# Patient Record
Sex: FEMALE | Race: BLACK OR AFRICAN AMERICAN | NOT HISPANIC OR LATINO | Employment: OTHER | URBAN - METROPOLITAN AREA
[De-identification: names, ages, dates, MRNs, and addresses within clinical notes are randomized per-mention and may not be internally consistent; named-entity substitution may affect disease eponyms.]

---

## 2017-01-19 ENCOUNTER — OFFICE VISIT (OUTPATIENT)
Dept: FAMILY MEDICINE | Facility: CLINIC | Age: 62
End: 2017-01-19
Payer: COMMERCIAL

## 2017-01-19 VITALS
DIASTOLIC BLOOD PRESSURE: 90 MMHG | WEIGHT: 196.13 LBS | HEART RATE: 76 BPM | OXYGEN SATURATION: 99 % | SYSTOLIC BLOOD PRESSURE: 140 MMHG | BODY MASS INDEX: 31.52 KG/M2 | HEIGHT: 66 IN | TEMPERATURE: 99 F

## 2017-01-19 DIAGNOSIS — I10 ESSENTIAL HYPERTENSION: Primary | ICD-10-CM

## 2017-01-19 DIAGNOSIS — L30.9 ECZEMA, UNSPECIFIED TYPE: ICD-10-CM

## 2017-01-19 DIAGNOSIS — K21.9 GASTROESOPHAGEAL REFLUX DISEASE, ESOPHAGITIS PRESENCE NOT SPECIFIED: ICD-10-CM

## 2017-01-19 DIAGNOSIS — R73.03 PREDIABETES: ICD-10-CM

## 2017-01-19 PROCEDURE — 99999 PR PBB SHADOW E&M-EST. PATIENT-LVL III: CPT | Mod: PBBFAC,,, | Performed by: FAMILY MEDICINE

## 2017-01-19 PROCEDURE — 1159F MED LIST DOCD IN RCRD: CPT | Mod: S$GLB,,, | Performed by: FAMILY MEDICINE

## 2017-01-19 PROCEDURE — 99214 OFFICE O/P EST MOD 30 MIN: CPT | Mod: S$GLB,,, | Performed by: FAMILY MEDICINE

## 2017-01-19 PROCEDURE — 3080F DIAST BP >= 90 MM HG: CPT | Mod: S$GLB,,, | Performed by: FAMILY MEDICINE

## 2017-01-19 PROCEDURE — 3077F SYST BP >= 140 MM HG: CPT | Mod: S$GLB,,, | Performed by: FAMILY MEDICINE

## 2017-01-19 RX ORDER — LOSARTAN POTASSIUM AND HYDROCHLOROTHIAZIDE 25; 100 MG/1; MG/1
1 TABLET ORAL DAILY
Qty: 90 TABLET | Refills: 4 | Status: SHIPPED | OUTPATIENT
Start: 2017-01-19 | End: 2018-04-20 | Stop reason: ALTCHOICE

## 2017-01-19 RX ORDER — HYDROCORTISONE 25 MG/G
OINTMENT TOPICAL 2 TIMES DAILY
Qty: 20 G | Refills: 0 | Status: SHIPPED | OUTPATIENT
Start: 2017-01-19 | End: 2017-05-01

## 2017-01-19 NOTE — PATIENT INSTRUCTIONS
Exercise for a Healthier Heart  You may wonder how you can improve the health of your heart. If youre thinking about exercise, youre on the right track. You dont need to become an athlete, but you do need a certain amount of brisk exercise to help strengthen your heart. If you have been diagnosed with a heart condition, your doctor may recommend exercise to help stabilize your condition. To help make exercise a habit, choose safe, fun activities.     Exercise with a friend. When activity is fun, you're more likely to stick with it.     Be sure to check with your healthcare provider before starting an exercise program.   Why exercise?  Exercising regularly offers many healthy rewards. It can help you do all of the following:  · Improve your blood cholesterol level to help prevent further heart trouble  · Lower your blood pressure to help prevent a stroke or heart attack  · Control diabetes, or reduce your risk of getting this disease  · Improve your heart and lung function  · Reach and maintain a healthy weight  · Make your muscles stronger and more limber so you can stay active  · Prevent falls and fractures by slowing the loss of bone mass (osteoporosis)  · Manage stress better  · Reduce your blood pressure  · Improve your sense of self and your body image  Exercise tips  Ease into your routine. Set small goals. Then build on them.  Exercise on most days. Aim for a total of 150 or more minutes of moderate to  vigorous intensity activity each week. Consider 40 minutes, 3 to 4 times a week. For best results, activity should last for 40 minutes on average. It is OK to work up to the 40 minute period over time. Examples of moderate-intensity activity is walking 1 mile in 15 minutes or 30 to 45 minutes of yard work.  Step up your daily activity level. Along with your exercise program, try being more active throughout the day. Walk instead of drive. Do more household tasks or yard work.  Choose one or more  activities you enjoy. Walking is one of the easiest things you can do. You can also try swimming, riding a bike, dancing, or taking an exercise class.  Stop exercising and call your doctor if you:  · Have chest pain or feel dizzy or lightheaded  · Feel burning, tightness, pressure, or heaviness in your chest, neck, shoulders, back, or arms  · Have unusual shortness of breath  · Have increased joint or muscle pain  · Have palpitations or an irregular heartbeat   © 9025-7399 [employer]. [address] [address]. All rights reserved. This information is not intended as a substitute for professional medical care. Always follow your healthcare professional's instructions.

## 2017-01-19 NOTE — PROGRESS NOTES
Routine Office Visit    Patient Name: Fauzia Kirk    : 1955  MRN: [id number]    Subjective:  Fauzia is a 61 y.o. female who presents today for     1. Hypertension - pt is here for prescription refill. She states blood pressure is better at home but becomes more elevated when she is at the doctor's office. Pt states during her surgery - there was concern for tachycardia. She would like to have her heart checked out.   2. gerd - pt c/o severe symptoms. She states that she has severe pain and reflux. The prilosec was working for her but when she saw the commercials on TV she stopped taking the prilosec. She has been taking otc zantac with minimal relief of symptoms. She would like to know what the next options were. She is scared of the side effects of prilosec.   3. Nagging hip pain - on left side - pain is intermittent. No aggravating / alleviating factors.   4. Rash on nose - chronic condition for patient. She does not know how it started but states she has had it for quite some time. It causes patient irritation         Review of Systems   Constitutional: Negative for chills and fever.   HENT: Negative for congestion.    Eyes: Negative for blurred vision.   Respiratory: Negative for cough.    Cardiovascular: Negative for chest pain.   Gastrointestinal: Positive for heartburn. Negative for abdominal pain, constipation, diarrhea, nausea and vomiting.   Genitourinary: Negative for dysuria.   Musculoskeletal: Negative for myalgias.   Skin: Positive for itching and rash.   Neurological: Negative for dizziness and headaches.   Psychiatric/Behavioral: Negative for depression.       Active Problem List  Patient Active Problem List   Diagnosis    Hypertension    History of left breast cancer    s/p L mastectomy, SLNBx     Ductal carcinoma in situ (DCIS) of left breast       Past Surgical History  Past Surgical History   Procedure Laterality Date    Mastectomy      Breast lumpectomy        "section, classic      Lipoma resection         Family History  Family History   Problem Relation Age of Onset    Lymphoma Mother     Prostate cancer Father     Stomach cancer Sister     Breast cancer Sister     Diabetes Brother     Breast cancer Paternal Aunt     Breast cancer Paternal Grandmother        Social History  Social History     Social History    Marital status:      Spouse name: N/A    Number of children: N/A    Years of education: N/A     Occupational History    Not on file.     Social History Main Topics    Smoking status: Never Smoker    Smokeless tobacco: Not on file    Alcohol use No    Drug use: No    Sexual activity: Not on file     Other Topics Concern    Not on file     Social History Narrative       Medications and Allergies  Reviewed and updated.       Physical Exam  Visit Vitals    BP (!) 140/90 (BP Location: Right arm, Patient Position: Sitting, BP Method: Manual)    Pulse 76    Temp 98.5 °F (36.9 °C) (Oral)    Ht 5' 6" (1.676 m)    Wt 89 kg (196 lb 1.6 oz)    SpO2 99%    BMI 31.65 kg/m2     Physical Exam   Constitutional: She is oriented to person, place, and time. She appears well-developed and well-nourished.   HENT:   Head: Normocephalic and atraumatic.   Eczematous rash on nares   Eyes: Conjunctivae and EOM are normal. Pupils are equal, round, and reactive to light.   Neck: Normal range of motion. Neck supple.   Cardiovascular: Normal rate, regular rhythm and normal heart sounds.  Exam reveals no gallop and no friction rub.    No murmur heard.  Pulmonary/Chest: Breath sounds normal. No respiratory distress.   Abdominal: Soft. Bowel sounds are normal. She exhibits no distension. There is no tenderness.   Musculoskeletal: Normal range of motion.   Lymphadenopathy:     She has no cervical adenopathy.   Neurological: She is alert and oriented to person, place, and time.   Skin: Skin is warm.   Psychiatric: She has a normal mood and affect. "         Assessment/Plan:  Fauzia Kirk is a 61 y.o. female who presents today for :    Essential hypertension  -     losartan-hydrochlorothiazide 100-25 mg (HYZAAR) 100-25 mg per tablet; Take 1 tablet by mouth once daily.  Dispense: 90 tablet; Refill: 4  -     Echo doppler color flow; Future  -     Lipid panel; Future; Expected date: 1/19/17  The current medical regimen is effective;  continue present plan and medications.    Prediabetes  -     HEMOGLOBIN A1C; Future; Expected date: 1/19/17  hga1c 6.1 last year  The patient is asked to make an attempt to improve diet and exercise patterns to aid in medical management of this problem.    Eczema, unspecified type  -     hydrocortisone 2.5 % ointment; Apply topically 2 (two) times daily.  Dispense: 20 g; Refill: 0  Will refer to dermatology if not improved     Gastroesophageal reflux disease, esophagitis presence not specified  -     ranitidine (ZANTAC) 150 MG tablet; Take 1 tablet (150 mg total) by mouth 2 (two) times daily.  Dispense: 180 tablet; Refill: 1  Avoid fried fatty foods  Avoid spicy foods  May alternate with prilosec if needed  Will give increase dose of zantac         No Follow-up on file.

## 2017-01-19 NOTE — MR AVS SNAPSHOT
[facility]  [address]  [place] [address]  Phone: [phone]  Fax: [phone]                  Fauzia Kirk   2017 10:15 AM   Office Visit    Description:  Female : 1955   Provider:  Era Urena MD   Department:  [facility] Medicine           Reason for Visit     Hypertension           Diagnoses this Visit        Comments    Prediabetes    -  Primary     Essential hypertension                To Do List           Goals (5 Years of Data)     None       These Medications        Disp Refills Start End    ranitidine (ZANTAC) 150 MG tablet 180 tablet 1 2017    Take 1 tablet (150 mg total) by mouth 2 (two) times daily. - Oral    Pharmacy: [place] [address] [place] [address] Ph #: [phone]       losartan-hydrochlorothiazide 100-25 mg (HYZAAR) 100-25 mg per tablet 90 tablet 4 2017     Take 1 tablet by mouth once daily. - Oral    Pharmacy: [place] [address] [place] [address] Ph #: [phone]       hydrocortisone 2.5 % ointment 20 g 0 2017    Apply topically 2 (two) times daily. - Topical (Top)    Pharmacy: [place] [address] [place] [address] Ph #: [phone]         [facility] On [facility]     [facility] On Call Nurse Care Line -  Assistance  Registered nurses in the [facility] provide clinical advisement, health education, appointment booking, and other advisory services.  Call for this free service at [phone].             Medications           Message regarding Medications     Verify the changes and/or additions to your medication regime listed below are the same as discussed with your clinician today.  If any of these changes or additions are incorrect, please notify your healthcare provider.        START taking these NEW medications        Refills    ranitidine (ZANTAC) 150 MG tablet 1    Sig: Take 1 tablet (150  "mg total) by mouth 2 (two) times daily.    Class: Normal    Route: Oral    hydrocortisone 2.5 % ointment 0    Sig: Apply topically 2 (two) times daily.    Class: Normal    Route: Topical (Top)      CHANGE how you are taking these medications     Start Taking Instead of    losartan-hydrochlorothiazide 100-25 mg (HYZAAR) 100-25 mg per tablet losartan-hydrochlorothiazide 100-25 mg (HYZAAR) 100-25 mg per tablet    Dosage:  Take 1 tablet by mouth once daily. Dosage:  Take 1 tablet by mouth once daily. Needs appointment for future refills    Reason for Change:  Reorder            Verify that the below list of medications is an accurate representation of the medications you are currently taking.  If none reported, the list may be blank. If incorrect, please contact your healthcare provider. Carry this list with you in case of emergency.           Current Medications     losartan-hydrochlorothiazide 100-25 mg (HYZAAR) 100-25 mg per tablet Take 1 tablet by mouth once daily.    hydrocortisone 2.5 % ointment Apply topically 2 (two) times daily.    ranitidine (ZANTAC) 150 MG tablet Take 1 tablet (150 mg total) by mouth 2 (two) times daily.           Clinical Reference Information           Vital Signs - Last Recorded  Most recent update: 1/19/2017 10:15 AM by Arun Wilkins MA    BP Pulse Temp Ht Wt SpO2    (!) 140/90 (BP Location: Right arm, Patient Position: Sitting, BP Method: Manual) 76 98.5 °F (36.9 °C) (Oral) 5' 6" (1.676 m) 89 kg (196 lb 1.6 oz) 99%    BMI                31.65 kg/m2          Blood Pressure          Most Recent Value    BP  (!)  140/90      Allergies as of 1/19/2017     No Known Allergies      Immunizations Administered on Date of Encounter - 1/19/2017     None      Orders Placed During Today's Visit     Future Labs/Procedures Expected by Expires    HEMOGLOBIN A1C  1/19/2017 3/20/2018    Lipid panel  1/19/2017 1/19/2018    Echo doppler color flow  As directed 1/19/2018      [facility] Sign-Up     " Activating your [facility] account is as easy as 1-2-3!     1) Visit [email or web address], select Sign Up Now, enter this activation code and your date of birth, then select Next.  [email or web address]  Expires: 3/5/2017 10:53 AM      2) Create a username and password to use when you visit [facility] in the future and select a security question in case you lose your password and select Next.    3) Enter your e-mail address and click Sign Up!    Additional Information  If you have questions, please e-mail [email or web address] or call [phone] to talk to our [facility] staff. Remember, [facility] is NOT to be used for urgent needs. For medical emergencies, dial 911.         Instructions      Exercise for a Healthier Heart  You may wonder how you can improve the health of your heart. If youre thinking about exercise, youre on the right track. You dont need to become an athlete, but you do need a certain amount of brisk exercise to help strengthen your heart. If you have been diagnosed with a heart condition, your doctor may recommend exercise to help stabilize your condition. To help make exercise a habit, choose safe, fun activities.     Exercise with a friend. When activity is fun, you're more likely to stick with it.     Be sure to check with your healthcare provider before starting an exercise program.   Why exercise?  Exercising regularly offers many healthy rewards. It can help you do all of the following:  · Improve your blood cholesterol level to help prevent further heart trouble  · Lower your blood pressure to help prevent a stroke or heart attack  · Control diabetes, or reduce your risk of getting this disease  · Improve your heart and lung function  · Reach and maintain a healthy weight  · Make your muscles stronger and more limber so you can stay active  · Prevent falls and fractures by slowing the loss of bone mass (osteoporosis)  · Manage stress better  · Reduce your blood pressure  · Improve your sense of  self and your body image  Exercise tips  Ease into your routine. Set small goals. Then build on them.  Exercise on most days. Aim for a total of 150 or more minutes of moderate to  vigorous intensity activity each week. Consider 40 minutes, 3 to 4 times a week. For best results, activity should last for 40 minutes on average. It is OK to work up to the 40 minute period over time. Examples of moderate-intensity activity is walking 1 mile in 15 minutes or 30 to 45 minutes of yard work.  Step up your daily activity level. Along with your exercise program, try being more active throughout the day. Walk instead of drive. Do more household tasks or yard work.  Choose one or more activities you enjoy. Walking is one of the easiest things you can do. You can also try swimming, riding a bike, dancing, or taking an exercise class.  Stop exercising and call your doctor if you:  · Have chest pain or feel dizzy or lightheaded  · Feel burning, tightness, pressure, or heaviness in your chest, neck, shoulders, back, or arms  · Have unusual shortness of breath  · Have increased joint or muscle pain  · Have palpitations or an irregular heartbeat   © 6721-1553 [employer]. [address] [place] [address], [place] [address]. All rights reserved. This information is not intended as a substitute for professional medical care. Always follow your healthcare professional's instructions.

## 2017-01-24 DIAGNOSIS — I10 ESSENTIAL HYPERTENSION: ICD-10-CM

## 2017-01-24 RX ORDER — LOSARTAN POTASSIUM AND HYDROCHLOROTHIAZIDE 25; 100 MG/1; MG/1
TABLET ORAL
Qty: 30 TABLET | Refills: 0 | Status: SHIPPED | OUTPATIENT
Start: 2017-01-24 | End: 2017-04-18 | Stop reason: SDUPTHER

## 2017-02-03 ENCOUNTER — HOSPITAL ENCOUNTER (OUTPATIENT)
Dept: CARDIOLOGY | Facility: HOSPITAL | Age: 62
Discharge: HOME | End: 2017-02-03
Attending: FAMILY MEDICINE
Payer: COMMERCIAL

## 2017-02-03 DIAGNOSIS — I10 ESSENTIAL HYPERTENSION: ICD-10-CM

## 2017-02-03 LAB
DIASTOLIC DYSFUNCTION: NO
ESTIMATED PA SYSTOLIC PRESSURE: 28.07
GLOBAL PERICARDIAL EFFUSION: NORMAL
MITRAL VALVE MOBILITY: NORMAL
RETIRED EF AND QEF - SEE NOTES: 60 (ref 55–65)

## 2017-02-03 PROCEDURE — 93306 TTE W/DOPPLER COMPLETE: CPT

## 2017-02-03 PROCEDURE — 93306 TTE W/DOPPLER COMPLETE: CPT | Mod: 26,,, | Performed by: INTERNAL MEDICINE

## 2017-02-06 ENCOUNTER — TELEPHONE (OUTPATIENT)
Dept: FAMILY MEDICINE | Facility: CLINIC | Age: 62
End: 2017-02-06

## 2017-02-06 NOTE — TELEPHONE ENCOUNTER
----- Message from Era Urena MD sent at 2/4/2017  8:19 PM CST -----  Normal ultrasound of the heart.

## 2017-04-18 ENCOUNTER — TELEPHONE (OUTPATIENT)
Dept: FAMILY MEDICINE | Facility: CLINIC | Age: 62
End: 2017-04-18

## 2017-04-18 ENCOUNTER — OFFICE VISIT (OUTPATIENT)
Dept: FAMILY MEDICINE | Facility: CLINIC | Age: 62
End: 2017-04-18
Payer: COMMERCIAL

## 2017-04-18 ENCOUNTER — HOSPITAL ENCOUNTER (OUTPATIENT)
Dept: RADIOLOGY | Facility: HOSPITAL | Age: 62
Discharge: HOME | End: 2017-04-18
Attending: INTERNAL MEDICINE
Payer: COMMERCIAL

## 2017-04-18 VITALS
OXYGEN SATURATION: 98 % | WEIGHT: 192.13 LBS | HEIGHT: 66 IN | DIASTOLIC BLOOD PRESSURE: 82 MMHG | HEART RATE: 78 BPM | BODY MASS INDEX: 30.88 KG/M2 | SYSTOLIC BLOOD PRESSURE: 130 MMHG | TEMPERATURE: 98 F

## 2017-04-18 DIAGNOSIS — B96.89 ACUTE BACTERIAL BRONCHITIS: ICD-10-CM

## 2017-04-18 DIAGNOSIS — R05.9 COUGH: Primary | ICD-10-CM

## 2017-04-18 DIAGNOSIS — J20.8 ACUTE BACTERIAL BRONCHITIS: ICD-10-CM

## 2017-04-18 DIAGNOSIS — R05.9 COUGH: ICD-10-CM

## 2017-04-18 PROCEDURE — 3075F SYST BP GE 130 - 139MM HG: CPT | Mod: S$GLB,,, | Performed by: INTERNAL MEDICINE

## 2017-04-18 PROCEDURE — 3079F DIAST BP 80-89 MM HG: CPT | Mod: S$GLB,,, | Performed by: INTERNAL MEDICINE

## 2017-04-18 PROCEDURE — 1160F RVW MEDS BY RX/DR IN RCRD: CPT | Mod: S$GLB,,, | Performed by: INTERNAL MEDICINE

## 2017-04-18 PROCEDURE — 99999 PR PBB SHADOW E&M-EST. PATIENT-LVL III: CPT | Mod: PBBFAC,,, | Performed by: INTERNAL MEDICINE

## 2017-04-18 PROCEDURE — 71020 XR CHEST PA AND LATERAL: CPT | Mod: TC,PO

## 2017-04-18 PROCEDURE — 71020 XR CHEST PA AND LATERAL: CPT | Mod: 26,,, | Performed by: RADIOLOGY

## 2017-04-18 PROCEDURE — 99214 OFFICE O/P EST MOD 30 MIN: CPT | Mod: S$GLB,,, | Performed by: INTERNAL MEDICINE

## 2017-04-18 RX ORDER — AMOXICILLIN 875 MG/1
875 TABLET, FILM COATED ORAL EVERY 12 HOURS
Qty: 14 TABLET | Refills: 0 | Status: SHIPPED | OUTPATIENT
Start: 2017-04-18 | End: 2017-04-18 | Stop reason: SDUPTHER

## 2017-04-18 RX ORDER — AMOXICILLIN 875 MG/1
875 TABLET, FILM COATED ORAL EVERY 12 HOURS
Qty: 14 TABLET | Refills: 0 | Status: SHIPPED | OUTPATIENT
Start: 2017-04-18 | End: 2017-04-25

## 2017-04-18 NOTE — TELEPHONE ENCOUNTER
"Left VM asking her to call back. Chest X ray shows "opacity" in the right side - could be pneumonia but not sure.  Take the antibiotic but needs to follow up and repeat the chest X ray in 3 weeks.  "

## 2017-04-18 NOTE — PROGRESS NOTES
Assessment & Plan  Cough  Acute bacterial bronchitis  - couugh ongoing x weeks, hx of perennial rhinitis, hx of nasal sprays ineffective, cough persisting despite benadryl use.  Check CXR, will send in amoxicillin 7 day course.  -     X-Ray Chest PA And Lateral; Future; Expected date: 4/18/17  -     Discontinue: amoxicillin (AMOXIL) 875 MG tablet; Take 1 tablet (875 mg total) by mouth every 12 (twelve) hours.  Dispense: 14 tablet; Refill: 0  -     amoxicillin (AMOXIL) 875 MG tablet; Take 1 tablet (875 mg total) by mouth every 12 (twelve) hours.  Dispense: 14 tablet; Refill: 0    Medications Discontinued During This Encounter   Medication Reason    losartan-hydrochlorothiazide 100-25 mg (HYZAAR) 100-25 mg per tablet Duplicate Order    amoxicillin (AMOXIL) 875 MG tablet Reorder       Follow-up: No Follow-up on file.      =================================================================      Chief Complaint   Patient presents with    Cough    Sinus Problem       HPI  Fauzia is a 61 y.o. female, last appointment with this clinic was 1/19/2017.    No LMP recorded. Patient has had a hysterectomy.    HTN  GERD  Hx of breast CA 1990 with recurrence, s/p mastectomy 2016  Pre-DM    Notes cough that won't quit.  Notes a hx of frequent URIs.  Sometimes blood with sputum in the AM cough.  Duration - weeks, off and on.  No accompanying fevers/chills subjective.  Cough all day, off and on.  Dry other than in the morning.  No sinus congestion.  OTC benadryl helps with the nasal allergy component.  Takes H2 for GERD - notes it's controlled.  Notes year long allergic rhinitis. Minor sniffling.     Patient Care Team:  Era Uerna MD as PCP - General (Family Medicine)    Patient Active Problem List    Diagnosis Date Noted    Ductal carcinoma in situ (DCIS) of left breast 11/25/2016    s/p L mastectomy, SLNBx 11/7 11/07/2016    History of left breast cancer 10/06/2015    Hypertension 03/18/2015       PAST MEDICAL HISTORY:  Past  "Medical History:   Diagnosis Date    Arthritis     Breast cancer     Hypertension        PAST SURGICAL HISTORY:  Past Surgical History:   Procedure Laterality Date    BREAST LUMPECTOMY       SECTION, CLASSIC      LIPOMA RESECTION      MASTECTOMY         SOCIAL HISTORY:  Social History     Social History    Marital status:      Spouse name: N/A    Number of children: N/A    Years of education: N/A     Occupational History    Not on file.     Social History Main Topics    Smoking status: Never Smoker    Smokeless tobacco: Not on file    Alcohol use No    Drug use: No    Sexual activity: Not on file     Other Topics Concern    Not on file     Social History Narrative       ALLERGIES AND MEDICATIONS: updated and reviewed.  Review of patient's allergies indicates:  No Known Allergies  Current Outpatient Prescriptions   Medication Sig Dispense Refill    losartan-hydrochlorothiazide 100-25 mg (HYZAAR) 100-25 mg per tablet Take 1 tablet by mouth once daily. 90 tablet 4    ranitidine (ZANTAC) 150 MG tablet Take 1 tablet (150 mg total) by mouth 2 (two) times daily. 180 tablet 1    hydrocortisone 2.5 % ointment Apply topically 2 (two) times daily. 20 g 0     No current facility-administered medications for this visit.        Review of Systems   Constitutional: Negative for chills, fever and malaise/fatigue.   HENT: Negative for congestion, ear pain, hearing loss and sore throat.    Respiratory: Positive for cough and sputum production. Negative for hemoptysis and shortness of breath.    Cardiovascular: Negative for chest pain.   Musculoskeletal: Negative for myalgias and neck pain.   Skin: Negative for rash.   Neurological: Negative for headaches.       Physical Exam   Vitals:    17 1053   BP: 130/82   Pulse: 78   Temp: 98.3 °F (36.8 °C)   SpO2: 98%   Weight: 87.1 kg (192 lb 2.1 oz)   Height: 5' 6" (1.676 m)    Body mass index is 31.01 kg/(m^2).  Weight: 87.1 kg (192 lb 2.1 oz) " "  Height: 5' 6" (167.6 cm)     Physical Exam   Constitutional: She is oriented to person, place, and time. She appears well-developed and well-nourished.   HENT:   Right Ear: Ear canal normal.   Left Ear: Tympanic membrane and ear canal normal.   Mouth/Throat: No oral lesions. No oropharyngeal exudate or posterior oropharyngeal erythema.   R TM obscured by impacted cerumen   Eyes: EOM are normal.   Neck: Neck supple.   Cardiovascular: Normal rate, regular rhythm and normal heart sounds.    Pulmonary/Chest: Effort normal and breath sounds normal. She has no wheezes.   Cough during exam - with some rhonchi minimal   Lymphadenopathy:     She has no cervical adenopathy.   Neurological: She is alert and oriented to person, place, and time.   Skin: Skin is warm and dry.   Psychiatric: She has a normal mood and affect. Her behavior is normal.     "

## 2017-04-20 DIAGNOSIS — R93.89 ABNORMAL CXR: Primary | ICD-10-CM

## 2017-04-20 NOTE — TELEPHONE ENCOUNTER
Spoke w/patient, informed of xray result and recommendation. Verbalized understanding. Xray scheduled 5/9/17.

## 2017-05-01 ENCOUNTER — OFFICE VISIT (OUTPATIENT)
Dept: FAMILY MEDICINE | Facility: CLINIC | Age: 62
End: 2017-05-01
Payer: COMMERCIAL

## 2017-05-01 VITALS
OXYGEN SATURATION: 99 % | HEART RATE: 84 BPM | TEMPERATURE: 99 F | SYSTOLIC BLOOD PRESSURE: 123 MMHG | HEIGHT: 66 IN | DIASTOLIC BLOOD PRESSURE: 75 MMHG

## 2017-05-01 DIAGNOSIS — J18.9 PNEUMONIA OF RIGHT MIDDLE LOBE DUE TO INFECTIOUS ORGANISM: Primary | ICD-10-CM

## 2017-05-01 PROCEDURE — 99213 OFFICE O/P EST LOW 20 MIN: CPT | Mod: S$GLB,,, | Performed by: INTERNAL MEDICINE

## 2017-05-01 PROCEDURE — 3074F SYST BP LT 130 MM HG: CPT | Mod: S$GLB,,, | Performed by: INTERNAL MEDICINE

## 2017-05-01 PROCEDURE — 99999 PR PBB SHADOW E&M-EST. PATIENT-LVL III: CPT | Mod: PBBFAC,,, | Performed by: INTERNAL MEDICINE

## 2017-05-01 PROCEDURE — 3078F DIAST BP <80 MM HG: CPT | Mod: S$GLB,,, | Performed by: INTERNAL MEDICINE

## 2017-05-01 PROCEDURE — 1160F RVW MEDS BY RX/DR IN RCRD: CPT | Mod: S$GLB,,, | Performed by: INTERNAL MEDICINE

## 2017-05-01 RX ORDER — AZITHROMYCIN 250 MG/1
TABLET, FILM COATED ORAL
Qty: 6 TABLET | Refills: 0 | Status: SHIPPED | OUTPATIENT
Start: 2017-05-01 | End: 2017-05-18

## 2017-05-01 RX ORDER — PROMETHAZINE HYDROCHLORIDE AND DEXTROMETHORPHAN HYDROBROMIDE 6.25; 15 MG/5ML; MG/5ML
5 SYRUP ORAL EVERY 12 HOURS PRN
Qty: 60 ML | Refills: 0 | Status: SHIPPED | OUTPATIENT
Start: 2017-05-01 | End: 2017-05-11

## 2017-05-01 NOTE — PROGRESS NOTES
Assessment & Plan  Pneumonia of right middle lobe due to infectious organism - opacity of the great fissure, will empirically treat for pneumonia, with zithromax.  Promethazine/DM for cough.  If no improvement at all - CT chest.  Repeat CXR - push out 3 weeks.  -     azithromycin (ZITHROMAX Z-LAMAR) 250 MG tablet; Take 2 pills day 1, then 1 pill day 2-5.  Dispense: 6 tablet; Refill: 0  -     promethazine-dextromethorphan (PROMETHAZINE-DM) 6.25-15 mg/5 mL Syrp; Take 5 mLs by mouth every 12 (twelve) hours as needed.  Dispense: 60 mL; Refill: 0        Medications Discontinued During This Encounter   Medication Reason    hydrocortisone 2.5 % ointment Patient no longer taking       Follow-up: No Follow-up on file.      =================================================================      Chief Complaint   Patient presents with    URI     cold symtoms not going away       HPI  Fauzia is a 61 y.o. female, last appointment with this clinic was 2017.    No LMP recorded. Patient has had a hysterectomy.    She finished her course of amoxicillin from last visit and feels no improvement whatsoever.  Cough, closely, nonproductive.  No fevers or chills.    She is scheduled to repeat her chest x-ray in a week and a half I believe.  We reviewed her chest x-ray together today.  Opacity in the right lung, in the sulcus.    Patient Care Team:  Era Urena MD as PCP - General (Family Medicine)    Patient Active Problem List    Diagnosis Date Noted    Ductal carcinoma in situ (DCIS) of left breast 2016    s/p L mastectomy, SLNBx 2016    History of left breast cancer 10/06/2015    Hypertension 2015       PAST MEDICAL HISTORY:  Past Medical History:   Diagnosis Date    Arthritis     Breast cancer     Hypertension        PAST SURGICAL HISTORY:  Past Surgical History:   Procedure Laterality Date    BREAST LUMPECTOMY       SECTION, CLASSIC      LIPOMA RESECTION      MASTECTOMY         SOCIAL  "HISTORY:  Social History     Social History    Marital status:      Spouse name: N/A    Number of children: N/A    Years of education: N/A     Occupational History    Not on file.     Social History Main Topics    Smoking status: Never Smoker    Smokeless tobacco: Not on file    Alcohol use No    Drug use: No    Sexual activity: Yes     Partners: Male     Other Topics Concern    Not on file     Social History Narrative    No narrative on file       ALLERGIES AND MEDICATIONS: updated and reviewed.  Review of patient's allergies indicates:  No Known Allergies  Current Outpatient Prescriptions   Medication Sig Dispense Refill    losartan-hydrochlorothiazide 100-25 mg (HYZAAR) 100-25 mg per tablet Take 1 tablet by mouth once daily. 90 tablet 4    ranitidine (ZANTAC) 150 MG tablet Take 1 tablet (150 mg total) by mouth 2 (two) times daily. 180 tablet 1     No current facility-administered medications for this visit.        ROS    Physical Exam   Vitals:    05/01/17 1556   BP: 123/75   BP Location: Right arm   Patient Position: Sitting   BP Method: Automatic   Pulse: 84   Temp: 98.6 °F (37 °C)   TempSrc: Oral   SpO2: 99%   Height: 5' 6" (1.676 m)    There is no height or weight on file to calculate BMI.      Height: 5' 6" (167.6 cm)     Physical Exam   Constitutional: She is oriented to person, place, and time. She appears well-developed and well-nourished. No distress.   Eyes: EOM are normal.   Cardiovascular: Normal rate, regular rhythm and normal heart sounds.    No murmur heard.  Pulmonary/Chest: Effort normal and breath sounds normal.   Musculoskeletal: Normal range of motion.   Neurological: She is alert and oriented to person, place, and time. Coordination normal.   Skin: Skin is warm and dry.   Psychiatric: She has a normal mood and affect. Her behavior is normal. Thought content normal.        CXR 4/18 PA and lateral    There is a rounded opacity overlying the right major fissure. The cardiac " silhouette is within normal limits.    Impression comment a rounded opacification overlying the right major fissure. Decreased as is the appearance of the suggested may be fluid within the fissure. Repeat chest ray graft following treatment to ensure resolution is recommended.

## 2017-05-01 NOTE — LETTER
May 1, 2017      Robert Fischer, BEVERLY  [address]  [place] [address]           [facility]  [address]  [place] [address]  Phone: [phone]  Fax: [phone]          Patient: Fauzia Kirk   MR Number: [id number]   YOB: 1955   Date of Visit: 5/1/2017       Dear Robert Fischer:    Thank you for referring Fauzia Kirk to me for evaluation. Attached you will find relevant portions of my assessment and plan of care.    If you have questions, please do not hesitate to call me. I look forward to following Fauzia Kirk along with you.    Sincerely,    Elvis De La Cruz MD    Enclosure  CC:  No Recipients    If you would like to receive this communication electronically, please contact [email or web address] or [phone] to request more information on [employer] Link access.    For providers and/or their staff who would like to refer a patient to [facility], please contact us through our one-stop-shop provider referral line, [facility] [place], at [phone].    If you feel you have received this communication in error or would no longer like to receive these types of communications, please e-mail [email or web address]

## 2017-05-01 NOTE — MR AVS SNAPSHOT
[facility]  [address]  [place] [address]  Phone: [phone]  Fax: [phone]                  Fauzia Kirk   2017 4:00 PM   Office Visit    Description:  Female : 1955   Provider:  Elvis De La Cruz MD   Department[facility]  [facility]           Reason for Visit     URI           Diagnoses this Visit        Comments    Pneumonia of right middle lobe due to infectious organism    -  Primary            To Do List           Future Appointments        Provider Department Dept Phone    2017 10:00 AM Wyatt Mai MD [place] - Hematology Oncology [phone]      Goals (5 Years of Data)     None       These Medications        Disp Refills Start End    azithromycin (ZITHROMAX Z-LAMAR) 250 MG tablet 6 tablet 0 2017     Take 2 pills day 1, then 1 pill day 2-5.    Pharmacy: [place] [address] [place] [address] Ph #: [phone]       promethazine-dextromethorphan (PROMETHAZINE-DM) 6.25-15 mg/5 mL Syrp 60 mL 0 2017    Take 5 mLs by mouth every 12 (twelve) hours as needed. - Oral    Pharmacy: [place] [address] [place] [address] Ph #: [phone]         [facility] On Call     [facility] On Call Nurse Care Line - 24 Assistance  Unless otherwise directed by your provider, please contact [facility], our nurse care line that is available for  assistance.     Registered nurses in the [facility] provide: appointment scheduling, clinical advisement, health education, and other advisory services.  Call: [phone] (toll free)               Medications           Message regarding Medications     Verify the changes and/or additions to your medication regime listed below are the same as discussed with your clinician today.  If any of these changes or additions are incorrect, please notify your healthcare provider.        START taking these NEW medications        Refills  "   azithromycin (ZITHROMAX Z-LAMAR) 250 MG tablet 0    Sig: Take 2 pills day 1, then 1 pill day 2-5.    Class: Normal    promethazine-dextromethorphan (PROMETHAZINE-DM) 6.25-15 mg/5 mL Syrp 0    Sig: Take 5 mLs by mouth every 12 (twelve) hours as needed.    Class: Normal    Route: Oral      STOP taking these medications     hydrocortisone 2.5 % ointment Apply topically 2 (two) times daily.           Verify that the below list of medications is an accurate representation of the medications you are currently taking.  If none reported, the list may be blank. If incorrect, please contact your healthcare provider. Carry this list with you in case of emergency.           Current Medications     losartan-hydrochlorothiazide 100-25 mg (HYZAAR) 100-25 mg per tablet Take 1 tablet by mouth once daily.    ranitidine (ZANTAC) 150 MG tablet Take 1 tablet (150 mg total) by mouth 2 (two) times daily.    azithromycin (ZITHROMAX Z-LAMAR) 250 MG tablet Take 2 pills day 1, then 1 pill day 2-5.    promethazine-dextromethorphan (PROMETHAZINE-DM) 6.25-15 mg/5 mL Syrp Take 5 mLs by mouth every 12 (twelve) hours as needed.           Clinical Reference Information           Your Vitals Were     BP Pulse Temp Height SpO2       123/75 (BP Location: Right arm, Patient Position: Sitting, BP Method: Automatic) 84 98.6 °F (37 °C) (Oral) 5' 6" (1.676 m) 99%       Blood Pressure          Most Recent Value    BP  123/75      Allergies as of 5/1/2017     No Known Allergies      Immunizations Administered on Date of Encounter - 5/1/2017     None      Language Assistance Services     ATTENTION: Language assistance services are available, free of charge. Please call [phone].      ATENCIÓN: Si habla español, tiene a alcala disposición servicios gratuitos de asistencia lingüística. Llame al [phone].     CHÚ Ý: N?u b?n nói Ti?ng Vi?t, có các d?ch v? h? tr? ngôn ng? mi?n phí dành cho b?n. G?i s? [phone].         [facility] " complies with applicable Federal civil rights laws and does not discriminate on the basis of race, color, national origin, age, disability, or sex.

## 2017-05-18 ENCOUNTER — OFFICE VISIT (OUTPATIENT)
Dept: HEMATOLOGY/ONCOLOGY | Facility: CLINIC | Age: 62
End: 2017-05-18
Payer: COMMERCIAL

## 2017-05-18 VITALS
BODY MASS INDEX: 32.02 KG/M2 | DIASTOLIC BLOOD PRESSURE: 77 MMHG | HEART RATE: 100 BPM | RESPIRATION RATE: 17 BRPM | WEIGHT: 198.44 LBS | SYSTOLIC BLOOD PRESSURE: 174 MMHG | TEMPERATURE: 99 F

## 2017-05-18 DIAGNOSIS — D05.12 DUCTAL CARCINOMA IN SITU (DCIS) OF LEFT BREAST: ICD-10-CM

## 2017-05-18 DIAGNOSIS — J18.9 PNEUMONIA OF RIGHT LUNG DUE TO INFECTIOUS ORGANISM, UNSPECIFIED PART OF LUNG: ICD-10-CM

## 2017-05-18 DIAGNOSIS — Z85.3 HISTORY OF LEFT BREAST CANCER: Primary | ICD-10-CM

## 2017-05-18 PROCEDURE — 99213 OFFICE O/P EST LOW 20 MIN: CPT | Mod: S$GLB,,, | Performed by: INTERNAL MEDICINE

## 2017-05-18 PROCEDURE — 99999 PR PBB SHADOW E&M-EST. PATIENT-LVL III: CPT | Mod: PBBFAC,,, | Performed by: INTERNAL MEDICINE

## 2017-05-18 PROCEDURE — 3077F SYST BP >= 140 MM HG: CPT | Mod: S$GLB,,, | Performed by: INTERNAL MEDICINE

## 2017-05-18 PROCEDURE — 3078F DIAST BP <80 MM HG: CPT | Mod: S$GLB,,, | Performed by: INTERNAL MEDICINE

## 2017-05-18 NOTE — PROGRESS NOTES
Subjective:       Patient ID: Fauzia Kirk is a 61 y.o. female.    Chief Complaint: Breast Cancer    HPI Ms. Kirk is a very pleasant 61-year-old        female who returned to clinic for followup of history of bilateral carcinoma of the breast.  In December 2016 she began to have some blood in her mucus when she would cough in the morning only.  That was intermittent.  Neuro April she developed a persistent cough and saw her physician on April 18.  Chest x-ray at that time showed a rounded opacity in the right lung overlying the major fissure.  She initially received a course of amoxicillin without any benefit and then took azithromycin which she thinks has helped.  She T's to have occasional tinge of blood in her mucus but has cervical mucus production.  She's had no shortness of breath or chest pain although she reports that her right chest feels a little different when she is lying on her right side.  She's had no fevers or sweats.          On October 17 left mammogram showed increased calcifications in the left breast at 3:00.  On October 27 a biopsy showed DCIS with solid, cribriform, and micropapillary patterns.  Tumor was 95% ER positive at 95% [place] positive    On November 7, 2016 she underwent left mastectomy which showed 8 mm of DCIS and negative margins.    She's been recovering from that fairly well but has some postoperative pain.      History:She had a stage I, ER negative carcinoma of the    left breast in 1990, treated with lumpectomy and radiation therapy.  In      1993, she developed a stage I, ER positive carcinoma of the right breast     treated with lumpectomy and radiation.  In 1995, she developed left breast   cancer recurrence, treated with lumpectomy, brachytherapy and four cycles    of Adriamycin and Cytoxan.  In 1996, she developed a new right breast        cancer T2 N0 poorly differentiated, treated with four cycles of              preoperative Taxol followed by mastectomy.           Review of Systems   Constitutional: Negative for activity change, appetite change, fever and unexpected weight change.   Respiratory: Positive for cough. Negative for shortness of breath.    Cardiovascular: Negative for chest pain.   Gastrointestinal: Negative for abdominal pain and nausea.   Neurological: Negative for headaches.   Psychiatric/Behavioral: Negative for dysphoric mood.       Objective:    /70 - right arm while sitting  Physical Exam   Constitutional: She appears well-developed and well-nourished.   HENT:   Mouth/Throat: Oropharynx is clear and moist. No oropharyngeal exudate.   Cardiovascular: Normal rate, regular rhythm and normal heart sounds.    Pulmonary/Chest: Effort normal and breath sounds normal. She has no wheezes. She has no rales. Left breast exhibits no mass, no nipple discharge and no skin change.       Abdominal: Soft. She exhibits no mass. There is no tenderness.   Lymphadenopathy:     She has no cervical adenopathy.     She has no axillary adenopathy.        Right: No supraclavicular adenopathy present.        Left: No supraclavicular adenopathy present.   Psychiatric: She has a normal mood and affect. Her behavior is normal. Thought content normal.       Assessment:      chest x-ray images reviewed.    1. History of left breast cancer    2. Ductal carcinoma in situ (DCIS) of left breast    3. Pneumonia of right lung due to infectious organism, unspecified part of lung        Plan:     Will get CT scan of the chest to further characterize her lung findings.  Further decisions based on that result.

## 2017-05-22 ENCOUNTER — HOSPITAL ENCOUNTER (OUTPATIENT)
Dept: RADIOLOGY | Facility: HOSPITAL | Age: 62
Discharge: HOME | End: 2017-05-22
Attending: INTERNAL MEDICINE
Payer: COMMERCIAL

## 2017-05-22 DIAGNOSIS — J18.9 PNEUMONIA OF RIGHT LUNG DUE TO INFECTIOUS ORGANISM, UNSPECIFIED PART OF LUNG: ICD-10-CM

## 2017-05-22 DIAGNOSIS — Z85.3 HISTORY OF LEFT BREAST CANCER: ICD-10-CM

## 2017-05-22 PROCEDURE — 71250 CT THORAX DX C-: CPT | Mod: TC

## 2017-05-22 PROCEDURE — 71250 CT THORAX DX C-: CPT | Mod: 26,,, | Performed by: RADIOLOGY

## 2017-05-25 DIAGNOSIS — R91.8 LUNG MASS: Primary | ICD-10-CM

## 2017-05-29 ENCOUNTER — OFFICE VISIT (OUTPATIENT)
Dept: PULMONOLOGY | Facility: CLINIC | Age: 62
End: 2017-05-29
Payer: COMMERCIAL

## 2017-05-29 VITALS
WEIGHT: 195.75 LBS | HEIGHT: 66 IN | OXYGEN SATURATION: 99 % | HEART RATE: 69 BPM | SYSTOLIC BLOOD PRESSURE: 132 MMHG | BODY MASS INDEX: 31.46 KG/M2 | DIASTOLIC BLOOD PRESSURE: 70 MMHG

## 2017-05-29 DIAGNOSIS — R91.8 MASS OF MIDDLE LOBE OF RIGHT LUNG: Primary | ICD-10-CM

## 2017-05-29 DIAGNOSIS — R04.2 HEMOPTYSIS: ICD-10-CM

## 2017-05-29 DIAGNOSIS — D05.12 DUCTAL CARCINOMA IN SITU (DCIS) OF LEFT BREAST: ICD-10-CM

## 2017-05-29 DIAGNOSIS — R05.9 COUGH: ICD-10-CM

## 2017-05-29 DIAGNOSIS — Z85.3 HISTORY OF LEFT BREAST CANCER: ICD-10-CM

## 2017-05-29 PROCEDURE — 99999 PR PBB SHADOW E&M-EST. PATIENT-LVL III: CPT | Mod: PBBFAC,,, | Performed by: INTERNAL MEDICINE

## 2017-05-29 PROCEDURE — 99244 OFF/OP CNSLTJ NEW/EST MOD 40: CPT | Mod: S$GLB,,, | Performed by: INTERNAL MEDICINE

## 2017-05-29 RX ORDER — ALBUTEROL SULFATE 90 UG/1
2 AEROSOL, METERED RESPIRATORY (INHALATION) EVERY 6 HOURS PRN
Qty: 1 INHALER | Refills: 6 | Status: SHIPPED | OUTPATIENT
Start: 2017-05-29 | End: 2017-10-16

## 2017-05-29 RX ORDER — HYDROCODONE BITARTRATE AND HOMATROPINE METHYLBROMIDE ORAL SOLUTION 5; 1.5 MG/5ML; MG/5ML
5 LIQUID ORAL EVERY 6 HOURS PRN
Qty: 240 ML | Refills: 0 | Status: SHIPPED | OUTPATIENT
Start: 2017-05-29 | End: 2017-10-16

## 2017-05-29 NOTE — PROGRESS NOTES
Reviewed instructions for EBUS with patient and a copy of written directions were given. Patient is to arrive to [facility] 1-1/2 hours prior to procedure and be NPO after midnight. Patient provided with name and phone number for nurse should they have any questions or concerns about the procedure. Instructions were given on anti coag therapy as needed.

## 2017-05-29 NOTE — PROGRESS NOTES
Subjective:       Patient ID: Fauzia Kirk is a 61 y.o. female.    Chief Complaint: Lung Mass    Consult from Dr. Mai for right lung mass with a history of breast cancer.    Lifelong nonsmoker    Patient had hemoptysis which began first and she related to sinus infection as she has chronic sinus infection.  Cough that has changed since April and is not related to sinus congestion.  Worse when she lays on the right lateral decubitus condition.      Cancer history:  She had a stage I, ER negative carcinoma of the    left breast in 1990, treated with lumpectomy and radiation therapy.  In      1993, she developed a stage I, ER positive carcinoma of the right breast     treated with lumpectomy and radiation.  In 1995, she developed left breast   cancer recurrence, treated with lumpectomy, brachytherapy and four cycles    of Adriamycin and Cytoxan.  In 1996, she developed a new right breast        cancer T2 N0 poorly differentiated, treated with four cycles of              preoperative Taxol followed by mastectomy.  On October 17 left mammogram showed increased calcifications in the left breast at 3:00.  On October 27 a biopsy showed DCIS with solid, cribriform, and micropapillary patterns.  Tumor was 95% ER positive at 95% [place] positive     On November 7, 2016 she underwent left mastectomy which showed 8 mm of DCIS and negative margins.      Review of Systems   Constitutional: Negative for fever, weight loss and appetite change.   HENT: Negative for trouble swallowing.    Respiratory: Positive for hemoptysis (fresh blood in morning and during day just streaks). Negative for choking and shortness of breath.    Cardiovascular: Negative for chest pain and leg swelling.   Genitourinary: Negative for difficulty urinating.   Endocrine: Negative for cold intolerance and heat intolerance.    Musculoskeletal: Negative for arthralgias.   Skin: Negative for rash.   Gastrointestinal: Positive for acid reflux (with certain  "foods with symptoms for two years).   Neurological: Negative for headaches.   Hematological: Negative for adenopathy.   Psychiatric/Behavioral: Negative for confusion.       Past medical and surgical history reviewed.  Social and family history reviewed.  Allergies and medications reviewed.  No personal or family history of anesthesia complications  Objective:       Vitals:    05/29/17 1315   BP: 132/70   Pulse: 69   SpO2: 99%   Weight: 88.8 kg (195 lb 12.3 oz)   Height: 5' 6" (1.676 m)     Physical Exam   Constitutional: She is oriented to person, place, and time. She appears well-developed and well-nourished.   HENT:   Head: Normocephalic.   Nose: Nose normal.   Neck: Normal range of motion. Neck supple. No tracheal deviation present.   Cardiovascular: Normal rate, regular rhythm, normal heart sounds and intact distal pulses.    Pulmonary/Chest: Normal expansion, symmetric chest wall expansion, effort normal and breath sounds normal.   Abdominal: Soft. Bowel sounds are normal. There is no hepatosplenomegaly.   Musculoskeletal: Normal range of motion. She exhibits no edema.   Lymphadenopathy: No supraclavicular adenopathy is present.     She has no cervical adenopathy.   Neurological: She is alert and oriented to person, place, and time. No cranial nerve deficit.   Skin: Skin is warm and dry.   Psychiatric: She has a normal mood and affect.     Personal Diagnostic Review  CT of chest performed on 5/22/2017 without contrast revealed RML lung mass at 4.8 cm.  1.6 cm right paratracheal lymph node..  No flowsheet data found.      Assessment:       1. Mass of middle lobe of right lung    2. Hemoptysis    3. History of left breast cancer    4. Ductal carcinoma in situ (DCIS) of left breast    5. Cough        Outpatient Encounter Prescriptions as of 5/29/2017   Medication Sig Dispense Refill    losartan-hydrochlorothiazide 100-25 mg (HYZAAR) 100-25 mg per tablet Take 1 tablet by mouth once daily. 90 tablet 4    " ranitidine (ZANTAC) 150 MG tablet Take 1 tablet (150 mg total) by mouth 2 (two) times daily. 180 tablet 1    albuterol 90 mcg/actuation inhaler Inhale 2 puffs into the lungs every 6 (six) hours as needed. 1 Inhaler 6    hydrocodone-homatropine 5-1.5 mg/5 ml (HYCODAN) 5-1.5 mg/5 mL Syrp Take 5 mLs by mouth every 6 (six) hours as needed (as needed for cough.  Do not drive while taking this med). 240 mL 0     No facility-administered encounter medications on file as of 5/29/2017.      No orders of the defined types were placed in this encounter.    Plan:       Trial of albuterol for cough  Hycodan for cough suppressant.  EBUS with navigation for right paratracheal lymph node and navigation to mass.  I have explained the risks, benefits and alternatives of the procedure in detail.  The patient voices understanding and all questions have been answered.  The patient agrees to proceed as planned.

## 2017-05-29 NOTE — LETTER
May 29, 2017      Wyatt Mai MD  [address] [address]           [facility]  [address]  [facility] [address]  Phone: [phone]          Patient: Fauzia Kirk   MR Number: [id number]   YOB: 1955   Date of Visit: 5/29/2017       Dear Dr. Wyatt Mai:    Thank you for referring Fauzia Kirk to me for evaluation. Attached you will find relevant portions of my assessment and plan of care.    If you have questions, please do not hesitate to call me. I look forward to following Fauzia Kirk along with you.    Sincerely,    Tatyana Maza MD    Enclosure  CC:  No Recipients    If you would like to receive this communication electronically, please contact [email or web address] or [phone] to request more information on [employer] Link access.    For providers and/or their staff who would like to refer a patient to [facility], please contact us through our one-stop-shop provider referral line, [facility], at [phone].    If you feel you have received this communication in error or would no longer like to receive these types of communications, please e-mail [email or web address]

## 2017-06-06 ENCOUNTER — TELEPHONE (OUTPATIENT)
Dept: PULMONOLOGY | Facility: CLINIC | Age: 62
End: 2017-06-06

## 2017-06-06 NOTE — TELEPHONE ENCOUNTER
Called patient to notify of new time for [facility]ch. Patient told to arrive to [facility] for 7 am. Full written instructions were given at time of office visit. Patient had no further questions.

## 2017-06-09 ENCOUNTER — SURGERY (OUTPATIENT)
Age: 62
End: 2017-06-09

## 2017-06-09 ENCOUNTER — ANESTHESIA EVENT (OUTPATIENT)
Dept: SURGERY | Facility: HOSPITAL | Age: 62
End: 2017-06-09
Payer: COMMERCIAL

## 2017-06-09 ENCOUNTER — HOSPITAL ENCOUNTER (OUTPATIENT)
Facility: HOSPITAL | Age: 62
Discharge: HOME | End: 2017-06-09
Attending: INTERNAL MEDICINE | Admitting: INTERNAL MEDICINE
Payer: COMMERCIAL

## 2017-06-09 ENCOUNTER — HOSPITAL ENCOUNTER (OUTPATIENT)
Dept: RADIOLOGY | Facility: HOSPITAL | Age: 62
Discharge: HOME | End: 2017-06-09
Attending: INTERNAL MEDICINE | Admitting: INTERNAL MEDICINE
Payer: COMMERCIAL

## 2017-06-09 ENCOUNTER — ANESTHESIA (OUTPATIENT)
Dept: SURGERY | Facility: HOSPITAL | Age: 62
End: 2017-06-09
Payer: COMMERCIAL

## 2017-06-09 VITALS
OXYGEN SATURATION: 96 % | WEIGHT: 194 LBS | SYSTOLIC BLOOD PRESSURE: 131 MMHG | BODY MASS INDEX: 31.18 KG/M2 | TEMPERATURE: 98 F | DIASTOLIC BLOOD PRESSURE: 78 MMHG | HEART RATE: 85 BPM | HEIGHT: 66 IN | RESPIRATION RATE: 18 BRPM

## 2017-06-09 DIAGNOSIS — Z85.3 HISTORY OF LEFT BREAST CANCER: ICD-10-CM

## 2017-06-09 DIAGNOSIS — D05.12 DUCTAL CARCINOMA IN SITU (DCIS) OF LEFT BREAST: ICD-10-CM

## 2017-06-09 DIAGNOSIS — R91.8 LUNG MASS: ICD-10-CM

## 2017-06-09 DIAGNOSIS — R91.8 MASS OF MIDDLE LOBE OF RIGHT LUNG: ICD-10-CM

## 2017-06-09 PROCEDURE — D9220A PRA ANESTHESIA: Mod: CRNA,,, | Performed by: NURSE ANESTHETIST, CERTIFIED REGISTERED

## 2017-06-09 PROCEDURE — 88173 CYTOPATH EVAL FNA REPORT: CPT | Mod: 26,,, | Performed by: PATHOLOGY

## 2017-06-09 PROCEDURE — 31627 NAVIGATIONAL BRONCHOSCOPY: CPT | Mod: ,,, | Performed by: INTERNAL MEDICINE

## 2017-06-09 PROCEDURE — 31628 BRONCHOSCOPY/LUNG BX EACH: CPT | Mod: 51,RT,, | Performed by: INTERNAL MEDICINE

## 2017-06-09 PROCEDURE — 71250 CT THORAX DX C-: CPT | Mod: TC

## 2017-06-09 PROCEDURE — 71000033 HC RECOVERY, INTIAL HOUR: Performed by: INTERNAL MEDICINE

## 2017-06-09 PROCEDURE — 63600175 PHARM REV CODE 636 W HCPCS: Performed by: NURSE ANESTHETIST, CERTIFIED REGISTERED

## 2017-06-09 PROCEDURE — 88172 CYTP DX EVAL FNA 1ST EA SITE: CPT | Mod: 26,,, | Performed by: PATHOLOGY

## 2017-06-09 PROCEDURE — 71250 CT THORAX DX C-: CPT | Mod: 26,,, | Performed by: RADIOLOGY

## 2017-06-09 PROCEDURE — 31629 BRONCHOSCOPY/NEEDLE BX EACH: CPT | Mod: 59,RT,, | Performed by: INTERNAL MEDICINE

## 2017-06-09 PROCEDURE — 25000003 PHARM REV CODE 250: Performed by: INTERNAL MEDICINE

## 2017-06-09 PROCEDURE — 88305 TISSUE EXAM BY PATHOLOGIST: CPT | Mod: 26,,, | Performed by: PATHOLOGY

## 2017-06-09 PROCEDURE — 36000707: Performed by: INTERNAL MEDICINE

## 2017-06-09 PROCEDURE — 88305 TISSUE EXAM BY PATHOLOGIST: CPT | Performed by: PATHOLOGY

## 2017-06-09 PROCEDURE — 71000015 HC POSTOP RECOV 1ST HR: Performed by: INTERNAL MEDICINE

## 2017-06-09 PROCEDURE — 88342 IMHCHEM/IMCYTCHM 1ST ANTB: CPT | Mod: 26,,, | Performed by: PATHOLOGY

## 2017-06-09 PROCEDURE — 71000045 HC DOSC ROUTINE RECOVERY EA ADD'L HR: Performed by: INTERNAL MEDICINE

## 2017-06-09 PROCEDURE — D9220A PRA ANESTHESIA: Mod: ANES,,, | Performed by: ANESTHESIOLOGY

## 2017-06-09 PROCEDURE — 37000009 HC ANESTHESIA EA ADD 15 MINS: Performed by: INTERNAL MEDICINE

## 2017-06-09 PROCEDURE — 31652 BRONCH EBUS SAMPLNG 1/2 NODE: CPT | Mod: ,,, | Performed by: INTERNAL MEDICINE

## 2017-06-09 PROCEDURE — 25000003 PHARM REV CODE 250: Performed by: ANESTHESIOLOGY

## 2017-06-09 PROCEDURE — 88177 CYTP FNA EVAL EA ADDL: CPT | Mod: 26,,, | Performed by: PATHOLOGY

## 2017-06-09 PROCEDURE — 36000706: Performed by: INTERNAL MEDICINE

## 2017-06-09 PROCEDURE — 88341 IMHCHEM/IMCYTCHM EA ADD ANTB: CPT | Mod: 26,,, | Performed by: PATHOLOGY

## 2017-06-09 PROCEDURE — 37000008 HC ANESTHESIA 1ST 15 MINUTES: Performed by: INTERNAL MEDICINE

## 2017-06-09 PROCEDURE — 27201423 OPTIME MED/SURG SUP & DEVICES STERILE SUPPLY: Performed by: INTERNAL MEDICINE

## 2017-06-09 PROCEDURE — 25000003 PHARM REV CODE 250: Performed by: NURSE ANESTHETIST, CERTIFIED REGISTERED

## 2017-06-09 PROCEDURE — 88341 IMHCHEM/IMCYTCHM EA ADD ANTB: CPT | Performed by: PATHOLOGY

## 2017-06-09 PROCEDURE — 71000044 HC DOSC ROUTINE RECOVERY FIRST HOUR: Performed by: INTERNAL MEDICINE

## 2017-06-09 RX ORDER — LIDOCAINE HCL/PF 100 MG/5ML
SYRINGE (ML) INTRAVENOUS
Status: DISCONTINUED | OUTPATIENT
Start: 2017-06-09 | End: 2017-06-09

## 2017-06-09 RX ORDER — PROPOFOL 10 MG/ML
VIAL (ML) INTRAVENOUS CONTINUOUS PRN
Status: DISCONTINUED | OUTPATIENT
Start: 2017-06-09 | End: 2017-06-09

## 2017-06-09 RX ORDER — LIDOCAINE HYDROCHLORIDE 10 MG/ML
INJECTION, SOLUTION EPIDURAL; INFILTRATION; INTRACAUDAL; PERINEURAL
Status: DISCONTINUED | OUTPATIENT
Start: 2017-06-09 | End: 2017-06-09 | Stop reason: HOSPADM

## 2017-06-09 RX ORDER — FENTANYL CITRATE 50 UG/ML
INJECTION, SOLUTION INTRAMUSCULAR; INTRAVENOUS
Status: DISCONTINUED | OUTPATIENT
Start: 2017-06-09 | End: 2017-06-09

## 2017-06-09 RX ORDER — SODIUM CHLORIDE 9 MG/ML
INJECTION, SOLUTION INTRAVENOUS CONTINUOUS
Status: DISCONTINUED | OUTPATIENT
Start: 2017-06-09 | End: 2017-06-09 | Stop reason: HOSPADM

## 2017-06-09 RX ORDER — PHENYLEPHRINE HYDROCHLORIDE 10 MG/ML
INJECTION INTRAVENOUS
Status: DISCONTINUED | OUTPATIENT
Start: 2017-06-09 | End: 2017-06-09

## 2017-06-09 RX ORDER — MIDAZOLAM HYDROCHLORIDE 1 MG/ML
INJECTION, SOLUTION INTRAMUSCULAR; INTRAVENOUS
Status: DISCONTINUED | OUTPATIENT
Start: 2017-06-09 | End: 2017-06-09

## 2017-06-09 RX ORDER — PROPOFOL 10 MG/ML
VIAL (ML) INTRAVENOUS
Status: DISCONTINUED | OUTPATIENT
Start: 2017-06-09 | End: 2017-06-09

## 2017-06-09 RX ADMIN — PHENYLEPHRINE HYDROCHLORIDE 100 MCG: 10 INJECTION INTRAVENOUS at 09:06

## 2017-06-09 RX ADMIN — MIDAZOLAM HYDROCHLORIDE 2 MG: 1 INJECTION, SOLUTION INTRAMUSCULAR; INTRAVENOUS at 08:06

## 2017-06-09 RX ADMIN — FENTANYL CITRATE 25 MCG: 50 INJECTION, SOLUTION INTRAMUSCULAR; INTRAVENOUS at 09:06

## 2017-06-09 RX ADMIN — PROPOFOL 50 MG: 10 INJECTION, EMULSION INTRAVENOUS at 09:06

## 2017-06-09 RX ADMIN — SODIUM CHLORIDE: 0.9 INJECTION, SOLUTION INTRAVENOUS at 08:06

## 2017-06-09 RX ADMIN — PROPOFOL 100 MG: 10 INJECTION, EMULSION INTRAVENOUS at 09:06

## 2017-06-09 RX ADMIN — LIDOCAINE HYDROCHLORIDE 8 ML: 10 INJECTION, SOLUTION EPIDURAL; INFILTRATION; INTRACAUDAL; PERINEURAL at 10:06

## 2017-06-09 RX ADMIN — LIDOCAINE HYDROCHLORIDE 100 MG: 20 INJECTION, SOLUTION INTRAVENOUS at 09:06

## 2017-06-09 RX ADMIN — PROPOFOL 200 MCG/KG/MIN: 10 INJECTION, EMULSION INTRAVENOUS at 09:06

## 2017-06-09 RX ADMIN — FENTANYL CITRATE 50 MCG: 50 INJECTION, SOLUTION INTRAMUSCULAR; INTRAVENOUS at 09:06

## 2017-06-09 NOTE — ANESTHESIA POSTPROCEDURE EVALUATION
"Anesthesia Post Evaluation    Patient: Fauzia Kirk    Procedure(s) Performed: Procedure(s) (LRB):  ENDOBRONCHIAL NAVIGATION (N/A)    Final Anesthesia Type: general  Patient location during evaluation: PACU  Patient participation: Yes- Able to Participate  Level of consciousness: awake and alert and oriented  Post-procedure vital signs: reviewed and stable  Pain management: adequate  Airway patency: patent  PONV status at discharge: No PONV  Anesthetic complications: no      Cardiovascular status: hemodynamically stable  Respiratory status: unassisted, spontaneous ventilation and room air  Hydration status: euvolemic  Follow-up not needed.        Visit Vitals  /78   Pulse 85   Temp 36.4 °C (97.6 °F) (Temporal)   Resp 18   Ht 5' 6" (1.676 m)   Wt 88 kg (194 lb)   SpO2 96%   Breastfeeding? No   BMI 31.31 kg/m²       Pain/Jaelyn Score: Pain Assessment Performed: Yes (6/9/2017 11:40 AM)  Presence of Pain: denies (6/9/2017 11:40 AM)  Jaelyn Score: 10 (6/9/2017 11:40 AM)      "

## 2017-06-09 NOTE — INTERVAL H&P NOTE
The patient has been examined and the H&P has been reviewed:    I concur with the findings and no changes have occurred since H&P was written.    Anesthesia/Surgery risks, benefits and alternative options discussed and understood by patient/family.          Active Hospital Problems    Diagnosis  POA    Mass of middle lobe of right lung [R91.8]  Yes      Resolved Hospital Problems    Diagnosis Date Resolved POA   No resolved problems to display.

## 2017-06-09 NOTE — ANESTHESIA RELEASE NOTE
"Anesthesia Release from PACU Note    Patient: Fauzia Kirk    Procedure(s) Performed: Procedure(s) (LRB):  ENDOBRONCHIAL NAVIGATION (N/A)    Anesthesia type: GEN    Post pain: Adequate analgesia reported    Post assessment: no apparent anesthetic complications, tolerated procedure well and no evidence of recall    Post vital signs: /78   Pulse 85   Temp 36.4 °C (97.6 °F) (Temporal)   Resp 18   Ht 5' 6" (1.676 m)   Wt 88 kg (194 lb)   SpO2 96%   Breastfeeding? No   BMI 31.31 kg/m²     Level of consciousness: awake, alert and oriented    Nausea/Vomiting: no nausea/no vomiting    Complications: none    Airway Patency: patent    Respiratory: unassisted, spontaneous ventilation, room air    Cardiovascular: stable and blood pressure at baseline    Hydration: euvolemic    "

## 2017-06-09 NOTE — ANESTHESIA PREPROCEDURE EVALUATION
2017  Fauzia Kirk is a 61 y.o., female.        PMH:  HTN on losartan-hctz          Drips: none    Patient Active Problem List   Diagnosis    Hypertension    History of left breast cancer    s/p L mastectomy, SLNBx     Ductal carcinoma in situ (DCIS) of left breast    Pneumonia of right lung due to infectious organism    Mass of middle lobe of right lung       No Known Allergies     Current Outpatient Prescriptions on File Prior to Visit   Medication Sig Dispense Refill    albuterol 90 mcg/actuation inhaler Inhale 2 puffs into the lungs every 6 (six) hours as needed. 1 Inhaler 6    hydrocodone-homatropine 5-1.5 mg/5 ml (HYCODAN) 5-1.5 mg/5 mL Syrp Take 5 mLs by mouth every 6 (six) hours as needed (as needed for cough.  Do not drive while taking this med). 240 mL 0    losartan-hydrochlorothiazide 100-25 mg (HYZAAR) 100-25 mg per tablet Take 1 tablet by mouth once daily. 90 tablet 4    ranitidine (ZANTAC) 150 MG tablet Take 1 tablet (150 mg total) by mouth 2 (two) times daily. 180 tablet 1     No current facility-administered medications on file prior to visit.        Past Surgical History:   Procedure Laterality Date    BREAST LUMPECTOMY       SECTION, CLASSIC      LIPOMA RESECTION      MASTECTOMY         Social History     Social History    Marital status:      Spouse name: N/A    Number of children: N/A    Years of education: N/A     Occupational History    Not on file.     Social History Main Topics    Smoking status: Never Smoker    Smokeless tobacco: Not on file    Alcohol use No    Drug use: No    Sexual activity: Yes     Partners: Male     Other Topics Concern    Not on file     Social History Narrative    No narrative on file         Vital Signs Range (Last 24H):  Temp:  [36.9 °C (98.5 °F)]   Pulse:  [99]   Resp:  [20]   BP: (146)/(69)   SpO2:   [93 %]       CBC: No results for input(s): WBC, RBC, HGB, HCT, PLT, MCV, MCH, MCHC in the last 72 hours.    CMP: No results for input(s): NA, K, CL, CO2, BUN, CREATININE, GLU, MG, PHOS, CALCIUM, ALBUMIN, PROT, ALKPHOS, ALT, AST, BILITOT in the last 72 hours.    INR  No results for input(s): INR, PROTIME, APTT in the last 72 hours.    Invalid input(s): PT        Diagnostic Studies:  Mammo:   Increasing calcifications in the left breast at 3 o'clock are suspicious.    EKG:  Vent. Rate : 076 BPM     Atrial Rate : 076 BPM     P-R Int : 180 ms          QRS Dur : 064 ms      QT Int : 362 ms       P-R-T Axes : 039 000 -02 degrees     QTc Int : 407 ms    Normal sinus rhythm  Nonspecific T wave abnormality  Abnormal ECG  When compared with ECG of 08-AUG-2006 09:42,  Nonspecific T wave abnormality now evident in Inferior leads      2D Echo: none in system          Pre-op Assessment         Review of Systems  Anesthesia Hx:  History of prior surgery of interest to airway management or planning: Previous anesthesia: General Airway issues documented on chart review include mask, easy, GETA, easy direct laryngoscopy , view on direct laryngoscopy Grade I  Denies Family Hx of Anesthesia complications.   Denies Personal Hx of Anesthesia complications.   Social:  Non-Smoker    Hematology/Oncology:        Current/Recent Cancer. Breast bilateral axillary node dissection chemotherapy, surgery and radiation   Cardiovascular:   Hypertension Denies MI.          Pulmonary:   Denies COPD.  Denies Asthma.  Denies Sleep Apnea.    Renal/:  Renal/ Normal     Hepatic/GI:   GERD, well controlled Denies Liver Disease. Denies Hepatitis.    Neurological:   Denies CVA. Denies Seizures.    Endocrine:   Denies Diabetes. Denies Hypothyroidism.        Physical Exam  General:  Well nourished    Airway/Jaw/Neck:  Airway Findings: Mouth Opening: Normal Tongue: Normal  General Airway Assessment: Adult  Mallampati: III  TM Distance: Normal, at least 6 cm   Jaw/Neck Findings:  Neck ROM: Normal ROM      Dental:  Dental Findings: upper partial dentures   Chest/Lungs:  Chest/Lungs Findings: Clear to auscultation, Normal Respiratory Rate     Heart/Vascular:  Heart Findings: Rate: Normal  Rhythm: Regular Rhythm        Mental Status:  Mental Status Findings:  Cooperative, Alert and Oriented         Anesthesia Plan  Type of Anesthesia, risks & benefits discussed:  Anesthesia Type:  general  Patient's Preference:   Intra-op Monitoring Plan:   Intra-op Monitoring Plan Comments:   Post Op Pain Control Plan:   Post Op Pain Control Plan Comments:   Induction:   IV  Beta Blocker:  Patient is not currently on a Beta-Blocker (No further documentation required).       Informed Consent: Patient understands risks and agrees with Anesthesia plan.  Questions answered. Anesthesia consent signed with patient.  ASA Score: 2     Day of Surgery Review of History & Physical:            Ready For Surgery From Anesthesia Perspective.

## 2017-06-09 NOTE — DISCHARGE SUMMARY
[facility]  Discharge Summary      Admit Date: 6/9/2017    Discharge Date and Time:  06/09/2017 10:56 AM    Attending Physician: Tatyana Maza MD     Reason for Admission: Bronchoscopy with navigation and EBUS    Procedures Performed: Procedure(s) (LRB):  ENDOBRONCHIAL NAVIGATION (N/A)    Hospital Course (synopsis of major diagnoses, care, treatment, and services provided during the course of the hospital stay): Bronchoscopy complete with biopsy and needle aspirate     Consults: none    Significant Diagnostic Studies: Bronchoscopy: complete  Post bronch CXR with pneumothorax or complications  Final Diagnoses:    Principal Problem: Mass of middle lobe of right lung   Secondary Diagnoses:   Active Hospital Problems    Diagnosis  POA    *Mass of middle lobe of right lung [R91.8]  Yes    Ductal carcinoma in situ (DCIS) of left breast [D05.12]  Yes      Resolved Hospital Problems    Diagnosis Date Resolved POA   No resolved problems to display.       Discharged Condition: stable    Disposition: Home or Self Care    Follow Up/Patient Instructions:     Medications:  Reconciled Home Medications:   Current Discharge Medication List      CONTINUE these medications which have NOT CHANGED    Details   albuterol 90 mcg/actuation inhaler Inhale 2 puffs into the lungs every 6 (six) hours as needed.  Qty: 1 Inhaler, Refills: 6    Associated Diagnoses: Cough      hydrocodone-homatropine 5-1.5 mg/5 ml (HYCODAN) 5-1.5 mg/5 mL Syrp Take 5 mLs by mouth every 6 (six) hours as needed (as needed for cough.  Do not drive while taking this med).  Qty: 240 mL, Refills: 0      losartan-hydrochlorothiazide 100-25 mg (HYZAAR) 100-25 mg per tablet Take 1 tablet by mouth once daily.  Qty: 90 tablet, Refills: 4    Associated Diagnoses: Essential hypertension      ranitidine (ZANTAC) 150 MG tablet Take 1 tablet (150 mg total) by mouth 2 (two) times daily.  Qty: 180 tablet, Refills: 1    Associated Diagnoses: Gastroesophageal  reflux disease, esophagitis presence not specified             Discharge Procedure Orders  Diet general       Follow-up Information     Call in 1 week to follow up.

## 2017-06-09 NOTE — TRANSFER OF CARE
"Anesthesia Transfer of Care Note    Patient: Fauzia Kirk    Procedure(s) Performed: Procedure(s) (LRB):  ENDOBRONCHIAL NAVIGATION (N/A)    Patient location: [facility]    Anesthesia Type: general    Transport from OR: Transported from OR on 6-10 L/min O2 by face mask with adequate spontaneous ventilation    Post pain: adequate analgesia    Post assessment: no apparent anesthetic complications and tolerated procedure well    Post vital signs: stable    Level of consciousness: awake, alert and oriented    Nausea/Vomiting: no nausea/vomiting    Complications: none    Transfer of care protocol was followed      Last vitals:   Visit Vitals  BP (!) 121/58 (BP Location: Right arm, Patient Position: Lying, BP Method: Automatic)   Pulse 105   Temp 36.3 °C (97.3 °F) (Skin)   Resp 18   Ht 5' 6" (1.676 m)   Wt 88 kg (194 lb)   SpO2 99%   Breastfeeding? No   BMI 31.31 kg/m²     "

## 2017-06-19 ENCOUNTER — TELEPHONE (OUTPATIENT)
Dept: PULMONOLOGY | Facility: CLINIC | Age: 62
End: 2017-06-19

## 2017-06-19 NOTE — TELEPHONE ENCOUNTER
----- Message from Abida Herrera sent at 6/19/2017 10:32 AM CDT -----  Contact: self   [phone]  Sahunna Morrow Pt needs to speak with the Dr in regards to an procedure she had done on June 9  Thanks,

## 2017-06-26 ENCOUNTER — TELEPHONE (OUTPATIENT)
Dept: PULMONOLOGY | Facility: HOSPITAL | Age: 62
End: 2017-06-26

## 2017-06-26 NOTE — TELEPHONE ENCOUNTER
Wyatt,  The results of her lung biopsy are positive for cancer.  I understand that she has had several types of breast cancer.  The patient is aware and will be waiting to hear from your office regarding the next best step.  Tatyana

## 2017-07-06 ENCOUNTER — TELEPHONE (OUTPATIENT)
Dept: HEMATOLOGY/ONCOLOGY | Facility: CLINIC | Age: 62
End: 2017-07-06

## 2017-07-06 DIAGNOSIS — Z85.3 HISTORY OF LEFT BREAST CANCER: Primary | ICD-10-CM

## 2017-07-06 DIAGNOSIS — R91.8 MASS OF MIDDLE LOBE OF RIGHT LUNG: ICD-10-CM

## 2017-07-06 NOTE — TELEPHONE ENCOUNTER
----- Message from Man Gibson MA sent at 7/6/2017 12:37 PM CDT -----  Contact: Self   Pt said you was suppose to call her after you speak with another doctor its been over a week and she haven't heard from you.   ----- Message -----  From: Maile Rashid  Sent: 7/6/2017  10:19 AM  To: Sergei POTTER Staff    Pt needs to talk to Sergei about results.   Call: [phone]

## 2017-07-06 NOTE — TELEPHONE ENCOUNTER
----- Message from Wyatt Mai MD sent at 7/6/2017  3:30 PM CDT -----  Needs PET scan and see me next week

## 2017-07-12 ENCOUNTER — HOSPITAL ENCOUNTER (OUTPATIENT)
Dept: RADIOLOGY | Facility: HOSPITAL | Age: 62
Discharge: HOME | End: 2017-07-12
Attending: INTERNAL MEDICINE
Payer: COMMERCIAL

## 2017-07-12 DIAGNOSIS — Z85.3 HISTORY OF LEFT BREAST CANCER: ICD-10-CM

## 2017-07-12 DIAGNOSIS — R91.8 MASS OF MIDDLE LOBE OF RIGHT LUNG: ICD-10-CM

## 2017-07-12 PROCEDURE — A9552 F18 FDG: HCPCS

## 2017-07-12 PROCEDURE — 78815 PET IMAGE W/CT SKULL-THIGH: CPT | Mod: 26,,, | Performed by: NUCLEAR MEDICINE

## 2017-07-13 ENCOUNTER — TELEPHONE (OUTPATIENT)
Dept: HEMATOLOGY/ONCOLOGY | Facility: CLINIC | Age: 62
End: 2017-07-13

## 2017-07-13 ENCOUNTER — INITIAL CONSULT (OUTPATIENT)
Dept: RADIATION ONCOLOGY | Facility: CLINIC | Age: 62
End: 2017-07-13
Payer: COMMERCIAL

## 2017-07-13 ENCOUNTER — OFFICE VISIT (OUTPATIENT)
Dept: HEMATOLOGY/ONCOLOGY | Facility: CLINIC | Age: 62
End: 2017-07-13
Payer: COMMERCIAL

## 2017-07-13 VITALS
HEART RATE: 110 BPM | BODY MASS INDEX: 30.18 KG/M2 | TEMPERATURE: 98 F | SYSTOLIC BLOOD PRESSURE: 147 MMHG | DIASTOLIC BLOOD PRESSURE: 68 MMHG | RESPIRATION RATE: 20 BRPM | WEIGHT: 187 LBS

## 2017-07-13 VITALS
RESPIRATION RATE: 16 BRPM | TEMPERATURE: 98 F | WEIGHT: 187.38 LBS | SYSTOLIC BLOOD PRESSURE: 147 MMHG | DIASTOLIC BLOOD PRESSURE: 68 MMHG | HEART RATE: 110 BPM | BODY MASS INDEX: 30.25 KG/M2

## 2017-07-13 DIAGNOSIS — R05.9 COUGH: Primary | ICD-10-CM

## 2017-07-13 DIAGNOSIS — C34.2 PRIMARY CANCER OF RIGHT MIDDLE LOBE OF LUNG: ICD-10-CM

## 2017-07-13 DIAGNOSIS — Z51.11 ENCOUNTER FOR ANTINEOPLASTIC CHEMOTHERAPY: ICD-10-CM

## 2017-07-13 DIAGNOSIS — C77.9 REGIONAL LYMPH NODE METASTASIS PRESENT: ICD-10-CM

## 2017-07-13 DIAGNOSIS — C34.2 PRIMARY CANCER OF RIGHT MIDDLE LOBE OF LUNG: Primary | ICD-10-CM

## 2017-07-13 DIAGNOSIS — R05.9 COUGH: ICD-10-CM

## 2017-07-13 PROCEDURE — 99999 PR PBB SHADOW E&M-EST. PATIENT-LVL III: CPT | Mod: PBBFAC,,, | Performed by: INTERNAL MEDICINE

## 2017-07-13 PROCEDURE — 99999 PR PBB SHADOW E&M-EST. PATIENT-LVL III: CPT | Mod: PBBFAC,,, | Performed by: RADIOLOGY

## 2017-07-13 PROCEDURE — 99213 OFFICE O/P EST LOW 20 MIN: CPT | Mod: S$GLB,,, | Performed by: INTERNAL MEDICINE

## 2017-07-13 PROCEDURE — 99205 OFFICE O/P NEW HI 60 MIN: CPT | Mod: S$GLB,,, | Performed by: RADIOLOGY

## 2017-07-13 RX ORDER — CODEINE SULFATE 30 MG/1
30 TABLET ORAL EVERY 4 HOURS PRN
Qty: 120 TABLET | Refills: 0 | Status: SHIPPED | OUTPATIENT
Start: 2017-07-13 | End: 2017-07-13 | Stop reason: SDUPTHER

## 2017-07-13 RX ORDER — CODEINE SULFATE 30 MG/1
30 TABLET ORAL EVERY 4 HOURS PRN
Qty: 120 TABLET | Refills: 0 | Status: SHIPPED | OUTPATIENT
Start: 2017-07-13 | End: 2017-07-27 | Stop reason: SDUPTHER

## 2017-07-13 NOTE — PROGRESS NOTES
REFERRING PHYSICIAN: Dr. Mai    DIAGNOSIS:   Stage IIIA poorly differentiated carcinoma of the right lung with mediastinal LAD. H/o B/L recurrences of breast cancers s/p metachronous mastectomies.    HISTORY OF PRESENT ILLNESS:   Ms. Kirk is a 63 yo female never smoker with a history of stage I ER + B/L metachronous breast cancers diagnosed in the 1990s (see below for Dr. Mai's excellent synopsis).  She eventually underwent right mastectomy in 1996, left mastectomy in 11/2016.  She had RT to each breast in the early 1990s, and no RT since that time.  She has had a persistent, productive cough since April of 2017 with occasional blood tinged sputum.  Chest x-ray at that time showed a rounded opacity in the right lung overlying the major fissure.   Chest CT in May showed a 4.8 x 4.0 cm, rounded, soft tissue mass in the middle lobe between the medial and lateral segments. There was pre-carinal adenopathy.   She underwent bronchoscopy with EBUS on 6/9/17. Needle biopsy of the medistinal nodes was positive for poorly differentiated carcinoma with squamoid features. The cells were positive for CK7, CK5/6, and P63 and are negative for CK20, GCDFP, TTF1, ER, MS, Napsin and YEMI 3.This was felt to be most c/w a new squamous lung cancer.     PET CT yesterday demonstrated the following:a hypermetabolic, large mass in the right middle lobe with an SUV max of 20.23. There is hypermetabolic activity extending from this mass tracking along the pleura. There is a hypermetabolic right hilar lymph node demonstrating an SUV max of 20.2. In addition there is are 2 hypermetabolic subcarinal lymph nodes with the larger demonstrating an SUV max of 14.2. An additional right paratracheal lymph node is seen with an SUV max of 26.1. Findings are consistent with metastatic lung cancer.    Today she c/o persistent productive cough.  She was prescribed a codeine pill today but has not yet started taking.  Cough is refractory to tessalone  perles and sri.  No significant SOB.  She denies weight loss but does say she is not eating as much these days.  She is a never smoker.  Denies headaches or neuro deficits.  No pain.  BMs, urination normal.  No LE swelling.  No N/V.  She has not had a brain MRI.    Strong FH of cancer, including S dx'ed with breast ca in her 30s (alive and well), sister w/ stomach CA dx'ed at 51, P aunt w/ breast ca at 49, P GM breast cancer, and father with prostate cancer.  Patient never had genetic testing.      Previous cancer history:  She had a stage I, ER negative carcinoma of the    left breast in , treated with lumpectomy and radiation therapy.  In      , she developed a stage I, ER positive carcinoma of the right breast     treated with lumpectomy and radiation.  In , she developed left breast   cancer recurrence, treated with lumpectomy, brachytherapy and four cycles    of Adriamycin and Cytoxan.  In , she developed a new right breast        cancer T2 N0 poorly differentiated, treated with four cycles of              preoperative Taxol followed by mastectomy.       On 2016 left mammogram showed increased calcifications in the left breast at 3:00.  On  a biopsy showed DCIS with solid, cribriform, and micropapillary patterns.  Tumor was 95% ER positive at 95% NJ positive  On 2016 she underwent left mastectomy which showed 8 mm of DCIS and negative margins.       ECO  ECOG SCORE             REVIEW OF SYSTEMS:   As above.  In addition, patient denies visual problems, dizziness, chest pain, diarrhea, or any new bony pains.  Patient also denies easy bruising, skin rashes, or numbness or tingling.    PAST MEDICAL HISTORY:  Past Medical History:   Diagnosis Date    Arthritis     Breast cancer     Hypertension      PAST SURGICAL HISTORY:  Past Surgical History:   Procedure Laterality Date    BREAST LUMPECTOMY       SECTION, CLASSIC      LIPOMA RESECTION       MASTECTOMY         ALLERGIES:   Review of patient's allergies indicates:  No Known Allergies    MEDICATIONS:  Current Outpatient Prescriptions   Medication Sig    albuterol 90 mcg/actuation inhaler Inhale 2 puffs into the lungs every 6 (six) hours as needed.    codeine 30 MG Tab Take 1 tablet (30 mg total) by mouth every 4 (four) hours as needed (cough).    hydrocodone-homatropine 5-1.5 mg/5 ml (HYCODAN) 5-1.5 mg/5 mL Syrp Take 5 mLs by mouth every 6 (six) hours as needed (as needed for cough.  Do not drive while taking this med).    losartan-hydrochlorothiazide 100-25 mg (HYZAAR) 100-25 mg per tablet Take 1 tablet by mouth once daily.    ranitidine (ZANTAC) 150 MG tablet Take 1 tablet (150 mg total) by mouth 2 (two) times daily.     No current facility-administered medications for this visit.        SOCIAL HISTORY:  Social History     Social History    Marital status:      Spouse name: N/A    Number of children: N/A    Years of education: N/A     Occupational History    Not on file.     Social History Main Topics    Smoking status: Never Smoker    Smokeless tobacco: Not on file    Alcohol use No    Drug use: No    Sexual activity: Yes     Partners: Male     Other Topics Concern    Not on file     Social History Narrative    No narrative on file       FAMILY HISTORY:  Family History   Problem Relation Age of Onset    Lymphoma Mother     Prostate cancer Father     Stomach cancer Sister     Breast cancer Sister     Diabetes Brother     Breast cancer Paternal Aunt     Breast cancer Paternal Grandmother          PHYSICAL EXAMINATION:  BP (!) 147/68 (BP Location: Right arm)   Pulse 110   Temp 97.5 °F (36.4 °C) (Oral)   Resp 20   Wt 84.8 kg (187 lb)   BMI 30.18 kg/m²   GENERAL: Patient is alert and oriented, in no acute distress.  HEENT:Extraocular muscles are intact.  Oropharynx is clear without lesions.    No thyromegaly noted.  LYMPH: There is no cervical or supraclavicular  lymphadenopathy palpated.  HEART: Regular rate and rhythm, no m/r/g.  LUNGS: Clear to auscultation bilaterally.  ABDOMEN:Soft, nontender, nondistended, without hepatosplenomegaly.  Normoactive bowel sounds.  EXTREMITIES: No clubbing, cyanosis, or edema.  MSK: spine is nontender.  NEUROLOGICAL: Cranial nerve II through XII grossly intact.  Sensation is intact.  Strength is 5 out of 5 in the upper and lower extremities bilaterally.  Gait is normal.    ASSESSMENT:  63 yo woman with stage IIIA poorly differentiated carcinoma of the right lung and mediastinum.      PLAN:  I will order a brain MRI to complete her staging.  Assuming this is negative, standard of care with known N2 disease up front (especially in the setting of multiple positive mediastinal nodes) is chemoradiation.  Path should be checked for ALK, ROS, EGFR mutations as well as PDL-1, especially given her non smoking status.    The risks, benefits, and side effects of radiation, both early and late were explained in detail to the patient  and her significant other.  Side effects discussed included but was not limited to esophagitis and radiation pneumonitis.  We also discussed the possibility of increased reaction of her chest wall skin and soft tissues given her prior radiotherapy, though much of this dose has likely been forgiven.  All of their questions were answered and informed consent was signed.  I plan to see the patient back for radiation planning CT as soon as possible.     I spent approximately 75 minutes reviewing the available records and evaluating the patient, out of which over 50% of the time was spent face to face with the patient in counseling and coordinating this patient's care.    Distress Screening Results: Psychosocial Distress screening score of Distress Score: 0 noted and reviewed. No intervention indicated.     Discussed case with Dr. Mai- testing for actionable mutations not necessary at this point as disease is not  metastatic.

## 2017-07-13 NOTE — TELEPHONE ENCOUNTER
Spoke to patient and she request the codeine be sent to [facility] [employer] in [place]. I added to med card.

## 2017-07-13 NOTE — LETTER
July 13, 2017      Wyatt Mai MD  [address] [facility] [address]           [facility] Radiation [facility]  [address]  [facility] [address]  Phone: [phone]          Patient: Fauzia Kirk   MR Number: [id number]   YOB: 1955   Date of Visit: 7/13/2017       Dear Dr. Wyatt Mai:    Thank you for referring Fauzia Kirk to me for evaluation. Attached you will find relevant portions of my assessment and plan of care.    If you have questions, please do not hesitate to call me. I look forward to following Fauzia Kirk along with you.    Sincerely,    Rasheed Heredia MD    Enclosure  CC:  No Recipients    If you would like to receive this communication electronically, please contact [email or web address] or [phone] to request more information on [facility] [employer] [employer] access.    For providers and/or their staff who would like to refer a patient to [facility], please contact us through our one-stop-shop provider referral line, [facility] , at [phone].    If you feel you have received this communication in error or would no longer like to receive these types of communications, please e-mail [email or web address]

## 2017-07-13 NOTE — PROGRESS NOTES
Subjective:       Patient ID: Fauzia Kirk is a 62 y.o. female.    Chief Complaint: No chief complaint on file.    HPI Ms. Kirk is a very pleasant 62-year-old  female who returned to clinic history  for F/U of PET scan results done to evaluate a new right lung cancer.     Her major issue continues to be her chronic cough.        In December 2016 she began to have some blood in her mucus after coughing.In  April she developed a persistent cough and saw her physician on April 18.  Chest x-ray at that time showed a rounded opacity in the right lung overlying the major fissure.   Chest CT in May showed a 4.8 x 4.0 cm, rounded, soft tissue mass in the middle lobe between the medial and lateral segments. There was pre-carinal adenopathy.    Subsequently she underwent bronchoscopy with EBUS on 6/9/17. Needle biopsy of the medistinal nodes was positive for poorly differentiated carcinoma with squamoid features. The cells were positive for CK7, CK5/6, and P63 and are negative for CK20, GCDFP, TTF1, ER, OK, Napsin and YEMI 3.This was felt to be most CW a new squamous lung cancer.    PET CT yesterday demonstrated the following:a hypermetabolic, large mass in the right middle lobe with an SUV max of 20.23. There is hypermetabolic activity extending from this mass tracking along the pleura. There is a hypermetabolic right hilar lymph node demonstrating an SUV max of 20.2. In addition there is are 2 hypermetabolic subcarinal lymph nodes with the larger demonstrating an SUV max of 14.2. An additional right paratracheal lymph node is seen with an SUV max of 26.1. Findings are consistent with metastatic lung cancer.      Previous cancer history:She had a stage I, ER negative carcinoma of the    left breast in 1990, treated with lumpectomy and radiation therapy.  In      1993, she developed a stage I, ER positive carcinoma of the right breast     treated with lumpectomy and radiation.  In 1995, she developed left breast    cancer recurrence, treated with lumpectomy, brachytherapy and four cycles    of Adriamycin and Cytoxan.  In 1996, she developed a new right breast        cancer T2 N0 poorly differentiated, treated with four cycles of              preoperative Taxol followed by mastectomy.        On October 17, 2016 left mammogram showed increased calcifications in the left breast at 3:00.  On October 27 a biopsy showed DCIS with solid, cribriform, and micropapillary patterns.  Tumor was 95% ER positive at 95% UT positive    On November 7, 2016 she underwent left mastectomy which showed 8 mm of DCIS and negative margins.    Review of Systems   Constitutional: Negative for activity change, appetite change, fever and unexpected weight change.   Respiratory: Positive for cough. Negative for shortness of breath.    Cardiovascular: Negative for chest pain.   Neurological: Negative for headaches.   Psychiatric/Behavioral: Negative for dysphoric mood. The patient is nervous/anxious.        Objective:      Physical Exam   Constitutional: She appears well-developed and well-nourished.   Psychiatric: She has a normal mood and affect. Her behavior is normal. Thought content normal.       Assessment:      chest x-ray images reviewed.    1. Primary cancer of right middle lobe of lung    2. Regional lymph node metastasis present        Plan:     I discussed the scan findings and reviewed images.     I recommended combined chemotherapy and radiation.  For chemotherapy I recommended weekly carboplatin AUC 2 with Paclitaxel 45 mg/M2.    She will see radiation oncology today.  She'll need a port placed.

## 2017-07-13 NOTE — TELEPHONE ENCOUNTER
----- Message from Ana Flower sent at 7/13/2017  1:04 PM CDT -----  Contact: [facility]  Verify codeine 30 MG Tab    [phone]

## 2017-07-13 NOTE — TELEPHONE ENCOUNTER
Pharmacy states they do not carry the codeine. You can send to another pharmacy or send something else in.

## 2017-07-13 NOTE — TELEPHONE ENCOUNTER
----- Message from Yudith Sander sent at 7/13/2017  2:55 PM CDT -----  Contact: RIVERA STEPHENSON [[id number]]  _  1st Request  _  2nd Request  _  3rd Request        Who: RIVERA STEPHENSON [[id number]]    Why: Patient states she would like a call back to know where to  Rx codeine 30 MG Tab because [facility] does not have it available.     What Number to Call Back: [phone]    When to Expect a call back: (Before the end of the day)   -- if call after 3:00 call back will be tomorrow.

## 2017-07-13 NOTE — TELEPHONE ENCOUNTER
----- Message from Maile Rashid sent at 7/13/2017  2:22 PM CDT -----  Contact: Self   Pt is returning your call from today

## 2017-07-17 ENCOUNTER — DOCUMENTATION ONLY (OUTPATIENT)
Dept: SURGERY | Facility: CLINIC | Age: 62
End: 2017-07-17

## 2017-07-18 ENCOUNTER — HOSPITAL ENCOUNTER (OUTPATIENT)
Dept: RADIATION THERAPY | Facility: HOSPITAL | Age: 62
Discharge: HOME | End: 2017-07-18
Attending: RADIOLOGY
Payer: COMMERCIAL

## 2017-07-18 DIAGNOSIS — C34.2 PRIMARY CANCER OF RIGHT MIDDLE LOBE OF LUNG: Primary | ICD-10-CM

## 2017-07-18 DIAGNOSIS — Z01.818 PREOP EXAMINATION: ICD-10-CM

## 2017-07-18 PROCEDURE — 77263 THER RADIOLOGY TX PLNG CPLX: CPT | Mod: ,,, | Performed by: RADIOLOGY

## 2017-07-18 PROCEDURE — 77290 THER RAD SIMULAJ FIELD CPLX: CPT | Mod: 26,,, | Performed by: RADIOLOGY

## 2017-07-18 PROCEDURE — 77334 RADIATION TREATMENT AID(S): CPT | Mod: 26,,, | Performed by: RADIOLOGY

## 2017-07-18 PROCEDURE — 77334 RADIATION TREATMENT AID(S): CPT | Mod: TC | Performed by: RADIOLOGY

## 2017-07-18 PROCEDURE — 77290 THER RAD SIMULAJ FIELD CPLX: CPT | Mod: TC | Performed by: RADIOLOGY

## 2017-07-18 PROCEDURE — 77014 HC CT GUIDANCE RADIATION THERAPY FLDS PLACEMENT: CPT | Mod: TC | Performed by: RADIOLOGY

## 2017-07-19 DIAGNOSIS — C34.2 PRIMARY CANCER OF RIGHT MIDDLE LOBE OF LUNG: Primary | ICD-10-CM

## 2017-07-20 ENCOUNTER — TELEPHONE (OUTPATIENT)
Dept: SURGERY | Facility: CLINIC | Age: 62
End: 2017-07-20

## 2017-07-20 NOTE — TELEPHONE ENCOUNTER
----- Message from Rene Devlin sent at 7/20/2017  4:09 PM CDT -----  Contact: pt   Pt would like to be called back regarding nurse calling about upcoming appt.       Pt can be reached at [phone].

## 2017-07-20 NOTE — TELEPHONE ENCOUNTER
Returned pt phone call-notified her that unfortunately we cannot place any sooner than 7/26.  Pt understands and will keep surgery date on 7/26 as scheduled.

## 2017-07-25 ENCOUNTER — ANESTHESIA EVENT (OUTPATIENT)
Dept: SURGERY | Facility: HOSPITAL | Age: 62
End: 2017-07-25
Payer: COMMERCIAL

## 2017-07-25 ENCOUNTER — TELEPHONE (OUTPATIENT)
Dept: SURGERY | Facility: CLINIC | Age: 62
End: 2017-07-25

## 2017-07-25 PROCEDURE — 77301 RADIOTHERAPY DOSE PLAN IMRT: CPT | Mod: TC | Performed by: RADIOLOGY

## 2017-07-25 PROCEDURE — 77301 RADIOTHERAPY DOSE PLAN IMRT: CPT | Mod: 26,,, | Performed by: RADIOLOGY

## 2017-07-25 RX ORDER — OMEPRAZOLE 20 MG/1
20 CAPSULE, DELAYED RELEASE ORAL 2 TIMES DAILY
COMMUNITY
End: 2017-07-27 | Stop reason: DRUGHIGH

## 2017-07-25 NOTE — ANESTHESIA PREPROCEDURE EVALUATION
Pre-operative evaluation for Procedure(s) (LRB):  [id number] (N/A)    Fauzia Kirk is a 62 y.o. female  with PMHx  Significant for  HTN (on arb/hctz), GERD,  breast cancers diagnosed in the 1990s with eventual right mastectomy in 1996, left mastectomy in 11/2016, with newly diagnosed RML lung cancer undergoing the above mentioned procedure     LDA: none    Prev airway:   Present Prior to Hospital Arrival?: No; Placement Date: 06/09/17; Placement Time: 0909; Inserted by: CRNA; Airway Device: LMA; Mask Ventilation: Easy; Intubated: Postinduction; Airway Device Size: 4.0; Cuff Inflation: Minimal occlusive pressure; Inflation Amount: 15; Placement Verified By: Auscultation, Capnometry, ETT Condensation; Complicating Factors: None; Intubation Findings: Positive EtCO2, Bilateral breath sounds, Atraumatic/Condition of teeth unchanged; Securment: Lips; Complications: None; Breath Sounds: Equal Bilateral; Insertion Attempts: 1; Removal Date: 06/09/17;  Removal Time: 1023    Method of Intubation: Direct laryngoscopy; Inserted by: Anesthesia Resident; Airway Device: Endotracheal Tube; Mask Ventilation: Easy; Intubated: Postinduction; Blade: Samson #2; Airway Device Size: 7.0; Style: Cuffed; Placement Verified By: Auscultation, Capnometry; Grade: Grade I; Complicating Factors: None; Intubation Findings: Positive EtCO2, Bilateral breath sounds, Atraumatic/Condition of teeth unchanged;  Depth of Insertion: 22; Securment: Lips; Complications: None; Breath Sounds: Equal Bilateral;    Drips:     Patient Active Problem List   Diagnosis    Hypertension    History of left breast cancer    s/p L mastectomy, SLNBx 11/7    Ductal carcinoma in situ (DCIS) of left breast    Pneumonia of right lung due to infectious organism    Mass of middle lobe of right lung    Primary cancer of right middle lobe of lung    Regional lymph node metastasis present    Encounter for antineoplastic chemotherapy       Review of  patient's allergies indicates:  No Known Allergies     No current facility-administered medications on file prior to encounter.      Current Outpatient Prescriptions on File Prior to Encounter   Medication Sig Dispense Refill    albuterol 90 mcg/actuation inhaler Inhale 2 puffs into the lungs every 6 (six) hours as needed. 1 Inhaler 6    losartan-hydrochlorothiazide 100-25 mg (HYZAAR) 100-25 mg per tablet Take 1 tablet by mouth once daily. 90 tablet 4    codeine 30 MG Tab Take 1 tablet (30 mg total) by mouth every 4 (four) hours as needed (cough). 120 tablet 0    hydrocodone-homatropine 5-1.5 mg/5 ml (HYCODAN) 5-1.5 mg/5 mL Syrp Take 5 mLs by mouth every 6 (six) hours as needed (as needed for cough.  Do not drive while taking this med). 240 mL 0       Past Surgical History:   Procedure Laterality Date    BREAST LUMPECTOMY       SECTION, CLASSIC      LIPOMA RESECTION      MASTECTOMY         Social History     Social History    Marital status:      Spouse name: N/A    Number of children: N/A    Years of education: N/A     Occupational History    Not on file.     Social History Main Topics    Smoking status: Never Smoker    Smokeless tobacco: Not on file    Alcohol use No    Drug use: No    Sexual activity: Yes     Partners: Male     Other Topics Concern    Not on file     Social History Narrative    No narrative on file           Diagnostic Studies:  CXR   Bedside chest radiograph was obtained with the patient in LEFT posterior oblique rotation.  Bilateral mastectomies and axillary hank dissection is noted.    The patient has known large mass in the RIGHT middle lobe accounting for the opacity observed in the RIGHT lower lung zone.    I detect no pneumothorax or other complication of recent bronchoscopy.    EK2016  Normal sinus rhythm  Nonspecific T wave abnormality  Abnormal ECG  When compared with ECG of 2016 10:32,  No significant change was found  Confirmed by  HERMINIO FLANAGAN MD ([id number]) on 11/7/2016 3:47:59 PM    2D Echo:  2/2017  CONCLUSIONS     1 - Normal left ventricular systolic function (EF 60-65%).  Normal wall motion.     2 - Aortic sclerosis without significant stenosis.     Anesthesia Evaluation    I have reviewed the Patient Summary Reports.    I have reviewed the Nursing Notes.   I have reviewed the Medications.     Review of Systems  Anesthesia Hx:  History of prior surgery of interest to airway management or planning: Previous anesthesia: General, MAC 04/2016 with general anesthesia.  Procedure performed at an [facility].  06/2017 with MAC.  Procedure performed at an [facility]. Airway issues documented on chart review include mask, easy, easy direct laryngoscopy , view on direct laryngoscopy Grade I  Denies Family Hx of Anesthesia complications.   Denies Personal Hx of Anesthesia complications.   Social:  Non-Smoker    Hematology/Oncology:        Current/Recent Cancer. Breast bilateral axillary node dissection chemotherapy, surgery and radiation Oncology Comments: + newly diagnosed RML lung ca    Cardiovascular:   Hypertension Denies MI.          Pulmonary:   Denies COPD.  Denies Asthma.  Denies Sleep Apnea.    Renal/:  Renal/ Normal     Hepatic/GI:   GERD, well controlled Denies Liver Disease. Denies Hepatitis.    Neurological:   Denies CVA. Denies Seizures.    Endocrine:   Denies Diabetes. Denies Hypothyroidism.        Physical Exam  General:  Well nourished    Airway/Jaw/Neck:  Airway Findings: Mouth Opening: Normal Tongue: Normal  General Airway Assessment: Adult  Mallampati: III  TM Distance: Normal, at least 6 cm  Jaw/Neck Findings:  Neck ROM: Normal ROM      Dental:  Dental Findings: upper partial dentures   Chest/Lungs:  Chest/Lungs Findings: Clear to auscultation, Normal Respiratory Rate     Heart/Vascular:  Heart Findings: Rate: Normal  Rhythm: Regular Rhythm        Mental Status:  Mental Status Findings:  Cooperative, Alert and  Oriented         Anesthesia Plan  Type of Anesthesia, risks & benefits discussed:  Anesthesia Type:  MAC, general  Patient's Preference:   Intra-op Monitoring Plan: standard ASA monitors  Intra-op Monitoring Plan Comments:   Post Op Pain Control Plan: IV/PO Opioids PRN  Post Op Pain Control Plan Comments:   Induction:   IV  Beta Blocker:  Patient is not currently on a Beta-Blocker (No further documentation required).       Informed Consent: Patient understands risks and agrees with Anesthesia plan.  Questions answered. Anesthesia consent signed with patient.  ASA Score: 3     Day of Surgery Review of History & Physical:  There are no significant changes.          Ready For Surgery From Anesthesia Perspective.

## 2017-07-25 NOTE — PRE-PROCEDURE INSTRUCTIONS
Preop instructions: NPO after midnight or 8 hours prior to procedure time, shower instructions, directions, leave all valuables at home, medication instructions for PM prior & am of procedure explained. Patient stated an understanding.    Patient denies any side effects or issues with anesthesia or sedation.  Did have tachycardia post op in the past

## 2017-07-26 ENCOUNTER — SURGERY (OUTPATIENT)
Age: 62
End: 2017-07-26

## 2017-07-26 ENCOUNTER — ANESTHESIA (OUTPATIENT)
Dept: SURGERY | Facility: HOSPITAL | Age: 62
End: 2017-07-26
Payer: COMMERCIAL

## 2017-07-26 ENCOUNTER — HOSPITAL ENCOUNTER (OUTPATIENT)
Facility: HOSPITAL | Age: 62
Discharge: HOME | End: 2017-07-26
Attending: SURGERY | Admitting: SURGERY
Payer: COMMERCIAL

## 2017-07-26 VITALS
HEIGHT: 66 IN | HEART RATE: 93 BPM | BODY MASS INDEX: 31.18 KG/M2 | RESPIRATION RATE: 18 BRPM | DIASTOLIC BLOOD PRESSURE: 60 MMHG | OXYGEN SATURATION: 100 % | TEMPERATURE: 98 F | WEIGHT: 194 LBS | SYSTOLIC BLOOD PRESSURE: 122 MMHG

## 2017-07-26 VITALS — DIASTOLIC BLOOD PRESSURE: 102 MMHG | OXYGEN SATURATION: 100 % | HEART RATE: 87 BPM | SYSTOLIC BLOOD PRESSURE: 173 MMHG

## 2017-07-26 DIAGNOSIS — D05.12 DUCTAL CARCINOMA IN SITU (DCIS) OF LEFT BREAST: Primary | ICD-10-CM

## 2017-07-26 DIAGNOSIS — C80.1 CANCER: ICD-10-CM

## 2017-07-26 DIAGNOSIS — R91.8 MASS OF MIDDLE LOBE OF RIGHT LUNG: ICD-10-CM

## 2017-07-26 PROCEDURE — 37000009 HC ANESTHESIA EA ADD 15 MINS: Performed by: SURGERY

## 2017-07-26 PROCEDURE — 77293 RESPIRATOR MOTION MGMT SIMUL: CPT | Mod: TC | Performed by: RADIOLOGY

## 2017-07-26 PROCEDURE — 77338 DESIGN MLC DEVICE FOR IMRT: CPT | Mod: TC | Performed by: RADIOLOGY

## 2017-07-26 PROCEDURE — 25000003 PHARM REV CODE 250: Performed by: ANESTHESIOLOGY

## 2017-07-26 PROCEDURE — 36000706: Performed by: SURGERY

## 2017-07-26 PROCEDURE — 77001 FLUOROGUIDE FOR VEIN DEVICE: CPT | Mod: 26,,, | Performed by: SURGERY

## 2017-07-26 PROCEDURE — 71000044 HC DOSC ROUTINE RECOVERY FIRST HOUR: Performed by: SURGERY

## 2017-07-26 PROCEDURE — C1788 PORT, INDWELLING, IMP: HCPCS | Performed by: SURGERY

## 2017-07-26 PROCEDURE — 37000008 HC ANESTHESIA 1ST 15 MINUTES: Performed by: SURGERY

## 2017-07-26 PROCEDURE — 36000707: Performed by: SURGERY

## 2017-07-26 PROCEDURE — 77338 DESIGN MLC DEVICE FOR IMRT: CPT | Mod: 26,,, | Performed by: RADIOLOGY

## 2017-07-26 PROCEDURE — 77293 RESPIRATOR MOTION MGMT SIMUL: CPT | Mod: 26,,, | Performed by: RADIOLOGY

## 2017-07-26 PROCEDURE — 77300 RADIATION THERAPY DOSE PLAN: CPT | Mod: 26,,, | Performed by: RADIOLOGY

## 2017-07-26 PROCEDURE — 63600175 PHARM REV CODE 636 W HCPCS: Performed by: ANESTHESIOLOGY

## 2017-07-26 PROCEDURE — 63600175 PHARM REV CODE 636 W HCPCS: Performed by: SURGERY

## 2017-07-26 PROCEDURE — 77280 THER RAD SIMULAJ FIELD SMPL: CPT | Mod: TC | Performed by: RADIOLOGY

## 2017-07-26 PROCEDURE — 77280 THER RAD SIMULAJ FIELD SMPL: CPT | Mod: 26,,, | Performed by: RADIOLOGY

## 2017-07-26 PROCEDURE — 25000003 PHARM REV CODE 250: Performed by: SURGERY

## 2017-07-26 PROCEDURE — 71000015 HC POSTOP RECOV 1ST HR: Performed by: SURGERY

## 2017-07-26 PROCEDURE — D9220A PRA ANESTHESIA: Mod: ANES,,, | Performed by: ANESTHESIOLOGY

## 2017-07-26 PROCEDURE — D9220A PRA ANESTHESIA: Mod: CRNA,,, | Performed by: NURSE ANESTHETIST, CERTIFIED REGISTERED

## 2017-07-26 PROCEDURE — 77300 RADIATION THERAPY DOSE PLAN: CPT | Mod: TC | Performed by: RADIOLOGY

## 2017-07-26 PROCEDURE — 36561 INSERT TUNNELED CV CATH: CPT | Mod: ,,, | Performed by: SURGERY

## 2017-07-26 DEVICE — KIT POWERPORT SINGLE 8FR: Type: IMPLANTABLE DEVICE | Site: CHEST | Status: FUNCTIONAL

## 2017-07-26 RX ORDER — FENTANYL CITRATE 50 UG/ML
INJECTION, SOLUTION INTRAMUSCULAR; INTRAVENOUS
Status: DISCONTINUED | OUTPATIENT
Start: 2017-07-26 | End: 2017-07-26

## 2017-07-26 RX ORDER — LIDOCAINE HCL/PF 100 MG/5ML
SYRINGE (ML) INTRAVENOUS
Status: DISCONTINUED | OUTPATIENT
Start: 2017-07-26 | End: 2017-07-26

## 2017-07-26 RX ORDER — LIDOCAINE HYDROCHLORIDE 10 MG/ML
1 INJECTION, SOLUTION EPIDURAL; INFILTRATION; INTRACAUDAL; PERINEURAL ONCE
Status: DISCONTINUED | OUTPATIENT
Start: 2017-07-26 | End: 2017-07-26 | Stop reason: HOSPADM

## 2017-07-26 RX ORDER — ONDANSETRON 2 MG/ML
4 INJECTION INTRAMUSCULAR; INTRAVENOUS DAILY PRN
Status: DISCONTINUED | OUTPATIENT
Start: 2017-07-26 | End: 2017-07-26 | Stop reason: HOSPADM

## 2017-07-26 RX ORDER — BUPIVACAINE HYDROCHLORIDE 2.5 MG/ML
INJECTION, SOLUTION EPIDURAL; INFILTRATION; INTRACAUDAL
Status: DISCONTINUED | OUTPATIENT
Start: 2017-07-26 | End: 2017-07-26 | Stop reason: HOSPADM

## 2017-07-26 RX ORDER — HEPARIN SODIUM (PORCINE) LOCK FLUSH IV SOLN 100 UNIT/ML 100 UNIT/ML
SOLUTION INTRAVENOUS
Status: DISCONTINUED | OUTPATIENT
Start: 2017-07-26 | End: 2017-07-26 | Stop reason: HOSPADM

## 2017-07-26 RX ORDER — FENTANYL CITRATE 50 UG/ML
25 INJECTION, SOLUTION INTRAMUSCULAR; INTRAVENOUS EVERY 5 MIN PRN
Status: DISCONTINUED | OUTPATIENT
Start: 2017-07-26 | End: 2017-07-26 | Stop reason: HOSPADM

## 2017-07-26 RX ORDER — SODIUM CHLORIDE 0.9 % (FLUSH) 0.9 %
3 SYRINGE (ML) INJECTION
Status: DISCONTINUED | OUTPATIENT
Start: 2017-07-26 | End: 2017-07-26 | Stop reason: HOSPADM

## 2017-07-26 RX ORDER — MIDAZOLAM HYDROCHLORIDE 1 MG/ML
INJECTION, SOLUTION INTRAMUSCULAR; INTRAVENOUS
Status: DISCONTINUED | OUTPATIENT
Start: 2017-07-26 | End: 2017-07-26

## 2017-07-26 RX ORDER — PROPOFOL 10 MG/ML
VIAL (ML) INTRAVENOUS
Status: DISCONTINUED | OUTPATIENT
Start: 2017-07-26 | End: 2017-07-26

## 2017-07-26 RX ORDER — PROPOFOL 10 MG/ML
VIAL (ML) INTRAVENOUS CONTINUOUS PRN
Status: DISCONTINUED | OUTPATIENT
Start: 2017-07-26 | End: 2017-07-26

## 2017-07-26 RX ORDER — SODIUM CHLORIDE 9 MG/ML
INJECTION, SOLUTION INTRAVENOUS CONTINUOUS
Status: DISCONTINUED | OUTPATIENT
Start: 2017-07-26 | End: 2017-07-26 | Stop reason: HOSPADM

## 2017-07-26 RX ORDER — OXYCODONE AND ACETAMINOPHEN 5; 325 MG/1; MG/1
1 TABLET ORAL EVERY 4 HOURS PRN
Qty: 10 TABLET | Refills: 0 | Status: SHIPPED | OUTPATIENT
Start: 2017-07-26 | End: 2017-10-16

## 2017-07-26 RX ADMIN — PROPOFOL 50 MG: 10 INJECTION, EMULSION INTRAVENOUS at 09:07

## 2017-07-26 RX ADMIN — PROPOFOL 20 MG: 10 INJECTION, EMULSION INTRAVENOUS at 10:07

## 2017-07-26 RX ADMIN — BUPIVACAINE HYDROCHLORIDE 8 ML: 2.5 INJECTION, SOLUTION EPIDURAL; INFILTRATION; INTRACAUDAL; PERINEURAL at 11:07

## 2017-07-26 RX ADMIN — HEPARIN SODIUM (PORCINE) LOCK FLUSH IV SOLN 100 UNIT/ML 10 ML: 100 SOLUTION at 11:07

## 2017-07-26 RX ADMIN — PROPOFOL 20 MG: 10 INJECTION, EMULSION INTRAVENOUS at 11:07

## 2017-07-26 RX ADMIN — PROPOFOL 75 MCG/KG/MIN: 10 INJECTION, EMULSION INTRAVENOUS at 09:07

## 2017-07-26 RX ADMIN — SODIUM CHLORIDE, SODIUM GLUCONATE, SODIUM ACETATE, POTASSIUM CHLORIDE, MAGNESIUM CHLORIDE, SODIUM PHOSPHATE, DIBASIC, AND POTASSIUM PHOSPHATE: .53; .5; .37; .037; .03; .012; .00082 INJECTION, SOLUTION INTRAVENOUS at 11:07

## 2017-07-26 RX ADMIN — FENTANYL CITRATE 50 MCG: 50 INJECTION, SOLUTION INTRAMUSCULAR; INTRAVENOUS at 10:07

## 2017-07-26 RX ADMIN — MIDAZOLAM HYDROCHLORIDE 2 MG: 1 INJECTION, SOLUTION INTRAMUSCULAR; INTRAVENOUS at 09:07

## 2017-07-26 RX ADMIN — SODIUM CHLORIDE: 0.9 INJECTION, SOLUTION INTRAVENOUS at 08:07

## 2017-07-26 RX ADMIN — LIDOCAINE HYDROCHLORIDE 75 MG: 20 INJECTION, SOLUTION INTRAVENOUS at 09:07

## 2017-07-26 RX ADMIN — Medication 2 G: at 09:07

## 2017-07-26 RX ADMIN — FENTANYL CITRATE 50 MCG: 50 INJECTION, SOLUTION INTRAMUSCULAR; INTRAVENOUS at 09:07

## 2017-07-26 NOTE — DISCHARGE INSTRUCTIONS
Vascular Access Port Implantation   Port implantation is surgery to place (implant) a port under the skin. For vascular access, it is placed into a vein. The port allows medicines or nutrition to be sent right into your bloodstream. Blood can also be taken or given through the port. During the procedure, a long, thin tube called a catheter is threaded into one of your large veins. The tube is then attached to the port. This usually sits under the skin of your chest and causes a small bump. To use the port, a special needle is passed through your skin and into the port. The needle can stay in your skin for up to 7 days, if needed. A port can stay in place for weeks or months or longer.    Why is a vascular access port needed?  A vascular access port may allow healthcare providers to give you:  · Chemotherapy or other cancer-fighting drugs  · IV treatments, such as antibiotics or nutrition  · Hemodialysis (for kidney failure)  The port may also be used to draw blood.  Before the procedure  Follow any instructions you are given on how to prepare.  Tell your provider about any medicines you are taking. This includes:  · All prescription medicines  · Over-the-counter medicines such as aspirin or ibuprofen  · Herbs, vitamins, and other supplements  Also be sure your provider knows:  · If you are pregnant or think you may be pregnant  · If you are allergic to any medicines or substances, especially local anesthetics or iodine  · Your full medical history, including why you will need the port  · If you plan on doing any contact sports  During the procedure  · Before the procedure, an IV may be put into a vein in your arm or hand. This gives you fluids and medicines. You may be given medicine through the IV to help you relax during the procedure. This is called sedation. But some surgeons place ports using general anesthesia.  · The chest is used most often for the port. In some cases, your belly (abdomen) or arm will be  used instead.  · The skin over the insertion area is numbed with local anesthetic.  · Ultrasound or X-rays are used to help the healthcare provider guide the catheter into the proper location during the procedure.  · A cut (incision) is made in the skin where the port will be placed. A small pocket for the port is formed under the skin.  · A second small incision is made in the skin near the first incision. A tunnel under the skin is created. The catheter is put through the tunnel and into the blood vessel.  · The skin is closed over the port. It is held shut with stitches (sutures) or surgical glue or tape. The second small incision is also closed.  · A chest X-ray may be done to make sure the port is placed properly.  After the procedure  You may be taken to a recovery room where youll recover from the sedation. Nurses will check on you as you rest. If you have pain, nurses can give you medicine. If you are not staying in the hospital overnight, you will be sent home a few hours after the procedure is done. A healthcare provider will tell you when you can go home. An adult family member or friend will need to drive you home.  Recovering at home  · Take pain medicine as directed by your healthcare provider.  · Take it easy for 24 hours after the procedure. Avoid physical activity and heavy lifting until your healthcare provider says its OK.  · Keep the port clean and dry. Ask when you can shower again. You will need to keep the port dry by covering it when you shower.  · Care for the insertion site as you are directed.  · Dont swim, bathe, or do other activities that cause water to cover the insertion site.  · To keep the port from getting blocked with blood clots, flush it as often as directed. You should be shown the proper way to flush the port before you go home. It is important to follow these directions.     Risks and possible complications of implantation  · Bleeding  · Infection of the insertion  site  · Damage to a blood vessel  · Nerve injury or irritation  · Collapsed lung (for chest port placements)  · Skin breakdown over the port  Risks and possible complications of having a port  · Blocked  port or catheter  · Leakage or breakage of the port or catheter  · The port moves out of position  · Blood clot  · Skin or bloodstream infection  · Skin breakdown over the port      When to seek medical care  Call your healthcare provider right away if you have any of the following:  · A fever of 100.4°F (38.0°C) or higher  · You can't access or use the port properly  · You can't flush the port or get a blood return  · The skin near the port is red, warm, swollen, or broken  · You have shoulder pain on the side where the port is located  · You feel a heart flutter or racing heart   · Swollen arm, if the port is placed in your arm   Date Last Reviewed: 7/1/2016  © 3774-3072 [employer] [address] [place] [address], [place] [address]. All rights reserved. This information is not intended as a substitute for professional medical care. Always follow your healthcare professional's instructions.

## 2017-07-26 NOTE — PLAN OF CARE
Dr Meng notified of cxr completion. Dr Meng says he will look at CXR once he is done in the OR. Informed him that patient is waiting to go for an MRI appt. Instructed to page Dr Tompkins to request him to look at the xray films.

## 2017-07-26 NOTE — INTERVAL H&P NOTE
The patient has been examined and the H&P has been reviewed:    I concur with the findings and no changes have occurred since H&P was written.    Anesthesia/Surgery risks, benefits and alternative options discussed and understood by patient/family.          Active Hospital Problems    Diagnosis  POA    Cancer [C80.1]  Yes      Resolved Hospital Problems    Diagnosis Date Resolved POA   No resolved problems to display.

## 2017-07-26 NOTE — PLAN OF CARE
DISCHARGE INSTRUCTIONS GIVEN,VERBALIZED UNDERSTANDING, VSS, DENIES PAIN, IV REMOVED WITH TIP INTACT.

## 2017-07-26 NOTE — H&P (VIEW-ONLY)
REFERRING PHYSICIAN: Dr. Mai    DIAGNOSIS:   Stage IIIA poorly differentiated carcinoma of the right lung with mediastinal LAD. H/o B/L recurrences of breast cancers s/p metachronous mastectomies.    HISTORY OF PRESENT ILLNESS:   Ms. Kirk is a 61 yo female never smoker with a history of stage I ER + B/L metachronous breast cancers diagnosed in the 1990s (see below for Dr. Mai's excellent synopsis).  She eventually underwent right mastectomy in 1996, left mastectomy in 11/2016.  She had RT to each breast in the early 1990s, and no RT since that time.  She has had a persistent, productive cough since April of 2017 with occasional blood tinged sputum.  Chest x-ray at that time showed a rounded opacity in the right lung overlying the major fissure.   Chest CT in May showed a 4.8 x 4.0 cm, rounded, soft tissue mass in the middle lobe between the medial and lateral segments. There was pre-carinal adenopathy.   She underwent bronchoscopy with EBUS on 6/9/17. Needle biopsy of the medistinal nodes was positive for poorly differentiated carcinoma with squamoid features. The cells were positive for CK7, CK5/6, and P63 and are negative for CK20, GCDFP, TTF1, ER, IN, Napsin and YEMI 3.This was felt to be most c/w a new squamous lung cancer.     PET CT yesterday demonstrated the following:a hypermetabolic, large mass in the right middle lobe with an SUV max of 20.23. There is hypermetabolic activity extending from this mass tracking along the pleura. There is a hypermetabolic right hilar lymph node demonstrating an SUV max of 20.2. In addition there is are 2 hypermetabolic subcarinal lymph nodes with the larger demonstrating an SUV max of 14.2. An additional right paratracheal lymph node is seen with an SUV max of 26.1. Findings are consistent with metastatic lung cancer.    Today she c/o persistent productive cough.  She was prescribed a codeine pill today but has not yet started taking.  Cough is refractory to tessalone  perles and sri.  No significant SOB.  She denies weight loss but does say she is not eating as much these days.  She is a never smoker.  Denies headaches or neuro deficits.  No pain.  BMs, urination normal.  No LE swelling.  No N/V.  She has not had a brain MRI.    Strong FH of cancer, including S dx'ed with breast ca in her 30s (alive and well), sister w/ stomach CA dx'ed at 51, P aunt w/ breast ca at 49, P GM breast cancer, and father with prostate cancer.  Patient never had genetic testing.      Previous cancer history:  She had a stage I, ER negative carcinoma of the    left breast in , treated with lumpectomy and radiation therapy.  In      , she developed a stage I, ER positive carcinoma of the right breast     treated with lumpectomy and radiation.  In , she developed left breast   cancer recurrence, treated with lumpectomy, brachytherapy and four cycles    of Adriamycin and Cytoxan.  In , she developed a new right breast        cancer T2 N0 poorly differentiated, treated with four cycles of              preoperative Taxol followed by mastectomy.       On 2016 left mammogram showed increased calcifications in the left breast at 3:00.  On  a biopsy showed DCIS with solid, cribriform, and micropapillary patterns.  Tumor was 95% ER positive at 95% OK positive  On 2016 she underwent left mastectomy which showed 8 mm of DCIS and negative margins.       ECO  ECOG SCORE             REVIEW OF SYSTEMS:   As above.  In addition, patient denies visual problems, dizziness, chest pain, diarrhea, or any new bony pains.  Patient also denies easy bruising, skin rashes, or numbness or tingling.    PAST MEDICAL HISTORY:  Past Medical History:   Diagnosis Date    Arthritis     Breast cancer     Hypertension      PAST SURGICAL HISTORY:  Past Surgical History:   Procedure Laterality Date    BREAST LUMPECTOMY       SECTION, CLASSIC      LIPOMA RESECTION       MASTECTOMY         ALLERGIES:   Review of patient's allergies indicates:  No Known Allergies    MEDICATIONS:  Current Outpatient Prescriptions   Medication Sig    albuterol 90 mcg/actuation inhaler Inhale 2 puffs into the lungs every 6 (six) hours as needed.    codeine 30 MG Tab Take 1 tablet (30 mg total) by mouth every 4 (four) hours as needed (cough).    hydrocodone-homatropine 5-1.5 mg/5 ml (HYCODAN) 5-1.5 mg/5 mL Syrp Take 5 mLs by mouth every 6 (six) hours as needed (as needed for cough.  Do not drive while taking this med).    losartan-hydrochlorothiazide 100-25 mg (HYZAAR) 100-25 mg per tablet Take 1 tablet by mouth once daily.    ranitidine (ZANTAC) 150 MG tablet Take 1 tablet (150 mg total) by mouth 2 (two) times daily.     No current facility-administered medications for this visit.        SOCIAL HISTORY:  Social History     Social History    Marital status:      Spouse name: N/A    Number of children: N/A    Years of education: N/A     Occupational History    Not on file.     Social History Main Topics    Smoking status: Never Smoker    Smokeless tobacco: Not on file    Alcohol use No    Drug use: No    Sexual activity: Yes     Partners: Male     Other Topics Concern    Not on file     Social History Narrative    No narrative on file       FAMILY HISTORY:  Family History   Problem Relation Age of Onset    Lymphoma Mother     Prostate cancer Father     Stomach cancer Sister     Breast cancer Sister     Diabetes Brother     Breast cancer Paternal Aunt     Breast cancer Paternal Grandmother          PHYSICAL EXAMINATION:  BP (!) 147/68 (BP Location: Right arm)   Pulse 110   Temp 97.5 °F (36.4 °C) (Oral)   Resp 20   Wt 84.8 kg (187 lb)   BMI 30.18 kg/m²   GENERAL: Patient is alert and oriented, in no acute distress.  HEENT:Extraocular muscles are intact.  Oropharynx is clear without lesions.    No thyromegaly noted.  LYMPH: There is no cervical or supraclavicular  lymphadenopathy palpated.  HEART: Regular rate and rhythm, no m/r/g.  LUNGS: Clear to auscultation bilaterally.  ABDOMEN:Soft, nontender, nondistended, without hepatosplenomegaly.  Normoactive bowel sounds.  EXTREMITIES: No clubbing, cyanosis, or edema.  MSK: spine is nontender.  NEUROLOGICAL: Cranial nerve II through XII grossly intact.  Sensation is intact.  Strength is 5 out of 5 in the upper and lower extremities bilaterally.  Gait is normal.    ASSESSMENT:  63 yo woman with stage IIIA poorly differentiated carcinoma of the right lung and mediastinum.      PLAN:  I will order a brain MRI to complete her staging.  Assuming this is negative, standard of care with known N2 disease up front (especially in the setting of multiple positive mediastinal nodes) is chemoradiation.  Path should be checked for ALK, ROS, EGFR mutations as well as PDL-1, especially given her non smoking status.    The risks, benefits, and side effects of radiation, both early and late were explained in detail to the patient  and her significant other.  Side effects discussed included but was not limited to esophagitis and radiation pneumonitis.  We also discussed the possibility of increased reaction of her chest wall skin and soft tissues given her prior radiotherapy, though much of this dose has likely been forgiven.  All of their questions were answered and informed consent was signed.  I plan to see the patient back for radiation planning CT as soon as possible.     I spent approximately 75 minutes reviewing the available records and evaluating the patient, out of which over 50% of the time was spent face to face with the patient in counseling and coordinating this patient's care.    Distress Screening Results: Psychosocial Distress screening score of Distress Score: 0 noted and reviewed. No intervention indicated.     Discussed case with Dr. Mai- testing for actionable mutations not necessary at this point as disease is not  metastatic.

## 2017-07-26 NOTE — TRANSFER OF CARE
"Anesthesia Transfer of Care Note    Patient: Fauzia Kirk    Procedure(s) Performed: Procedure(s) (LRB):  [id number] (N/A)    Patient location: [facility]    Anesthesia Type: general    Transport from OR: Transported from OR on room air with adequate spontaneous ventilation    Post pain: adequate analgesia    Post assessment: no apparent anesthetic complications and tolerated procedure well    Post vital signs: stable    Level of consciousness: awake, alert and oriented    Nausea/Vomiting: no nausea/vomiting    Complications: none    Transfer of care protocol was followed      Last vitals:   Visit Vitals  BP (!) 136/52 (BP Location: Right arm, Patient Position: Lying, BP Method: Automatic)   Pulse 95   Temp 36.9 °C (98.4 °F) (Oral)   Resp 18   Ht 5' 6" (1.676 m)   Wt 88 kg (194 lb)   SpO2 100%   Breastfeeding? No   BMI 31.31 kg/m²     "

## 2017-07-26 NOTE — OP NOTE
[facility]  Surgery Department  Operative Note    SUMMARY     Date of Procedure: 7/26/2017     Procedure: Procedure(s) (LRB):  [id number] (N/A)     Surgeon(s) and Role:     * Brendan Ruiz MD - Primary     * Pablo Meng MD - Resident - Assisting        Pre-Operative Diagnosis: Primary cancer of right middle lobe of lung [C34.2]    Post-Operative Diagnosis: Post-Op Diagnosis Codes:     * Primary cancer of right middle lobe of lung [C34.2]    Anesthesia: Local MAC    Technical Procedures Used: The patient was identified in Preoperative Holding,   brought back to the Operating Room, and placed supine on the operating table   and padded appropriately. Monitors were applied. Monitored anesthesia care   was initiated. The left chest was prepped and draped in the standard sterile   surgical fashion. A timeout was performed and all team members present agreed   this was the correct procedure on the correct patient. We also confirmed   administration of appropriate preoperative antibiotics.     After administration of appropriate local anesthesia, the left subclavian vein was cannulated on the 1st pass with a hollow-bore needle. A guidewire was placed and confirmed to be in correct position by fluoroscopy. An appropriate area for the pocket was chosen, and the incision was anesthetized with lidocaine. A 4 cm transverse skin incision was made. Subcutaneous tissue was divided with Bovie electrocautery.  The   catheter was measured for length appropriately and cut. Additional local was   administered for the pocket for the port and then the port pocket was created   with a mixture of Bovie electrocautery and blunt dissection.  The port was secured to the investing pectoral fascia with two 2-0 Prolene sutures. Under fluoroscopic guidance, the split sheath and dilator were placed over the guidewire, and the guidewire and dilator were then removed. The catheter was placed down the split  sheath and the sheath was split and peeled away. Fluoroscopy confirmed appropriate position of the catheter, which aspirated and flushed easily. Heparinized saline was instilled in the catheter. The skin incision was closed in layers with deep buried interrupted 3-0 Vicryl and  running subcuticular 4-0 Monocryl. A sterile dressing was applied. The patient was transported to the Recovery Room in stable condition. All sponge, instrument and needle counts were correct at the end of the procedure. I was present and scrubbed for the entire case.      Description of the Findings of the Procedure: Left subclavian port, in good position on fluoro    Significant Surgical Tasks Conducted by the Assistant(s), if Applicable: Assist    Complications: No    Estimated Blood Loss (EBL):minimal           Implants:   Implant Name Type Inv. Item Serial No.  Lot No. LRB No. Used   KIT POWERPORT SINGLE 8FR - [id number]   KIT POWERPORT SINGLE 8FR   [employer] [id number] Left 1       Specimens:   Specimen (12h ago through future)    None                  Condition: Good    Disposition: PACU - hemodynamically stable.    Attestation: I was present and scrubbed for the entire procedure.

## 2017-07-26 NOTE — ANESTHESIA POSTPROCEDURE EVALUATION
"Anesthesia Post Evaluation    Patient: Fauzia Kirk    Procedure(s) Performed: Procedure(s) (LRB):  [id number] (N/A)    Final Anesthesia Type: general  Patient location during evaluation: PACU  Patient participation: Yes- Able to Participate  Level of consciousness: awake and alert and oriented  Post-procedure vital signs: reviewed and stable  Pain management: adequate  Airway patency: patent  PONV status at discharge: No PONV  Anesthetic complications: no      Cardiovascular status: blood pressure returned to baseline and hemodynamically stable  Respiratory status: unassisted, spontaneous ventilation and room air  Hydration status: euvolemic  Follow-up not needed.        Visit Vitals  /60   Pulse 93   Temp 36.9 °C (98.4 °F) (Temporal)   Resp 18   Ht 5' 6" (1.676 m)   Wt 88 kg (194 lb)   SpO2 100%   Breastfeeding? No   BMI 31.31 kg/m²       Pain/Jaelyn Score: Pain Assessment Performed: Yes (7/26/2017 12:25 PM)  Presence of Pain: denies (7/26/2017 12:25 PM)  Jaelyn Score: 10 (7/26/2017 12:25 PM)      "

## 2017-07-26 NOTE — BRIEF OP NOTE
[facility]  Brief Operative Note     SUMMARY     Surgery Date: 7/26/2017     Surgeon(s) and Role:     * Brendan Ruiz MD - Primary     * Pablo Meng MD - Resident - Assisting        Pre-op Diagnosis:  Primary cancer of right middle lobe of lung [C34.2]    Post-op Diagnosis:  Post-Op Diagnosis Codes:     * Primary cancer of right middle lobe of lung [C34.2]    Procedure(s) (LRB):  [id number] (N/A)    Anesthesia: Local MAC    Description of the findings of the procedure: Placement of left subclavian port    Findings/Key Components: Placement confirmed under fluoro    Estimated Blood Loss: minimal         Specimens:   Specimen (12h ago through future)    None          Discharge Note    SUMMARY     Admit Date: 7/26/2017    Discharge Date and Time:  07/26/2017 11:30 AM    Hospital Course (synopsis of major diagnoses, care, treatment, and services provided during the course of the hospital stay): OP surgery     Final Diagnosis: Post-Op Diagnosis Codes:     * Primary cancer of right middle lobe of lung [C34.2]    Disposition: Home or Self Care    Follow Up/Patient Instructions:     Medications:  Reconciled Home Medications:   Current Discharge Medication List      START taking these medications    Details   oxycodone-acetaminophen (PERCOCET) 5-325 mg per tablet Take 1 tablet by mouth every 4 (four) hours as needed.  Qty: 10 tablet, Refills: 0         CONTINUE these medications which have NOT CHANGED    Details   albuterol 90 mcg/actuation inhaler Inhale 2 puffs into the lungs every 6 (six) hours as needed.  Qty: 1 Inhaler, Refills: 6    Associated Diagnoses: Cough      hydrocodone-homatropine 5-1.5 mg/5 ml (HYCODAN) 5-1.5 mg/5 mL Syrp Take 5 mLs by mouth every 6 (six) hours as needed (as needed for cough.  Do not drive while taking this med).  Qty: 240 mL, Refills: 0      losartan-hydrochlorothiazide 100-25 mg (HYZAAR) 100-25 mg per tablet Take 1 tablet by mouth once daily.  Qty:  90 tablet, Refills: 4    Associated Diagnoses: Essential hypertension      omeprazole (PRILOSEC) 20 MG capsule Take 20 mg by mouth 2 (two) times daily.      codeine 30 MG Tab Take 1 tablet (30 mg total) by mouth every 4 (four) hours as needed (cough).  Qty: 120 tablet, Refills: 0    Associated Diagnoses: Primary cancer of right middle lobe of lung; Cough             Discharge Procedure Orders  Diet general     Activity as tolerated   Order Comments: Ok to shower in 48 hours     Call MD for:  increased confusion or weakness     Call MD for:  persistent dizziness, light-headedness, or visual disturbances     Call MD for:  worsening rash     Call MD for:  severe persistent headache     Call MD for:  difficulty breathing or increased cough     Call MD for:  redness, tenderness, or signs of infection (pain, swelling, redness, odor or green/yellow discharge around incision site)     Call MD for:  severe uncontrolled pain     Call MD for:  persistent nausea and vomiting or diarrhea     Call MD for:  temperature >100.4     Remove dressing in 48 hours   Order Comments: Leave steri strips on for 2 weeks.  Outer dressing can come off in 2 days       Follow-up Information     Brendan Ruiz MD. Schedule an appointment as soon as possible for a visit in 2 weeks.    Specialties:  General Surgery, Bariatrics  Contact information:  [address]  [facility] [address] [phone]

## 2017-07-27 ENCOUNTER — INFUSION (OUTPATIENT)
Dept: INFUSION THERAPY | Facility: HOSPITAL | Age: 62
End: 2017-07-27
Attending: INTERNAL MEDICINE
Payer: COMMERCIAL

## 2017-07-27 ENCOUNTER — LAB VISIT (OUTPATIENT)
Dept: LAB | Facility: HOSPITAL | Age: 62
End: 2017-07-27
Payer: COMMERCIAL

## 2017-07-27 ENCOUNTER — OFFICE VISIT (OUTPATIENT)
Dept: HEMATOLOGY/ONCOLOGY | Facility: CLINIC | Age: 62
End: 2017-07-27
Payer: COMMERCIAL

## 2017-07-27 VITALS
DIASTOLIC BLOOD PRESSURE: 69 MMHG | BODY MASS INDEX: 30.14 KG/M2 | WEIGHT: 186.75 LBS | SYSTOLIC BLOOD PRESSURE: 131 MMHG | TEMPERATURE: 99 F | HEART RATE: 103 BPM

## 2017-07-27 VITALS
HEART RATE: 101 BPM | DIASTOLIC BLOOD PRESSURE: 71 MMHG | WEIGHT: 188.5 LBS | HEIGHT: 66 IN | RESPIRATION RATE: 20 BRPM | TEMPERATURE: 99 F | BODY MASS INDEX: 30.29 KG/M2 | SYSTOLIC BLOOD PRESSURE: 136 MMHG

## 2017-07-27 DIAGNOSIS — C34.2 PRIMARY CANCER OF RIGHT MIDDLE LOBE OF LUNG: Primary | ICD-10-CM

## 2017-07-27 DIAGNOSIS — R05.9 COUGH: ICD-10-CM

## 2017-07-27 DIAGNOSIS — C77.9 REGIONAL LYMPH NODE METASTASIS PRESENT: ICD-10-CM

## 2017-07-27 DIAGNOSIS — Z51.11 ENCOUNTER FOR ANTINEOPLASTIC CHEMOTHERAPY: ICD-10-CM

## 2017-07-27 DIAGNOSIS — C34.2 PRIMARY CANCER OF RIGHT MIDDLE LOBE OF LUNG: ICD-10-CM

## 2017-07-27 PROBLEM — J18.9 PNEUMONIA OF RIGHT LUNG DUE TO INFECTIOUS ORGANISM: Status: RESOLVED | Noted: 2017-05-18 | Resolved: 2017-07-27

## 2017-07-27 LAB
ALBUMIN SERPL BCP-MCNC: 2.9 G/DL
ALP SERPL-CCNC: 69 U/L
ALT SERPL W/O P-5'-P-CCNC: 7 U/L
ANION GAP SERPL CALC-SCNC: 8 MMOL/L
AST SERPL-CCNC: 10 U/L
BASOPHILS # BLD AUTO: 0.03 K/UL
BASOPHILS NFR BLD: 0.3 %
BILIRUB SERPL-MCNC: 0.6 MG/DL
BUN SERPL-MCNC: 13 MG/DL
CALCIUM SERPL-MCNC: 9.9 MG/DL
CHLORIDE SERPL-SCNC: 102 MMOL/L
CO2 SERPL-SCNC: 29 MMOL/L
CREAT SERPL-MCNC: 0.9 MG/DL
DIFFERENTIAL METHOD: ABNORMAL
EOSINOPHIL # BLD AUTO: 0.2 K/UL
EOSINOPHIL NFR BLD: 1.7 %
ERYTHROCYTE [DISTWIDTH] IN BLOOD BY AUTOMATED COUNT: 14.4 %
EST. GFR  (AFRICAN AMERICAN): >60 ML/MIN/1.73 M^2
EST. GFR  (NON AFRICAN AMERICAN): >60 ML/MIN/1.73 M^2
GLUCOSE SERPL-MCNC: 153 MG/DL
HCT VFR BLD AUTO: 30.9 %
HGB BLD-MCNC: 9.7 G/DL
LYMPHOCYTES # BLD AUTO: 1.5 K/UL
LYMPHOCYTES NFR BLD: 14.3 %
MCH RBC QN AUTO: 26.1 PG
MCHC RBC AUTO-ENTMCNC: 31.4 G/DL
MCV RBC AUTO: 83 FL
MONOCYTES # BLD AUTO: 0.7 K/UL
MONOCYTES NFR BLD: 6.2 %
NEUTROPHILS # BLD AUTO: 8.2 K/UL
NEUTROPHILS NFR BLD: 77.2 %
PLATELET # BLD AUTO: 416 K/UL
PMV BLD AUTO: 9.3 FL
POTASSIUM SERPL-SCNC: 3.5 MMOL/L
PROT SERPL-MCNC: 7.9 G/DL
RBC # BLD AUTO: 3.72 M/UL
SODIUM SERPL-SCNC: 139 MMOL/L
WBC # BLD AUTO: 10.59 K/UL

## 2017-07-27 PROCEDURE — 96375 TX/PRO/DX INJ NEW DRUG ADDON: CPT

## 2017-07-27 PROCEDURE — A4216 STERILE WATER/SALINE, 10 ML: HCPCS | Performed by: INTERNAL MEDICINE

## 2017-07-27 PROCEDURE — 25000003 PHARM REV CODE 250: Performed by: INTERNAL MEDICINE

## 2017-07-27 PROCEDURE — 96413 CHEMO IV INFUSION 1 HR: CPT

## 2017-07-27 PROCEDURE — 96417 CHEMO IV INFUS EACH ADDL SEQ: CPT

## 2017-07-27 PROCEDURE — 36415 COLL VENOUS BLD VENIPUNCTURE: CPT

## 2017-07-27 PROCEDURE — 80053 COMPREHEN METABOLIC PANEL: CPT

## 2017-07-27 PROCEDURE — 85025 COMPLETE CBC W/AUTO DIFF WBC: CPT

## 2017-07-27 PROCEDURE — 77386 HC IMRT, COMPLEX: CPT | Performed by: RADIOLOGY

## 2017-07-27 PROCEDURE — 63600175 PHARM REV CODE 636 W HCPCS: Performed by: INTERNAL MEDICINE

## 2017-07-27 PROCEDURE — 96367 TX/PROPH/DG ADDL SEQ IV INF: CPT

## 2017-07-27 PROCEDURE — 77387 GUIDANCE FOR RADJ TX DLVR: CPT | Mod: 26,,, | Performed by: RADIOLOGY

## 2017-07-27 PROCEDURE — 99999 PR PBB SHADOW E&M-EST. PATIENT-LVL III: CPT | Mod: PBBFAC,,, | Performed by: INTERNAL MEDICINE

## 2017-07-27 PROCEDURE — 99212 OFFICE O/P EST SF 10 MIN: CPT | Mod: S$GLB,,, | Performed by: INTERNAL MEDICINE

## 2017-07-27 PROCEDURE — S0028 INJECTION, FAMOTIDINE, 20 MG: HCPCS | Performed by: INTERNAL MEDICINE

## 2017-07-27 RX ORDER — DIPHENHYDRAMINE HYDROCHLORIDE 50 MG/ML
50 INJECTION INTRAMUSCULAR; INTRAVENOUS ONCE AS NEEDED
Status: CANCELLED | OUTPATIENT
Start: 2017-07-27 | End: 2017-07-27

## 2017-07-27 RX ORDER — ONDANSETRON 4 MG/1
4 TABLET, ORALLY DISINTEGRATING ORAL EVERY 6 HOURS PRN
Qty: 20 TABLET | Refills: 3 | Status: SHIPPED | OUTPATIENT
Start: 2017-07-27 | End: 2017-08-03

## 2017-07-27 RX ORDER — HEPARIN 100 UNIT/ML
500 SYRINGE INTRAVENOUS
Status: CANCELLED | OUTPATIENT
Start: 2017-07-27

## 2017-07-27 RX ORDER — EPINEPHRINE 0.3 MG/.3ML
0.3 INJECTION SUBCUTANEOUS ONCE AS NEEDED
Status: CANCELLED | OUTPATIENT
Start: 2017-07-27 | End: 2017-07-27

## 2017-07-27 RX ORDER — EPINEPHRINE 0.3 MG/.3ML
0.3 INJECTION SUBCUTANEOUS ONCE AS NEEDED
Status: DISCONTINUED | OUTPATIENT
Start: 2017-07-27 | End: 2017-07-27 | Stop reason: HOSPADM

## 2017-07-27 RX ORDER — DIPHENHYDRAMINE HYDROCHLORIDE 50 MG/ML
50 INJECTION INTRAMUSCULAR; INTRAVENOUS ONCE AS NEEDED
Status: DISCONTINUED | OUTPATIENT
Start: 2017-07-27 | End: 2017-07-27 | Stop reason: HOSPADM

## 2017-07-27 RX ORDER — SODIUM CHLORIDE 0.9 % (FLUSH) 0.9 %
10 SYRINGE (ML) INJECTION
Status: DISCONTINUED | OUTPATIENT
Start: 2017-07-27 | End: 2017-07-27 | Stop reason: HOSPADM

## 2017-07-27 RX ORDER — SODIUM CHLORIDE 0.9 % (FLUSH) 0.9 %
10 SYRINGE (ML) INJECTION
Status: CANCELLED | OUTPATIENT
Start: 2017-07-27

## 2017-07-27 RX ORDER — FAMOTIDINE 10 MG/ML
20 INJECTION INTRAVENOUS
Status: COMPLETED | OUTPATIENT
Start: 2017-07-27 | End: 2017-07-27

## 2017-07-27 RX ORDER — CODEINE SULFATE 30 MG/1
30 TABLET ORAL EVERY 4 HOURS PRN
Qty: 120 TABLET | Refills: 0 | Status: SHIPPED | OUTPATIENT
Start: 2017-07-27 | End: 2017-10-16

## 2017-07-27 RX ORDER — FAMOTIDINE 10 MG/ML
20 INJECTION INTRAVENOUS
Status: CANCELLED | OUTPATIENT
Start: 2017-07-27

## 2017-07-27 RX ORDER — OMEPRAZOLE 40 MG/1
40 CAPSULE, DELAYED RELEASE ORAL DAILY
Qty: 30 CAPSULE | Refills: 6 | Status: SHIPPED | OUTPATIENT
Start: 2017-07-27 | End: 2019-02-04

## 2017-07-27 RX ORDER — HEPARIN 100 UNIT/ML
500 SYRINGE INTRAVENOUS
Status: DISCONTINUED | OUTPATIENT
Start: 2017-07-27 | End: 2017-07-27 | Stop reason: HOSPADM

## 2017-07-27 RX ADMIN — SODIUM CHLORIDE: 9 INJECTION, SOLUTION INTRAVENOUS at 02:07

## 2017-07-27 RX ADMIN — FAMOTIDINE 20 MG: 10 INJECTION INTRAVENOUS at 02:07

## 2017-07-27 RX ADMIN — DIPHENHYDRAMINE HYDROCHLORIDE 50 MG: 50 INJECTION, SOLUTION INTRAMUSCULAR; INTRAVENOUS at 02:07

## 2017-07-27 RX ADMIN — SODIUM CHLORIDE 20 MG: 9 INJECTION, SOLUTION INTRAVENOUS at 02:07

## 2017-07-27 RX ADMIN — HEPARIN 500 UNITS: 100 SYRINGE at 04:07

## 2017-07-27 RX ADMIN — PACLITAXEL 90 MG: 6 INJECTION, SOLUTION, CONCENTRATE INTRAVENOUS at 02:07

## 2017-07-27 RX ADMIN — SODIUM CHLORIDE, PRESERVATIVE FREE 10 ML: 5 INJECTION INTRAVENOUS at 04:07

## 2017-07-27 RX ADMIN — CARBOPLATIN 220 MG: 10 INJECTION, SOLUTION INTRAVENOUS at 03:07

## 2017-07-27 NOTE — PROGRESS NOTES
Subjective:       Patient ID: Fauzia Kirk is a 62 y.o. female.    Chief Complaint: Chemotherapy    HPI Ms. Kirk is a very pleasant 62-year-old  female who returned  for F/U of right lung cancer.  She has seen Dr. paul in radiation oncology and has had a port placed and she is here to initiate chemotherapy as part of chemoradiation.    Her major issue continues to be her chronic cough.        In December 2016 she began to have some blood in her mucus after coughing.In  April she developed a persistent cough and saw her physician on April 18.  Chest x-ray at that time showed a rounded opacity in the right lung overlying the major fissure.   Chest CT in May showed a 4.8 x 4.0 cm, rounded, soft tissue mass in the middle lobe between the medial and lateral segments. There was pre-carinal adenopathy.    Subsequently she underwent bronchoscopy with EBUS on 6/9/17. Needle biopsy of the medistinal nodes was positive for poorly differentiated carcinoma with squamoid features. The cells were positive for CK7, CK5/6, and P63 and are negative for CK20, GCDFP, TTF1, ER, TX, Napsin and YEMI 3.This was felt to be most CW a new squamous lung cancer.    PET CT yesterday demonstrated the following:a hypermetabolic, large mass in the right middle lobe with an SUV max of 20.23. There is hypermetabolic activity extending from this mass tracking along the pleura. There is a hypermetabolic right hilar lymph node demonstrating an SUV max of 20.2. In addition there is are 2 hypermetabolic subcarinal lymph nodes with the larger demonstrating an SUV max of 14.2. An additional right paratracheal lymph node is seen with an SUV max of 26.1. Findings are consistent with metastatic lung cancer.      Previous cancer history:She had a stage I, ER negative carcinoma of the    left breast in 1990, treated with lumpectomy and radiation therapy.  In      1993, she developed a stage I, ER positive carcinoma of the right breast     treated with  lumpectomy and radiation.  In 1995, she developed left breast   cancer recurrence, treated with lumpectomy, brachytherapy and four cycles    of Adriamycin and Cytoxan.  In 1996, she developed a new right breast        cancer T2 N0 poorly differentiated, treated with four cycles of              preoperative Taxol followed by mastectomy.        On October 17, 2016 left mammogram showed increased calcifications in the left breast at 3:00.  On October 27 a biopsy showed DCIS with solid, cribriform, and micropapillary patterns.  Tumor was 95% ER positive at 95% TN positive    On November 7, 2016 she underwent left mastectomy which showed 8 mm of DCIS and negative margins.    Review of Systems   Respiratory: Positive for cough.    Psychiatric/Behavioral: Positive for dysphoric mood. The patient is nervous/anxious.        Objective:      Physical Exam   Constitutional: She appears well-developed and well-nourished.   Psychiatric: She has a normal mood and affect. Her behavior is normal. Thought content normal.       Assessment:      chest x-ray images reviewed.    1. Primary cancer of right middle lobe of lung    2. Regional lymph node metastasis present    3. Encounter for antineoplastic chemotherapy        Plan:     I  reviewed side effects of chemotherapy including myelosuppression, hair loss, gastrointestinal effects, and peripheral neuropathy.  Written informed consent was executed.    Plan will be for her to have weekly carboplatin AUC 2 together  with Paclitaxel 45 mg/M2.

## 2017-07-27 NOTE — PLAN OF CARE
Problem: Patient Care Overview  Goal: Plan of Care Review  Outcome: Ongoing (interventions implemented as appropriate)  Pt tolerated Taxol and Carbo with no complications. VSS. Pt instructed to call MD with any problems. AVS given to patient and patient discharged home.

## 2017-07-28 PROCEDURE — 77387 GUIDANCE FOR RADJ TX DLVR: CPT | Mod: 26,,, | Performed by: RADIOLOGY

## 2017-07-28 PROCEDURE — 77386 HC IMRT, COMPLEX: CPT | Performed by: RADIOLOGY

## 2017-07-31 PROCEDURE — 77387 GUIDANCE FOR RADJ TX DLVR: CPT | Mod: 26,,, | Performed by: RADIOLOGY

## 2017-07-31 PROCEDURE — 77386 HC IMRT, COMPLEX: CPT | Performed by: RADIOLOGY

## 2017-08-01 ENCOUNTER — HOSPITAL ENCOUNTER (OUTPATIENT)
Dept: RADIATION THERAPY | Facility: HOSPITAL | Age: 62
Discharge: HOME | End: 2017-08-01
Attending: RADIOLOGY
Payer: COMMERCIAL

## 2017-08-01 ENCOUNTER — DOCUMENTATION ONLY (OUTPATIENT)
Dept: RADIATION ONCOLOGY | Facility: CLINIC | Age: 62
End: 2017-08-01

## 2017-08-01 PROCEDURE — 77387 GUIDANCE FOR RADJ TX DLVR: CPT | Mod: 26,,, | Performed by: RADIOLOGY

## 2017-08-01 PROCEDURE — 77386 HC IMRT, COMPLEX: CPT | Performed by: RADIOLOGY

## 2017-08-02 PROCEDURE — 77336 RADIATION PHYSICS CONSULT: CPT | Performed by: RADIOLOGY

## 2017-08-02 PROCEDURE — 77387 GUIDANCE FOR RADJ TX DLVR: CPT | Mod: 26,,, | Performed by: RADIOLOGY

## 2017-08-02 PROCEDURE — 77386 HC IMRT, COMPLEX: CPT | Performed by: RADIOLOGY

## 2017-08-03 ENCOUNTER — INFUSION (OUTPATIENT)
Dept: INFUSION THERAPY | Facility: HOSPITAL | Age: 62
End: 2017-08-03
Attending: INTERNAL MEDICINE
Payer: COMMERCIAL

## 2017-08-03 VITALS
SYSTOLIC BLOOD PRESSURE: 103 MMHG | BODY MASS INDEX: 29.65 KG/M2 | HEART RATE: 110 BPM | TEMPERATURE: 98 F | HEIGHT: 66 IN | WEIGHT: 184.5 LBS | DIASTOLIC BLOOD PRESSURE: 55 MMHG

## 2017-08-03 DIAGNOSIS — Z51.11 ENCOUNTER FOR ANTINEOPLASTIC CHEMOTHERAPY: ICD-10-CM

## 2017-08-03 DIAGNOSIS — C34.2 PRIMARY CANCER OF RIGHT MIDDLE LOBE OF LUNG: Primary | ICD-10-CM

## 2017-08-03 PROCEDURE — 77386 HC IMRT, COMPLEX: CPT | Performed by: RADIOLOGY

## 2017-08-03 PROCEDURE — 96413 CHEMO IV INFUSION 1 HR: CPT

## 2017-08-03 PROCEDURE — 25000003 PHARM REV CODE 250: Performed by: INTERNAL MEDICINE

## 2017-08-03 PROCEDURE — 96375 TX/PRO/DX INJ NEW DRUG ADDON: CPT

## 2017-08-03 PROCEDURE — A4216 STERILE WATER/SALINE, 10 ML: HCPCS | Performed by: INTERNAL MEDICINE

## 2017-08-03 PROCEDURE — 96417 CHEMO IV INFUS EACH ADDL SEQ: CPT

## 2017-08-03 PROCEDURE — 77417 THER RADIOLOGY PORT IMAGE(S): CPT | Performed by: RADIOLOGY

## 2017-08-03 PROCEDURE — S0028 INJECTION, FAMOTIDINE, 20 MG: HCPCS | Performed by: INTERNAL MEDICINE

## 2017-08-03 PROCEDURE — 96367 TX/PROPH/DG ADDL SEQ IV INF: CPT

## 2017-08-03 PROCEDURE — 77387 GUIDANCE FOR RADJ TX DLVR: CPT | Mod: 26,,, | Performed by: RADIOLOGY

## 2017-08-03 PROCEDURE — 63600175 PHARM REV CODE 636 W HCPCS: Performed by: INTERNAL MEDICINE

## 2017-08-03 RX ORDER — SODIUM CHLORIDE 0.9 % (FLUSH) 0.9 %
10 SYRINGE (ML) INJECTION
Status: DISCONTINUED | OUTPATIENT
Start: 2017-08-03 | End: 2017-08-03 | Stop reason: HOSPADM

## 2017-08-03 RX ORDER — EPINEPHRINE 0.3 MG/.3ML
0.3 INJECTION SUBCUTANEOUS ONCE AS NEEDED
Status: DISCONTINUED | OUTPATIENT
Start: 2017-08-03 | End: 2017-08-03 | Stop reason: HOSPADM

## 2017-08-03 RX ORDER — HEPARIN 100 UNIT/ML
500 SYRINGE INTRAVENOUS
Status: DISCONTINUED | OUTPATIENT
Start: 2017-08-03 | End: 2017-08-03 | Stop reason: HOSPADM

## 2017-08-03 RX ORDER — DIPHENHYDRAMINE HYDROCHLORIDE 50 MG/ML
50 INJECTION INTRAMUSCULAR; INTRAVENOUS ONCE AS NEEDED
Status: DISCONTINUED | OUTPATIENT
Start: 2017-08-03 | End: 2017-08-03 | Stop reason: HOSPADM

## 2017-08-03 RX ORDER — FAMOTIDINE 10 MG/ML
20 INJECTION INTRAVENOUS
Status: COMPLETED | OUTPATIENT
Start: 2017-08-03 | End: 2017-08-03

## 2017-08-03 RX ADMIN — DEXAMETHASONE SODIUM PHOSPHATE 20 MG: 4 INJECTION, SOLUTION INTRA-ARTICULAR; INTRALESIONAL; INTRAMUSCULAR; INTRAVENOUS; SOFT TISSUE at 02:08

## 2017-08-03 RX ADMIN — HEPARIN 500 UNITS: 100 SYRINGE at 05:08

## 2017-08-03 RX ADMIN — FAMOTIDINE 20 MG: 10 INJECTION INTRAVENOUS at 02:08

## 2017-08-03 RX ADMIN — CARBOPLATIN 200 MG: 10 INJECTION, SOLUTION INTRAVENOUS at 04:08

## 2017-08-03 RX ADMIN — PACLITAXEL 90 MG: 6 INJECTION, SOLUTION, CONCENTRATE INTRAVENOUS at 03:08

## 2017-08-03 RX ADMIN — HYDROCORTISONE SODIUM SUCCINATE 100 MG: 100 INJECTION, POWDER, FOR SOLUTION INTRAMUSCULAR; INTRAVENOUS at 03:08

## 2017-08-03 RX ADMIN — SODIUM CHLORIDE, PRESERVATIVE FREE 10 ML: 5 INJECTION INTRAVENOUS at 05:08

## 2017-08-03 RX ADMIN — SODIUM CHLORIDE: 0.9 INJECTION, SOLUTION INTRAVENOUS at 02:08

## 2017-08-03 RX ADMIN — DIPHENHYDRAMINE HYDROCHLORIDE 50 MG: 50 INJECTION, SOLUTION INTRAMUSCULAR; INTRAVENOUS at 02:08

## 2017-08-03 NOTE — PLAN OF CARE
Problem: Patient Care Overview  Goal: Plan of Care Review  Outcome: Ongoing (interventions implemented as appropriate)  Patient completed taxol and carbo today. NAD noted upon discharge. Plan to rtc 8/10 for next infusion. Verbalized understanding to call MD for any questions/concerns. AVS given. Discharged home; ambulated independently with  by side.

## 2017-08-03 NOTE — NURSING
Patient began to experience hot flashes. Taxol paused. Susan osullivan RN also at chair side. B/P elevated from baseline 163/79 hr 122. Patient requesting not to receive anymore benadryl. Solucortef 100 mg given iv. B/P trending down, 148/72 hr 111.     Resumed infusion. Repeat B/P 133/68, hr 111

## 2017-08-04 PROCEDURE — 77386 HC IMRT, COMPLEX: CPT | Performed by: RADIOLOGY

## 2017-08-04 PROCEDURE — 77387 GUIDANCE FOR RADJ TX DLVR: CPT | Mod: 26,,, | Performed by: RADIOLOGY

## 2017-08-07 PROCEDURE — 77386 HC IMRT, COMPLEX: CPT | Performed by: RADIOLOGY

## 2017-08-07 PROCEDURE — 77387 GUIDANCE FOR RADJ TX DLVR: CPT | Mod: 26,,, | Performed by: RADIOLOGY

## 2017-08-08 PROCEDURE — 77386 HC IMRT, COMPLEX: CPT | Performed by: RADIOLOGY

## 2017-08-08 PROCEDURE — 77387 GUIDANCE FOR RADJ TX DLVR: CPT | Mod: 26,,, | Performed by: RADIOLOGY

## 2017-08-08 PROCEDURE — 77336 RADIATION PHYSICS CONSULT: CPT | Performed by: RADIOLOGY

## 2017-08-09 ENCOUNTER — DOCUMENTATION ONLY (OUTPATIENT)
Dept: RADIATION ONCOLOGY | Facility: CLINIC | Age: 62
End: 2017-08-09

## 2017-08-09 PROCEDURE — 77387 GUIDANCE FOR RADJ TX DLVR: CPT | Mod: 26,,, | Performed by: RADIOLOGY

## 2017-08-09 PROCEDURE — 77386 HC IMRT, COMPLEX: CPT | Performed by: RADIOLOGY

## 2017-08-09 NOTE — PLAN OF CARE
Problem: Patient Care Overview  Goal: Plan of Care Review  Outcome: Ongoing (interventions implemented as appropriate)  Day 10 of radiation to the lung cough has improved appetite poor

## 2017-08-10 ENCOUNTER — INFUSION (OUTPATIENT)
Dept: INFUSION THERAPY | Facility: HOSPITAL | Age: 62
End: 2017-08-10
Attending: INTERNAL MEDICINE
Payer: COMMERCIAL

## 2017-08-10 ENCOUNTER — OFFICE VISIT (OUTPATIENT)
Dept: HEMATOLOGY/ONCOLOGY | Facility: CLINIC | Age: 62
End: 2017-08-10
Payer: COMMERCIAL

## 2017-08-10 VITALS
DIASTOLIC BLOOD PRESSURE: 66 MMHG | TEMPERATURE: 99 F | SYSTOLIC BLOOD PRESSURE: 113 MMHG | HEART RATE: 114 BPM | HEART RATE: 110 BPM | RESPIRATION RATE: 16 BRPM | RESPIRATION RATE: 18 BRPM | SYSTOLIC BLOOD PRESSURE: 126 MMHG | DIASTOLIC BLOOD PRESSURE: 70 MMHG | BODY MASS INDEX: 29 KG/M2 | TEMPERATURE: 98 F | WEIGHT: 179.69 LBS

## 2017-08-10 DIAGNOSIS — C34.2 PRIMARY CANCER OF RIGHT MIDDLE LOBE OF LUNG: Primary | ICD-10-CM

## 2017-08-10 DIAGNOSIS — Z51.11 ENCOUNTER FOR ANTINEOPLASTIC CHEMOTHERAPY: ICD-10-CM

## 2017-08-10 PROCEDURE — 63600175 PHARM REV CODE 636 W HCPCS: Performed by: INTERNAL MEDICINE

## 2017-08-10 PROCEDURE — 96375 TX/PRO/DX INJ NEW DRUG ADDON: CPT

## 2017-08-10 PROCEDURE — 77417 THER RADIOLOGY PORT IMAGE(S): CPT | Performed by: RADIOLOGY

## 2017-08-10 PROCEDURE — 3008F BODY MASS INDEX DOCD: CPT | Mod: S$GLB,,, | Performed by: INTERNAL MEDICINE

## 2017-08-10 PROCEDURE — 99214 OFFICE O/P EST MOD 30 MIN: CPT | Mod: S$GLB,,, | Performed by: INTERNAL MEDICINE

## 2017-08-10 PROCEDURE — S0028 INJECTION, FAMOTIDINE, 20 MG: HCPCS | Performed by: INTERNAL MEDICINE

## 2017-08-10 PROCEDURE — 77386 HC IMRT, COMPLEX: CPT | Performed by: RADIOLOGY

## 2017-08-10 PROCEDURE — 77387 GUIDANCE FOR RADJ TX DLVR: CPT | Mod: 26,,, | Performed by: RADIOLOGY

## 2017-08-10 PROCEDURE — 25000003 PHARM REV CODE 250: Performed by: INTERNAL MEDICINE

## 2017-08-10 PROCEDURE — 99999 PR PBB SHADOW E&M-EST. PATIENT-LVL III: CPT | Mod: PBBFAC,,, | Performed by: INTERNAL MEDICINE

## 2017-08-10 PROCEDURE — 3074F SYST BP LT 130 MM HG: CPT | Mod: S$GLB,,, | Performed by: INTERNAL MEDICINE

## 2017-08-10 PROCEDURE — 96367 TX/PROPH/DG ADDL SEQ IV INF: CPT

## 2017-08-10 PROCEDURE — 96413 CHEMO IV INFUSION 1 HR: CPT

## 2017-08-10 PROCEDURE — 96417 CHEMO IV INFUS EACH ADDL SEQ: CPT

## 2017-08-10 PROCEDURE — 3078F DIAST BP <80 MM HG: CPT | Mod: S$GLB,,, | Performed by: INTERNAL MEDICINE

## 2017-08-10 RX ORDER — DIPHENHYDRAMINE HYDROCHLORIDE 50 MG/ML
50 INJECTION INTRAMUSCULAR; INTRAVENOUS ONCE AS NEEDED
Status: CANCELLED | OUTPATIENT
Start: 2017-08-10 | End: 2017-08-10

## 2017-08-10 RX ORDER — EPINEPHRINE 0.3 MG/.3ML
0.3 INJECTION SUBCUTANEOUS ONCE AS NEEDED
Status: DISCONTINUED | OUTPATIENT
Start: 2017-08-10 | End: 2017-08-10 | Stop reason: HOSPADM

## 2017-08-10 RX ORDER — HEPARIN 100 UNIT/ML
500 SYRINGE INTRAVENOUS
Status: CANCELLED | OUTPATIENT
Start: 2017-08-10

## 2017-08-10 RX ORDER — FAMOTIDINE 10 MG/ML
20 INJECTION INTRAVENOUS
Status: COMPLETED | OUTPATIENT
Start: 2017-08-10 | End: 2017-08-10

## 2017-08-10 RX ORDER — FAMOTIDINE 10 MG/ML
20 INJECTION INTRAVENOUS
Status: CANCELLED | OUTPATIENT
Start: 2017-08-10

## 2017-08-10 RX ORDER — SODIUM CHLORIDE 0.9 % (FLUSH) 0.9 %
10 SYRINGE (ML) INJECTION
Status: DISCONTINUED | OUTPATIENT
Start: 2017-08-10 | End: 2017-08-10 | Stop reason: HOSPADM

## 2017-08-10 RX ORDER — HEPARIN 100 UNIT/ML
500 SYRINGE INTRAVENOUS
Status: DISCONTINUED | OUTPATIENT
Start: 2017-08-10 | End: 2017-08-10 | Stop reason: HOSPADM

## 2017-08-10 RX ORDER — DIPHENHYDRAMINE HYDROCHLORIDE 50 MG/ML
50 INJECTION INTRAMUSCULAR; INTRAVENOUS ONCE AS NEEDED
Status: DISCONTINUED | OUTPATIENT
Start: 2017-08-10 | End: 2017-08-10 | Stop reason: HOSPADM

## 2017-08-10 RX ORDER — SODIUM CHLORIDE 0.9 % (FLUSH) 0.9 %
10 SYRINGE (ML) INJECTION
Status: CANCELLED | OUTPATIENT
Start: 2017-08-10

## 2017-08-10 RX ORDER — EPINEPHRINE 0.3 MG/.3ML
0.3 INJECTION SUBCUTANEOUS ONCE AS NEEDED
Status: CANCELLED | OUTPATIENT
Start: 2017-08-10 | End: 2017-08-10

## 2017-08-10 RX ADMIN — CARBOPLATIN 210 MG: 10 INJECTION, SOLUTION INTRAVENOUS at 02:08

## 2017-08-10 RX ADMIN — HEPARIN 500 UNITS: 100 SYRINGE at 03:08

## 2017-08-10 RX ADMIN — SODIUM CHLORIDE: 0.9 INJECTION, SOLUTION INTRAVENOUS at 11:08

## 2017-08-10 RX ADMIN — FAMOTIDINE 20 MG: 10 INJECTION INTRAVENOUS at 12:08

## 2017-08-10 RX ADMIN — DIPHENHYDRAMINE HYDROCHLORIDE 50 MG: 50 INJECTION, SOLUTION INTRAMUSCULAR; INTRAVENOUS at 12:08

## 2017-08-10 RX ADMIN — PACLITAXEL 90 MG: 6 INJECTION, SOLUTION, CONCENTRATE INTRAVENOUS at 12:08

## 2017-08-10 RX ADMIN — DEXAMETHASONE SODIUM PHOSPHATE 20 MG: 4 INJECTION, SOLUTION INTRA-ARTICULAR; INTRALESIONAL; INTRAMUSCULAR; INTRAVENOUS; SOFT TISSUE at 12:08

## 2017-08-10 NOTE — PROGRESS NOTES
Subjective:       Patient ID: Fauzia Kirk is a 62 y.o. female.    Chief Complaint: No chief complaint on file.    HPI Ms. Kirk is a very pleasant 62-year-old  female who returned  for F/U of right lung cancer.      She has been on weekly chemotherapy with carboplatin and Taxol and has been receiving radiation therapy for stage IIIA disease.    Her major problem is been some fatigue.  She's sleeping very poorly at night.  She's having no significant pain other than some aching in her legs.  Appetites been good, bowels have been somewhat irregular.        In December 2016 she began to have some blood in her mucus after coughing.In  April she developed a persistent cough and saw her physician on April 18.  Chest x-ray at that time showed a rounded opacity in the right lung overlying the major fissure.   Chest CT in May showed a 4.8 x 4.0 cm, rounded, soft tissue mass in the middle lobe between the medial and lateral segments. There was pre-carinal adenopathy.    Subsequently she underwent bronchoscopy with EBUS on 6/9/17. Needle biopsy of the medistinal nodes was positive for poorly differentiated carcinoma with squamoid features. The cells were positive for CK7, CK5/6, and P63 and are negative for CK20, GCDFP, TTF1, ER, FL, Napsin and YEMI 3.This was felt to be most CW a new squamous lung cancer.    PET CT yesterday demonstrated the following:a hypermetabolic, large mass in the right middle lobe with an SUV max of 20.23. There is hypermetabolic activity extending from this mass tracking along the pleura. There is a hypermetabolic right hilar lymph node demonstrating an SUV max of 20.2. In addition there is are 2 hypermetabolic subcarinal lymph nodes with the larger demonstrating an SUV max of 14.2. An additional right paratracheal lymph node is seen with an SUV max of 26.1. Findings are consistent with metastatic lung cancer.      Previous cancer history:She had a stage I, ER negative carcinoma of the     left breast in 1990, treated with lumpectomy and radiation therapy.  In      1993, she developed a stage I, ER positive carcinoma of the right breast     treated with lumpectomy and radiation.  In 1995, she developed left breast   cancer recurrence, treated with lumpectomy, brachytherapy and four cycles    of Adriamycin and Cytoxan.  In 1996, she developed a new right breast        cancer T2 N0 poorly differentiated, treated with four cycles of              preoperative Taxol followed by mastectomy.        On October 17, 2016 left mammogram showed increased calcifications in the left breast at 3:00.  On October 27 a biopsy showed DCIS with solid, cribriform, and micropapillary patterns.  Tumor was 95% ER positive at 95% OH positive    On November 7, 2016 she underwent left mastectomy which showed 8 mm of DCIS and negative margins.    Review of Systems   Constitutional: Positive for activity change and fatigue. Negative for appetite change and fever.   Respiratory: Positive for cough (improved).    Cardiovascular: Negative for chest pain.   Gastrointestinal: Negative for abdominal pain and constipation.   Musculoskeletal: Negative for back pain.   Psychiatric/Behavioral: Positive for sleep disturbance.       Objective:      Physical Exam   Constitutional: She is oriented to person, place, and time. She appears well-developed and well-nourished.   HENT:   Mouth/Throat: No oropharyngeal exudate.   Eyes: No scleral icterus.   Cardiovascular: Normal rate and regular rhythm.    Lymphadenopathy:     She has no cervical adenopathy.   Neurological: She is alert and oriented to person, place, and time.   Psychiatric: She has a normal mood and affect. Her behavior is normal. Thought content normal.       Assessment:       1. Primary cancer of right middle lobe of lung    2. Encounter for antineoplastic chemotherapy        Plan:       RTC 4 weeks.  Plan:-Continue weekly carboplatin AUC 2 together  with Paclitaxel 45  mg/M2.

## 2017-08-10 NOTE — PLAN OF CARE
Problem: Patient Care Overview  Goal: Plan of Care Review  Outcome: Ongoing (interventions implemented as appropriate)  Pt tolerated taxol/carbo infusion without difficulty, avs given, rtc 8/17/17, no distress noted upon d/c to home with grandson

## 2017-08-10 NOTE — PLAN OF CARE
Problem: Chemotherapy Effects (Adult)  Goal: Signs and Symptoms of Listed Potential Problems Will be Absent, Minimized or Managed (Chemotherapy Effects)  Signs and symptoms of listed potential problems will be absent, minimized or managed by discharge/transition of care (reference Chemotherapy Effects (Adult) CPG).   Outcome: Ongoing (interventions implemented as appropriate)  Pt here for taxol/carbo infusion cycle 3, hx, meds, allergies reviewed. Reviewed treatment plan with pt, pt verbalizes understanding, pt with chronic cough, reclined in chair, drink provided, continue to monitor

## 2017-08-11 ENCOUNTER — TELEPHONE (OUTPATIENT)
Dept: HEMATOLOGY/ONCOLOGY | Facility: CLINIC | Age: 62
End: 2017-08-11

## 2017-08-11 NOTE — TELEPHONE ENCOUNTER
----- Message from Ana Flower sent at 8/10/2017  4:50 PM CDT -----  Contact: PT  States Rx was suppose to be called into the pharmacy today for her.    She would like to know what medication it will be, and when it will be called in?    Call [phone]

## 2017-08-11 NOTE — TELEPHONE ENCOUNTER
Patient states Dr mai was suppose to send something to her pharmacy to help her sleep. Per Dr Mai call in ambien 5 mg #30 no refills to [facility] in med card. Patient informed spoke to Sandra at [facility].

## 2017-08-14 PROCEDURE — 77387 GUIDANCE FOR RADJ TX DLVR: CPT | Mod: 26,,, | Performed by: RADIOLOGY

## 2017-08-14 PROCEDURE — 77386 HC IMRT, COMPLEX: CPT | Performed by: RADIOLOGY

## 2017-08-15 ENCOUNTER — DOCUMENTATION ONLY (OUTPATIENT)
Dept: RADIATION ONCOLOGY | Facility: CLINIC | Age: 62
End: 2017-08-15

## 2017-08-15 PROCEDURE — 77387 GUIDANCE FOR RADJ TX DLVR: CPT | Mod: 26,,, | Performed by: RADIOLOGY

## 2017-08-15 PROCEDURE — 77386 HC IMRT, COMPLEX: CPT | Performed by: RADIOLOGY

## 2017-08-16 PROCEDURE — 77336 RADIATION PHYSICS CONSULT: CPT | Performed by: RADIOLOGY

## 2017-08-16 PROCEDURE — 77387 GUIDANCE FOR RADJ TX DLVR: CPT | Mod: 26,,, | Performed by: RADIOLOGY

## 2017-08-16 PROCEDURE — 77386 HC IMRT, COMPLEX: CPT | Performed by: RADIOLOGY

## 2017-08-16 NOTE — PLAN OF CARE
Problem: Patient Care Overview  Goal: Plan of Care Review  Outcome: Ongoing (interventions implemented as appropriate)  Day 13 of radiation to the lung wgt down 4 lbs no sob noted

## 2017-08-17 ENCOUNTER — INFUSION (OUTPATIENT)
Dept: INFUSION THERAPY | Facility: HOSPITAL | Age: 62
End: 2017-08-17
Attending: INTERNAL MEDICINE
Payer: COMMERCIAL

## 2017-08-17 VITALS
WEIGHT: 180.56 LBS | RESPIRATION RATE: 16 BRPM | SYSTOLIC BLOOD PRESSURE: 128 MMHG | HEART RATE: 105 BPM | DIASTOLIC BLOOD PRESSURE: 67 MMHG | TEMPERATURE: 98 F | HEIGHT: 66 IN | BODY MASS INDEX: 29.02 KG/M2

## 2017-08-17 DIAGNOSIS — C34.2 PRIMARY CANCER OF RIGHT MIDDLE LOBE OF LUNG: Primary | ICD-10-CM

## 2017-08-17 DIAGNOSIS — Z51.11 ENCOUNTER FOR ANTINEOPLASTIC CHEMOTHERAPY: ICD-10-CM

## 2017-08-17 PROCEDURE — 96413 CHEMO IV INFUSION 1 HR: CPT

## 2017-08-17 PROCEDURE — 96375 TX/PRO/DX INJ NEW DRUG ADDON: CPT

## 2017-08-17 PROCEDURE — 25000003 PHARM REV CODE 250: Performed by: INTERNAL MEDICINE

## 2017-08-17 PROCEDURE — 77386 HC IMRT, COMPLEX: CPT | Performed by: RADIOLOGY

## 2017-08-17 PROCEDURE — 96367 TX/PROPH/DG ADDL SEQ IV INF: CPT

## 2017-08-17 PROCEDURE — 96417 CHEMO IV INFUS EACH ADDL SEQ: CPT

## 2017-08-17 PROCEDURE — 77387 GUIDANCE FOR RADJ TX DLVR: CPT | Mod: 26,,, | Performed by: RADIOLOGY

## 2017-08-17 PROCEDURE — A4216 STERILE WATER/SALINE, 10 ML: HCPCS | Performed by: INTERNAL MEDICINE

## 2017-08-17 PROCEDURE — 63600175 PHARM REV CODE 636 W HCPCS: Performed by: INTERNAL MEDICINE

## 2017-08-17 PROCEDURE — S0028 INJECTION, FAMOTIDINE, 20 MG: HCPCS | Performed by: INTERNAL MEDICINE

## 2017-08-17 RX ORDER — FAMOTIDINE 10 MG/ML
20 INJECTION INTRAVENOUS
Status: COMPLETED | OUTPATIENT
Start: 2017-08-17 | End: 2017-08-17

## 2017-08-17 RX ORDER — SODIUM CHLORIDE 0.9 % (FLUSH) 0.9 %
10 SYRINGE (ML) INJECTION
Status: DISCONTINUED | OUTPATIENT
Start: 2017-08-17 | End: 2017-08-17 | Stop reason: HOSPADM

## 2017-08-17 RX ORDER — DIPHENHYDRAMINE HYDROCHLORIDE 50 MG/ML
50 INJECTION INTRAMUSCULAR; INTRAVENOUS ONCE AS NEEDED
Status: DISCONTINUED | OUTPATIENT
Start: 2017-08-17 | End: 2017-08-17 | Stop reason: HOSPADM

## 2017-08-17 RX ORDER — FAMOTIDINE 10 MG/ML
20 INJECTION INTRAVENOUS
Status: CANCELLED | OUTPATIENT
Start: 2017-08-17

## 2017-08-17 RX ORDER — SODIUM CHLORIDE 0.9 % (FLUSH) 0.9 %
10 SYRINGE (ML) INJECTION
Status: CANCELLED | OUTPATIENT
Start: 2017-08-17

## 2017-08-17 RX ORDER — HEPARIN 100 UNIT/ML
500 SYRINGE INTRAVENOUS
Status: DISCONTINUED | OUTPATIENT
Start: 2017-08-17 | End: 2017-08-17 | Stop reason: HOSPADM

## 2017-08-17 RX ORDER — EPINEPHRINE 0.3 MG/.3ML
0.3 INJECTION SUBCUTANEOUS ONCE AS NEEDED
Status: CANCELLED | OUTPATIENT
Start: 2017-08-17 | End: 2017-08-17

## 2017-08-17 RX ORDER — EPINEPHRINE 0.3 MG/.3ML
0.3 INJECTION SUBCUTANEOUS ONCE AS NEEDED
Status: DISCONTINUED | OUTPATIENT
Start: 2017-08-17 | End: 2017-08-17 | Stop reason: HOSPADM

## 2017-08-17 RX ORDER — DIPHENHYDRAMINE HYDROCHLORIDE 50 MG/ML
50 INJECTION INTRAMUSCULAR; INTRAVENOUS ONCE AS NEEDED
Status: CANCELLED | OUTPATIENT
Start: 2017-08-17 | End: 2017-08-17

## 2017-08-17 RX ORDER — HEPARIN 100 UNIT/ML
500 SYRINGE INTRAVENOUS
Status: CANCELLED | OUTPATIENT
Start: 2017-08-17

## 2017-08-17 RX ADMIN — CARBOPLATIN 235 MG: 10 INJECTION, SOLUTION INTRAVENOUS at 05:08

## 2017-08-17 RX ADMIN — DEXAMETHASONE SODIUM PHOSPHATE 20 MG: 4 INJECTION, SOLUTION INTRA-ARTICULAR; INTRALESIONAL; INTRAMUSCULAR; INTRAVENOUS; SOFT TISSUE at 03:08

## 2017-08-17 RX ADMIN — FAMOTIDINE 20 MG: 10 INJECTION INTRAVENOUS at 03:08

## 2017-08-17 RX ADMIN — DIPHENHYDRAMINE HYDROCHLORIDE 50 MG: 50 INJECTION, SOLUTION INTRAMUSCULAR; INTRAVENOUS at 04:08

## 2017-08-17 RX ADMIN — HEPARIN 500 UNITS: 100 SYRINGE at 06:08

## 2017-08-17 RX ADMIN — PACLITAXEL 90 MG: 6 INJECTION, SOLUTION, CONCENTRATE INTRAVENOUS at 04:08

## 2017-08-17 RX ADMIN — SODIUM CHLORIDE, PRESERVATIVE FREE 10 ML: 5 INJECTION INTRAVENOUS at 06:08

## 2017-08-17 NOTE — PLAN OF CARE
Problem: Patient Care Overview  Goal: Plan of Care Review  Outcome: Ongoing (interventions implemented as appropriate)  Pt tolerated taxol/carbo without adverse effects. VSS. Provided AVS & verbalized understanding of RTC date. DC with family ambulating independently.

## 2017-08-18 PROCEDURE — 77387 GUIDANCE FOR RADJ TX DLVR: CPT | Mod: 26,,, | Performed by: RADIOLOGY

## 2017-08-18 PROCEDURE — 77386 HC IMRT, COMPLEX: CPT | Performed by: RADIOLOGY

## 2017-08-21 PROCEDURE — 77386 HC IMRT, COMPLEX: CPT | Performed by: RADIOLOGY

## 2017-08-21 PROCEDURE — 77417 THER RADIOLOGY PORT IMAGE(S): CPT | Performed by: RADIOLOGY

## 2017-08-21 PROCEDURE — 77387 GUIDANCE FOR RADJ TX DLVR: CPT | Mod: 26,,, | Performed by: RADIOLOGY

## 2017-08-22 ENCOUNTER — DOCUMENTATION ONLY (OUTPATIENT)
Dept: RADIATION ONCOLOGY | Facility: CLINIC | Age: 62
End: 2017-08-22

## 2017-08-22 PROCEDURE — 77387 GUIDANCE FOR RADJ TX DLVR: CPT | Mod: 26,,, | Performed by: RADIOLOGY

## 2017-08-22 PROCEDURE — 77386 HC IMRT, COMPLEX: CPT | Performed by: RADIOLOGY

## 2017-08-22 NOTE — PLAN OF CARE
Problem: Patient Care Overview  Goal: Plan of Care Review  Outcome: Ongoing (interventions implemented as appropriate)  Day 18 of radiation to the lung w/wkly chemo  C/o nausea taking zofran MRI brain scheduled instructed to push fluids

## 2017-08-23 PROCEDURE — 77386 HC IMRT, COMPLEX: CPT | Performed by: RADIOLOGY

## 2017-08-23 PROCEDURE — 77387 GUIDANCE FOR RADJ TX DLVR: CPT | Mod: 26,,, | Performed by: RADIOLOGY

## 2017-08-23 PROCEDURE — 77336 RADIATION PHYSICS CONSULT: CPT | Performed by: RADIOLOGY

## 2017-08-24 ENCOUNTER — HOSPITAL ENCOUNTER (OUTPATIENT)
Dept: RADIOLOGY | Facility: HOSPITAL | Age: 62
Discharge: HOME | End: 2017-08-24
Attending: RADIOLOGY
Payer: COMMERCIAL

## 2017-08-24 ENCOUNTER — INFUSION (OUTPATIENT)
Dept: INFUSION THERAPY | Facility: HOSPITAL | Age: 62
End: 2017-08-24
Attending: INTERNAL MEDICINE
Payer: COMMERCIAL

## 2017-08-24 VITALS
TEMPERATURE: 99 F | HEART RATE: 99 BPM | RESPIRATION RATE: 16 BRPM | DIASTOLIC BLOOD PRESSURE: 80 MMHG | SYSTOLIC BLOOD PRESSURE: 144 MMHG

## 2017-08-24 DIAGNOSIS — C34.2 PRIMARY CANCER OF RIGHT MIDDLE LOBE OF LUNG: Primary | ICD-10-CM

## 2017-08-24 DIAGNOSIS — C34.2 PRIMARY CANCER OF RIGHT MIDDLE LOBE OF LUNG: ICD-10-CM

## 2017-08-24 DIAGNOSIS — Z51.11 ENCOUNTER FOR ANTINEOPLASTIC CHEMOTHERAPY: ICD-10-CM

## 2017-08-24 PROCEDURE — 77387 GUIDANCE FOR RADJ TX DLVR: CPT | Mod: 26,,, | Performed by: RADIOLOGY

## 2017-08-24 PROCEDURE — 77386 HC IMRT, COMPLEX: CPT | Performed by: RADIOLOGY

## 2017-08-24 PROCEDURE — 96417 CHEMO IV INFUS EACH ADDL SEQ: CPT

## 2017-08-24 PROCEDURE — 25500020 PHARM REV CODE 255: Performed by: RADIOLOGY

## 2017-08-24 PROCEDURE — 25000003 PHARM REV CODE 250: Performed by: INTERNAL MEDICINE

## 2017-08-24 PROCEDURE — 96375 TX/PRO/DX INJ NEW DRUG ADDON: CPT

## 2017-08-24 PROCEDURE — 70553 MRI BRAIN STEM W/O & W/DYE: CPT | Mod: 26,,, | Performed by: RADIOLOGY

## 2017-08-24 PROCEDURE — 96367 TX/PROPH/DG ADDL SEQ IV INF: CPT

## 2017-08-24 PROCEDURE — 70553 MRI BRAIN STEM W/O & W/DYE: CPT | Mod: TC

## 2017-08-24 PROCEDURE — S0028 INJECTION, FAMOTIDINE, 20 MG: HCPCS | Performed by: INTERNAL MEDICINE

## 2017-08-24 PROCEDURE — 96413 CHEMO IV INFUSION 1 HR: CPT

## 2017-08-24 PROCEDURE — 63600175 PHARM REV CODE 636 W HCPCS: Performed by: INTERNAL MEDICINE

## 2017-08-24 PROCEDURE — A4216 STERILE WATER/SALINE, 10 ML: HCPCS | Performed by: INTERNAL MEDICINE

## 2017-08-24 PROCEDURE — A9585 GADOBUTROL INJECTION: HCPCS | Performed by: RADIOLOGY

## 2017-08-24 RX ORDER — HEPARIN 100 UNIT/ML
500 SYRINGE INTRAVENOUS
Status: DISCONTINUED | OUTPATIENT
Start: 2017-08-24 | End: 2017-08-24 | Stop reason: HOSPADM

## 2017-08-24 RX ORDER — EPINEPHRINE 0.3 MG/.3ML
0.3 INJECTION SUBCUTANEOUS ONCE AS NEEDED
Status: CANCELLED | OUTPATIENT
Start: 2017-08-24 | End: 2017-08-24

## 2017-08-24 RX ORDER — EPINEPHRINE 0.3 MG/.3ML
0.3 INJECTION SUBCUTANEOUS ONCE AS NEEDED
Status: DISCONTINUED | OUTPATIENT
Start: 2017-08-24 | End: 2017-08-24 | Stop reason: HOSPADM

## 2017-08-24 RX ORDER — FAMOTIDINE 10 MG/ML
20 INJECTION INTRAVENOUS
Status: COMPLETED | OUTPATIENT
Start: 2017-08-24 | End: 2017-08-24

## 2017-08-24 RX ORDER — DIPHENHYDRAMINE HYDROCHLORIDE 50 MG/ML
50 INJECTION INTRAMUSCULAR; INTRAVENOUS ONCE AS NEEDED
Status: CANCELLED | OUTPATIENT
Start: 2017-08-24 | End: 2017-08-24

## 2017-08-24 RX ORDER — DIPHENHYDRAMINE HYDROCHLORIDE 50 MG/ML
50 INJECTION INTRAMUSCULAR; INTRAVENOUS ONCE AS NEEDED
Status: DISCONTINUED | OUTPATIENT
Start: 2017-08-24 | End: 2017-08-24 | Stop reason: HOSPADM

## 2017-08-24 RX ORDER — GADOBUTROL 604.72 MG/ML
9 INJECTION INTRAVENOUS
Status: COMPLETED | OUTPATIENT
Start: 2017-08-24 | End: 2017-08-24

## 2017-08-24 RX ORDER — SODIUM CHLORIDE 0.9 % (FLUSH) 0.9 %
10 SYRINGE (ML) INJECTION
Status: CANCELLED | OUTPATIENT
Start: 2017-08-24

## 2017-08-24 RX ORDER — FAMOTIDINE 10 MG/ML
20 INJECTION INTRAVENOUS
Status: CANCELLED | OUTPATIENT
Start: 2017-08-24

## 2017-08-24 RX ORDER — HEPARIN 100 UNIT/ML
500 SYRINGE INTRAVENOUS
Status: CANCELLED | OUTPATIENT
Start: 2017-08-24

## 2017-08-24 RX ORDER — SODIUM CHLORIDE 0.9 % (FLUSH) 0.9 %
10 SYRINGE (ML) INJECTION
Status: DISCONTINUED | OUTPATIENT
Start: 2017-08-24 | End: 2017-08-24 | Stop reason: HOSPADM

## 2017-08-24 RX ADMIN — FAMOTIDINE 20 MG: 10 INJECTION INTRAVENOUS at 03:08

## 2017-08-24 RX ADMIN — PACLITAXEL 90 MG: 6 INJECTION, SOLUTION, CONCENTRATE INTRAVENOUS at 04:08

## 2017-08-24 RX ADMIN — DEXAMETHASONE SODIUM PHOSPHATE 20 MG: 4 INJECTION, SOLUTION INTRA-ARTICULAR; INTRALESIONAL; INTRAMUSCULAR; INTRAVENOUS; SOFT TISSUE at 03:08

## 2017-08-24 RX ADMIN — HEPARIN 500 UNITS: 100 SYRINGE at 06:08

## 2017-08-24 RX ADMIN — DIPHENHYDRAMINE HYDROCHLORIDE 50 MG: 50 INJECTION, SOLUTION INTRAMUSCULAR; INTRAVENOUS at 03:08

## 2017-08-24 RX ADMIN — CARBOPLATIN 235 MG: 10 INJECTION, SOLUTION INTRAVENOUS at 05:08

## 2017-08-24 RX ADMIN — GADOBUTROL 9 ML: 604.72 INJECTION INTRAVENOUS at 01:08

## 2017-08-24 RX ADMIN — SODIUM CHLORIDE, PRESERVATIVE FREE 10 ML: 5 INJECTION INTRAVENOUS at 06:08

## 2017-08-24 NOTE — PLAN OF CARE
Problem: Chemotherapy Effects (Adult)  Goal: Signs and Symptoms of Listed Potential Problems Will be Absent, Minimized or Managed (Chemotherapy Effects)  Signs and symptoms of listed potential problems will be absent, minimized or managed by discharge/transition of care (reference Chemotherapy Effects (Adult) CPG).   Outcome: Ongoing (interventions implemented as appropriate)  Pt arrived via ambulatory with  at side.  Reviewed labs, medication, hx, allergies and treatment plan. Pt reclined back in chair.   Will monitor while in unit.

## 2017-08-24 NOTE — PLAN OF CARE
Problem: Patient Care Overview  Goal: Plan of Care Review  Outcome: Ongoing (interventions implemented as appropriate)  Patient received Taxol/Carboplatin without any issues. Notified to call MD with any further concerns . Vss. No apparent distress noted.

## 2017-08-24 NOTE — PLAN OF CARE
Problem: Patient Care Overview  Goal: Plan of Care Review  Outcome: Ongoing (interventions implemented as appropriate)  Patient received Taxol/Carbo without any issues. Notified to call MD with any further concerns No apparent distress noted.

## 2017-08-25 PROCEDURE — 77387 GUIDANCE FOR RADJ TX DLVR: CPT | Mod: 26,,, | Performed by: RADIOLOGY

## 2017-08-25 PROCEDURE — 77386 HC IMRT, COMPLEX: CPT | Performed by: RADIOLOGY

## 2017-08-28 PROCEDURE — 77387 GUIDANCE FOR RADJ TX DLVR: CPT | Mod: 26,,, | Performed by: RADIOLOGY

## 2017-08-28 PROCEDURE — 77417 THER RADIOLOGY PORT IMAGE(S): CPT | Performed by: RADIOLOGY

## 2017-08-28 PROCEDURE — 77386 HC IMRT, COMPLEX: CPT | Performed by: RADIOLOGY

## 2017-08-29 ENCOUNTER — DOCUMENTATION ONLY (OUTPATIENT)
Dept: RADIATION ONCOLOGY | Facility: CLINIC | Age: 62
End: 2017-08-29

## 2017-08-29 PROCEDURE — 77387 GUIDANCE FOR RADJ TX DLVR: CPT | Mod: 26,,, | Performed by: RADIOLOGY

## 2017-08-29 PROCEDURE — 77386 HC IMRT, COMPLEX: CPT | Performed by: RADIOLOGY

## 2017-08-29 NOTE — PLAN OF CARE
Problem: Patient Care Overview  Goal: Plan of Care Review  Outcome: Ongoing (interventions implemented as appropriate)  Day 23 of radiation to the lung cough has improved

## 2017-08-30 PROCEDURE — 77387 GUIDANCE FOR RADJ TX DLVR: CPT | Mod: 26,,, | Performed by: RADIOLOGY

## 2017-08-30 PROCEDURE — 77386 HC IMRT, COMPLEX: CPT | Performed by: RADIOLOGY

## 2017-08-31 ENCOUNTER — LAB VISIT (OUTPATIENT)
Dept: LAB | Facility: HOSPITAL | Age: 62
End: 2017-08-31
Attending: INTERNAL MEDICINE
Payer: COMMERCIAL

## 2017-08-31 ENCOUNTER — INFUSION (OUTPATIENT)
Dept: INFUSION THERAPY | Facility: HOSPITAL | Age: 62
End: 2017-08-31
Attending: INTERNAL MEDICINE
Payer: COMMERCIAL

## 2017-08-31 VITALS
DIASTOLIC BLOOD PRESSURE: 71 MMHG | TEMPERATURE: 99 F | RESPIRATION RATE: 16 BRPM | SYSTOLIC BLOOD PRESSURE: 136 MMHG | WEIGHT: 178.38 LBS | BODY MASS INDEX: 28.79 KG/M2 | HEART RATE: 99 BPM

## 2017-08-31 DIAGNOSIS — Z51.11 ENCOUNTER FOR ANTINEOPLASTIC CHEMOTHERAPY: ICD-10-CM

## 2017-08-31 DIAGNOSIS — C34.2 PRIMARY CANCER OF RIGHT MIDDLE LOBE OF LUNG: Primary | ICD-10-CM

## 2017-08-31 DIAGNOSIS — C34.2 PRIMARY CANCER OF RIGHT MIDDLE LOBE OF LUNG: ICD-10-CM

## 2017-08-31 LAB
ALBUMIN SERPL BCP-MCNC: 2.8 G/DL
ALP SERPL-CCNC: 56 U/L
ALT SERPL W/O P-5'-P-CCNC: 5 U/L
ANION GAP SERPL CALC-SCNC: 5 MMOL/L
AST SERPL-CCNC: 12 U/L
BASOPHILS # BLD AUTO: 0.01 K/UL
BASOPHILS NFR BLD: 0.4 %
BILIRUB SERPL-MCNC: 0.5 MG/DL
BUN SERPL-MCNC: 12 MG/DL
CALCIUM SERPL-MCNC: 9.5 MG/DL
CHLORIDE SERPL-SCNC: 107 MMOL/L
CO2 SERPL-SCNC: 27 MMOL/L
CREAT SERPL-MCNC: 0.8 MG/DL
DIFFERENTIAL METHOD: ABNORMAL
EOSINOPHIL # BLD AUTO: 0.1 K/UL
EOSINOPHIL NFR BLD: 2.3 %
ERYTHROCYTE [DISTWIDTH] IN BLOOD BY AUTOMATED COUNT: 19 %
EST. GFR  (AFRICAN AMERICAN): >60 ML/MIN/1.73 M^2
EST. GFR  (NON AFRICAN AMERICAN): >60 ML/MIN/1.73 M^2
GLUCOSE SERPL-MCNC: 134 MG/DL
HCT VFR BLD AUTO: 26.2 %
HGB BLD-MCNC: 8.7 G/DL
LYMPHOCYTES # BLD AUTO: 0.4 K/UL
LYMPHOCYTES NFR BLD: 16.7 %
MCH RBC QN AUTO: 26.6 PG
MCHC RBC AUTO-ENTMCNC: 33.2 G/DL
MCV RBC AUTO: 80 FL
MONOCYTES # BLD AUTO: 0.1 K/UL
MONOCYTES NFR BLD: 1.9 %
NEUTROPHILS # BLD AUTO: 2.1 K/UL
NEUTROPHILS NFR BLD: 78.7 %
PLATELET # BLD AUTO: 146 K/UL
PMV BLD AUTO: 9 FL
POTASSIUM SERPL-SCNC: 4.4 MMOL/L
PROT SERPL-MCNC: 7.1 G/DL
RBC # BLD AUTO: 3.27 M/UL
SODIUM SERPL-SCNC: 139 MMOL/L
WBC # BLD AUTO: 2.63 K/UL

## 2017-08-31 PROCEDURE — 36415 COLL VENOUS BLD VENIPUNCTURE: CPT

## 2017-08-31 PROCEDURE — 96367 TX/PROPH/DG ADDL SEQ IV INF: CPT

## 2017-08-31 PROCEDURE — 96413 CHEMO IV INFUSION 1 HR: CPT

## 2017-08-31 PROCEDURE — 77386 HC IMRT, COMPLEX: CPT | Performed by: RADIOLOGY

## 2017-08-31 PROCEDURE — 85025 COMPLETE CBC W/AUTO DIFF WBC: CPT

## 2017-08-31 PROCEDURE — 77387 GUIDANCE FOR RADJ TX DLVR: CPT | Mod: 26,,, | Performed by: RADIOLOGY

## 2017-08-31 PROCEDURE — 25000003 PHARM REV CODE 250: Performed by: INTERNAL MEDICINE

## 2017-08-31 PROCEDURE — 63600175 PHARM REV CODE 636 W HCPCS: Performed by: INTERNAL MEDICINE

## 2017-08-31 PROCEDURE — 96375 TX/PRO/DX INJ NEW DRUG ADDON: CPT

## 2017-08-31 PROCEDURE — A4216 STERILE WATER/SALINE, 10 ML: HCPCS | Performed by: INTERNAL MEDICINE

## 2017-08-31 PROCEDURE — 96417 CHEMO IV INFUS EACH ADDL SEQ: CPT

## 2017-08-31 PROCEDURE — 80053 COMPREHEN METABOLIC PANEL: CPT

## 2017-08-31 PROCEDURE — S0028 INJECTION, FAMOTIDINE, 20 MG: HCPCS | Performed by: INTERNAL MEDICINE

## 2017-08-31 RX ORDER — EPINEPHRINE 0.3 MG/.3ML
0.3 INJECTION SUBCUTANEOUS ONCE AS NEEDED
Status: CANCELLED | OUTPATIENT
Start: 2017-08-31 | End: 2017-08-31

## 2017-08-31 RX ORDER — SODIUM CHLORIDE 0.9 % (FLUSH) 0.9 %
10 SYRINGE (ML) INJECTION
Status: DISCONTINUED | OUTPATIENT
Start: 2017-08-31 | End: 2017-08-31 | Stop reason: HOSPADM

## 2017-08-31 RX ORDER — DIPHENHYDRAMINE HYDROCHLORIDE 50 MG/ML
50 INJECTION INTRAMUSCULAR; INTRAVENOUS ONCE AS NEEDED
Status: CANCELLED | OUTPATIENT
Start: 2017-08-31 | End: 2017-08-31

## 2017-08-31 RX ORDER — HEPARIN 100 UNIT/ML
500 SYRINGE INTRAVENOUS
Status: DISCONTINUED | OUTPATIENT
Start: 2017-08-31 | End: 2017-08-31 | Stop reason: HOSPADM

## 2017-08-31 RX ORDER — HEPARIN 100 UNIT/ML
500 SYRINGE INTRAVENOUS
Status: CANCELLED | OUTPATIENT
Start: 2017-08-31

## 2017-08-31 RX ORDER — EPINEPHRINE 0.3 MG/.3ML
0.3 INJECTION SUBCUTANEOUS ONCE AS NEEDED
Status: DISCONTINUED | OUTPATIENT
Start: 2017-08-31 | End: 2017-08-31 | Stop reason: HOSPADM

## 2017-08-31 RX ORDER — SODIUM CHLORIDE 0.9 % (FLUSH) 0.9 %
10 SYRINGE (ML) INJECTION
Status: CANCELLED | OUTPATIENT
Start: 2017-08-31

## 2017-08-31 RX ORDER — FAMOTIDINE 10 MG/ML
20 INJECTION INTRAVENOUS
Status: COMPLETED | OUTPATIENT
Start: 2017-08-31 | End: 2017-08-31

## 2017-08-31 RX ORDER — DIPHENHYDRAMINE HYDROCHLORIDE 50 MG/ML
50 INJECTION INTRAMUSCULAR; INTRAVENOUS ONCE AS NEEDED
Status: DISCONTINUED | OUTPATIENT
Start: 2017-08-31 | End: 2017-08-31 | Stop reason: HOSPADM

## 2017-08-31 RX ORDER — FAMOTIDINE 10 MG/ML
20 INJECTION INTRAVENOUS
Status: CANCELLED | OUTPATIENT
Start: 2017-08-31

## 2017-08-31 RX ADMIN — CARBOPLATIN 230 MG: 10 INJECTION, SOLUTION INTRAVENOUS at 03:08

## 2017-08-31 RX ADMIN — DEXAMETHASONE SODIUM PHOSPHATE 20 MG: 4 INJECTION, SOLUTION INTRA-ARTICULAR; INTRALESIONAL; INTRAMUSCULAR; INTRAVENOUS; SOFT TISSUE at 02:08

## 2017-08-31 RX ADMIN — SODIUM CHLORIDE: 0.9 INJECTION, SOLUTION INTRAVENOUS at 01:08

## 2017-08-31 RX ADMIN — DIPHENHYDRAMINE HYDROCHLORIDE 50 MG: 50 INJECTION, SOLUTION INTRAMUSCULAR; INTRAVENOUS at 02:08

## 2017-08-31 RX ADMIN — FAMOTIDINE 20 MG: 10 INJECTION INTRAVENOUS at 01:08

## 2017-08-31 RX ADMIN — PACLITAXEL 90 MG: 6 INJECTION, SOLUTION, CONCENTRATE INTRAVENOUS at 02:08

## 2017-08-31 RX ADMIN — HEPARIN 500 UNITS: 100 SYRINGE at 05:08

## 2017-08-31 RX ADMIN — SODIUM CHLORIDE, PRESERVATIVE FREE 10 ML: 5 INJECTION INTRAVENOUS at 05:08

## 2017-09-01 ENCOUNTER — HOSPITAL ENCOUNTER (OUTPATIENT)
Dept: RADIATION THERAPY | Facility: HOSPITAL | Age: 62
Discharge: HOME | End: 2017-09-01
Attending: RADIOLOGY
Payer: COMMERCIAL

## 2017-09-01 PROCEDURE — 77387 GUIDANCE FOR RADJ TX DLVR: CPT | Mod: 26,,, | Performed by: RADIOLOGY

## 2017-09-01 PROCEDURE — 77386 HC IMRT, COMPLEX: CPT | Performed by: RADIOLOGY

## 2017-09-05 ENCOUNTER — DOCUMENTATION ONLY (OUTPATIENT)
Dept: RADIATION ONCOLOGY | Facility: CLINIC | Age: 62
End: 2017-09-05

## 2017-09-05 PROCEDURE — 77386 HC IMRT, COMPLEX: CPT | Performed by: RADIOLOGY

## 2017-09-05 PROCEDURE — 77336 RADIATION PHYSICS CONSULT: CPT | Performed by: RADIOLOGY

## 2017-09-05 PROCEDURE — 77387 GUIDANCE FOR RADJ TX DLVR: CPT | Mod: 26,,, | Performed by: RADIOLOGY

## 2017-09-05 PROCEDURE — 77417 THER RADIOLOGY PORT IMAGE(S): CPT | Performed by: RADIOLOGY

## 2017-09-06 PROCEDURE — 77386 HC IMRT, COMPLEX: CPT | Performed by: RADIOLOGY

## 2017-09-06 PROCEDURE — 77387 GUIDANCE FOR RADJ TX DLVR: CPT | Mod: 26,,, | Performed by: RADIOLOGY

## 2017-09-06 NOTE — PLAN OF CARE
Problem: Patient Care Overview  Goal: Plan of Care Review  Outcome: Ongoing (interventions implemented as appropriate)  Day 27 of radiation to the lung feeling better this week

## 2017-09-07 ENCOUNTER — DOCUMENTATION ONLY (OUTPATIENT)
Dept: HEMATOLOGY/ONCOLOGY | Facility: CLINIC | Age: 62
End: 2017-09-07

## 2017-09-07 ENCOUNTER — LAB VISIT (OUTPATIENT)
Dept: LAB | Facility: HOSPITAL | Age: 62
End: 2017-09-07
Attending: INTERNAL MEDICINE
Payer: COMMERCIAL

## 2017-09-07 ENCOUNTER — OFFICE VISIT (OUTPATIENT)
Dept: HEMATOLOGY/ONCOLOGY | Facility: CLINIC | Age: 62
End: 2017-09-07
Payer: COMMERCIAL

## 2017-09-07 ENCOUNTER — TELEPHONE (OUTPATIENT)
Dept: HEMATOLOGY/ONCOLOGY | Facility: CLINIC | Age: 62
End: 2017-09-07

## 2017-09-07 VITALS
DIASTOLIC BLOOD PRESSURE: 79 MMHG | HEIGHT: 66 IN | SYSTOLIC BLOOD PRESSURE: 125 MMHG | WEIGHT: 179.25 LBS | BODY MASS INDEX: 28.81 KG/M2 | TEMPERATURE: 98 F | HEART RATE: 114 BPM

## 2017-09-07 DIAGNOSIS — C77.9 REGIONAL LYMPH NODE METASTASIS PRESENT: ICD-10-CM

## 2017-09-07 DIAGNOSIS — C34.2 PRIMARY CANCER OF RIGHT MIDDLE LOBE OF LUNG: Primary | ICD-10-CM

## 2017-09-07 DIAGNOSIS — C34.2 PRIMARY CANCER OF RIGHT MIDDLE LOBE OF LUNG: ICD-10-CM

## 2017-09-07 LAB
ALBUMIN SERPL BCP-MCNC: 3 G/DL
ALP SERPL-CCNC: 54 U/L
ALT SERPL W/O P-5'-P-CCNC: 9 U/L
ANION GAP SERPL CALC-SCNC: 7 MMOL/L
ANISOCYTOSIS BLD QL SMEAR: SLIGHT
AST SERPL-CCNC: 15 U/L
BASOPHILS NFR BLD: 0 %
BILIRUB SERPL-MCNC: 0.4 MG/DL
BUN SERPL-MCNC: 13 MG/DL
CALCIUM SERPL-MCNC: 9.4 MG/DL
CHLORIDE SERPL-SCNC: 107 MMOL/L
CO2 SERPL-SCNC: 27 MMOL/L
CREAT SERPL-MCNC: 0.8 MG/DL
DIFFERENTIAL METHOD: ABNORMAL
EOSINOPHIL NFR BLD: 2 %
ERYTHROCYTE [DISTWIDTH] IN BLOOD BY AUTOMATED COUNT: 20.6 %
EST. GFR  (AFRICAN AMERICAN): >60 ML/MIN/1.73 M^2
EST. GFR  (NON AFRICAN AMERICAN): >60 ML/MIN/1.73 M^2
GLUCOSE SERPL-MCNC: 146 MG/DL
HCT VFR BLD AUTO: 26.2 %
HGB BLD-MCNC: 8.4 G/DL
LYMPHOCYTES NFR BLD: 28 %
MCH RBC QN AUTO: 26.8 PG
MCHC RBC AUTO-ENTMCNC: 32.1 G/DL
MCV RBC AUTO: 83 FL
MONOCYTES NFR BLD: 2 %
NEUTROPHILS NFR BLD: 68 %
PLATELET # BLD AUTO: 164 K/UL
PLATELET BLD QL SMEAR: ABNORMAL
PMV BLD AUTO: 9.6 FL
POIKILOCYTOSIS BLD QL SMEAR: ABNORMAL
POLYCHROMASIA BLD QL SMEAR: ABNORMAL
POTASSIUM SERPL-SCNC: 4.3 MMOL/L
PROT SERPL-MCNC: 7.3 G/DL
RBC # BLD AUTO: 3.14 M/UL
SODIUM SERPL-SCNC: 141 MMOL/L
WBC # BLD AUTO: 1.33 K/UL

## 2017-09-07 PROCEDURE — 3078F DIAST BP <80 MM HG: CPT | Mod: S$GLB,,, | Performed by: INTERNAL MEDICINE

## 2017-09-07 PROCEDURE — 77386 HC IMRT, COMPLEX: CPT | Performed by: RADIOLOGY

## 2017-09-07 PROCEDURE — 99214 OFFICE O/P EST MOD 30 MIN: CPT | Mod: S$GLB,,, | Performed by: INTERNAL MEDICINE

## 2017-09-07 PROCEDURE — 36415 COLL VENOUS BLD VENIPUNCTURE: CPT

## 2017-09-07 PROCEDURE — 99999 PR PBB SHADOW E&M-EST. PATIENT-LVL III: CPT | Mod: PBBFAC,,, | Performed by: INTERNAL MEDICINE

## 2017-09-07 PROCEDURE — 3008F BODY MASS INDEX DOCD: CPT | Mod: S$GLB,,, | Performed by: INTERNAL MEDICINE

## 2017-09-07 PROCEDURE — 3074F SYST BP LT 130 MM HG: CPT | Mod: S$GLB,,, | Performed by: INTERNAL MEDICINE

## 2017-09-07 PROCEDURE — 77387 GUIDANCE FOR RADJ TX DLVR: CPT | Mod: 26,,, | Performed by: RADIOLOGY

## 2017-09-07 PROCEDURE — 80053 COMPREHEN METABOLIC PANEL: CPT

## 2017-09-07 PROCEDURE — 85007 BL SMEAR W/DIFF WBC COUNT: CPT

## 2017-09-07 PROCEDURE — 85027 COMPLETE CBC AUTOMATED: CPT

## 2017-09-07 NOTE — PROGRESS NOTES
Subjective:       Patient ID: Fauzia Kirk is a 62 y.o. female.    Chief Complaint: No chief complaint on file.    HPI Ms. Kirk is a very pleasant 62-year-old  female who returned  for F/U of right lung cancer.      She has been on weekly chemotherapy with carboplatin and Taxol and has been receiving radiation therapy for stage IIIA disease.  She has had 6 weekly treatments of chemotherapy thus far.  She is on day 29 of radiation and will finish tomorrow.  Her major side effects been some fatigue.  She has some chronic right leg pain in the distal lateral area.  Bowel functions been variable.  She denies any shortness of breath or dysphagia.            In December 2016 she began to have some blood in her mucus after coughing.In  April she developed a persistent cough and saw her physician on April 18.  Chest x-ray at that time showed a rounded opacity in the right lung overlying the major fissure.   Chest CT in May showed a 4.8 x 4.0 cm, rounded, soft tissue mass in the middle lobe between the medial and lateral segments. There was pre-carinal adenopathy.    Subsequently she underwent bronchoscopy with EBUS on 6/9/17. Needle biopsy of the medistinal nodes was positive for poorly differentiated carcinoma with squamoid features. The cells were positive for CK7, CK5/6, and P63 and are negative for CK20, GCDFP, TTF1, ER, [place], Napsin and YEMI 3.This was felt to be most CW a new squamous lung cancer.    PET CT yesterday demonstrated the following:a hypermetabolic, large mass in the right middle lobe with an SUV max of 20.23. There is hypermetabolic activity extending from this mass tracking along the pleura. There is a hypermetabolic right hilar lymph node demonstrating an SUV max of 20.2. In addition there is are 2 hypermetabolic subcarinal lymph nodes with the larger demonstrating an SUV max of 14.2. An additional right paratracheal lymph node is seen with an SUV max of 26.1. Findings are consistent with  metastatic lung cancer.      Previous cancer history:She had a stage I, ER negative carcinoma of the    left breast in 1990, treated with lumpectomy and radiation therapy.  In      1993, she developed a stage I, ER positive carcinoma of the right breast     treated with lumpectomy and radiation.  In 1995, she developed left breast   cancer recurrence, treated with lumpectomy, brachytherapy and four cycles    of Adriamycin and Cytoxan.  In 1996, she developed a new right breast        cancer T2 N0 poorly differentiated, treated with four cycles of              preoperative Taxol followed by mastectomy.        On October 17, 2016 left mammogram showed increased calcifications in the left breast at 3:00.  On October 27 a biopsy showed DCIS with solid, cribriform, and micropapillary patterns.  Tumor was 95% ER positive at 95% MA positive    On November 7, 2016 she underwent left mastectomy which showed 8 mm of DCIS and negative margins.    Review of Systems   Constitutional: Positive for fatigue. Negative for activity change, appetite change and fever.   Respiratory: Negative for cough and shortness of breath.    Cardiovascular: Negative for chest pain.   Gastrointestinal: Negative for abdominal pain and constipation.   Musculoskeletal: Negative for back pain.        Right leg pain   Psychiatric/Behavioral: Positive for sleep disturbance.       Objective:      Physical Exam   Constitutional: She is oriented to person, place, and time. She appears well-developed and well-nourished.   HENT:   Mouth/Throat: Oropharynx is clear and moist. No oropharyngeal exudate.   Eyes: No scleral icterus.   Cardiovascular: Normal rate and regular rhythm.    Pulmonary/Chest: Effort normal and breath sounds normal. She has no wheezes. She has no rales.   Abdominal: Soft. She exhibits no mass. There is no tenderness.   Musculoskeletal: She exhibits no edema.   Lymphadenopathy:     She has no cervical adenopathy.     She has no axillary  adenopathy.        Right: No supraclavicular adenopathy present.        Left: No supraclavicular adenopathy present.   Neurological: She is alert and oriented to person, place, and time.   Psychiatric: She has a normal mood and affect. Her behavior is normal. Thought content normal.       Assessment:       1. Primary cancer of right middle lobe of lung    2. Regional lymph node metastasis present        Plan:         Plan:-Continue weekly carboplatin AUC 2 together  with Paclitaxel 45 mg/M2.     RTC 4 weeks.  Repeat scans at that time.

## 2017-09-07 NOTE — TELEPHONE ENCOUNTER
----- Message from Wyatt Mai MD sent at 9/7/2017  2:34 PM CDT -----  Needs repeat CBC tomorrow and reschedule todays chemo to tomorrow

## 2017-09-07 NOTE — TELEPHONE ENCOUNTER
Called pt, no answer, left message letting pt know about apts for tomorrow. And asked her to call back with any questions.

## 2017-09-08 ENCOUNTER — TELEPHONE (OUTPATIENT)
Dept: HEMATOLOGY/ONCOLOGY | Facility: CLINIC | Age: 62
End: 2017-09-08

## 2017-09-08 ENCOUNTER — DOCUMENTATION ONLY (OUTPATIENT)
Dept: RADIATION ONCOLOGY | Facility: CLINIC | Age: 62
End: 2017-09-08

## 2017-09-08 PROCEDURE — 77387 GUIDANCE FOR RADJ TX DLVR: CPT | Mod: 26,,, | Performed by: RADIOLOGY

## 2017-09-08 PROCEDURE — 77386 HC IMRT, COMPLEX: CPT | Performed by: RADIOLOGY

## 2017-09-08 NOTE — TELEPHONE ENCOUNTER
----- Message from Wyatt Mai MD sent at 9/7/2017  5:01 PM CDT -----  Contact: PT   She wants to have her chemotherapy next week rather than tomorrow so we will set up an appointment with repeat CBC then  ----- Message -----  From: Desirae Hartman RN  Sent: 9/7/2017   3:11 PM  To: Wyatt Mai MD        ----- Message -----  From: Sepideh Goss  Sent: 9/7/2017   2:40 PM  To: Sergei POTTER Staff    PT is asking to speak to Dr. Mai himself.    Callback: [phone]

## 2017-09-08 NOTE — TELEPHONE ENCOUNTER
Patient called back and I assisted with rescheduling her labs and chemo that was for today to Tuesday(as she requested)

## 2017-09-11 ENCOUNTER — TELEPHONE (OUTPATIENT)
Dept: HEMATOLOGY/ONCOLOGY | Facility: CLINIC | Age: 62
End: 2017-09-11

## 2017-09-11 PROCEDURE — 77336 RADIATION PHYSICS CONSULT: CPT | Performed by: RADIOLOGY

## 2017-09-11 NOTE — TELEPHONE ENCOUNTER
----- Message from Dorys Mcdonald MA sent at 9/11/2017  8:57 AM CDT -----  Contact: pt   You can just change it  ----- Message -----  From: Laly Tang  Sent: 9/11/2017   8:19 AM  To: Dorys Mcdonald MA    This patient is calling to r/s her chemo again. Do you want to call and speak with her to find out why?    ----- Message -----  From: Maverick Alvarez  Sent: 9/11/2017   8:09 AM  To: Laly Tang    Pt has appt on tomorrow and will like to r/s sometime this week     Pt contact::[phone]

## 2017-09-13 ENCOUNTER — INFUSION (OUTPATIENT)
Dept: INFUSION THERAPY | Facility: HOSPITAL | Age: 62
End: 2017-09-13
Attending: INTERNAL MEDICINE
Payer: COMMERCIAL

## 2017-09-13 VITALS
SYSTOLIC BLOOD PRESSURE: 133 MMHG | DIASTOLIC BLOOD PRESSURE: 77 MMHG | RESPIRATION RATE: 18 BRPM | TEMPERATURE: 99 F | HEART RATE: 104 BPM

## 2017-09-13 DIAGNOSIS — Z51.11 ENCOUNTER FOR ANTINEOPLASTIC CHEMOTHERAPY: ICD-10-CM

## 2017-09-13 DIAGNOSIS — C34.2 PRIMARY CANCER OF RIGHT MIDDLE LOBE OF LUNG: Primary | ICD-10-CM

## 2017-09-13 PROCEDURE — 96413 CHEMO IV INFUSION 1 HR: CPT

## 2017-09-13 PROCEDURE — 96417 CHEMO IV INFUS EACH ADDL SEQ: CPT

## 2017-09-13 PROCEDURE — 25000003 PHARM REV CODE 250: Performed by: INTERNAL MEDICINE

## 2017-09-13 PROCEDURE — 96375 TX/PRO/DX INJ NEW DRUG ADDON: CPT

## 2017-09-13 PROCEDURE — 63600175 PHARM REV CODE 636 W HCPCS: Performed by: INTERNAL MEDICINE

## 2017-09-13 PROCEDURE — 96367 TX/PROPH/DG ADDL SEQ IV INF: CPT

## 2017-09-13 PROCEDURE — A4216 STERILE WATER/SALINE, 10 ML: HCPCS | Performed by: INTERNAL MEDICINE

## 2017-09-13 PROCEDURE — S0028 INJECTION, FAMOTIDINE, 20 MG: HCPCS | Performed by: INTERNAL MEDICINE

## 2017-09-13 RX ORDER — SODIUM CHLORIDE 0.9 % (FLUSH) 0.9 %
10 SYRINGE (ML) INJECTION
Status: DISCONTINUED | OUTPATIENT
Start: 2017-09-13 | End: 2017-09-13 | Stop reason: HOSPADM

## 2017-09-13 RX ORDER — EPINEPHRINE 0.3 MG/.3ML
0.3 INJECTION SUBCUTANEOUS ONCE AS NEEDED
Status: DISCONTINUED | OUTPATIENT
Start: 2017-09-13 | End: 2017-09-13 | Stop reason: HOSPADM

## 2017-09-13 RX ORDER — FAMOTIDINE 10 MG/ML
20 INJECTION INTRAVENOUS
Status: COMPLETED | OUTPATIENT
Start: 2017-09-13 | End: 2017-09-13

## 2017-09-13 RX ORDER — DIPHENHYDRAMINE HYDROCHLORIDE 50 MG/ML
50 INJECTION INTRAMUSCULAR; INTRAVENOUS ONCE AS NEEDED
Status: DISCONTINUED | OUTPATIENT
Start: 2017-09-13 | End: 2017-09-13 | Stop reason: HOSPADM

## 2017-09-13 RX ORDER — HEPARIN 100 UNIT/ML
500 SYRINGE INTRAVENOUS
Status: DISCONTINUED | OUTPATIENT
Start: 2017-09-13 | End: 2017-09-13 | Stop reason: HOSPADM

## 2017-09-13 RX ADMIN — DIPHENHYDRAMINE HYDROCHLORIDE 50 MG: 50 INJECTION, SOLUTION INTRAMUSCULAR; INTRAVENOUS at 01:09

## 2017-09-13 RX ADMIN — DEXAMETHASONE SODIUM PHOSPHATE 20 MG: 4 INJECTION, SOLUTION INTRA-ARTICULAR; INTRALESIONAL; INTRAMUSCULAR; INTRAVENOUS; SOFT TISSUE at 01:09

## 2017-09-13 RX ADMIN — HEPARIN 500 UNITS: 100 SYRINGE at 05:09

## 2017-09-13 RX ADMIN — PACLITAXEL 90 MG: 6 INJECTION, SOLUTION, CONCENTRATE INTRAVENOUS at 03:09

## 2017-09-13 RX ADMIN — SODIUM CHLORIDE: 0.9 INJECTION, SOLUTION INTRAVENOUS at 01:09

## 2017-09-13 RX ADMIN — FAMOTIDINE 20 MG: 10 INJECTION INTRAVENOUS at 01:09

## 2017-09-13 RX ADMIN — CARBOPLATIN 230 MG: 10 INJECTION, SOLUTION INTRAVENOUS at 04:09

## 2017-09-13 RX ADMIN — SODIUM CHLORIDE, PRESERVATIVE FREE 10 ML: 5 INJECTION INTRAVENOUS at 05:09

## 2017-09-13 NOTE — PLAN OF CARE
Problem: Patient Care Overview  Goal: Plan of Care Review  1719-Patient tolerated treatment well. Discharged without complaints or S/S of adverse event. AVS given.  Instructed to call provider for any questions or concerns.

## 2017-09-13 NOTE — PLAN OF CARE
Problem: Patient Care Overview  Goal: Individualization & Mutuality  Outcome: Ongoing (interventions implemented as appropriate)  1315-Labs , hx, and medications reviewed, Dr. aMi contacted to review labs and sign orders. Assessment completed. Discussed plan of care with patient. Patient in agreement. Chair reclined and warm blanket and snack offered.

## 2017-09-15 NOTE — PLAN OF CARE
Problem: Patient Care Overview  Goal: Plan of Care Review  Outcome: Outcome(s) achieved Date Met: 09/15/17  Radiation completed on 9/8/17  F/u appt made

## 2017-09-22 ENCOUNTER — TELEPHONE (OUTPATIENT)
Dept: RADIATION ONCOLOGY | Facility: CLINIC | Age: 62
End: 2017-09-22

## 2017-09-22 NOTE — TELEPHONE ENCOUNTER
Follow up call after completing radiation to the lung  Denies sob, cough has improved f/u appt confirmed

## 2017-09-29 ENCOUNTER — HOSPITAL ENCOUNTER (OUTPATIENT)
Dept: RADIOLOGY | Facility: HOSPITAL | Age: 62
Discharge: HOME | End: 2017-09-29
Attending: INTERNAL MEDICINE
Payer: COMMERCIAL

## 2017-09-29 DIAGNOSIS — C34.2 PRIMARY CANCER OF RIGHT MIDDLE LOBE OF LUNG: ICD-10-CM

## 2017-09-29 PROCEDURE — 71260 CT THORAX DX C+: CPT | Mod: 26,,, | Performed by: RADIOLOGY

## 2017-09-29 PROCEDURE — 25500020 PHARM REV CODE 255: Performed by: INTERNAL MEDICINE

## 2017-09-29 PROCEDURE — 71260 CT THORAX DX C+: CPT | Mod: TC

## 2017-09-29 RX ADMIN — IOHEXOL 75 ML: 350 INJECTION, SOLUTION INTRAVENOUS at 11:09

## 2017-10-02 ENCOUNTER — OFFICE VISIT (OUTPATIENT)
Dept: HEMATOLOGY/ONCOLOGY | Facility: CLINIC | Age: 62
End: 2017-10-02
Payer: COMMERCIAL

## 2017-10-02 ENCOUNTER — INFUSION (OUTPATIENT)
Dept: INFUSION THERAPY | Facility: HOSPITAL | Age: 62
End: 2017-10-02
Attending: INTERNAL MEDICINE
Payer: COMMERCIAL

## 2017-10-02 VITALS
DIASTOLIC BLOOD PRESSURE: 76 MMHG | RESPIRATION RATE: 17 BRPM | WEIGHT: 171.5 LBS | TEMPERATURE: 99 F | HEART RATE: 135 BPM | SYSTOLIC BLOOD PRESSURE: 117 MMHG | BODY MASS INDEX: 27.68 KG/M2

## 2017-10-02 DIAGNOSIS — C34.2 PRIMARY CANCER OF RIGHT MIDDLE LOBE OF LUNG: Primary | ICD-10-CM

## 2017-10-02 DIAGNOSIS — C77.9 REGIONAL LYMPH NODE METASTASIS PRESENT: ICD-10-CM

## 2017-10-02 DIAGNOSIS — Z85.3 HISTORY OF LEFT BREAST CANCER: ICD-10-CM

## 2017-10-02 PROCEDURE — A4216 STERILE WATER/SALINE, 10 ML: HCPCS | Performed by: INTERNAL MEDICINE

## 2017-10-02 PROCEDURE — 3078F DIAST BP <80 MM HG: CPT | Mod: S$GLB,,, | Performed by: INTERNAL MEDICINE

## 2017-10-02 PROCEDURE — 63600175 PHARM REV CODE 636 W HCPCS: Performed by: INTERNAL MEDICINE

## 2017-10-02 PROCEDURE — 3074F SYST BP LT 130 MM HG: CPT | Mod: S$GLB,,, | Performed by: INTERNAL MEDICINE

## 2017-10-02 PROCEDURE — 25000003 PHARM REV CODE 250: Performed by: INTERNAL MEDICINE

## 2017-10-02 PROCEDURE — 99999 PR PBB SHADOW E&M-EST. PATIENT-LVL III: CPT | Mod: PBBFAC,,, | Performed by: INTERNAL MEDICINE

## 2017-10-02 PROCEDURE — 3008F BODY MASS INDEX DOCD: CPT | Mod: S$GLB,,, | Performed by: INTERNAL MEDICINE

## 2017-10-02 PROCEDURE — 99213 OFFICE O/P EST LOW 20 MIN: CPT | Mod: S$GLB,,, | Performed by: INTERNAL MEDICINE

## 2017-10-02 PROCEDURE — 96523 IRRIG DRUG DELIVERY DEVICE: CPT

## 2017-10-02 RX ORDER — SODIUM CHLORIDE 0.9 % (FLUSH) 0.9 %
10 SYRINGE (ML) INJECTION
Status: COMPLETED | OUTPATIENT
Start: 2017-10-02 | End: 2017-10-02

## 2017-10-02 RX ORDER — HEPARIN 100 UNIT/ML
500 SYRINGE INTRAVENOUS
Status: COMPLETED | OUTPATIENT
Start: 2017-10-02 | End: 2017-10-02

## 2017-10-02 RX ORDER — HEPARIN 100 UNIT/ML
500 SYRINGE INTRAVENOUS
Status: CANCELLED | OUTPATIENT
Start: 2017-10-02

## 2017-10-02 RX ORDER — SODIUM CHLORIDE 0.9 % (FLUSH) 0.9 %
10 SYRINGE (ML) INJECTION
Status: CANCELLED | OUTPATIENT
Start: 2017-10-02

## 2017-10-02 RX ADMIN — HEPARIN 500 UNITS: 100 SYRINGE at 10:10

## 2017-10-02 RX ADMIN — SODIUM CHLORIDE, PRESERVATIVE FREE 10 ML: 5 INJECTION INTRAVENOUS at 10:10

## 2017-10-02 NOTE — NURSING
Pt arrived for port flush following MD appt.  Port flushes easily with no blood return.  Port deaccessed.  Pt discharged to home.

## 2017-10-02 NOTE — PROGRESS NOTES
Subjective:       Patient ID: Fauzia Kirk is a 62 y.o. female.    Chief Complaint: No chief complaint on file.    HPI Ms. Kirk is a very pleasant 62-year-old  female who returned  for F/U of right lung cancer.      She completed radiation together with weekly chemotherapy with carboplatin and Taxol  for stage IIIA disease.    She completed therapy on September 13 and received 7 chemotherapy treatments.      She is still recovering from that therapy.  She reports that her appetite is not back to normal.  She does have some ongoing upper GI symptoms.  Over the weekend she had acute onset of some nausea and was able to tolerate only liquids yesterday.  That is better today.    Her chronic cough is better and has changed in quality.  She's not having any significant pain.  She denies any breathing problems.            In December 2016 she began to have some blood in her mucus after coughing.In  April she developed a persistent cough and saw her physician on April 18.  Chest x-ray at that time showed a rounded opacity in the right lung overlying the major fissure.   Chest CT in May showed a 4.8 x 4.0 cm, rounded, soft tissue mass in the middle lobe between the medial and lateral segments. There was pre-carinal adenopathy.    Subsequently she underwent bronchoscopy with EBUS on 6/9/17. Needle biopsy of the medistinal nodes was positive for poorly differentiated carcinoma with squamoid features. The cells were positive for CK7, CK5/6, and P63 and are negative for CK20, GCDFP, TTF1, ER, [place], Napsin and YEMI 3.This was felt to be most CW a new squamous lung cancer.    PET CT yesterday demonstrated the following:a hypermetabolic, large mass in the right middle lobe with an SUV max of 20.23. There is hypermetabolic activity extending from this mass tracking along the pleura. There is a hypermetabolic right hilar lymph node demonstrating an SUV max of 20.2. In addition there is are 2 hypermetabolic subcarinal lymph  nodes with the larger demonstrating an SUV max of 14.2. An additional right paratracheal lymph node is seen with an SUV max of 26.1. Findings are consistent with metastatic lung cancer.      Previous cancer history:She had a stage I, ER negative carcinoma of the    left breast in 1990, treated with lumpectomy and radiation therapy.  In      1993, she developed a stage I, ER positive carcinoma of the right breast     treated with lumpectomy and radiation.  In 1995, she developed left breast   cancer recurrence, treated with lumpectomy, brachytherapy and four cycles    of Adriamycin and Cytoxan.  In 1996, she developed a new right breast        cancer T2 N0 poorly differentiated, treated with four cycles of              preoperative Taxol followed by mastectomy.        On October 17, 2016 left mammogram showed increased calcifications in the left breast at 3:00.  On October 27 a biopsy showed DCIS with solid, cribriform, and micropapillary patterns.  Tumor was 95% ER positive at 95% CA positive    On November 7, 2016 she underwent left mastectomy which showed 8 mm of DCIS and negative margins.    Review of Systems   Constitutional: Positive for fatigue. Negative for activity change, appetite change and fever.   Respiratory: Negative for cough and shortness of breath.    Cardiovascular: Negative for chest pain.   Gastrointestinal: Positive for abdominal pain, nausea and vomiting. Negative for constipation.   Musculoskeletal: Negative for back pain.        Right leg pain   Psychiatric/Behavioral: Positive for sleep disturbance.       Objective:      Physical Exam   Constitutional: She is oriented to person, place, and time. She appears well-developed and well-nourished.   HENT:   Mouth/Throat: Oropharynx is clear and moist. No oropharyngeal exudate.   Eyes: No scleral icterus.   Cardiovascular: Normal rate and regular rhythm.    Pulmonary/Chest: Effort normal and breath sounds normal. She has no wheezes. She has no  rales.   Abdominal: Soft. She exhibits no mass. There is no tenderness.   Musculoskeletal: She exhibits no edema.   Lymphadenopathy:     She has no cervical adenopathy.     She has no axillary adenopathy.        Right: No supraclavicular adenopathy present.        Left: No supraclavicular adenopathy present.   Neurological: She is alert and oriented to person, place, and time.   Psychiatric: She has a normal mood and affect. Her behavior is normal. Thought content normal.       Assessment:     CT scan from September 29 showed that the right middle lobe mass had decreased to 4.2 x 2.3 cm from 4.8 x 4.3 cm.  Stable 4 mm pulmonary nodule in the left lower lobe.  CBC was marked for hemoglobin 9.1.  Metabolic profiles normal.  1. Primary cancer of right middle lobe of lung    2. Regional lymph node metastasis present    3. History of left breast cancer        Plan:     will allow her some additional time to recuperate from her treatment.  I will see her in 2 weeks we'll discuss further chemotherapy that time.

## 2017-10-10 ENCOUNTER — OFFICE VISIT (OUTPATIENT)
Dept: RADIATION ONCOLOGY | Facility: CLINIC | Age: 62
End: 2017-10-10
Payer: COMMERCIAL

## 2017-10-10 VITALS
TEMPERATURE: 98 F | HEART RATE: 110 BPM | BODY MASS INDEX: 28.33 KG/M2 | SYSTOLIC BLOOD PRESSURE: 137 MMHG | DIASTOLIC BLOOD PRESSURE: 66 MMHG | WEIGHT: 175.5 LBS | RESPIRATION RATE: 20 BRPM

## 2017-10-10 DIAGNOSIS — C34.2 PRIMARY CANCER OF RIGHT MIDDLE LOBE OF LUNG: Primary | ICD-10-CM

## 2017-10-10 PROCEDURE — 99213 OFFICE O/P EST LOW 20 MIN: CPT | Mod: S$GLB,,, | Performed by: RADIOLOGY

## 2017-10-10 PROCEDURE — 99999 PR PBB SHADOW E&M-EST. PATIENT-LVL III: CPT | Mod: PBBFAC,,, | Performed by: RADIOLOGY

## 2017-10-10 NOTE — PROGRESS NOTES
REFERRING PHYSICIAN: Dr. Mai    DIAGNOSIS: SCC of the right lung, [id number], stage IIIA    INTERVAL SINCE COMPLETION: 1 month     INTERVAL HISTORY: Ms. Kirk returns a month after completing chemoradiotherapy for her stage IIIA squamous cell CA of the right lung.  She is a never smoker and previously had had adjuvant radiotherapy to both breasts in the 1990s.  For her lung cancer she received 60Gy in 30 fractions concurrently with weekly carbo/taxol chemotherapy. Chemoradiation was completed on 9/8/17.  Since completing treatment she had a chest CT on 9/29/17 that showed decreased size of the RML mass without any e/o lymphadenopathy.  Dr. Mai is seeing her again next week for consideration of two additional cycles of chemo.  Today she is feeling well. She is swallowing normally now, eating well and gaining weight.  Her cough has improved significantly and she is no longer needing antitussives.  No SOB or headaches.  Skin is healing well.    PHYSICAL EXAMINATION:  VITAL SIGNS: /66 (BP Location: Right arm)   Pulse 110   Temp 98 °F (36.7 °C) (Oral)   Resp 20   Wt 79.6 kg (175 lb 8 oz)   BMI 28.33 kg/m²   GENERAL: Patient is alert and oriented, in no acute distress.  HEENT: Extraocular muscles are intact.  Oropharynx is clear without lesions.    LYMPH: There is no cervical or supraclavicular adenopathy palpated.  No axillary LAD.  CHEST: Breath sounds clear bilaterally.  No rales.  No rhonchi.  Unlabored respirations.  CARDIOVASCULAR: Normal S1, S2.  Normal rate.  Regular rhythm.  ABDOMEN: Bowel sounds normal.  No tenderness.  No abdominal distention.  No hepatomegaly.  No splenomegaly.  EXTREMITIES: No clubbing, cyanosis, edema.  MSK: spine is nontender.  SKIN: no skin changes on the back.  Mild-moderate hyperpigmentation on anterior chest skin but this is on both sides and is not much different from her baseline.  There is some mild healing dry desquamation on the right anterior chest wall that is likely  healing change 2/2 radiotherapy.  NEUROLOGIC: Cranial nerves II through XII are grossly intact.  Sensation is intact.  Strength is 5 out of 5 in the upper and lower extremities bilaterally.    ASSESSMENT:   63 yo female never smoker with h/o B/L breast radiotherapy, now 1 month s/p chemoradiation for stage IIIA SCC of the RML.  She is recovering well from the acute side effects of treatment, and has had a partial response on early f/u CT chest.  ECOG 0.    PLAN:   She will follow up with Dr. Mai for consideration of further chemotherapy.  I will see her again in two months, sooner should the need arise.  20 minutes was spent in follow up, of which >50% was spent in face to face counseling.

## 2017-10-16 ENCOUNTER — OFFICE VISIT (OUTPATIENT)
Dept: HEMATOLOGY/ONCOLOGY | Facility: CLINIC | Age: 62
End: 2017-10-16
Payer: COMMERCIAL

## 2017-10-16 VITALS
RESPIRATION RATE: 17 BRPM | DIASTOLIC BLOOD PRESSURE: 70 MMHG | WEIGHT: 177.94 LBS | SYSTOLIC BLOOD PRESSURE: 145 MMHG | BODY MASS INDEX: 28.72 KG/M2 | HEART RATE: 100 BPM | TEMPERATURE: 98 F

## 2017-10-16 DIAGNOSIS — C77.9 REGIONAL LYMPH NODE METASTASIS PRESENT: ICD-10-CM

## 2017-10-16 DIAGNOSIS — C34.2 PRIMARY CANCER OF RIGHT MIDDLE LOBE OF LUNG: Primary | ICD-10-CM

## 2017-10-16 PROCEDURE — 99212 OFFICE O/P EST SF 10 MIN: CPT | Mod: S$GLB,,, | Performed by: INTERNAL MEDICINE

## 2017-10-16 PROCEDURE — 99999 PR PBB SHADOW E&M-EST. PATIENT-LVL III: CPT | Mod: PBBFAC,,, | Performed by: INTERNAL MEDICINE

## 2017-10-16 NOTE — PROGRESS NOTES
Subjective:       Patient ID: Fauzia Kirk is a 62 y.o. female.    Chief Complaint: No chief complaint on file.    HPI Ms. Kirk is a very pleasant 62-year-old  female who returned  for F/U of right lung cancer.      She completed radiation together with weekly chemotherapy with carboplatin and Taxol  for stage IIIA disease.    She completed therapy on September 13 and received 7 chemotherapy treatments.          CT scan from September 29 showed that the right middle lobe mass had decreased to 4.2 x 2.3 cm from 4.8 x 4.3 cm.  Stable 4 mm pulmonary nodule in the left lower lobe.      Today she reports she's been feeling much better since she's got off the chemotherapy and radiation.  Her appetites been good and she's gained some weight.          In December 2016 she began to have some blood in her mucus after coughing.In  April she developed a persistent cough and saw her physician on April 18.  Chest x-ray at that time showed a rounded opacity in the right lung overlying the major fissure.   Chest CT in May showed a 4.8 x 4.0 cm, rounded, soft tissue mass in the middle lobe between the medial and lateral segments. There was pre-carinal adenopathy.    Subsequently she underwent bronchoscopy with EBUS on 6/9/17. Needle biopsy of the medistinal nodes was positive for poorly differentiated carcinoma with squamoid features. The cells were positive for CK7, CK5/6, and P63 and are negative for CK20, GCDFP, TTF1, ER, [place], Napsin and YEMI 3.This was felt to be most CW a new squamous lung cancer.    PET CT yesterday demonstrated the following:a hypermetabolic, large mass in the right middle lobe with an SUV max of 20.23. There is hypermetabolic activity extending from this mass tracking along the pleura. There is a hypermetabolic right hilar lymph node demonstrating an SUV max of 20.2. In addition there is are 2 hypermetabolic subcarinal lymph nodes with the larger demonstrating an SUV max of 14.2. An additional right  paratracheal lymph node is seen with an SUV max of 26.1. Findings are consistent with metastatic lung cancer.      Previous cancer history:She had a stage I, ER negative carcinoma of the    left breast in 1990, treated with lumpectomy and radiation therapy.  In      1993, she developed a stage I, ER positive carcinoma of the right breast     treated with lumpectomy and radiation.  In 1995, she developed left breast   cancer recurrence, treated with lumpectomy, brachytherapy and four cycles    of Adriamycin and Cytoxan.  In 1996, she developed a new right breast        cancer T2 N0 poorly differentiated, treated with four cycles of              preoperative Taxol followed by mastectomy.        On October 17, 2016 left mammogram showed increased calcifications in the left breast at 3:00.  On October 27 a biopsy showed DCIS with solid, cribriform, and micropapillary patterns.  Tumor was 95% ER positive at 95% CT positive    On November 7, 2016 she underwent left mastectomy which showed 8 mm of DCIS and negative margins.    Review of Systems   Constitutional: Positive for appetite change ( improved) and fatigue (improved). Negative for activity change and fever.   Respiratory: Negative for cough and shortness of breath.    Cardiovascular: Negative for chest pain.   Gastrointestinal: Negative for abdominal pain, constipation, nausea and vomiting.   Musculoskeletal: Negative for back pain.        Right leg pain   Psychiatric/Behavioral: Positive for sleep disturbance (helped with Benadryl).       Objective:      Physical Exam   Constitutional: She is oriented to person, place, and time. She appears well-developed and well-nourished.   Neurological: She is alert and oriented to person, place, and time.   Psychiatric: She has a normal mood and affect. Her behavior is normal. Thought content normal.       Assessment:     CBC was marked for hemoglobin 9.1.  Metabolic profiles normal.  1. Primary cancer of right middle lobe of  lung    2. Regional lymph node metastasis present        Plan:     I discussed possible immunotherapy with durvalumab based on the [facility] article by Nina et al from September 2017.  If we can get approval for that therapy then that would be my recommendation.

## 2017-10-19 ENCOUNTER — TELEPHONE (OUTPATIENT)
Dept: HEMATOLOGY/ONCOLOGY | Facility: CLINIC | Age: 62
End: 2017-10-19

## 2017-10-19 NOTE — TELEPHONE ENCOUNTER
----- Message from Linda Hutchins sent at 10/19/2017  2:14 PM CDT -----  James Saul.. We are looking for the code for this drug.. Is this all the pt will receive or are they going to receive supportive meds as well?  If so, could that be loaded in a treatment plan?  ----- Message -----  From: Dorys Mcdonald MA  Sent: 10/18/2017   8:43 AM  To: Linda Hutchins    This is the patient I messaged you about yesterday to see if we can get authorization. The Dr Was not able to put in because this is a new drug. Durvalumab 10 mg/kg iv every 2 weeks for 12 months  ----- Message -----  From: Wyatt Mai MD  Sent: 10/17/2017  11:27 AM  To: Dorys Mcdonald MA    Can't - it does not exist in EPIC  ----- Message -----  From: Dorys Mcdonald MA  Sent: 10/17/2017  10:06 AM  To: Wyatt Mai MD    Please put plan in for durvalumab so we can get authorization

## 2017-10-25 ENCOUNTER — TELEPHONE (OUTPATIENT)
Dept: HEMATOLOGY/ONCOLOGY | Facility: CLINIC | Age: 62
End: 2017-10-25

## 2017-10-25 NOTE — TELEPHONE ENCOUNTER
I called patient to change her apt for her to see Dr. Mai. She stated she would like a nurse to call her first in regards to why she is needing to come in as she stated she just saw him not to long ago. I told her I would send a message to the nurse and have her call back. She voiced understanding.

## 2017-10-25 NOTE — TELEPHONE ENCOUNTER
----- Message from Dorys Mcdonald MA sent at 10/25/2017  9:39 AM CDT -----  Contact: Pt  Sorry please move someone to Karen schedule and put her there  ----- Message -----  From: Laly Tang  Sent: 10/25/2017   9:23 AM  To: Dorys Mcdonald MA    Where can we schedule her ?  ----- Message -----  From: Skye Jimenez  Sent: 10/25/2017   9:10 AM  To: Laly Tang    Pt calling regarding her appt on 10/30. She does not want to see Dr. Chicas she only wants to see . So she would like to be scheduled when  has something available.     Pt call back number  [phone]

## 2017-11-07 RX ORDER — HEPARIN 100 UNIT/ML
500 SYRINGE INTRAVENOUS
Status: CANCELLED | OUTPATIENT
Start: 2017-11-10

## 2017-11-07 RX ORDER — DIPHENHYDRAMINE HYDROCHLORIDE 50 MG/ML
50 INJECTION INTRAMUSCULAR; INTRAVENOUS ONCE AS NEEDED
Status: CANCELLED | OUTPATIENT
Start: 2017-11-10 | End: 2017-11-10

## 2017-11-07 RX ORDER — SODIUM CHLORIDE 0.9 % (FLUSH) 0.9 %
10 SYRINGE (ML) INJECTION
Status: CANCELLED | OUTPATIENT
Start: 2017-11-10

## 2017-11-07 RX ORDER — EPINEPHRINE 0.1 MG/ML
1 INJECTION INTRAVENOUS ONCE AS NEEDED
Status: CANCELLED | OUTPATIENT
Start: 2017-11-10 | End: 2017-11-10

## 2017-11-08 ENCOUNTER — TELEPHONE (OUTPATIENT)
Dept: HEMATOLOGY/ONCOLOGY | Facility: CLINIC | Age: 62
End: 2017-11-08

## 2017-11-10 ENCOUNTER — OFFICE VISIT (OUTPATIENT)
Dept: HEMATOLOGY/ONCOLOGY | Facility: CLINIC | Age: 62
End: 2017-11-10
Payer: COMMERCIAL

## 2017-11-10 ENCOUNTER — INFUSION (OUTPATIENT)
Dept: INFUSION THERAPY | Facility: HOSPITAL | Age: 62
End: 2017-11-10
Attending: INTERNAL MEDICINE
Payer: COMMERCIAL

## 2017-11-10 ENCOUNTER — LAB VISIT (OUTPATIENT)
Dept: LAB | Facility: HOSPITAL | Age: 62
End: 2017-11-10
Attending: INTERNAL MEDICINE
Payer: COMMERCIAL

## 2017-11-10 VITALS
TEMPERATURE: 99 F | DIASTOLIC BLOOD PRESSURE: 75 MMHG | SYSTOLIC BLOOD PRESSURE: 140 MMHG | WEIGHT: 176.38 LBS | HEART RATE: 104 BPM | HEIGHT: 67 IN | RESPIRATION RATE: 16 BRPM | BODY MASS INDEX: 27.68 KG/M2

## 2017-11-10 VITALS
DIASTOLIC BLOOD PRESSURE: 62 MMHG | HEART RATE: 91 BPM | SYSTOLIC BLOOD PRESSURE: 119 MMHG | RESPIRATION RATE: 18 BRPM | TEMPERATURE: 99 F

## 2017-11-10 DIAGNOSIS — Z51.11 ENCOUNTER FOR ANTINEOPLASTIC CHEMOTHERAPY: ICD-10-CM

## 2017-11-10 DIAGNOSIS — C77.9 REGIONAL LYMPH NODE METASTASIS PRESENT: ICD-10-CM

## 2017-11-10 DIAGNOSIS — C34.2 PRIMARY CANCER OF RIGHT MIDDLE LOBE OF LUNG: Primary | ICD-10-CM

## 2017-11-10 DIAGNOSIS — C34.2 PRIMARY CANCER OF RIGHT MIDDLE LOBE OF LUNG: ICD-10-CM

## 2017-11-10 LAB
ALBUMIN SERPL BCP-MCNC: 3.1 G/DL
ALP SERPL-CCNC: 75 U/L
ALT SERPL W/O P-5'-P-CCNC: 10 U/L
ANION GAP SERPL CALC-SCNC: 8 MMOL/L
AST SERPL-CCNC: 15 U/L
BASOPHILS # BLD AUTO: 0.02 K/UL
BASOPHILS NFR BLD: 0.4 %
BILIRUB SERPL-MCNC: 0.4 MG/DL
BUN SERPL-MCNC: 19 MG/DL
CALCIUM SERPL-MCNC: 9.7 MG/DL
CHLORIDE SERPL-SCNC: 106 MMOL/L
CO2 SERPL-SCNC: 25 MMOL/L
CREAT SERPL-MCNC: 0.9 MG/DL
DIFFERENTIAL METHOD: ABNORMAL
EOSINOPHIL # BLD AUTO: 0 K/UL
EOSINOPHIL NFR BLD: 0.8 %
ERYTHROCYTE [DISTWIDTH] IN BLOOD BY AUTOMATED COUNT: 17.2 %
EST. GFR  (AFRICAN AMERICAN): >60 ML/MIN/1.73 M^2
EST. GFR  (NON AFRICAN AMERICAN): >60 ML/MIN/1.73 M^2
GLUCOSE SERPL-MCNC: 118 MG/DL
HCT VFR BLD AUTO: 31.1 %
HGB BLD-MCNC: 9.9 G/DL
IMM GRANULOCYTES # BLD AUTO: 0.01 K/UL
IMM GRANULOCYTES NFR BLD AUTO: 0.2 %
LYMPHOCYTES # BLD AUTO: 0.6 K/UL
LYMPHOCYTES NFR BLD: 12.1 %
MCH RBC QN AUTO: 29.6 PG
MCHC RBC AUTO-ENTMCNC: 31.8 G/DL
MCV RBC AUTO: 93 FL
MONOCYTES # BLD AUTO: 0.4 K/UL
MONOCYTES NFR BLD: 7.1 %
NEUTROPHILS # BLD AUTO: 4 K/UL
NEUTROPHILS NFR BLD: 79.4 %
NRBC BLD-RTO: 0 /100 WBC
PLATELET # BLD AUTO: 238 K/UL
PMV BLD AUTO: 9.8 FL
POTASSIUM SERPL-SCNC: 4.1 MMOL/L
PROT SERPL-MCNC: 7.3 G/DL
RBC # BLD AUTO: 3.34 M/UL
SODIUM SERPL-SCNC: 139 MMOL/L
WBC # BLD AUTO: 5.05 K/UL

## 2017-11-10 PROCEDURE — 63600175 PHARM REV CODE 636 W HCPCS: Performed by: INTERNAL MEDICINE

## 2017-11-10 PROCEDURE — A4216 STERILE WATER/SALINE, 10 ML: HCPCS | Performed by: INTERNAL MEDICINE

## 2017-11-10 PROCEDURE — 96413 CHEMO IV INFUSION 1 HR: CPT

## 2017-11-10 PROCEDURE — 80053 COMPREHEN METABOLIC PANEL: CPT

## 2017-11-10 PROCEDURE — 99999 PR PBB SHADOW E&M-EST. PATIENT-LVL III: CPT | Mod: PBBFAC,,, | Performed by: INTERNAL MEDICINE

## 2017-11-10 PROCEDURE — C9492 INJECTION, DURVALUMAB: HCPCS | Performed by: INTERNAL MEDICINE

## 2017-11-10 PROCEDURE — 36415 COLL VENOUS BLD VENIPUNCTURE: CPT

## 2017-11-10 PROCEDURE — 25000003 PHARM REV CODE 250: Performed by: INTERNAL MEDICINE

## 2017-11-10 PROCEDURE — 85025 COMPLETE CBC W/AUTO DIFF WBC: CPT

## 2017-11-10 PROCEDURE — 99214 OFFICE O/P EST MOD 30 MIN: CPT | Mod: S$GLB,,, | Performed by: INTERNAL MEDICINE

## 2017-11-10 PROCEDURE — 36593 DECLOT VASCULAR DEVICE: CPT

## 2017-11-10 RX ORDER — HEPARIN 100 UNIT/ML
500 SYRINGE INTRAVENOUS
Status: DISCONTINUED | OUTPATIENT
Start: 2017-11-10 | End: 2017-11-10 | Stop reason: HOSPADM

## 2017-11-10 RX ORDER — LIDOCAINE AND PRILOCAINE 25; 25 MG/G; MG/G
CREAM TOPICAL
Qty: 5 G | Refills: 3 | Status: SHIPPED | OUTPATIENT
Start: 2017-11-10 | End: 2017-11-13 | Stop reason: SDUPTHER

## 2017-11-10 RX ORDER — SODIUM CHLORIDE 0.9 % (FLUSH) 0.9 %
10 SYRINGE (ML) INJECTION
Status: DISCONTINUED | OUTPATIENT
Start: 2017-11-10 | End: 2017-11-10 | Stop reason: HOSPADM

## 2017-11-10 RX ORDER — DIPHENHYDRAMINE HYDROCHLORIDE 50 MG/ML
50 INJECTION INTRAMUSCULAR; INTRAVENOUS ONCE AS NEEDED
Status: DISCONTINUED | OUTPATIENT
Start: 2017-11-10 | End: 2017-11-10 | Stop reason: HOSPADM

## 2017-11-10 RX ORDER — EPINEPHRINE 0.3 MG/.3ML
1 INJECTION SUBCUTANEOUS ONCE AS NEEDED
Status: DISCONTINUED | OUTPATIENT
Start: 2017-11-10 | End: 2017-11-10 | Stop reason: HOSPADM

## 2017-11-10 RX ADMIN — ALTEPLASE 2 MG: 2.2 INJECTION, POWDER, LYOPHILIZED, FOR SOLUTION INTRAVENOUS at 11:11

## 2017-11-10 RX ADMIN — SODIUM CHLORIDE: 900 INJECTION, SOLUTION INTRAVENOUS at 01:11

## 2017-11-10 RX ADMIN — HEPARIN 500 UNITS: 100 SYRINGE at 02:11

## 2017-11-10 RX ADMIN — SODIUM CHLORIDE, PRESERVATIVE FREE 10 ML: 5 INJECTION INTRAVENOUS at 02:11

## 2017-11-10 RX ADMIN — SODIUM CHLORIDE: 9 INJECTION, SOLUTION INTRAVENOUS at 11:11

## 2017-11-10 NOTE — PROGRESS NOTES
Subjective:       Patient ID: Fauzia Kirk is a 62 y.o. female.    Chief Complaint: No chief complaint on file.    HPI Ms. Kirk is a very pleasant 62-year-old  female who returned  for F/U of right lung cancer.      She completed radiation together with weekly chemotherapy with carboplatin and Taxol  for stage IIIA disease.    She completed therapy on September 13 and received 7 chemotherapy treatments.    CT scan from September 29 showed that the right middle lobe mass had decreased to 4.2 x 2.3 cm from 4.8 x 4.3 cm.  Stable 4 mm pulmonary nodule in the left lower lobe.    She is here to initiate adjuvant therapy with Durvalumab.          In December 2016 she began to have some blood in her mucus after coughing.In  April she developed a persistent cough and saw her physician on April 18.  Chest x-ray at that time showed a rounded opacity in the right lung overlying the major fissure.   Chest CT in May showed a 4.8 x 4.0 cm, rounded, soft tissue mass in the middle lobe between the medial and lateral segments. There was pre-carinal adenopathy.    Subsequently she underwent bronchoscopy with EBUS on 6/9/17. Needle biopsy of the medistinal nodes was positive for poorly differentiated carcinoma with squamoid features. The cells were positive for CK7, CK5/6, and P63 and are negative for CK20, GCDFP, TTF1, ER, MS, Napsin and YEMI 3.This was felt to be most CW a new squamous lung cancer.    PET CT yesterday demonstrated the following:a hypermetabolic, large mass in the right middle lobe with an SUV max of 20.23. There is hypermetabolic activity extending from this mass tracking along the pleura. There is a hypermetabolic right hilar lymph node demonstrating an SUV max of 20.2. In addition there is are 2 hypermetabolic subcarinal lymph nodes with the larger demonstrating an SUV max of 14.2. An additional right paratracheal lymph node is seen with an SUV max of 26.1. Findings are consistent with metastatic lung  cancer.      Previous cancer history:She had a stage I, ER negative carcinoma of the    left breast in 1990, treated with lumpectomy and radiation therapy.  In      1993, she developed a stage I, ER positive carcinoma of the right breast     treated with lumpectomy and radiation.  In 1995, she developed left breast   cancer recurrence, treated with lumpectomy, brachytherapy and four cycles    of Adriamycin and Cytoxan.  In 1996, she developed a new right breast        cancer T2 N0 poorly differentiated, treated with four cycles of              preoperative Taxol followed by mastectomy.        On October 17, 2016 left mammogram showed increased calcifications in the left breast at 3:00.  On October 27 a biopsy showed DCIS with solid, cribriform, and micropapillary patterns.  Tumor was 95% ER positive at 95% TX positive    On November 7, 2016 she underwent left mastectomy which showed 8 mm of DCIS and negative margins.    Review of Systems   Constitutional: Negative for activity change and fever.   Respiratory: Negative for cough and shortness of breath.    Cardiovascular: Negative for chest pain.   Gastrointestinal: Negative for abdominal pain, constipation, nausea and vomiting.   Musculoskeletal: Negative for back pain.       Objective:      Physical Exam   Constitutional: She is oriented to person, place, and time. She appears well-developed and well-nourished.   Neurological: She is alert and oriented to person, place, and time.   Psychiatric: She has a normal mood and affect. Her behavior is normal. Thought content normal.       Assessment:       1. Primary cancer of right middle lobe of lung    2. Regional lymph node metastasis present    3. Encounter for antineoplastic chemotherapy        Plan:     I reviewed side effects of therapy including effects on thyroid function, colitis, and pneumonitis.  Written informed consent was signed.    I'll plan to see her bi - weekly for the first several treatments and then  likely monthly.    Distress Screening Results: Psychosocial Distress screening score of Distress Score: 6 noted and reviewed. No intervention indicated.Anxious due to new therapy.

## 2017-11-10 NOTE — PLAN OF CARE
Problem: Patient Care Overview  Goal: Plan of Care Review  1448-Patients port flushed easily but no blood could be aspirated so patient had cath jonathan instilled. Port demonstrated patency by blood return after 30 minutes of cath jonathan so cath jonathan removed and medication for today released to pharmacy. Patient tolerated treatment well, she reports minor itching to her foot following infusion but denies any other symptoms or any need for intervention. Discharged without complaints or S/S of adverse event. AVS given.  Instructed to call provider for any questions or concerns.

## 2017-11-10 NOTE — PLAN OF CARE
Problem: Patient Care Overview  Goal: Individualization & Mutuality  Outcome: Ongoing (interventions implemented as appropriate)  1045-Labs , hx, and medications reviewed, patient seen by MD prior to arrival.  MD contacted regarding baseline thyroid function, -per MD baseline will be obtained with next treatment. Assessment completed. Discussed plan of care with patient. Patient in agreement. Chair reclined and warm blanket and snack offered.

## 2017-11-13 ENCOUNTER — TELEPHONE (OUTPATIENT)
Dept: HEMATOLOGY/ONCOLOGY | Facility: CLINIC | Age: 62
End: 2017-11-13

## 2017-11-13 DIAGNOSIS — Z51.11 ENCOUNTER FOR ANTINEOPLASTIC CHEMOTHERAPY: ICD-10-CM

## 2017-11-13 RX ORDER — LIDOCAINE AND PRILOCAINE 25; 25 MG/G; MG/G
CREAM TOPICAL
Qty: 5 G | Refills: 3 | Status: SHIPPED | OUTPATIENT
Start: 2017-11-13 | End: 2019-06-14 | Stop reason: SDUPTHER

## 2017-11-13 NOTE — TELEPHONE ENCOUNTER
Patient has a head cold. Blowing he nose a lot and dry cough. Patient not sure what she can take because of the new chemotherapy she had. She also said the emla [facility]

## 2017-11-13 NOTE — TELEPHONE ENCOUNTER
----- Message from Skye Jimenez sent at 11/13/2017  8:39 AM CST -----  Contact: Pt  Pt calling regarding medication      Pt call back number [phone]

## 2017-11-13 NOTE — TELEPHONE ENCOUNTER
----- Message from Skye Jimenez sent at 11/13/2017  8:39 AM CST -----  Contact: Pt  Pt calling regarding medication      Pt call back number [phone]

## 2017-11-21 ENCOUNTER — OFFICE VISIT (OUTPATIENT)
Dept: SURGERY | Facility: CLINIC | Age: 62
End: 2017-11-21
Payer: COMMERCIAL

## 2017-11-21 VITALS
DIASTOLIC BLOOD PRESSURE: 77 MMHG | HEIGHT: 67 IN | HEART RATE: 102 BPM | SYSTOLIC BLOOD PRESSURE: 120 MMHG | BODY MASS INDEX: 27.63 KG/M2 | WEIGHT: 176.06 LBS

## 2017-11-21 DIAGNOSIS — C34.2 PRIMARY CANCER OF RIGHT MIDDLE LOBE OF LUNG: Primary | ICD-10-CM

## 2017-11-21 PROCEDURE — 99213 OFFICE O/P EST LOW 20 MIN: CPT | Mod: S$GLB,,, | Performed by: SURGERY

## 2017-11-21 PROCEDURE — 99999 PR PBB SHADOW E&M-EST. PATIENT-LVL III: CPT | Mod: PBBFAC,,, | Performed by: SURGERY

## 2017-11-21 NOTE — PROGRESS NOTES
I have seen the patient, reviewed the Resident's history and physical, assessment and plan. I have personally interviewed and examined the patient at bedside and: agree with the findings.     Doing well with her port but has some difficulty with blood draws.  Follow up prn.

## 2017-11-21 NOTE — PROGRESS NOTES
History & Physical    SUBJECTIVE:     History of Present Illness:  Patient is a 62 y.o. female presents 4 months after left port-a-cath placement doing well since surgery. No issues with using the port. No infections at the port site. No complaints or concerns. She feels well today.       No chief complaint on file.      Review of patient's allergies indicates:  No Known Allergies    Current Outpatient Prescriptions   Medication Sig Dispense Refill    lidocaine-prilocaine (EMLA) cream Apply topically as needed. 5 g 3    losartan-hydrochlorothiazide 100-25 mg (HYZAAR) 100-25 mg per tablet Take 1 tablet by mouth once daily. 90 tablet 4    omeprazole (PRILOSEC) 40 MG capsule Take 1 capsule (40 mg total) by mouth once daily. 30 capsule 6     No current facility-administered medications for this visit.        Past Medical History:   Diagnosis Date    Arthritis     Breast cancer     Hypertension      Past Surgical History:   Procedure Laterality Date    BREAST LUMPECTOMY       SECTION, CLASSIC      LIPOMA RESECTION      MASTECTOMY       Family History   Problem Relation Age of Onset    Lymphoma Mother     Prostate cancer Father     Stomach cancer Sister     Breast cancer Sister     Diabetes Brother     Breast cancer Paternal Aunt     Breast cancer Paternal Grandmother      Social History   Substance Use Topics    Smoking status: Never Smoker    Smokeless tobacco: Never Used    Alcohol use No        Review of Systems:  Review of Systems   Constitutional: Negative for chills, fatigue and fever.   HENT: Negative.    Respiratory: Negative for chest tightness, shortness of breath and stridor.    Cardiovascular: Negative for chest pain.   Gastrointestinal: Negative for abdominal pain, nausea and vomiting.   Genitourinary: Negative.    Musculoskeletal: Negative.    Psychiatric/Behavioral: Negative.        OBJECTIVE:     Vital Signs (Most Recent)  Pulse: 102 (17 1129)  BP: 120/77 (17  "9149  5' 7" (1.702 m)  79.9 kg (176 lb 0.6 oz)     Physical Exam:  Physical Exam   Constitutional: She is oriented to person, place, and time. She appears well-developed and well-nourished. No distress.   HENT:   Head: Normocephalic and atraumatic.   Eyes: Pupils are equal, round, and reactive to light. No scleral icterus.   Neck: No tracheal deviation present.   Cardiovascular: Normal rate.    Pulmonary/Chest: Effort normal. No respiratory distress.       Abdominal: Soft. She exhibits no distension.   Neurological: She is alert and oriented to person, place, and time. No cranial nerve deficit.   Psychiatric: She has a normal mood and affect. Her behavior is normal.       ASSESSMENT/PLAN:     Fauzia Kirk is a 62 y.o. female s/p left port-a-cath placement doing well    Local wound care  Continue using as needed  Follow up when pt needs it removed    Bright Baker MD PGY III  [phone]        "

## 2017-11-21 NOTE — LETTER
[facility] - General Surgery  [address]  [place] [address]  Phone: [phone] November 21, 2017      Era Urena MD  [address] [facility] [address]    Patient: Fauzia Kirk   MR Number: [id number]   YOB: 1955   Date of Visit: 11/21/2017     Dear Dr. Urena:    Thank you for referring Fauzia Kirk to me for evaluation. Below are the relevant portions of my assessment and plan of care.    Fauzia Kirk is a 62-year-old female status post left port-a-cath placement doing well    PLAN:  - Local wound care  - Continue using as needed  - Follow up when pt needs it removed    If you have questions, please do not hesitate to call me. I look forward to following Fauzia along with you.    Sincerely,      Brendan Ruiz MD   Section Head - General, Laparoscopic, Bariatric  Acute Care and Oncologic Surgery   - Surgical Weight Loss Program  [facility]    WSR/alcides    CC  Wyatt Mai MD

## 2017-11-22 RX ORDER — DIPHENHYDRAMINE HYDROCHLORIDE 50 MG/ML
50 INJECTION INTRAMUSCULAR; INTRAVENOUS ONCE AS NEEDED
Status: CANCELLED | OUTPATIENT
Start: 2017-11-24 | End: 2017-11-24

## 2017-11-22 RX ORDER — HEPARIN 100 UNIT/ML
500 SYRINGE INTRAVENOUS
Status: CANCELLED | OUTPATIENT
Start: 2017-11-24

## 2017-11-22 RX ORDER — EPINEPHRINE 0.1 MG/ML
1 INJECTION INTRAVENOUS ONCE AS NEEDED
Status: CANCELLED | OUTPATIENT
Start: 2017-11-24 | End: 2017-11-24

## 2017-11-22 RX ORDER — SODIUM CHLORIDE 0.9 % (FLUSH) 0.9 %
10 SYRINGE (ML) INJECTION
Status: CANCELLED | OUTPATIENT
Start: 2017-11-24

## 2017-11-24 ENCOUNTER — LAB VISIT (OUTPATIENT)
Dept: LAB | Facility: HOSPITAL | Age: 62
End: 2017-11-24
Attending: INTERNAL MEDICINE
Payer: COMMERCIAL

## 2017-11-24 ENCOUNTER — INFUSION (OUTPATIENT)
Dept: INFUSION THERAPY | Facility: HOSPITAL | Age: 62
End: 2017-11-24
Attending: INTERNAL MEDICINE
Payer: COMMERCIAL

## 2017-11-24 VITALS
SYSTOLIC BLOOD PRESSURE: 142 MMHG | RESPIRATION RATE: 18 BRPM | TEMPERATURE: 99 F | DIASTOLIC BLOOD PRESSURE: 78 MMHG | HEART RATE: 100 BPM | HEIGHT: 67 IN | BODY MASS INDEX: 27.62 KG/M2 | WEIGHT: 176 LBS

## 2017-11-24 DIAGNOSIS — Z51.11 ENCOUNTER FOR ANTINEOPLASTIC CHEMOTHERAPY: ICD-10-CM

## 2017-11-24 DIAGNOSIS — C34.2 PRIMARY CANCER OF RIGHT MIDDLE LOBE OF LUNG: ICD-10-CM

## 2017-11-24 DIAGNOSIS — C34.2 PRIMARY CANCER OF RIGHT MIDDLE LOBE OF LUNG: Primary | ICD-10-CM

## 2017-11-24 LAB
ALBUMIN SERPL BCP-MCNC: 3.2 G/DL
ALP SERPL-CCNC: 78 U/L
ALT SERPL W/O P-5'-P-CCNC: 7 U/L
ANION GAP SERPL CALC-SCNC: 7 MMOL/L
AST SERPL-CCNC: 16 U/L
BASOPHILS # BLD AUTO: 0.03 K/UL
BASOPHILS NFR BLD: 0.5 %
BILIRUB SERPL-MCNC: 0.6 MG/DL
BUN SERPL-MCNC: 14 MG/DL
CALCIUM SERPL-MCNC: 9.7 MG/DL
CHLORIDE SERPL-SCNC: 106 MMOL/L
CO2 SERPL-SCNC: 28 MMOL/L
CREAT SERPL-MCNC: 0.9 MG/DL
DIFFERENTIAL METHOD: ABNORMAL
EOSINOPHIL # BLD AUTO: 0.1 K/UL
EOSINOPHIL NFR BLD: 1.4 %
ERYTHROCYTE [DISTWIDTH] IN BLOOD BY AUTOMATED COUNT: 15.2 %
EST. GFR  (AFRICAN AMERICAN): >60 ML/MIN/1.73 M^2
EST. GFR  (NON AFRICAN AMERICAN): >60 ML/MIN/1.73 M^2
GLUCOSE SERPL-MCNC: 105 MG/DL
HCT VFR BLD AUTO: 31.5 %
HGB BLD-MCNC: 10.2 G/DL
IMM GRANULOCYTES # BLD AUTO: 0.02 K/UL
IMM GRANULOCYTES NFR BLD AUTO: 0.4 %
LYMPHOCYTES # BLD AUTO: 0.7 K/UL
LYMPHOCYTES NFR BLD: 13 %
MCH RBC QN AUTO: 30.1 PG
MCHC RBC AUTO-ENTMCNC: 32.4 G/DL
MCV RBC AUTO: 93 FL
MONOCYTES # BLD AUTO: 0.6 K/UL
MONOCYTES NFR BLD: 11.1 %
NEUTROPHILS # BLD AUTO: 4.1 K/UL
NEUTROPHILS NFR BLD: 73.6 %
NRBC BLD-RTO: 0 /100 WBC
PLATELET # BLD AUTO: 230 K/UL
PMV BLD AUTO: 9.7 FL
POTASSIUM SERPL-SCNC: 4.3 MMOL/L
PROT SERPL-MCNC: 7.4 G/DL
RBC # BLD AUTO: 3.39 M/UL
SODIUM SERPL-SCNC: 141 MMOL/L
WBC # BLD AUTO: 5.6 K/UL

## 2017-11-24 PROCEDURE — 80053 COMPREHEN METABOLIC PANEL: CPT

## 2017-11-24 PROCEDURE — 63600175 PHARM REV CODE 636 W HCPCS: Performed by: INTERNAL MEDICINE

## 2017-11-24 PROCEDURE — 96413 CHEMO IV INFUSION 1 HR: CPT

## 2017-11-24 PROCEDURE — 85025 COMPLETE CBC W/AUTO DIFF WBC: CPT

## 2017-11-24 PROCEDURE — 25000003 PHARM REV CODE 250: Performed by: INTERNAL MEDICINE

## 2017-11-24 PROCEDURE — C9492 INJECTION, DURVALUMAB: HCPCS | Performed by: INTERNAL MEDICINE

## 2017-11-24 PROCEDURE — 36415 COLL VENOUS BLD VENIPUNCTURE: CPT

## 2017-11-24 PROCEDURE — A4216 STERILE WATER/SALINE, 10 ML: HCPCS | Performed by: INTERNAL MEDICINE

## 2017-11-24 RX ORDER — HEPARIN 100 UNIT/ML
500 SYRINGE INTRAVENOUS
Status: DISCONTINUED | OUTPATIENT
Start: 2017-11-24 | End: 2017-11-24 | Stop reason: HOSPADM

## 2017-11-24 RX ORDER — SODIUM CHLORIDE 0.9 % (FLUSH) 0.9 %
10 SYRINGE (ML) INJECTION
Status: DISCONTINUED | OUTPATIENT
Start: 2017-11-24 | End: 2017-11-24 | Stop reason: HOSPADM

## 2017-11-24 RX ORDER — DIPHENHYDRAMINE HYDROCHLORIDE 50 MG/ML
50 INJECTION INTRAMUSCULAR; INTRAVENOUS ONCE AS NEEDED
Status: DISCONTINUED | OUTPATIENT
Start: 2017-11-24 | End: 2017-11-24 | Stop reason: HOSPADM

## 2017-11-24 RX ORDER — EPINEPHRINE 0.3 MG/.3ML
0.3 INJECTION SUBCUTANEOUS ONCE AS NEEDED
Status: DISCONTINUED | OUTPATIENT
Start: 2017-11-24 | End: 2017-11-24 | Stop reason: HOSPADM

## 2017-11-24 RX ADMIN — SODIUM CHLORIDE: 9 INJECTION, SOLUTION INTRAVENOUS at 11:11

## 2017-11-24 RX ADMIN — HEPARIN 500 UNITS: 100 SYRINGE at 01:11

## 2017-11-24 RX ADMIN — DURVALUMAB: 120 INJECTION, SOLUTION INTRAVENOUS at 11:11

## 2017-11-24 RX ADMIN — SODIUM CHLORIDE, PRESERVATIVE FREE 10 ML: 5 INJECTION INTRAVENOUS at 01:11

## 2017-11-24 NOTE — PLAN OF CARE
Problem: Chemotherapy Effects (Adult)  Goal: Signs and Symptoms of Listed Potential Problems Will be Absent, Minimized or Managed (Chemotherapy Effects)  Signs and symptoms of listed potential problems will be absent, minimized or managed by discharge/transition of care (reference Chemotherapy Effects (Adult) CPG).   Outcome: Ongoing (interventions implemented as appropriate)  Pt here for Imfinzi infusion, labs, hx, meds, allergies reviewed, spouse with pt., reclined in chair, pt with no complaints or concerns at this time, continue to monitor

## 2017-11-24 NOTE — PLAN OF CARE
Problem: Patient Care Overview  Goal: Plan of Care Review  Outcome: Ongoing (interventions implemented as appropriate)  Pt tolerated imfinzi infusion without issue, avs given, rtc 12/8/17, no distress noted upon d/c to home

## 2017-12-06 RX ORDER — DIPHENHYDRAMINE HYDROCHLORIDE 50 MG/ML
50 INJECTION INTRAMUSCULAR; INTRAVENOUS ONCE AS NEEDED
Status: CANCELLED | OUTPATIENT
Start: 2017-12-08 | End: 2017-12-08

## 2017-12-06 RX ORDER — HEPARIN 100 UNIT/ML
500 SYRINGE INTRAVENOUS
Status: CANCELLED | OUTPATIENT
Start: 2017-12-08

## 2017-12-06 RX ORDER — SODIUM CHLORIDE 0.9 % (FLUSH) 0.9 %
10 SYRINGE (ML) INJECTION
Status: CANCELLED | OUTPATIENT
Start: 2017-12-08

## 2017-12-06 RX ORDER — EPINEPHRINE 0.1 MG/ML
1 INJECTION INTRAVENOUS ONCE AS NEEDED
Status: CANCELLED | OUTPATIENT
Start: 2017-12-08 | End: 2017-12-08

## 2017-12-08 ENCOUNTER — OFFICE VISIT (OUTPATIENT)
Dept: HEMATOLOGY/ONCOLOGY | Facility: CLINIC | Age: 62
End: 2017-12-08
Payer: COMMERCIAL

## 2017-12-08 ENCOUNTER — INFUSION (OUTPATIENT)
Dept: INFUSION THERAPY | Facility: HOSPITAL | Age: 62
End: 2017-12-08
Attending: INTERNAL MEDICINE
Payer: COMMERCIAL

## 2017-12-08 ENCOUNTER — HOSPITAL ENCOUNTER (OUTPATIENT)
Dept: CARDIOLOGY | Facility: CLINIC | Age: 62
Discharge: HOME | End: 2017-12-08
Payer: COMMERCIAL

## 2017-12-08 ENCOUNTER — HOSPITAL ENCOUNTER (OUTPATIENT)
Dept: RADIOLOGY | Facility: HOSPITAL | Age: 62
Discharge: HOME | End: 2017-12-08
Attending: INTERNAL MEDICINE
Payer: COMMERCIAL

## 2017-12-08 VITALS
RESPIRATION RATE: 18 BRPM | TEMPERATURE: 99 F | HEART RATE: 95 BPM | SYSTOLIC BLOOD PRESSURE: 121 MMHG | OXYGEN SATURATION: 99 % | DIASTOLIC BLOOD PRESSURE: 63 MMHG

## 2017-12-08 VITALS
RESPIRATION RATE: 16 BRPM | TEMPERATURE: 99 F | HEIGHT: 66 IN | SYSTOLIC BLOOD PRESSURE: 135 MMHG | HEART RATE: 107 BPM | DIASTOLIC BLOOD PRESSURE: 61 MMHG | WEIGHT: 175.94 LBS | OXYGEN SATURATION: 90 % | BODY MASS INDEX: 28.27 KG/M2

## 2017-12-08 DIAGNOSIS — I49.9 IRREGULAR HEART BEAT: ICD-10-CM

## 2017-12-08 DIAGNOSIS — C77.9 REGIONAL LYMPH NODE METASTASIS PRESENT: ICD-10-CM

## 2017-12-08 DIAGNOSIS — C34.2 PRIMARY CANCER OF RIGHT MIDDLE LOBE OF LUNG: ICD-10-CM

## 2017-12-08 DIAGNOSIS — C34.2 PRIMARY CANCER OF RIGHT MIDDLE LOBE OF LUNG: Primary | ICD-10-CM

## 2017-12-08 DIAGNOSIS — Z51.11 ENCOUNTER FOR ANTINEOPLASTIC CHEMOTHERAPY: ICD-10-CM

## 2017-12-08 DIAGNOSIS — E06.9 THYROIDITIS: ICD-10-CM

## 2017-12-08 PROCEDURE — 63600175 PHARM REV CODE 636 W HCPCS: Performed by: INTERNAL MEDICINE

## 2017-12-08 PROCEDURE — C9492 INJECTION, DURVALUMAB: HCPCS | Performed by: INTERNAL MEDICINE

## 2017-12-08 PROCEDURE — 99215 OFFICE O/P EST HI 40 MIN: CPT | Mod: S$GLB,,, | Performed by: INTERNAL MEDICINE

## 2017-12-08 PROCEDURE — 93000 ELECTROCARDIOGRAM COMPLETE: CPT | Mod: S$GLB,,, | Performed by: INTERNAL MEDICINE

## 2017-12-08 PROCEDURE — 96413 CHEMO IV INFUSION 1 HR: CPT

## 2017-12-08 PROCEDURE — 25000003 PHARM REV CODE 250: Performed by: INTERNAL MEDICINE

## 2017-12-08 PROCEDURE — A4216 STERILE WATER/SALINE, 10 ML: HCPCS | Performed by: INTERNAL MEDICINE

## 2017-12-08 PROCEDURE — 99999 PR PBB SHADOW E&M-EST. PATIENT-LVL III: CPT | Mod: PBBFAC,,, | Performed by: INTERNAL MEDICINE

## 2017-12-08 PROCEDURE — 71020 XR CHEST PA AND LATERAL: CPT | Mod: TC

## 2017-12-08 PROCEDURE — 71020 XR CHEST PA AND LATERAL: CPT | Mod: 26,,, | Performed by: RADIOLOGY

## 2017-12-08 RX ORDER — DIPHENHYDRAMINE HYDROCHLORIDE 50 MG/ML
50 INJECTION INTRAMUSCULAR; INTRAVENOUS ONCE AS NEEDED
Status: DISCONTINUED | OUTPATIENT
Start: 2017-12-08 | End: 2017-12-08 | Stop reason: HOSPADM

## 2017-12-08 RX ORDER — METHIMAZOLE 5 MG/1
5 TABLET ORAL 3 TIMES DAILY
Qty: 90 TABLET | Refills: 11 | Status: SHIPPED | OUTPATIENT
Start: 2017-12-08 | End: 2017-12-13

## 2017-12-08 RX ORDER — SODIUM CHLORIDE 0.9 % (FLUSH) 0.9 %
10 SYRINGE (ML) INJECTION
Status: DISCONTINUED | OUTPATIENT
Start: 2017-12-08 | End: 2017-12-08 | Stop reason: HOSPADM

## 2017-12-08 RX ORDER — EPINEPHRINE 0.3 MG/.3ML
1 INJECTION SUBCUTANEOUS ONCE AS NEEDED
Status: DISCONTINUED | OUTPATIENT
Start: 2017-12-08 | End: 2017-12-08 | Stop reason: HOSPADM

## 2017-12-08 RX ORDER — HEPARIN 100 UNIT/ML
500 SYRINGE INTRAVENOUS
Status: DISCONTINUED | OUTPATIENT
Start: 2017-12-08 | End: 2017-12-08 | Stop reason: HOSPADM

## 2017-12-08 RX ADMIN — DURVALUMAB 805 MG: 120 INJECTION, SOLUTION INTRAVENOUS at 02:12

## 2017-12-08 RX ADMIN — HEPARIN 500 UNITS: 100 SYRINGE at 03:12

## 2017-12-08 RX ADMIN — SODIUM CHLORIDE, PRESERVATIVE FREE 10 ML: 5 INJECTION INTRAVENOUS at 03:12

## 2017-12-08 RX ADMIN — SODIUM CHLORIDE: 9 INJECTION, SOLUTION INTRAVENOUS at 01:12

## 2017-12-08 NOTE — Clinical Note
Saw her on Friday. She was tachy and I sent her for EKG which showed PVC Did chest xray for cough and it seemed stable since before. You might want to do a CT chest if symptoms worsen. Also her TSH was undetectable so started methimazole. Will rechecked thyroid labs in  2 weeks

## 2017-12-08 NOTE — PROGRESS NOTES
Subjective:       Patient ID: Fauzia Kirk is a 62 y.o. female.    Chief Complaint: Primary cancer of right middle lobe of lung  ONCOLOGIC HISTORY:         In December 2016 she began to have some blood in her mucus after coughing.In  April she developed a persistent cough and saw her physician on April 18.  Chest x-ray at that time showed a rounded opacity in the right lung overlying the major fissure.   Chest CT in May showed a 4.8 x 4.0 cm, rounded, soft tissue mass in the middle lobe between the medial and lateral segments. There was pre-carinal adenopathy.     Subsequently she underwent bronchoscopy with EBUS on 6/9/17. Needle biopsy of the medistinal nodes was positive for poorly differentiated carcinoma with squamoid features. The cells were positive for CK7, CK5/6, and P63 and are negative for CK20, GCDFP, TTF1, ER, MN, Napsin and YEMI 3.This was felt to be most CW a new squamous lung cancer.     PET CT  demonstrated the following:a hypermetabolic, large mass in the right middle lobe with an SUV max of 20.23. There is hypermetabolic activity extending from this mass tracking along the pleura. There is a hypermetabolic right hilar lymph node demonstrating an SUV max of 20.2. In addition there is are 2 hypermetabolic subcarinal lymph nodes with the larger demonstrating an SUV max of 14.2. An additional right paratracheal lymph node is seen with an SUV max of 26.1. Findings are consistent with metastatic lung cancer.      She completed radiation together with weekly chemotherapy with carboplatin and Taxol  for stage IIIA disease.    She completed therapy on September 13 and received 7 chemotherapy treatments.    CT scan from September 29 showed that the right middle lobe mass had decreased to 4.2 x 2.3 cm from 4.8 x 4.3 cm.  Stable 4 mm pulmonary nodule in the left lower lobe.    Previous cancer history: She had a stage I, ER negative carcinoma of the    left breast in 1990, treated with lumpectomy and  radiation therapy.  In      1993, she developed a stage I, ER positive carcinoma of the right breast     treated with lumpectomy and radiation.  In 1995, she developed left breast   cancer recurrence, treated with lumpectomy, brachytherapy and four cycles    of Adriamycin and Cytoxan.  In 1996, she developed a new right breast        cancer T2 N0 poorly differentiated, treated with four cycles of              preoperative Taxol followed by mastectomy.         On October 17, 2016 left mammogram showed increased calcifications in the left breast at 3:00.  On October 27 a biopsy showed DCIS with solid, cribriform, and micropapillary patterns.  Tumor was 95% ER positive at 95% CA positive     On November 7, 2016 she underwent left mastectomy which showed 8 mm of DCIS and negative margins.     Kenyatta is now on Imfinzi. She notes some dry cough with clear sputum production. Denies fever, shortness of breath      Review of Systems   Constitutional: Negative for appetite change, fatigue and unexpected weight change.   HENT: Negative for mouth sores.    Eyes: Negative for visual disturbance.   Respiratory: Positive for cough. Negative for shortness of breath.    Cardiovascular: Negative for chest pain.   Gastrointestinal: Negative for abdominal pain and diarrhea.   Genitourinary: Negative for frequency.   Musculoskeletal: Negative for back pain.   Skin: Negative for rash.   Neurological: Negative for headaches.   Hematological: Negative for adenopathy.   Psychiatric/Behavioral: The patient is not nervous/anxious.    All other systems reviewed and are negative.      Objective:      Physical Exam   Constitutional: She is oriented to person, place, and time. She appears well-developed and well-nourished.   HENT:   Mouth/Throat: No oropharyngeal exudate.   Cardiovascular: Normal heart sounds.  An irregularly irregular rhythm present. Tachycardia present.    Pulmonary/Chest: Effort normal and breath sounds normal. She has no  wheezes.   Abdominal: Soft. Bowel sounds are normal. There is no tenderness.   Musculoskeletal: She exhibits no edema or tenderness.   Lymphadenopathy:     She has no cervical adenopathy.   Neurological: She is alert and oriented to person, place, and time. Coordination normal.   Skin: Skin is warm and dry. No rash noted.   Psychiatric: She has a normal mood and affect. Judgment and thought content normal.   Vitals reviewed.        LABS:  WBC   Date Value Ref Range Status   12/08/2017 5.38 3.90 - 12.70 K/uL Final     Hemoglobin   Date Value Ref Range Status   12/08/2017 9.8 (L) 12.0 - 16.0 g/dL Final     Hematocrit   Date Value Ref Range Status   12/08/2017 30.8 (L) 37.0 - 48.5 % Final     Platelets   Date Value Ref Range Status   12/08/2017 217 150 - 350 K/uL Final     Gran #   Date Value Ref Range Status   12/08/2017 4.2 1.8 - 7.7 K/uL Final     Gran%   Date Value Ref Range Status   12/08/2017 78.8 (H) 38.0 - 73.0 % Final       Chemistry        Component Value Date/Time     12/08/2017 0936    K 3.7 12/08/2017 0936     12/08/2017 0936    CO2 27 12/08/2017 0936    BUN 26 (H) 12/08/2017 0936    CREATININE 0.9 12/08/2017 0936     (H) 12/08/2017 0936        Component Value Date/Time    CALCIUM 9.7 12/08/2017 0936    ALKPHOS 81 12/08/2017 0936    AST 14 12/08/2017 0936    ALT 10 12/08/2017 0936    BILITOT 0.6 12/08/2017 0936    ESTGFRAFRICA >60.0 12/08/2017 0936    EGFRNONAA >60.0 12/08/2017 0936        TSH   Date Value Ref Range Status   12/08/2017 <0.010 (L) 0.400 - 4.000 uIU/mL Final     Free T4   Date Value Ref Range Status   12/08/2017 2.06 (H) 0.71 - 1.51 ng/dL Final       Assessment:       1. Primary cancer of right middle lobe of lung    2. Regional lymph node metastasis present    3. Irregular heart beat    4. Thyroiditis        Plan:        1,2. She will proceed with Imfinzi. Reviewed chest xray which appears stable. Sent message to Dr. Mai in case he wants to do CT chest  3,4. EKG with PVC  but labs reveal hyperthyroidism. E scribed Methimazole. Called patient and left voicemail to call back       Above care plan was discussed with patient and accompanying  and all questions were addressed to their satisfaction

## 2017-12-08 NOTE — PLAN OF CARE
Problem: Patient Care Overview  Goal: Individualization & Mutuality  Outcome: Ongoing (interventions implemented as appropriate)  1400-Labs , hx, and medications reviewed, patient seen by MD prior to arrival, had chest x ray and ekg, results reviewed by MD and okay to proceed with treatment today. Assessment completed. Discussed plan of care with patient. Patient in agreement. Chair reclined and warm blanket and snack offered.

## 2017-12-08 NOTE — PLAN OF CARE
Problem: Patient Care Overview  Goal: Plan of Care Review  0420-Patient tolerated treatment well, she spoke to MD while on the unit and was informed of new PO medication to take at home.  Printouts about new medication and diagnosis hyperthyroidism given to patient. Discharged without complaints or S/S of adverse event. AVS given.  Instructed to call provider for any questions or concerns.

## 2017-12-11 DIAGNOSIS — E05.90 HYPERTHYROIDISM: ICD-10-CM

## 2017-12-13 ENCOUNTER — TELEPHONE (OUTPATIENT)
Dept: HEMATOLOGY/ONCOLOGY | Facility: CLINIC | Age: 62
End: 2017-12-13

## 2017-12-13 DIAGNOSIS — E05.90 HYPERTHYROIDISM: Primary | ICD-10-CM

## 2017-12-13 NOTE — TELEPHONE ENCOUNTER
----- Message from Maverick Alvarez sent at 12/13/2017 12:05 PM CST -----  Contact: Pt   Will like a call from Dorys regarding thyroid medication     Contact::[phone]

## 2017-12-14 ENCOUNTER — OFFICE VISIT (OUTPATIENT)
Dept: RADIATION ONCOLOGY | Facility: CLINIC | Age: 62
End: 2017-12-14
Payer: COMMERCIAL

## 2017-12-14 VITALS
HEIGHT: 66 IN | HEART RATE: 92 BPM | RESPIRATION RATE: 16 BRPM | DIASTOLIC BLOOD PRESSURE: 77 MMHG | SYSTOLIC BLOOD PRESSURE: 152 MMHG | WEIGHT: 177.25 LBS | BODY MASS INDEX: 28.49 KG/M2

## 2017-12-14 DIAGNOSIS — C34.2 PRIMARY CANCER OF RIGHT MIDDLE LOBE OF LUNG: Primary | ICD-10-CM

## 2017-12-14 PROCEDURE — 99214 OFFICE O/P EST MOD 30 MIN: CPT | Mod: S$GLB,,, | Performed by: RADIOLOGY

## 2017-12-14 PROCEDURE — 99999 PR PBB SHADOW E&M-EST. PATIENT-LVL III: CPT | Mod: PBBFAC,,, | Performed by: RADIOLOGY

## 2017-12-15 RX ORDER — SODIUM CHLORIDE 0.9 % (FLUSH) 0.9 %
10 SYRINGE (ML) INJECTION
Status: CANCELLED | OUTPATIENT
Start: 2017-12-22

## 2017-12-15 RX ORDER — DIPHENHYDRAMINE HYDROCHLORIDE 50 MG/ML
50 INJECTION INTRAMUSCULAR; INTRAVENOUS ONCE AS NEEDED
Status: CANCELLED | OUTPATIENT
Start: 2017-12-22 | End: 2017-12-22

## 2017-12-15 RX ORDER — HEPARIN 100 UNIT/ML
500 SYRINGE INTRAVENOUS
Status: CANCELLED | OUTPATIENT
Start: 2017-12-22

## 2017-12-15 RX ORDER — EPINEPHRINE 0.1 MG/ML
1 INJECTION INTRAVENOUS ONCE AS NEEDED
Status: CANCELLED | OUTPATIENT
Start: 2017-12-22 | End: 2017-12-22

## 2017-12-20 ENCOUNTER — OFFICE VISIT (OUTPATIENT)
Dept: ENDOCRINOLOGY | Facility: CLINIC | Age: 62
End: 2017-12-20
Payer: COMMERCIAL

## 2017-12-20 VITALS
SYSTOLIC BLOOD PRESSURE: 110 MMHG | HEIGHT: 66 IN | WEIGHT: 176.81 LBS | HEART RATE: 112 BPM | DIASTOLIC BLOOD PRESSURE: 70 MMHG | BODY MASS INDEX: 28.42 KG/M2

## 2017-12-20 DIAGNOSIS — R00.0 TACHYCARDIA: ICD-10-CM

## 2017-12-20 DIAGNOSIS — E05.90 HYPERTHYROIDISM: Primary | ICD-10-CM

## 2017-12-20 DIAGNOSIS — C34.2 PRIMARY CANCER OF RIGHT MIDDLE LOBE OF LUNG: ICD-10-CM

## 2017-12-20 PROCEDURE — 99999 PR PBB SHADOW E&M-EST. PATIENT-LVL III: CPT | Mod: PBBFAC,,, | Performed by: INTERNAL MEDICINE

## 2017-12-20 PROCEDURE — 99204 OFFICE O/P NEW MOD 45 MIN: CPT | Mod: S$GLB,,, | Performed by: INTERNAL MEDICINE

## 2017-12-20 RX ORDER — PROPRANOLOL HYDROCHLORIDE 60 MG/1
60 CAPSULE, EXTENDED RELEASE ORAL DAILY
Qty: 30 CAPSULE | Refills: 3 | Status: SHIPPED | OUTPATIENT
Start: 2017-12-20 | End: 2018-04-20

## 2017-12-20 RX ORDER — METHIMAZOLE 5 MG/1
15 TABLET ORAL DAILY
Qty: 90 TABLET | Refills: 0
Start: 2017-12-20 | End: 2017-12-20

## 2017-12-20 NOTE — PROGRESS NOTES
Subjective:      Patient ID: Fauzia Kirk is a 62 y.o. female.    Chief Complaint:  Hyperthyroidism      History of Present Illness  Initial visit for hyperthyroidism     Pt is currently on durvalumab as part of her treatment for lung cancer, which she has been on since 11/2017 and has received three treatments (q2 weeks).   Screening TSH ordered by hemonc showed elevated FT4 and suppressed TSH.   No personal or FH thyroid disease.     She lost 25lbs but this was attributed to her other chemo agent in 7/2017 before starting durvalumab.   HR is elevated but she denies palpitations. She states that she has been evaluated for tachycardia with EKG, echo, holter even before durvalumab was started.   Denies diarrhea or increased frequency of BM, no tremors, or anxiety.   Has longstanding insomnia that is relieved with benadryl.  Denies hypotension, abd pain, dizziness, decreased appetite.     She was prescribed MMI 5mg tid by her oncologist, but stopped after 2 days due to pruritis (no rash).     Denies history of heart disease.   Denies fractures. No prior BMD.     She also has a history of breast cancer s/p mastectomy in chemo in '95.     Prediabetes based on A1c 6.1%. Denies polyuria, polydipsia.    Review of Systems   Constitutional: Positive for fatigue and unexpected weight change (weight loss). Negative for activity change.   Eyes: Negative for visual disturbance.   Respiratory: Positive for cough.    Cardiovascular: Negative for palpitations and leg swelling.   Gastrointestinal: Negative for abdominal pain, constipation, diarrhea, nausea and vomiting.   Endocrine: Positive for cold intolerance. Negative for polydipsia and polyuria.   Musculoskeletal: Negative for arthralgias.   Skin: Negative for rash.   Neurological: Negative for dizziness, tremors, weakness and light-headedness.   Psychiatric/Behavioral: Positive for sleep disturbance. The patient is not nervous/anxious.        Objective:   Physical  Exam   Constitutional: She appears well-developed.   HENT:   Right Ear: External ear normal.   Left Ear: External ear normal.   Nose: Nose normal.   Hearing normal  Dentition good    Neck: No tracheal deviation present. No thyromegaly (no tenderness or nodules palpated) present.   Cardiovascular: Intact distal pulses.    tachycardic   Pulmonary/Chest: Effort normal. No respiratory distress.   Abdominal: Soft. There is no tenderness. No hernia.   Musculoskeletal: She exhibits no edema.   Gait normal  No clubbing or cyanosis noted   Neurological: She displays normal reflexes. No cranial nerve deficit.   No tremors   Skin: No rash noted.   No nodules       Psychiatric: She has a normal mood and affect. Judgment normal.   Vitals reviewed.      Lab Review:       Ref. Range 12/8/2017 09:36   Sodium Latest Ref Range: 136 - 145 mmol/L 142   Potassium Latest Ref Range: 3.5 - 5.1 mmol/L 3.7   Chloride Latest Ref Range: 95 - 110 mmol/L 107   CO2 Latest Ref Range: 23 - 29 mmol/L 27   Anion Gap Latest Ref Range: 8 - 16 mmol/L 8   BUN, Bld Latest Ref Range: 8 - 23 mg/dL 26 (H)   Creatinine Latest Ref Range: 0.5 - 1.4 mg/dL 0.9   eGFR if non African American Latest Ref Range: >60 mL/min/1.73 m^2 >60.0   eGFR if African American Latest Ref Range: >60 mL/min/1.73 m^2 >60.0   Glucose Latest Ref Range: 70 - 110 mg/dL 136 (H)   Calcium Latest Ref Range: 8.7 - 10.5 mg/dL 9.7   Alkaline Phosphatase Latest Ref Range: 55 - 135 U/L 81   Total Protein Latest Ref Range: 6.0 - 8.4 g/dL 7.1   Albumin Latest Ref Range: 3.5 - 5.2 g/dL 2.8 (L)   Total Bilirubin Latest Ref Range: 0.1 - 1.0 mg/dL 0.6   AST Latest Ref Range: 10 - 40 U/L 14   ALT Latest Ref Range: 10 - 44 U/L 10   TSH Latest Ref Range: 0.400 - 4.000 uIU/mL <0.010 (L)   Free T4 Latest Ref Range: 0.71 - 1.51 ng/dL 2.06 (H)      Ref. Range 12/21/2015 09:20   Hemoglobin A1C Latest Ref Range: 4.5 - 6.2 % 6.1   Estimated Avg Glucose Latest Ref Range: 68 - 131 mg/dL 128     Assessment:      1. Hyperthyroidism  T3, FREE    Thyrotropin receptor antibody    THYROID PEROXIDASE ANTIBODY    NM Thyroid Uptake and Scan   2. Primary cancer of right middle lobe of lung     3. Tachycardia          Plan:     Discussed that durvalumab is a/w Immune-mediated hyperthyroidism, hypothyroidism, and thyroiditis.   Will get thyroid uptake and scan to differentiate if this is hyperthyroidism due to hormone production vs thyroiditis, which would alter management (MMI if uptake high vs steroids if uptake low) and check FT3, TPO, TRAb.   Since she is asymptomatic and clinically euthyroid, will hold off on MMI until thyroid scan done.    IF MMI is started, will monitor for agranulocytosis.      Start propranolol ER 60mg daily for tachycardia and monitor BP, low threshold to decrease current antihypertensives.  Discussed that hyperthyroidism may resolve and it is not uncommon to require treatment interruption and/or permanent discontinuation of ATD, and will monitor labs q2-4 weeks with chemo labs.   Currently without s/s adrenal insufficiency, monitor for hypotension, hyponatremia, hypoglycemia and check cortisol is becomes symptomatic as durvalumab also a/w hypophysitis.      Discussed w Dr Davis

## 2017-12-22 ENCOUNTER — LAB VISIT (OUTPATIENT)
Dept: LAB | Facility: HOSPITAL | Age: 62
End: 2017-12-22
Attending: INTERNAL MEDICINE
Payer: COMMERCIAL

## 2017-12-22 ENCOUNTER — TELEPHONE (OUTPATIENT)
Dept: RADIATION ONCOLOGY | Facility: CLINIC | Age: 62
End: 2017-12-22

## 2017-12-22 ENCOUNTER — OFFICE VISIT (OUTPATIENT)
Dept: HEMATOLOGY/ONCOLOGY | Facility: CLINIC | Age: 62
End: 2017-12-22
Payer: COMMERCIAL

## 2017-12-22 ENCOUNTER — INFUSION (OUTPATIENT)
Dept: INFUSION THERAPY | Facility: HOSPITAL | Age: 62
End: 2017-12-22
Attending: INTERNAL MEDICINE
Payer: COMMERCIAL

## 2017-12-22 VITALS
HEART RATE: 97 BPM | SYSTOLIC BLOOD PRESSURE: 134 MMHG | TEMPERATURE: 98 F | RESPIRATION RATE: 18 BRPM | DIASTOLIC BLOOD PRESSURE: 77 MMHG

## 2017-12-22 VITALS
OXYGEN SATURATION: 98 % | RESPIRATION RATE: 19 BRPM | SYSTOLIC BLOOD PRESSURE: 123 MMHG | DIASTOLIC BLOOD PRESSURE: 67 MMHG | TEMPERATURE: 98 F | WEIGHT: 176.38 LBS | HEART RATE: 104 BPM | BODY MASS INDEX: 28.34 KG/M2 | HEIGHT: 66 IN

## 2017-12-22 DIAGNOSIS — C34.2 PRIMARY CANCER OF RIGHT MIDDLE LOBE OF LUNG: ICD-10-CM

## 2017-12-22 DIAGNOSIS — C34.2 PRIMARY CANCER OF RIGHT MIDDLE LOBE OF LUNG: Primary | ICD-10-CM

## 2017-12-22 DIAGNOSIS — E05.90 HYPERTHYROIDISM: ICD-10-CM

## 2017-12-22 DIAGNOSIS — Z51.11 ENCOUNTER FOR ANTINEOPLASTIC CHEMOTHERAPY: ICD-10-CM

## 2017-12-22 LAB
ALBUMIN SERPL BCP-MCNC: 3 G/DL
ALP SERPL-CCNC: 74 U/L
ALT SERPL W/O P-5'-P-CCNC: 14 U/L
ANION GAP SERPL CALC-SCNC: 8 MMOL/L
AST SERPL-CCNC: 23 U/L
BASOPHILS # BLD AUTO: 0.02 K/UL
BASOPHILS NFR BLD: 0.4 %
BILIRUB SERPL-MCNC: 0.4 MG/DL
BUN SERPL-MCNC: 18 MG/DL
CALCIUM SERPL-MCNC: 10 MG/DL
CHLORIDE SERPL-SCNC: 104 MMOL/L
CO2 SERPL-SCNC: 27 MMOL/L
CREAT SERPL-MCNC: 0.9 MG/DL
DIFFERENTIAL METHOD: ABNORMAL
EOSINOPHIL # BLD AUTO: 0.2 K/UL
EOSINOPHIL NFR BLD: 3.4 %
ERYTHROCYTE [DISTWIDTH] IN BLOOD BY AUTOMATED COUNT: 13.8 %
EST. GFR  (AFRICAN AMERICAN): >60 ML/MIN/1.73 M^2
EST. GFR  (NON AFRICAN AMERICAN): >60 ML/MIN/1.73 M^2
GLUCOSE SERPL-MCNC: 122 MG/DL
HCT VFR BLD AUTO: 33.2 %
HGB BLD-MCNC: 10.5 G/DL
IMM GRANULOCYTES # BLD AUTO: 0.02 K/UL
IMM GRANULOCYTES NFR BLD AUTO: 0.4 %
LYMPHOCYTES # BLD AUTO: 0.8 K/UL
LYMPHOCYTES NFR BLD: 14.1 %
MCH RBC QN AUTO: 28.5 PG
MCHC RBC AUTO-ENTMCNC: 31.6 G/DL
MCV RBC AUTO: 90 FL
MONOCYTES # BLD AUTO: 0.5 K/UL
MONOCYTES NFR BLD: 9.5 %
NEUTROPHILS # BLD AUTO: 4 K/UL
NEUTROPHILS NFR BLD: 72.2 %
NRBC BLD-RTO: 0 /100 WBC
PLATELET # BLD AUTO: 296 K/UL
PMV BLD AUTO: 9.8 FL
POTASSIUM SERPL-SCNC: 4.4 MMOL/L
PROT SERPL-MCNC: 7.4 G/DL
RBC # BLD AUTO: 3.69 M/UL
SODIUM SERPL-SCNC: 139 MMOL/L
T3FREE SERPL-MCNC: 1.2 PG/ML
T4 FREE SERPL-MCNC: 0.57 NG/DL
THYROPEROXIDASE IGG SERPL-ACNC: 4726 IU/ML
TSH SERPL DL<=0.005 MIU/L-ACNC: 0.81 UIU/ML
WBC # BLD AUTO: 5.55 K/UL

## 2017-12-22 PROCEDURE — 63600175 PHARM REV CODE 636 W HCPCS: Performed by: INTERNAL MEDICINE

## 2017-12-22 PROCEDURE — 84439 ASSAY OF FREE THYROXINE: CPT

## 2017-12-22 PROCEDURE — C9492 INJECTION, DURVALUMAB: HCPCS | Performed by: INTERNAL MEDICINE

## 2017-12-22 PROCEDURE — 96413 CHEMO IV INFUSION 1 HR: CPT

## 2017-12-22 PROCEDURE — 83520 IMMUNOASSAY QUANT NOS NONAB: CPT

## 2017-12-22 PROCEDURE — 25000003 PHARM REV CODE 250: Performed by: INTERNAL MEDICINE

## 2017-12-22 PROCEDURE — 99999 PR PBB SHADOW E&M-EST. PATIENT-LVL III: CPT | Mod: PBBFAC,,, | Performed by: INTERNAL MEDICINE

## 2017-12-22 PROCEDURE — 84443 ASSAY THYROID STIM HORMONE: CPT

## 2017-12-22 PROCEDURE — 99215 OFFICE O/P EST HI 40 MIN: CPT | Mod: S$GLB,,, | Performed by: INTERNAL MEDICINE

## 2017-12-22 PROCEDURE — 36415 COLL VENOUS BLD VENIPUNCTURE: CPT

## 2017-12-22 PROCEDURE — A4216 STERILE WATER/SALINE, 10 ML: HCPCS | Performed by: INTERNAL MEDICINE

## 2017-12-22 PROCEDURE — 84481 FREE ASSAY (FT-3): CPT

## 2017-12-22 PROCEDURE — 80053 COMPREHEN METABOLIC PANEL: CPT

## 2017-12-22 PROCEDURE — 86376 MICROSOMAL ANTIBODY EACH: CPT

## 2017-12-22 PROCEDURE — 85025 COMPLETE CBC W/AUTO DIFF WBC: CPT

## 2017-12-22 RX ORDER — DIPHENHYDRAMINE HYDROCHLORIDE 50 MG/ML
50 INJECTION INTRAMUSCULAR; INTRAVENOUS ONCE AS NEEDED
Status: DISCONTINUED | OUTPATIENT
Start: 2017-12-22 | End: 2017-12-22 | Stop reason: HOSPADM

## 2017-12-22 RX ORDER — HEPARIN 100 UNIT/ML
500 SYRINGE INTRAVENOUS
Status: DISCONTINUED | OUTPATIENT
Start: 2017-12-22 | End: 2017-12-22 | Stop reason: HOSPADM

## 2017-12-22 RX ORDER — EPINEPHRINE 0.3 MG/.3ML
1 INJECTION SUBCUTANEOUS ONCE AS NEEDED
Status: DISCONTINUED | OUTPATIENT
Start: 2017-12-22 | End: 2017-12-22 | Stop reason: HOSPADM

## 2017-12-22 RX ORDER — SODIUM CHLORIDE 0.9 % (FLUSH) 0.9 %
10 SYRINGE (ML) INJECTION
Status: DISCONTINUED | OUTPATIENT
Start: 2017-12-22 | End: 2017-12-22 | Stop reason: HOSPADM

## 2017-12-22 RX ADMIN — SODIUM CHLORIDE: 900 INJECTION, SOLUTION INTRAVENOUS at 11:12

## 2017-12-22 RX ADMIN — DURVALUMAB 805 MG: 500 INJECTION, SOLUTION INTRAVENOUS at 11:12

## 2017-12-22 RX ADMIN — HEPARIN 500 UNITS: 100 SYRINGE at 12:12

## 2017-12-22 RX ADMIN — SODIUM CHLORIDE, PRESERVATIVE FREE 10 ML: 5 INJECTION INTRAVENOUS at 12:12

## 2017-12-22 NOTE — PLAN OF CARE
Problem: Patient Care Overview  Goal: Individualization & Mutuality  Outcome: Ongoing (interventions implemented as appropriate)  1100-Labs , hx, and medications reviewed, patient was seen by Md prior to arrival. Assessment completed. Discussed plan of care with patient. Patient in agreement. Chair reclined and warm blanket and snack offered.

## 2017-12-22 NOTE — PLAN OF CARE
Problem: Patient Care Overview  Goal: Plan of Care Review  1242-Patient tolerated treatment well. Discharged without complaints or S/S of adverse event. AVS given.  Instructed to call provider for any questions or concerns.

## 2017-12-22 NOTE — TELEPHONE ENCOUNTER
----- Message from Susana Trujillo sent at 12/22/2017 11:52 AM CST -----  Contact: self  Pt is calling in regards to rescheduling her appt on 01/18/18. Per pt she would like to reschedule to 01/23/18.     Pt would like a call back at [phone].    Thank you   appt changed and confirmed

## 2017-12-26 LAB — TSH RECEP AB SER-ACNC: <1 IU/L

## 2017-12-27 NOTE — PROGRESS NOTES
I, Taylor Davis, have personally taken the history and physical exam and I agree with Dr. Rodriges's assessment and plan.

## 2018-01-02 ENCOUNTER — TELEPHONE (OUTPATIENT)
Dept: ENDOCRINOLOGY | Facility: CLINIC | Age: 63
End: 2018-01-02

## 2018-01-02 DIAGNOSIS — R94.6 BORDERLINE ABNORMAL TFTS: Primary | ICD-10-CM

## 2018-01-02 NOTE — TELEPHONE ENCOUNTER
TFTs are now normal. But will need to continue monitoring while on durvalumab.   Will repeat tft q4 weeks (she get labs for chemo q2 week).   She has no s/s hyperthyroidism, has inderal but has not needed to start.   If tft abnormal again, to schedule thyroid scan. See consult note from 12/20.

## 2018-01-04 NOTE — PROGRESS NOTES
Subjective:       Patient ID: Fauzia Kirk is a 62 y.o. female.    Chief Complaint: No chief complaint on file.    HPI Ms. Kirk is a very pleasant 62-year-old  female who returned  for F/U of right lung cancer.    She has been receiving adjuvant therapy with Durvalumab.  That has been, complicated by the  development of transient hyperthyroidism.  She has seen endocrinology and her last thyroid functions had normalized.  Today her only complaint is some chronic distal right leg pain which began during her chemotherapy.  She has a mild chronic cough but no shortness of breath.  Appetite and bowel function have been good.        In December 2016 she began to have some blood in her mucus after coughing.In  April she developed a persistent cough and saw her physician on April 18.  Chest x-ray at that time showed a rounded opacity in the right lung overlying the major fissure.   Chest CT in May showed a 4.8 x 4.0 cm, rounded, soft tissue mass in the middle lobe between the medial and lateral segments. There was pre-carinal adenopathy.    Subsequently she underwent bronchoscopy with EBUS on 6/9/17. Needle biopsy of the medistinal nodes was positive for poorly differentiated carcinoma with squamoid features. The cells were positive for CK7, CK5/6, and P63 and are negative for CK20, GCDFP, TTF1, ER, [place], Napsin and YEMI 3.This was felt to be most CW a new squamous lung cancer.    PET CT yesterday demonstrated the following:a hypermetabolic, large mass in the right middle lobe with an SUV max of 20.23. There is hypermetabolic activity extending from this mass tracking along the pleura. There is a hypermetabolic right hilar lymph node demonstrating an SUV max of 20.2. In addition there is are 2 hypermetabolic subcarinal lymph nodes with the larger demonstrating an SUV max of 14.2. An additional right paratracheal lymph node is seen with an SUV max of 26.1. Findings are consistent with metastatic lung  cancer.      Previous cancer history:She had a stage I, ER negative carcinoma of the    left breast in 1990, treated with lumpectomy and radiation therapy.  In      1993, she developed a stage I, ER positive carcinoma of the right breast     treated with lumpectomy and radiation.  In 1995, she developed left breast   cancer recurrence, treated with lumpectomy, brachytherapy and four cycles    of Adriamycin and Cytoxan.  In 1996, she developed a new right breast        cancer T2 N0 poorly differentiated, treated with four cycles of              preoperative Taxol followed by mastectomy.        On October 17, 2016 left mammogram showed increased calcifications in the left breast at 3:00.  On October 27 a biopsy showed DCIS with solid, cribriform, and micropapillary patterns.  Tumor was 95% ER positive at 95% [place] positive    On November 7, 2016 she underwent left mastectomy which showed 8 mm of DCIS and negative margins.    She completed radiation together with weekly chemotherapy with carboplatin and Taxol  for stage IIIA disease.    She completed therapy on September 13 and received 7 chemotherapy treatments.    CT scan from September 29 showed that the right middle lobe mass had decreased to 4.2 x 2.3 cm from 4.8 x 4.3 cm.  Stable 4 mm pulmonary nodule in the left lower lobe.    Review of Systems   Constitutional: Negative for activity change, appetite change, fatigue, fever and unexpected weight change.   HENT: Negative for trouble swallowing.    Respiratory: Positive for cough. Negative for shortness of breath.    Cardiovascular: Negative for chest pain.   Gastrointestinal: Negative for abdominal pain, constipation, nausea and vomiting.   Musculoskeletal: Negative for back pain.        Right distal leg pain   Neurological: Negative for headaches.   Psychiatric/Behavioral: Negative for dysphoric mood. The patient is not nervous/anxious.        Objective:      Physical Exam   Constitutional: She is oriented to  person, place, and time. She appears well-developed and well-nourished.   HENT:   Mouth/Throat: No oropharyngeal exudate.   Eyes: No scleral icterus.   Cardiovascular: Normal rate and regular rhythm.    Pulmonary/Chest: Effort normal and breath sounds normal. She has no wheezes. She has no rales.   Abdominal: Soft. She exhibits no mass. There is no tenderness.   Lymphadenopathy:     She has no cervical adenopathy.     She has no axillary adenopathy.        Right: No supraclavicular adenopathy present.        Left: No supraclavicular adenopathy present.   Neurological: She is alert and oriented to person, place, and time.   Skin: No rash noted. No erythema.   Psychiatric: She has a normal mood and affect. Her behavior is normal. Thought content normal.       Assessment:       1. Primary cancer of right middle lobe of lung    2. Encounter for antineoplastic chemotherapy        Plan:     will continue her current therapy every 2 weeks.    Return in one month with labs and CT scan.

## 2018-01-05 ENCOUNTER — OFFICE VISIT (OUTPATIENT)
Dept: HEMATOLOGY/ONCOLOGY | Facility: CLINIC | Age: 63
End: 2018-01-05
Payer: COMMERCIAL

## 2018-01-05 ENCOUNTER — INFUSION (OUTPATIENT)
Dept: INFUSION THERAPY | Facility: HOSPITAL | Age: 63
End: 2018-01-05
Attending: INTERNAL MEDICINE
Payer: COMMERCIAL

## 2018-01-05 ENCOUNTER — LAB VISIT (OUTPATIENT)
Dept: LAB | Facility: HOSPITAL | Age: 63
End: 2018-01-05
Attending: INTERNAL MEDICINE
Payer: COMMERCIAL

## 2018-01-05 ENCOUNTER — TELEPHONE (OUTPATIENT)
Dept: HEMATOLOGY/ONCOLOGY | Facility: CLINIC | Age: 63
End: 2018-01-05

## 2018-01-05 VITALS
HEART RATE: 91 BPM | DIASTOLIC BLOOD PRESSURE: 67 MMHG | RESPIRATION RATE: 18 BRPM | SYSTOLIC BLOOD PRESSURE: 113 MMHG | TEMPERATURE: 98 F

## 2018-01-05 VITALS
SYSTOLIC BLOOD PRESSURE: 136 MMHG | BODY MASS INDEX: 29.55 KG/M2 | DIASTOLIC BLOOD PRESSURE: 72 MMHG | WEIGHT: 183.88 LBS | TEMPERATURE: 98 F | HEIGHT: 66 IN | HEART RATE: 96 BPM

## 2018-01-05 DIAGNOSIS — C34.2 PRIMARY CANCER OF RIGHT MIDDLE LOBE OF LUNG: ICD-10-CM

## 2018-01-05 DIAGNOSIS — C34.2 PRIMARY CANCER OF RIGHT MIDDLE LOBE OF LUNG: Primary | ICD-10-CM

## 2018-01-05 DIAGNOSIS — Z51.11 ENCOUNTER FOR ANTINEOPLASTIC CHEMOTHERAPY: ICD-10-CM

## 2018-01-05 DIAGNOSIS — M79.604 RIGHT LEG PAIN: ICD-10-CM

## 2018-01-05 LAB
ALBUMIN SERPL BCP-MCNC: 2.9 G/DL
ALP SERPL-CCNC: 74 U/L
ALT SERPL W/O P-5'-P-CCNC: 38 U/L
ANION GAP SERPL CALC-SCNC: 8 MMOL/L
AST SERPL-CCNC: 42 U/L
BILIRUB SERPL-MCNC: 0.3 MG/DL
BUN SERPL-MCNC: 17 MG/DL
CALCIUM SERPL-MCNC: 9.3 MG/DL
CHLORIDE SERPL-SCNC: 105 MMOL/L
CO2 SERPL-SCNC: 28 MMOL/L
CREAT SERPL-MCNC: 1 MG/DL
ERYTHROCYTE [DISTWIDTH] IN BLOOD BY AUTOMATED COUNT: 14.4 %
EST. GFR  (AFRICAN AMERICAN): >60 ML/MIN/1.73 M^2
EST. GFR  (NON AFRICAN AMERICAN): 59.7 ML/MIN/1.73 M^2
GLUCOSE SERPL-MCNC: 124 MG/DL
HCT VFR BLD AUTO: 34.5 %
HGB BLD-MCNC: 11 G/DL
IMM GRANULOCYTES # BLD AUTO: 0.02 K/UL
MCH RBC QN AUTO: 28 PG
MCHC RBC AUTO-ENTMCNC: 31.9 G/DL
MCV RBC AUTO: 88 FL
NEUTROPHILS # BLD AUTO: 3.6 K/UL
PLATELET # BLD AUTO: 224 K/UL
PMV BLD AUTO: 10.2 FL
POTASSIUM SERPL-SCNC: 4.3 MMOL/L
PROT SERPL-MCNC: 7.1 G/DL
RBC # BLD AUTO: 3.93 M/UL
SODIUM SERPL-SCNC: 141 MMOL/L
WBC # BLD AUTO: 5.35 K/UL

## 2018-01-05 PROCEDURE — A4216 STERILE WATER/SALINE, 10 ML: HCPCS | Performed by: INTERNAL MEDICINE

## 2018-01-05 PROCEDURE — 63600175 PHARM REV CODE 636 W HCPCS: Performed by: INTERNAL MEDICINE

## 2018-01-05 PROCEDURE — 99214 OFFICE O/P EST MOD 30 MIN: CPT | Mod: S$GLB,,, | Performed by: INTERNAL MEDICINE

## 2018-01-05 PROCEDURE — 96413 CHEMO IV INFUSION 1 HR: CPT

## 2018-01-05 PROCEDURE — 25000003 PHARM REV CODE 250: Performed by: INTERNAL MEDICINE

## 2018-01-05 PROCEDURE — 80053 COMPREHEN METABOLIC PANEL: CPT

## 2018-01-05 PROCEDURE — 99999 PR PBB SHADOW E&M-EST. PATIENT-LVL III: CPT | Mod: PBBFAC,,, | Performed by: INTERNAL MEDICINE

## 2018-01-05 PROCEDURE — 36415 COLL VENOUS BLD VENIPUNCTURE: CPT

## 2018-01-05 PROCEDURE — 85027 COMPLETE CBC AUTOMATED: CPT

## 2018-01-05 PROCEDURE — C9492 INJECTION, DURVALUMAB: HCPCS | Performed by: INTERNAL MEDICINE

## 2018-01-05 RX ORDER — GABAPENTIN 300 MG/1
300 CAPSULE ORAL NIGHTLY
Qty: 30 CAPSULE | Refills: 6 | Status: SHIPPED | OUTPATIENT
Start: 2018-01-05 | End: 2021-11-30

## 2018-01-05 RX ORDER — EPINEPHRINE 0.1 MG/ML
1 INJECTION INTRAVENOUS ONCE AS NEEDED
Status: DISCONTINUED | OUTPATIENT
Start: 2018-01-05 | End: 2018-01-05 | Stop reason: HOSPADM

## 2018-01-05 RX ORDER — DIPHENHYDRAMINE HYDROCHLORIDE 50 MG/ML
50 INJECTION INTRAMUSCULAR; INTRAVENOUS ONCE AS NEEDED
Status: CANCELLED | OUTPATIENT
Start: 2018-01-05 | End: 2018-01-05

## 2018-01-05 RX ORDER — EPINEPHRINE 0.1 MG/ML
1 INJECTION INTRAVENOUS ONCE AS NEEDED
Status: CANCELLED | OUTPATIENT
Start: 2018-01-05 | End: 2018-01-05

## 2018-01-05 RX ORDER — GABAPENTIN 300 MG/1
300 CAPSULE ORAL NIGHTLY
Qty: 30 CAPSULE | Refills: 6 | Status: SHIPPED | OUTPATIENT
Start: 2018-01-05 | End: 2018-01-05 | Stop reason: SDUPTHER

## 2018-01-05 RX ORDER — DIPHENHYDRAMINE HYDROCHLORIDE 50 MG/ML
50 INJECTION INTRAMUSCULAR; INTRAVENOUS ONCE AS NEEDED
Status: DISCONTINUED | OUTPATIENT
Start: 2018-01-05 | End: 2018-01-05 | Stop reason: HOSPADM

## 2018-01-05 RX ORDER — SODIUM CHLORIDE 0.9 % (FLUSH) 0.9 %
10 SYRINGE (ML) INJECTION
Status: CANCELLED | OUTPATIENT
Start: 2018-01-05

## 2018-01-05 RX ORDER — HEPARIN 100 UNIT/ML
500 SYRINGE INTRAVENOUS
Status: CANCELLED | OUTPATIENT
Start: 2018-01-05

## 2018-01-05 RX ORDER — HEPARIN 100 UNIT/ML
500 SYRINGE INTRAVENOUS
Status: DISCONTINUED | OUTPATIENT
Start: 2018-01-05 | End: 2018-01-05 | Stop reason: HOSPADM

## 2018-01-05 RX ORDER — SODIUM CHLORIDE 0.9 % (FLUSH) 0.9 %
10 SYRINGE (ML) INJECTION
Status: DISCONTINUED | OUTPATIENT
Start: 2018-01-05 | End: 2018-01-05 | Stop reason: HOSPADM

## 2018-01-05 RX ADMIN — SODIUM CHLORIDE: 0.9 INJECTION, SOLUTION INTRAVENOUS at 11:01

## 2018-01-05 RX ADMIN — HEPARIN 500 UNITS: 100 SYRINGE at 12:01

## 2018-01-05 RX ADMIN — DURVALUMAB 805 MG: 120 INJECTION, SOLUTION INTRAVENOUS at 11:01

## 2018-01-05 RX ADMIN — SODIUM CHLORIDE, PRESERVATIVE FREE 10 ML: 5 INJECTION INTRAVENOUS at 12:01

## 2018-01-05 NOTE — TELEPHONE ENCOUNTER
----- Message from Skye Jimenez sent at 1/5/2018  3:02 PM CST -----  Contact: Pt  Pt calling regarding all upcoming appts that are scheduled in [place]. She does not want anything to be scheduled in [place]. She would like to move all those appts to [facility]        Pt call back number [phone]

## 2018-01-05 NOTE — PLAN OF CARE
Problem: Patient Care Overview  Goal: Plan of Care Review  Outcome: Ongoing (interventions implemented as appropriate)  1300-Patient tolerated treatment well. Discharged without complaints or S/S of adverse event. AVS given.  Instructed to call provider for any questions or concerns.

## 2018-01-05 NOTE — PLAN OF CARE
Problem: Patient Care Overview  Goal: Individualization & Mutuality  Outcome: Ongoing (interventions implemented as appropriate)  1050-Labs , hx, and medications reviewed, patient had labs and was seen by MD prior to arrival. Assessment completed. Discussed plan of care with patient. Patient in agreement. Chair reclined and warm blanket and snack offered.

## 2018-01-12 RX ORDER — HEPARIN 100 UNIT/ML
500 SYRINGE INTRAVENOUS
Status: CANCELLED | OUTPATIENT
Start: 2018-01-19

## 2018-01-12 RX ORDER — SODIUM CHLORIDE 0.9 % (FLUSH) 0.9 %
10 SYRINGE (ML) INJECTION
Status: CANCELLED | OUTPATIENT
Start: 2018-01-19

## 2018-01-12 RX ORDER — DIPHENHYDRAMINE HYDROCHLORIDE 50 MG/ML
50 INJECTION INTRAMUSCULAR; INTRAVENOUS ONCE AS NEEDED
Status: CANCELLED | OUTPATIENT
Start: 2018-01-19 | End: 2018-01-19

## 2018-01-12 RX ORDER — EPINEPHRINE 0.1 MG/ML
1 INJECTION INTRAVENOUS ONCE AS NEEDED
Status: CANCELLED | OUTPATIENT
Start: 2018-01-19 | End: 2018-01-19

## 2018-01-19 ENCOUNTER — INFUSION (OUTPATIENT)
Dept: INFUSION THERAPY | Facility: HOSPITAL | Age: 63
End: 2018-01-19
Attending: INTERNAL MEDICINE
Payer: COMMERCIAL

## 2018-01-19 ENCOUNTER — LAB VISIT (OUTPATIENT)
Dept: LAB | Facility: HOSPITAL | Age: 63
End: 2018-01-19
Attending: INTERNAL MEDICINE
Payer: COMMERCIAL

## 2018-01-19 VITALS — RESPIRATION RATE: 18 BRPM | HEART RATE: 86 BPM | DIASTOLIC BLOOD PRESSURE: 84 MMHG | SYSTOLIC BLOOD PRESSURE: 134 MMHG

## 2018-01-19 DIAGNOSIS — C34.2 PRIMARY CANCER OF RIGHT MIDDLE LOBE OF LUNG: Primary | ICD-10-CM

## 2018-01-19 DIAGNOSIS — C34.2 PRIMARY CANCER OF RIGHT MIDDLE LOBE OF LUNG: ICD-10-CM

## 2018-01-19 DIAGNOSIS — Z51.11 ENCOUNTER FOR ANTINEOPLASTIC CHEMOTHERAPY: ICD-10-CM

## 2018-01-19 LAB
ALBUMIN SERPL BCP-MCNC: 3 G/DL
ALP SERPL-CCNC: 77 U/L
ALT SERPL W/O P-5'-P-CCNC: 41 U/L
ANION GAP SERPL CALC-SCNC: 6 MMOL/L
AST SERPL-CCNC: 46 U/L
BASOPHILS # BLD AUTO: 0.04 K/UL
BASOPHILS NFR BLD: 0.9 %
BILIRUB SERPL-MCNC: 0.5 MG/DL
BUN SERPL-MCNC: 15 MG/DL
CALCIUM SERPL-MCNC: 9.5 MG/DL
CHLORIDE SERPL-SCNC: 104 MMOL/L
CO2 SERPL-SCNC: 29 MMOL/L
CREAT SERPL-MCNC: 1 MG/DL
DIFFERENTIAL METHOD: ABNORMAL
EOSINOPHIL # BLD AUTO: 0.2 K/UL
EOSINOPHIL NFR BLD: 3.8 %
ERYTHROCYTE [DISTWIDTH] IN BLOOD BY AUTOMATED COUNT: 15.4 %
EST. GFR  (AFRICAN AMERICAN): >60 ML/MIN/1.73 M^2
EST. GFR  (NON AFRICAN AMERICAN): 59.7 ML/MIN/1.73 M^2
GLUCOSE SERPL-MCNC: 95 MG/DL
HCT VFR BLD AUTO: 34.6 %
HGB BLD-MCNC: 11.4 G/DL
IMM GRANULOCYTES # BLD AUTO: 0.02 K/UL
IMM GRANULOCYTES NFR BLD AUTO: 0.4 %
LYMPHOCYTES # BLD AUTO: 0.9 K/UL
LYMPHOCYTES NFR BLD: 20 %
MCH RBC QN AUTO: 28.1 PG
MCHC RBC AUTO-ENTMCNC: 32.9 G/DL
MCV RBC AUTO: 85 FL
MONOCYTES # BLD AUTO: 0.4 K/UL
MONOCYTES NFR BLD: 8.5 %
NEUTROPHILS # BLD AUTO: 3 K/UL
NEUTROPHILS NFR BLD: 66.4 %
NRBC BLD-RTO: 0 /100 WBC
PLATELET # BLD AUTO: 263 K/UL
PMV BLD AUTO: 9.7 FL
POTASSIUM SERPL-SCNC: 4.6 MMOL/L
PROT SERPL-MCNC: 7.4 G/DL
RBC # BLD AUTO: 4.05 M/UL
SODIUM SERPL-SCNC: 139 MMOL/L
WBC # BLD AUTO: 4.46 K/UL

## 2018-01-19 PROCEDURE — 63600175 PHARM REV CODE 636 W HCPCS: Performed by: INTERNAL MEDICINE

## 2018-01-19 PROCEDURE — 36415 COLL VENOUS BLD VENIPUNCTURE: CPT

## 2018-01-19 PROCEDURE — C9492 INJECTION, DURVALUMAB: HCPCS | Performed by: INTERNAL MEDICINE

## 2018-01-19 PROCEDURE — 96413 CHEMO IV INFUSION 1 HR: CPT

## 2018-01-19 PROCEDURE — 85025 COMPLETE CBC W/AUTO DIFF WBC: CPT

## 2018-01-19 PROCEDURE — 80053 COMPREHEN METABOLIC PANEL: CPT

## 2018-01-19 PROCEDURE — 25000003 PHARM REV CODE 250: Performed by: INTERNAL MEDICINE

## 2018-01-19 RX ORDER — EPINEPHRINE 0.3 MG/.3ML
1 INJECTION SUBCUTANEOUS ONCE AS NEEDED
Status: DISCONTINUED | OUTPATIENT
Start: 2018-01-19 | End: 2018-01-19 | Stop reason: HOSPADM

## 2018-01-19 RX ORDER — DIPHENHYDRAMINE HYDROCHLORIDE 50 MG/ML
50 INJECTION INTRAMUSCULAR; INTRAVENOUS ONCE AS NEEDED
Status: DISCONTINUED | OUTPATIENT
Start: 2018-01-19 | End: 2018-01-19 | Stop reason: HOSPADM

## 2018-01-19 RX ORDER — HEPARIN 100 UNIT/ML
500 SYRINGE INTRAVENOUS
Status: DISCONTINUED | OUTPATIENT
Start: 2018-01-19 | End: 2018-01-19 | Stop reason: HOSPADM

## 2018-01-19 RX ORDER — SODIUM CHLORIDE 0.9 % (FLUSH) 0.9 %
10 SYRINGE (ML) INJECTION
Status: DISCONTINUED | OUTPATIENT
Start: 2018-01-19 | End: 2018-01-19 | Stop reason: HOSPADM

## 2018-01-19 RX ADMIN — DURVALUMAB 805 MG: 500 INJECTION, SOLUTION INTRAVENOUS at 12:01

## 2018-01-19 RX ADMIN — SODIUM CHLORIDE: 0.9 INJECTION, SOLUTION INTRAVENOUS at 11:01

## 2018-01-23 ENCOUNTER — OFFICE VISIT (OUTPATIENT)
Dept: RADIATION ONCOLOGY | Facility: CLINIC | Age: 63
End: 2018-01-23
Payer: COMMERCIAL

## 2018-01-23 ENCOUNTER — HOSPITAL ENCOUNTER (OUTPATIENT)
Dept: RADIOLOGY | Facility: HOSPITAL | Age: 63
Discharge: HOME | End: 2018-01-23
Attending: RADIOLOGY
Payer: COMMERCIAL

## 2018-01-23 VITALS
BODY MASS INDEX: 30.18 KG/M2 | DIASTOLIC BLOOD PRESSURE: 77 MMHG | WEIGHT: 187.81 LBS | TEMPERATURE: 98 F | HEIGHT: 66 IN | HEART RATE: 97 BPM | SYSTOLIC BLOOD PRESSURE: 134 MMHG | RESPIRATION RATE: 18 BRPM

## 2018-01-23 DIAGNOSIS — C34.2 PRIMARY CANCER OF RIGHT MIDDLE LOBE OF LUNG: ICD-10-CM

## 2018-01-23 DIAGNOSIS — C34.2 PRIMARY CANCER OF RIGHT MIDDLE LOBE OF LUNG: Primary | ICD-10-CM

## 2018-01-23 PROCEDURE — 99999 PR PBB SHADOW E&M-EST. PATIENT-LVL III: CPT | Mod: PBBFAC,,, | Performed by: RADIOLOGY

## 2018-01-23 PROCEDURE — 70553 MRI BRAIN STEM W/O & W/DYE: CPT | Mod: 26,,, | Performed by: RADIOLOGY

## 2018-01-23 PROCEDURE — 99213 OFFICE O/P EST LOW 20 MIN: CPT | Mod: S$GLB,,, | Performed by: RADIOLOGY

## 2018-01-23 PROCEDURE — 70553 MRI BRAIN STEM W/O & W/DYE: CPT | Mod: TC

## 2018-01-23 PROCEDURE — 25500020 PHARM REV CODE 255: Performed by: RADIOLOGY

## 2018-01-23 PROCEDURE — A9585 GADOBUTROL INJECTION: HCPCS | Performed by: RADIOLOGY

## 2018-01-23 RX ORDER — GADOBUTROL 604.72 MG/ML
9 INJECTION INTRAVENOUS
Status: COMPLETED | OUTPATIENT
Start: 2018-01-23 | End: 2018-01-23

## 2018-01-23 RX ADMIN — GADOBUTROL 9 ML: 604.72 INJECTION INTRAVENOUS at 02:01

## 2018-01-24 NOTE — PROGRESS NOTES
"REFERRING PHYSICIAN: Dr. Mai     DIAGNOSIS: SCC of the right lung, [id number], stage IIIA     INTERVAL SINCE COMPLETION: 4.5 months     INTERVAL HISTORY: Ms. Kirk returns 4.5 months after completing chemoradiotherapy for her stage IIIA squamous cell CA of the right lung.  She is a never smoker and previously had had adjuvant radiotherapy to both breasts in the 1990s.  For her lung cancer she received 60Gy in 30 fractions concurrently with weekly carbo/taxol chemotherapy. Chemoradiation was completed on 9/8/17.  Since being seen a month ago she has continued on adjuvant durvalumab q2 weeks and is tolerating it well.  Plan is to complete a year of therapy if tolerated.  She feels well.  Her only complaint is of residual right leg neuropathy, managed with gabapentin 100mg daily.  No more chest wall pain or cough.  She is being followed by endocrinology for her hyperthyroidism.  Her latest TFTs show no abnormalities, and she is off methimazole.  She is just being followed.  She is eating well with stable weight.  No headaches.   Brain MRI performed yesterday showed no e/o metastatic disease.  The previously reported focus in the left frontal lobe was no longer seen.      PHYSICAL EXAMINATION:  VITAL SIGNS: /77 (BP Location: Right arm, Patient Position: Sitting) Comment: 134/77  Pulse 97   Temp 98.2 °F (36.8 °C) (Oral)   Resp 18   Ht 5' 6" (1.676 m)   Wt 85.2 kg (187 lb 12.8 oz)   BMI 30.31 kg/m²   GENERAL: Patient is alert and oriented, in no acute distress.  HEENT: Extraocular muscles are intact.  Oropharynx is clear without lesions.    LYMPH: There is no cervical or supraclavicular adenopathy palpated.    CHEST: Breath sounds clear bilaterally.  No rales.  No rhonchi.  Unlabored respirations.  CARDIOVASCULAR: Normal S1, S2.  Normal rate.  Regular rhythm.  ABDOMEN: Bowel sounds normal.  No tenderness.  No abdominal distention.  No hepatomegaly.  No splenomegaly.  EXTREMITIES: No clubbing, cyanosis, " edema.  MSK: spine is nontender.  NEUROLOGIC: Cranial nerves II through XII are grossly intact.  Sensation is intact.  Strength is 5 out of 5 in the upper and lower extremities bilaterally.    ASSESSMENT:   63 yo female never smoker 3 months s/p chemoRT for stage IIIA SCC of the right lung, now on durvulumab.  Doing well clinically.  Brain MRI negative for mets.    PLAN:   I will see her again in 6 months.  She will continue adjuvant treatment as directed by Dr. Mai.  F/u with endocrinology as scheduled.  20 minutes was spent in follow up, of which >50% was spent in face to face counseling.

## 2018-01-31 ENCOUNTER — HOSPITAL ENCOUNTER (OUTPATIENT)
Dept: RADIOLOGY | Facility: HOSPITAL | Age: 63
Discharge: HOME | End: 2018-01-31
Attending: INTERNAL MEDICINE
Payer: COMMERCIAL

## 2018-01-31 ENCOUNTER — INFUSION (OUTPATIENT)
Dept: INFUSION THERAPY | Facility: HOSPITAL | Age: 63
End: 2018-01-31
Attending: INTERNAL MEDICINE
Payer: COMMERCIAL

## 2018-01-31 DIAGNOSIS — Z51.11 ENCOUNTER FOR ANTINEOPLASTIC CHEMOTHERAPY: ICD-10-CM

## 2018-01-31 DIAGNOSIS — C34.2 PRIMARY CANCER OF RIGHT MIDDLE LOBE OF LUNG: Primary | ICD-10-CM

## 2018-01-31 DIAGNOSIS — C34.2 PRIMARY CANCER OF RIGHT MIDDLE LOBE OF LUNG: ICD-10-CM

## 2018-01-31 LAB
ALBUMIN SERPL BCP-MCNC: 3.2 G/DL
ALP SERPL-CCNC: 72 U/L
ALT SERPL W/O P-5'-P-CCNC: 46 U/L
ANION GAP SERPL CALC-SCNC: 8 MMOL/L
AST SERPL-CCNC: 44 U/L
BASOPHILS # BLD AUTO: 0.02 K/UL
BASOPHILS NFR BLD: 0.4 %
BILIRUB SERPL-MCNC: 0.4 MG/DL
BUN SERPL-MCNC: 17 MG/DL
CALCIUM SERPL-MCNC: 9.4 MG/DL
CHLORIDE SERPL-SCNC: 103 MMOL/L
CO2 SERPL-SCNC: 27 MMOL/L
CREAT SERPL-MCNC: 1 MG/DL
DIFFERENTIAL METHOD: ABNORMAL
EOSINOPHIL # BLD AUTO: 0.1 K/UL
EOSINOPHIL NFR BLD: 2.7 %
ERYTHROCYTE [DISTWIDTH] IN BLOOD BY AUTOMATED COUNT: 16.2 %
EST. GFR  (AFRICAN AMERICAN): >60 ML/MIN/1.73 M^2
EST. GFR  (NON AFRICAN AMERICAN): 59.7 ML/MIN/1.73 M^2
GLUCOSE SERPL-MCNC: 84 MG/DL
HCT VFR BLD AUTO: 31.8 %
HGB BLD-MCNC: 10.6 G/DL
IMM GRANULOCYTES # BLD AUTO: 0.02 K/UL
IMM GRANULOCYTES NFR BLD AUTO: 0.4 %
LYMPHOCYTES # BLD AUTO: 0.7 K/UL
LYMPHOCYTES NFR BLD: 15 %
MCH RBC QN AUTO: 28.4 PG
MCHC RBC AUTO-ENTMCNC: 33.3 G/DL
MCV RBC AUTO: 85 FL
MONOCYTES # BLD AUTO: 0.4 K/UL
MONOCYTES NFR BLD: 7.4 %
NEUTROPHILS # BLD AUTO: 3.5 K/UL
NEUTROPHILS NFR BLD: 74.1 %
NRBC BLD-RTO: 0 /100 WBC
PLATELET # BLD AUTO: 216 K/UL
PMV BLD AUTO: 10.5 FL
POTASSIUM SERPL-SCNC: 3.6 MMOL/L
PROT SERPL-MCNC: 7.5 G/DL
RBC # BLD AUTO: 3.73 M/UL
SODIUM SERPL-SCNC: 138 MMOL/L
T4 FREE SERPL-MCNC: <0.4 NG/DL
TSH SERPL DL<=0.005 MIU/L-ACNC: 61.7 UIU/ML
WBC # BLD AUTO: 4.73 K/UL

## 2018-01-31 PROCEDURE — 80053 COMPREHEN METABOLIC PANEL: CPT

## 2018-01-31 PROCEDURE — 71260 CT THORAX DX C+: CPT | Mod: 26,,, | Performed by: RADIOLOGY

## 2018-01-31 PROCEDURE — 71260 CT THORAX DX C+: CPT | Mod: TC

## 2018-01-31 PROCEDURE — 36591 DRAW BLOOD OFF VENOUS DEVICE: CPT

## 2018-01-31 PROCEDURE — 63600175 PHARM REV CODE 636 W HCPCS: Performed by: INTERNAL MEDICINE

## 2018-01-31 PROCEDURE — 25000003 PHARM REV CODE 250: Performed by: INTERNAL MEDICINE

## 2018-01-31 PROCEDURE — A4216 STERILE WATER/SALINE, 10 ML: HCPCS | Performed by: INTERNAL MEDICINE

## 2018-01-31 PROCEDURE — 84439 ASSAY OF FREE THYROXINE: CPT

## 2018-01-31 PROCEDURE — 84443 ASSAY THYROID STIM HORMONE: CPT

## 2018-01-31 PROCEDURE — 25500020 PHARM REV CODE 255: Performed by: INTERNAL MEDICINE

## 2018-01-31 PROCEDURE — 85025 COMPLETE CBC W/AUTO DIFF WBC: CPT

## 2018-01-31 RX ORDER — DIPHENHYDRAMINE HYDROCHLORIDE 50 MG/ML
50 INJECTION INTRAMUSCULAR; INTRAVENOUS ONCE AS NEEDED
Status: CANCELLED | OUTPATIENT
Start: 2018-02-02 | End: 2018-02-02

## 2018-01-31 RX ORDER — HEPARIN 100 UNIT/ML
500 SYRINGE INTRAVENOUS
Status: CANCELLED | OUTPATIENT
Start: 2018-01-31

## 2018-01-31 RX ORDER — SODIUM CHLORIDE 0.9 % (FLUSH) 0.9 %
10 SYRINGE (ML) INJECTION
Status: COMPLETED | OUTPATIENT
Start: 2018-01-31 | End: 2018-01-31

## 2018-01-31 RX ORDER — HEPARIN 100 UNIT/ML
500 SYRINGE INTRAVENOUS
Status: CANCELLED | OUTPATIENT
Start: 2018-02-02

## 2018-01-31 RX ORDER — EPINEPHRINE 0.3 MG/.3ML
1 INJECTION SUBCUTANEOUS ONCE AS NEEDED
Status: CANCELLED | OUTPATIENT
Start: 2018-02-02 | End: 2018-02-02

## 2018-01-31 RX ORDER — HEPARIN 100 UNIT/ML
500 SYRINGE INTRAVENOUS
Status: COMPLETED | OUTPATIENT
Start: 2018-01-31 | End: 2018-01-31

## 2018-01-31 RX ORDER — SODIUM CHLORIDE 0.9 % (FLUSH) 0.9 %
10 SYRINGE (ML) INJECTION
Status: CANCELLED | OUTPATIENT
Start: 2018-02-02

## 2018-01-31 RX ORDER — SODIUM CHLORIDE 0.9 % (FLUSH) 0.9 %
10 SYRINGE (ML) INJECTION
Status: CANCELLED | OUTPATIENT
Start: 2018-01-31

## 2018-01-31 RX ADMIN — SODIUM CHLORIDE, PRESERVATIVE FREE 10 ML: 5 INJECTION INTRAVENOUS at 01:01

## 2018-01-31 RX ADMIN — HEPARIN 500 UNITS: 100 SYRINGE at 01:01

## 2018-01-31 RX ADMIN — IOHEXOL 75 ML: 350 INJECTION, SOLUTION INTRAVENOUS at 12:01

## 2018-01-31 NOTE — NURSING
Pt arrived for labs from port.  Port flushes easily with good blood return, labs sent.  Port de accessed.  Pt discharged to home.

## 2018-02-02 ENCOUNTER — INFUSION (OUTPATIENT)
Dept: INFUSION THERAPY | Facility: HOSPITAL | Age: 63
End: 2018-02-02
Attending: INTERNAL MEDICINE
Payer: COMMERCIAL

## 2018-02-02 ENCOUNTER — OFFICE VISIT (OUTPATIENT)
Dept: HEMATOLOGY/ONCOLOGY | Facility: CLINIC | Age: 63
End: 2018-02-02
Payer: COMMERCIAL

## 2018-02-02 ENCOUNTER — TELEPHONE (OUTPATIENT)
Dept: HEMATOLOGY/ONCOLOGY | Facility: CLINIC | Age: 63
End: 2018-02-02

## 2018-02-02 VITALS
SYSTOLIC BLOOD PRESSURE: 119 MMHG | HEART RATE: 86 BPM | TEMPERATURE: 99 F | RESPIRATION RATE: 18 BRPM | DIASTOLIC BLOOD PRESSURE: 80 MMHG

## 2018-02-02 VITALS
DIASTOLIC BLOOD PRESSURE: 85 MMHG | TEMPERATURE: 98 F | BODY MASS INDEX: 30.89 KG/M2 | SYSTOLIC BLOOD PRESSURE: 158 MMHG | HEART RATE: 98 BPM | RESPIRATION RATE: 17 BRPM | WEIGHT: 191.38 LBS

## 2018-02-02 DIAGNOSIS — C34.2 PRIMARY CANCER OF RIGHT MIDDLE LOBE OF LUNG: Primary | ICD-10-CM

## 2018-02-02 DIAGNOSIS — Z51.11 ENCOUNTER FOR ANTINEOPLASTIC CHEMOTHERAPY: ICD-10-CM

## 2018-02-02 DIAGNOSIS — E03.2 HYPOTHYROIDISM DUE TO MEDICATION: ICD-10-CM

## 2018-02-02 PROCEDURE — 3008F BODY MASS INDEX DOCD: CPT | Mod: S$GLB,,, | Performed by: INTERNAL MEDICINE

## 2018-02-02 PROCEDURE — C9492 INJECTION, DURVALUMAB: HCPCS | Performed by: INTERNAL MEDICINE

## 2018-02-02 PROCEDURE — 25000003 PHARM REV CODE 250: Performed by: INTERNAL MEDICINE

## 2018-02-02 PROCEDURE — 63600175 PHARM REV CODE 636 W HCPCS: Performed by: INTERNAL MEDICINE

## 2018-02-02 PROCEDURE — 99214 OFFICE O/P EST MOD 30 MIN: CPT | Mod: S$GLB,,, | Performed by: INTERNAL MEDICINE

## 2018-02-02 PROCEDURE — A4216 STERILE WATER/SALINE, 10 ML: HCPCS | Performed by: INTERNAL MEDICINE

## 2018-02-02 PROCEDURE — 99999 PR PBB SHADOW E&M-EST. PATIENT-LVL III: CPT | Mod: PBBFAC,,, | Performed by: INTERNAL MEDICINE

## 2018-02-02 PROCEDURE — 96413 CHEMO IV INFUSION 1 HR: CPT

## 2018-02-02 RX ORDER — DIPHENHYDRAMINE HYDROCHLORIDE 50 MG/ML
50 INJECTION INTRAMUSCULAR; INTRAVENOUS ONCE AS NEEDED
Status: DISCONTINUED | OUTPATIENT
Start: 2018-02-02 | End: 2018-02-02 | Stop reason: HOSPADM

## 2018-02-02 RX ORDER — LEVOTHYROXINE SODIUM 100 UG/1
100 TABLET ORAL DAILY
Qty: 30 TABLET | Refills: 11 | Status: SHIPPED | OUTPATIENT
Start: 2018-02-02 | End: 2018-02-02 | Stop reason: SDUPTHER

## 2018-02-02 RX ORDER — LEVOTHYROXINE SODIUM 100 UG/1
100 TABLET ORAL DAILY
Qty: 30 TABLET | Refills: 11 | Status: SHIPPED | OUTPATIENT
Start: 2018-02-02 | End: 2018-03-29

## 2018-02-02 RX ORDER — EPINEPHRINE 0.3 MG/.3ML
1 INJECTION SUBCUTANEOUS ONCE AS NEEDED
Status: DISCONTINUED | OUTPATIENT
Start: 2018-02-02 | End: 2018-02-02 | Stop reason: HOSPADM

## 2018-02-02 RX ORDER — HEPARIN 100 UNIT/ML
500 SYRINGE INTRAVENOUS
Status: DISCONTINUED | OUTPATIENT
Start: 2018-02-02 | End: 2018-02-02 | Stop reason: HOSPADM

## 2018-02-02 RX ORDER — SODIUM CHLORIDE 0.9 % (FLUSH) 0.9 %
10 SYRINGE (ML) INJECTION
Status: DISCONTINUED | OUTPATIENT
Start: 2018-02-02 | End: 2018-02-02 | Stop reason: HOSPADM

## 2018-02-02 RX ADMIN — SODIUM CHLORIDE: 0.9 INJECTION, SOLUTION INTRAVENOUS at 09:02

## 2018-02-02 RX ADMIN — DURVALUMAB 805 MG: 120 INJECTION, SOLUTION INTRAVENOUS at 09:02

## 2018-02-02 RX ADMIN — SODIUM CHLORIDE, PRESERVATIVE FREE 10 ML: 5 INJECTION INTRAVENOUS at 10:02

## 2018-02-02 RX ADMIN — HEPARIN 500 UNITS: 100 SYRINGE at 10:02

## 2018-02-02 NOTE — PLAN OF CARE
Problem: Patient Care Overview  Goal: Individualization & Mutuality  Outcome: Ongoing (interventions implemented as appropriate)  0900-Labs , hx, and medications reviewed, patient was seen by MD prior to arrival today. Assessment completed. Discussed plan of care with patient. Patient in agreement. Chair reclined and warm blanket and snack offered.

## 2018-02-02 NOTE — PROGRESS NOTES
Subjective:       Patient ID: Fauzia Kirk is a 62 y.o. female.    Chief Complaint: No chief complaint on file.    HPI Ms. Kirk is a very pleasant 62-year-old  female who returned  for F/U of right lung cancer.    She has been receiving adjuvant therapy with Durvalumab.    She has tolerated that well.  She did have some transient hyperthyroidism which resolved spontaneously.        In December 2016 she began to have some blood in her mucus after coughing.In  April she developed a persistent cough and saw her physician on April 18.  Chest x-ray at that time showed a rounded opacity in the right lung overlying the major fissure.   Chest CT in May showed a 4.8 x 4.0 cm, rounded, soft tissue mass in the middle lobe between the medial and lateral segments. There was pre-carinal adenopathy.    Subsequently she underwent bronchoscopy with EBUS on 6/9/17. Needle biopsy of the medistinal nodes was positive for poorly differentiated carcinoma with squamoid features. The cells were positive for CK7, CK5/6, and P63 and are negative for CK20, GCDFP, TTF1, ER, [place], Napsin and YEMI 3.This was felt to be most CW a new squamous lung cancer.    PET CT yesterday demonstrated the following:a hypermetabolic, large mass in the right middle lobe with an SUV max of 20.23. There is hypermetabolic activity extending from this mass tracking along the pleura. There is a hypermetabolic right hilar lymph node demonstrating an SUV max of 20.2. In addition there is are 2 hypermetabolic subcarinal lymph nodes with the larger demonstrating an SUV max of 14.2. An additional right paratracheal lymph node is seen with an SUV max of 26.1. Findings are consistent with metastatic lung cancer.      Previous cancer history:She had a stage I, ER negative carcinoma of the    left breast in 1990, treated with lumpectomy and radiation therapy.  In      1993, she developed a stage I, ER positive carcinoma of the right breast     treated with  lumpectomy and radiation.  In 1995, she developed left breast   cancer recurrence, treated with lumpectomy, brachytherapy and four cycles    of Adriamycin and Cytoxan.  In 1996, she developed a new right breast        cancer T2 N0 poorly differentiated, treated with four cycles of              preoperative Taxol followed by mastectomy.        On October 17, 2016 left mammogram showed increased calcifications in the left breast at 3:00.  On October 27 a biopsy showed DCIS with solid, cribriform, and micropapillary patterns.  Tumor was 95% ER positive at 95% [place] positive    On November 7, 2016 she underwent left mastectomy which showed 8 mm of DCIS and negative margins.    She completed radiation together with weekly chemotherapy with carboplatin and Taxol  for stage IIIA disease.    She completed therapy on September 13 and received 7 chemotherapy treatments.    CT scan from September 29 showed that the right middle lobe mass had decreased to 4.2 x 2.3 cm from 4.8 x 4.3 cm.  Stable 4 mm pulmonary nodule in the left lower lobe.    Review of Systems   Constitutional: Negative for activity change, appetite change, fatigue, fever and unexpected weight change.   HENT: Negative for trouble swallowing.    Respiratory: Negative for cough and shortness of breath.    Cardiovascular: Negative for chest pain.   Gastrointestinal: Negative for abdominal pain, constipation, nausea and vomiting.   Musculoskeletal: Negative for back pain.   Neurological: Negative for headaches.   Psychiatric/Behavioral: Negative for dysphoric mood. The patient is not nervous/anxious.        Objective:      Physical Exam   Constitutional: She is oriented to person, place, and time. She appears well-developed and well-nourished.   HENT:   Mouth/Throat: No oropharyngeal exudate.   Eyes: No scleral icterus.   Cardiovascular: Normal rate and regular rhythm.    Pulmonary/Chest: Effort normal and breath sounds normal. She has no wheezes. She has no rales.    Abdominal: Soft. She exhibits no mass. There is no tenderness.   Lymphadenopathy:     She has no cervical adenopathy.     She has no axillary adenopathy.        Right: No supraclavicular adenopathy present.        Left: No supraclavicular adenopathy present.   Neurological: She is alert and oriented to person, place, and time.   Skin: No rash noted. No erythema.   Psychiatric: She has a normal mood and affect. Her behavior is normal. Thought content normal.       Assessment:   CT scan of the chest shows 2.7 x 4.3 cm right middle lobe density with surrounding groundglass opacity consistent with post radiation changes.  This is stable 4 mm left lower lobe pulmonary nodule.  The no other findings.    Metabolic profile shows normal hepatic and renal function, TSH is 61 and free T4 is less than 0.4    1. Primary cancer of right middle lobe of lung    2. Encounter for antineoplastic chemotherapy      3.  Treatment-induced hypothyroidism  Plan:     will continue her current therapy every 2 weeks.    Start thyroid replacement, return to clinic in 1 month.

## 2018-02-02 NOTE — TELEPHONE ENCOUNTER
Returned call, spoke with patient and explained why her apts were scheduled as they were. Patient voiced understanding.

## 2018-02-02 NOTE — TELEPHONE ENCOUNTER
----- Message from Skye Jimenez sent at 2/2/2018  1:24 PM CST -----  Contact: Pt  Pt calling regarding upcoming appts on 2/16 and 3/2. She would like to move all appts to the morning           Pt call back number [phone]

## 2018-02-02 NOTE — PLAN OF CARE
Problem: Patient Care Overview  Goal: Plan of Care Review  1032-Patient tolerated treatment well, port was flushed following infusion. Discharged without complaints or S/S of adverse event. AVS given.  Instructed to call provider for any questions or concerns.

## 2018-02-09 RX ORDER — DIPHENHYDRAMINE HYDROCHLORIDE 50 MG/ML
50 INJECTION INTRAMUSCULAR; INTRAVENOUS ONCE AS NEEDED
Status: CANCELLED | OUTPATIENT
Start: 2018-02-16 | End: 2018-02-16

## 2018-02-09 RX ORDER — HEPARIN 100 UNIT/ML
500 SYRINGE INTRAVENOUS
Status: CANCELLED | OUTPATIENT
Start: 2018-02-16

## 2018-02-09 RX ORDER — SODIUM CHLORIDE 0.9 % (FLUSH) 0.9 %
10 SYRINGE (ML) INJECTION
Status: CANCELLED | OUTPATIENT
Start: 2018-02-16

## 2018-02-09 RX ORDER — EPINEPHRINE 0.3 MG/.3ML
1 INJECTION SUBCUTANEOUS ONCE AS NEEDED
Status: CANCELLED | OUTPATIENT
Start: 2018-02-16 | End: 2018-02-16

## 2018-02-16 ENCOUNTER — INFUSION (OUTPATIENT)
Dept: INFUSION THERAPY | Facility: HOSPITAL | Age: 63
End: 2018-02-16
Attending: INTERNAL MEDICINE
Payer: COMMERCIAL

## 2018-02-16 VITALS
HEART RATE: 93 BPM | SYSTOLIC BLOOD PRESSURE: 127 MMHG | RESPIRATION RATE: 18 BRPM | TEMPERATURE: 99 F | DIASTOLIC BLOOD PRESSURE: 73 MMHG

## 2018-02-16 DIAGNOSIS — Z51.11 ENCOUNTER FOR ANTINEOPLASTIC CHEMOTHERAPY: ICD-10-CM

## 2018-02-16 DIAGNOSIS — C34.2 PRIMARY CANCER OF RIGHT MIDDLE LOBE OF LUNG: Primary | ICD-10-CM

## 2018-02-16 LAB
ALBUMIN SERPL BCP-MCNC: 3.4 G/DL
ALP SERPL-CCNC: 89 U/L
ALT SERPL W/O P-5'-P-CCNC: 28 U/L
ANION GAP SERPL CALC-SCNC: 9 MMOL/L
AST SERPL-CCNC: 28 U/L
BILIRUB SERPL-MCNC: 0.7 MG/DL
BUN SERPL-MCNC: 22 MG/DL
CALCIUM SERPL-MCNC: 9.6 MG/DL
CHLORIDE SERPL-SCNC: 104 MMOL/L
CO2 SERPL-SCNC: 26 MMOL/L
CREAT SERPL-MCNC: 1.1 MG/DL
ERYTHROCYTE [DISTWIDTH] IN BLOOD BY AUTOMATED COUNT: 17.6 %
EST. GFR  (AFRICAN AMERICAN): >60 ML/MIN/1.73 M^2
EST. GFR  (NON AFRICAN AMERICAN): 53.2 ML/MIN/1.73 M^2
GLUCOSE SERPL-MCNC: 122 MG/DL
HCT VFR BLD AUTO: 29.1 %
HGB BLD-MCNC: 9.6 G/DL
IMM GRANULOCYTES # BLD AUTO: 0.02 K/UL
MCH RBC QN AUTO: 28.6 PG
MCHC RBC AUTO-ENTMCNC: 33 G/DL
MCV RBC AUTO: 87 FL
NEUTROPHILS # BLD AUTO: 3.4 K/UL
PLATELET # BLD AUTO: 208 K/UL
PMV BLD AUTO: 10.5 FL
POTASSIUM SERPL-SCNC: 3.4 MMOL/L
PROT SERPL-MCNC: 7.7 G/DL
RBC # BLD AUTO: 3.36 M/UL
SODIUM SERPL-SCNC: 139 MMOL/L
T4 FREE SERPL-MCNC: 0.86 NG/DL
TSH SERPL DL<=0.005 MIU/L-ACNC: 20.57 UIU/ML
WBC # BLD AUTO: 4.76 K/UL

## 2018-02-16 PROCEDURE — 25000003 PHARM REV CODE 250: Performed by: INTERNAL MEDICINE

## 2018-02-16 PROCEDURE — 80053 COMPREHEN METABOLIC PANEL: CPT

## 2018-02-16 PROCEDURE — 84439 ASSAY OF FREE THYROXINE: CPT

## 2018-02-16 PROCEDURE — A4216 STERILE WATER/SALINE, 10 ML: HCPCS | Performed by: INTERNAL MEDICINE

## 2018-02-16 PROCEDURE — 84443 ASSAY THYROID STIM HORMONE: CPT

## 2018-02-16 PROCEDURE — 63600175 PHARM REV CODE 636 W HCPCS: Performed by: INTERNAL MEDICINE

## 2018-02-16 PROCEDURE — 85027 COMPLETE CBC AUTOMATED: CPT

## 2018-02-16 RX ORDER — SODIUM CHLORIDE 0.9 % (FLUSH) 0.9 %
10 SYRINGE (ML) INJECTION
Status: CANCELLED | OUTPATIENT
Start: 2018-02-16

## 2018-02-16 RX ORDER — EPINEPHRINE 0.3 MG/.3ML
1 INJECTION SUBCUTANEOUS ONCE AS NEEDED
Status: DISCONTINUED | OUTPATIENT
Start: 2018-02-16 | End: 2018-02-16 | Stop reason: HOSPADM

## 2018-02-16 RX ORDER — HEPARIN 100 UNIT/ML
500 SYRINGE INTRAVENOUS
Status: CANCELLED | OUTPATIENT
Start: 2018-02-16

## 2018-02-16 RX ORDER — HEPARIN 100 UNIT/ML
500 SYRINGE INTRAVENOUS
Status: COMPLETED | OUTPATIENT
Start: 2018-02-16 | End: 2018-02-16

## 2018-02-16 RX ORDER — SODIUM CHLORIDE 0.9 % (FLUSH) 0.9 %
10 SYRINGE (ML) INJECTION
Status: DISCONTINUED | OUTPATIENT
Start: 2018-02-16 | End: 2018-02-16 | Stop reason: HOSPADM

## 2018-02-16 RX ORDER — HEPARIN 100 UNIT/ML
500 SYRINGE INTRAVENOUS
Status: DISCONTINUED | OUTPATIENT
Start: 2018-02-16 | End: 2018-02-16 | Stop reason: HOSPADM

## 2018-02-16 RX ORDER — DIPHENHYDRAMINE HYDROCHLORIDE 50 MG/ML
50 INJECTION INTRAMUSCULAR; INTRAVENOUS ONCE AS NEEDED
Status: DISCONTINUED | OUTPATIENT
Start: 2018-02-16 | End: 2018-02-16 | Stop reason: HOSPADM

## 2018-02-16 RX ORDER — SODIUM CHLORIDE 0.9 % (FLUSH) 0.9 %
10 SYRINGE (ML) INJECTION
Status: COMPLETED | OUTPATIENT
Start: 2018-02-16 | End: 2018-02-16

## 2018-02-16 RX ADMIN — HEPARIN 500 UNITS: 100 SYRINGE at 01:02

## 2018-02-16 RX ADMIN — SODIUM CHLORIDE, PRESERVATIVE FREE 10 ML: 5 INJECTION INTRAVENOUS at 01:02

## 2018-02-17 NOTE — NURSING
1535- Per pharmacist- not enough medication on hand to give patient todays dose, okay by Dr. Mai to reschedule patient next week.  Patient informed of shortage and appointment rescheduled for Tuesday.  Will confirm with patient Monday that medication is available.  Patient de-accessed and discharged to home setting at this time.

## 2018-02-20 ENCOUNTER — INFUSION (OUTPATIENT)
Dept: INFUSION THERAPY | Facility: HOSPITAL | Age: 63
End: 2018-02-20
Attending: INTERNAL MEDICINE
Payer: COMMERCIAL

## 2018-02-20 VITALS
DIASTOLIC BLOOD PRESSURE: 74 MMHG | SYSTOLIC BLOOD PRESSURE: 143 MMHG | WEIGHT: 190 LBS | HEART RATE: 96 BPM | RESPIRATION RATE: 16 BRPM | BODY MASS INDEX: 30.67 KG/M2 | TEMPERATURE: 99 F

## 2018-02-20 DIAGNOSIS — Z51.11 ENCOUNTER FOR ANTINEOPLASTIC CHEMOTHERAPY: ICD-10-CM

## 2018-02-20 DIAGNOSIS — C34.2 PRIMARY CANCER OF RIGHT MIDDLE LOBE OF LUNG: Primary | ICD-10-CM

## 2018-02-20 PROCEDURE — C9492 INJECTION, DURVALUMAB: HCPCS | Performed by: INTERNAL MEDICINE

## 2018-02-20 PROCEDURE — 25000003 PHARM REV CODE 250: Performed by: INTERNAL MEDICINE

## 2018-02-20 PROCEDURE — 96413 CHEMO IV INFUSION 1 HR: CPT

## 2018-02-20 PROCEDURE — 63600175 PHARM REV CODE 636 W HCPCS: Performed by: INTERNAL MEDICINE

## 2018-02-20 RX ORDER — SODIUM CHLORIDE 0.9 % (FLUSH) 0.9 %
10 SYRINGE (ML) INJECTION
Status: DISCONTINUED | OUTPATIENT
Start: 2018-02-20 | End: 2018-02-20 | Stop reason: HOSPADM

## 2018-02-20 RX ORDER — EPINEPHRINE 0.3 MG/.3ML
1 INJECTION SUBCUTANEOUS ONCE AS NEEDED
Status: DISCONTINUED | OUTPATIENT
Start: 2018-02-20 | End: 2018-02-20 | Stop reason: HOSPADM

## 2018-02-20 RX ORDER — HEPARIN 100 UNIT/ML
500 SYRINGE INTRAVENOUS
Status: DISCONTINUED | OUTPATIENT
Start: 2018-02-20 | End: 2018-02-20 | Stop reason: HOSPADM

## 2018-02-20 RX ORDER — DIPHENHYDRAMINE HYDROCHLORIDE 50 MG/ML
50 INJECTION INTRAMUSCULAR; INTRAVENOUS ONCE AS NEEDED
Status: DISCONTINUED | OUTPATIENT
Start: 2018-02-20 | End: 2018-02-20 | Stop reason: HOSPADM

## 2018-02-20 RX ADMIN — SODIUM CHLORIDE 805 MG: 9 INJECTION, SOLUTION INTRAVENOUS at 11:02

## 2018-02-20 RX ADMIN — SODIUM CHLORIDE: 9 INJECTION, SOLUTION INTRAVENOUS at 11:02

## 2018-02-20 RX ADMIN — HEPARIN 500 UNITS: 100 SYRINGE at 12:02

## 2018-02-20 NOTE — PLAN OF CARE
Problem: Chemotherapy Effects (Adult)  Goal: Signs and Symptoms of Listed Potential Problems Will be Absent, Minimized or Managed (Chemotherapy Effects)  Signs and symptoms of listed potential problems will be absent, minimized or managed by discharge/transition of care (reference Chemotherapy Effects (Adult) CPG).   Outcome: Ongoing (interventions implemented as appropriate)  Patient here for Imfim[facility].  Assessment complete and labs reviewed.  VSS.  Chair reclined and blanket offered.  No needs expressed at this time.  Will continue to monitor.

## 2018-02-21 ENCOUNTER — TELEPHONE (OUTPATIENT)
Dept: HEMATOLOGY/ONCOLOGY | Facility: CLINIC | Age: 63
End: 2018-02-21

## 2018-02-21 NOTE — TELEPHONE ENCOUNTER
----- Message from Maverick Alvarez sent at 2/21/2018 10:08 AM CST -----  Contact: [employer]   Needs a diagnostic code for mastectomy bra    Contact::[phone]

## 2018-03-02 ENCOUNTER — INFUSION (OUTPATIENT)
Dept: INFUSION THERAPY | Facility: HOSPITAL | Age: 63
End: 2018-03-02
Attending: INTERNAL MEDICINE
Payer: COMMERCIAL

## 2018-03-02 ENCOUNTER — OFFICE VISIT (OUTPATIENT)
Dept: HEMATOLOGY/ONCOLOGY | Facility: CLINIC | Age: 63
End: 2018-03-02
Payer: COMMERCIAL

## 2018-03-02 VITALS
HEART RATE: 97 BPM | BODY MASS INDEX: 29.23 KG/M2 | HEIGHT: 66 IN | SYSTOLIC BLOOD PRESSURE: 118 MMHG | TEMPERATURE: 99 F | DIASTOLIC BLOOD PRESSURE: 63 MMHG | WEIGHT: 181.88 LBS

## 2018-03-02 VITALS
SYSTOLIC BLOOD PRESSURE: 126 MMHG | RESPIRATION RATE: 18 BRPM | TEMPERATURE: 98 F | DIASTOLIC BLOOD PRESSURE: 56 MMHG | HEART RATE: 94 BPM

## 2018-03-02 DIAGNOSIS — C34.2 PRIMARY CANCER OF RIGHT MIDDLE LOBE OF LUNG: ICD-10-CM

## 2018-03-02 DIAGNOSIS — Z51.11 ENCOUNTER FOR ANTINEOPLASTIC CHEMOTHERAPY: ICD-10-CM

## 2018-03-02 DIAGNOSIS — C34.2 PRIMARY CANCER OF RIGHT MIDDLE LOBE OF LUNG: Primary | ICD-10-CM

## 2018-03-02 DIAGNOSIS — E03.2 HYPOTHYROIDISM DUE TO MEDICATION: Primary | ICD-10-CM

## 2018-03-02 DIAGNOSIS — E05.90 HYPERTHYROIDISM: ICD-10-CM

## 2018-03-02 DIAGNOSIS — C77.9 REGIONAL LYMPH NODE METASTASIS PRESENT: ICD-10-CM

## 2018-03-02 LAB
ALBUMIN SERPL BCP-MCNC: 3.2 G/DL
ALP SERPL-CCNC: 76 U/L
ALT SERPL W/O P-5'-P-CCNC: 16 U/L
ANION GAP SERPL CALC-SCNC: 10 MMOL/L
AST SERPL-CCNC: 19 U/L
BILIRUB SERPL-MCNC: 0.7 MG/DL
BUN SERPL-MCNC: 23 MG/DL
CALCIUM SERPL-MCNC: 9.7 MG/DL
CHLORIDE SERPL-SCNC: 106 MMOL/L
CO2 SERPL-SCNC: 24 MMOL/L
CREAT SERPL-MCNC: 0.9 MG/DL
ERYTHROCYTE [DISTWIDTH] IN BLOOD BY AUTOMATED COUNT: 17.8 %
EST. GFR  (AFRICAN AMERICAN): >60 ML/MIN/1.73 M^2
EST. GFR  (NON AFRICAN AMERICAN): >60 ML/MIN/1.73 M^2
GLUCOSE SERPL-MCNC: 142 MG/DL
HCT VFR BLD AUTO: 30.1 %
HGB BLD-MCNC: 9.7 G/DL
IMM GRANULOCYTES # BLD AUTO: 0.02 K/UL
MCH RBC QN AUTO: 28.4 PG
MCHC RBC AUTO-ENTMCNC: 32.2 G/DL
MCV RBC AUTO: 88 FL
NEUTROPHILS # BLD AUTO: 3.6 K/UL
PLATELET # BLD AUTO: 209 K/UL
PMV BLD AUTO: 10.3 FL
POTASSIUM SERPL-SCNC: 3.6 MMOL/L
PROT SERPL-MCNC: 7.4 G/DL
RBC # BLD AUTO: 3.42 M/UL
SODIUM SERPL-SCNC: 140 MMOL/L
TSH SERPL DL<=0.005 MIU/L-ACNC: 0.92 UIU/ML
WBC # BLD AUTO: 4.81 K/UL

## 2018-03-02 PROCEDURE — 99999 PR PBB SHADOW E&M-EST. PATIENT-LVL III: CPT | Mod: PBBFAC,,, | Performed by: INTERNAL MEDICINE

## 2018-03-02 PROCEDURE — 25000003 PHARM REV CODE 250: Performed by: INTERNAL MEDICINE

## 2018-03-02 PROCEDURE — 99215 OFFICE O/P EST HI 40 MIN: CPT | Mod: S$GLB,,, | Performed by: INTERNAL MEDICINE

## 2018-03-02 PROCEDURE — 63600175 PHARM REV CODE 636 W HCPCS: Performed by: INTERNAL MEDICINE

## 2018-03-02 PROCEDURE — A4216 STERILE WATER/SALINE, 10 ML: HCPCS | Performed by: INTERNAL MEDICINE

## 2018-03-02 PROCEDURE — 3074F SYST BP LT 130 MM HG: CPT | Mod: S$GLB,,, | Performed by: INTERNAL MEDICINE

## 2018-03-02 PROCEDURE — 85027 COMPLETE CBC AUTOMATED: CPT

## 2018-03-02 PROCEDURE — 84443 ASSAY THYROID STIM HORMONE: CPT

## 2018-03-02 PROCEDURE — 3078F DIAST BP <80 MM HG: CPT | Mod: S$GLB,,, | Performed by: INTERNAL MEDICINE

## 2018-03-02 PROCEDURE — 96413 CHEMO IV INFUSION 1 HR: CPT

## 2018-03-02 PROCEDURE — C9492 INJECTION, DURVALUMAB: HCPCS | Performed by: INTERNAL MEDICINE

## 2018-03-02 PROCEDURE — 80053 COMPREHEN METABOLIC PANEL: CPT

## 2018-03-02 RX ORDER — HEPARIN 100 UNIT/ML
500 SYRINGE INTRAVENOUS
Status: COMPLETED | OUTPATIENT
Start: 2018-03-02 | End: 2018-03-02

## 2018-03-02 RX ORDER — HEPARIN 100 UNIT/ML
500 SYRINGE INTRAVENOUS
Status: DISCONTINUED | OUTPATIENT
Start: 2018-03-02 | End: 2018-03-02 | Stop reason: HOSPADM

## 2018-03-02 RX ORDER — DIPHENHYDRAMINE HYDROCHLORIDE 50 MG/ML
50 INJECTION INTRAMUSCULAR; INTRAVENOUS ONCE AS NEEDED
Status: DISCONTINUED | OUTPATIENT
Start: 2018-03-02 | End: 2018-03-02 | Stop reason: HOSPADM

## 2018-03-02 RX ORDER — EPINEPHRINE 0.3 MG/.3ML
1 INJECTION SUBCUTANEOUS ONCE AS NEEDED
Status: CANCELLED | OUTPATIENT
Start: 2018-03-16 | End: 2018-03-16

## 2018-03-02 RX ORDER — SODIUM CHLORIDE 0.9 % (FLUSH) 0.9 %
10 SYRINGE (ML) INJECTION
Status: DISCONTINUED | OUTPATIENT
Start: 2018-03-02 | End: 2018-03-02 | Stop reason: HOSPADM

## 2018-03-02 RX ORDER — HEPARIN 100 UNIT/ML
500 SYRINGE INTRAVENOUS
Status: CANCELLED | OUTPATIENT
Start: 2018-03-02

## 2018-03-02 RX ORDER — SODIUM CHLORIDE 0.9 % (FLUSH) 0.9 %
10 SYRINGE (ML) INJECTION
Status: CANCELLED | OUTPATIENT
Start: 2018-03-02

## 2018-03-02 RX ORDER — DIPHENHYDRAMINE HYDROCHLORIDE 50 MG/ML
50 INJECTION INTRAMUSCULAR; INTRAVENOUS ONCE AS NEEDED
Status: CANCELLED | OUTPATIENT
Start: 2018-03-16 | End: 2018-03-16

## 2018-03-02 RX ORDER — SODIUM CHLORIDE 0.9 % (FLUSH) 0.9 %
10 SYRINGE (ML) INJECTION
Status: CANCELLED | OUTPATIENT
Start: 2018-03-16

## 2018-03-02 RX ORDER — EPINEPHRINE 0.3 MG/.3ML
1 INJECTION SUBCUTANEOUS ONCE AS NEEDED
Status: CANCELLED | OUTPATIENT
Start: 2018-03-02 | End: 2018-03-02

## 2018-03-02 RX ORDER — HEPARIN 100 UNIT/ML
500 SYRINGE INTRAVENOUS
Status: CANCELLED | OUTPATIENT
Start: 2018-03-16

## 2018-03-02 RX ORDER — EPINEPHRINE 0.3 MG/.3ML
1 INJECTION SUBCUTANEOUS ONCE AS NEEDED
Status: DISCONTINUED | OUTPATIENT
Start: 2018-03-02 | End: 2018-03-02 | Stop reason: HOSPADM

## 2018-03-02 RX ORDER — DIPHENHYDRAMINE HYDROCHLORIDE 50 MG/ML
50 INJECTION INTRAMUSCULAR; INTRAVENOUS ONCE AS NEEDED
Status: CANCELLED | OUTPATIENT
Start: 2018-03-02 | End: 2018-03-02

## 2018-03-02 RX ORDER — SODIUM CHLORIDE 0.9 % (FLUSH) 0.9 %
10 SYRINGE (ML) INJECTION
Status: COMPLETED | OUTPATIENT
Start: 2018-03-02 | End: 2018-03-02

## 2018-03-02 RX ADMIN — HEPARIN 500 UNITS: 100 SYRINGE at 01:03

## 2018-03-02 RX ADMIN — SODIUM CHLORIDE, PRESERVATIVE FREE 10 ML: 5 INJECTION INTRAVENOUS at 03:03

## 2018-03-02 RX ADMIN — HEPARIN 500 UNITS: 100 SYRINGE at 03:03

## 2018-03-02 RX ADMIN — SODIUM CHLORIDE: 9 INJECTION, SOLUTION INTRAVENOUS at 02:03

## 2018-03-02 RX ADMIN — SODIUM CHLORIDE 805 MG: 9 INJECTION, SOLUTION INTRAVENOUS at 02:03

## 2018-03-02 RX ADMIN — Medication 10 ML: at 01:03

## 2018-03-02 NOTE — NURSING
Patient tolerated having portacath accessed and labs drawn.  NAD noted.  Released in sttable condition, to return for treatment later today.

## 2018-03-02 NOTE — PROGRESS NOTES
Subjective:       Patient ID: Fauzia Kirk is a 62 y.o. female.    Chief Complaint: Lung Cancer    HPI Ms. Kirk is a very pleasant 62-year-old  female who returned  for F/U of right lung cancer.    She has been receiving adjuvant therapy with Durvalumab.      In December 2016 she began to have some blood in her mucus after coughing.In  April she developed a persistent cough and saw her physician on April 18.  Chest x-ray at that time showed a rounded opacity in the right lung overlying the major fissure.   Chest CT in May showed a 4.8 x 4.0 cm, rounded, soft tissue mass in the middle lobe between the medial and lateral segments. There was pre-carinal adenopathy.    Subsequently she underwent bronchoscopy with EBUS on 6/9/17. Needle biopsy of the medistinal nodes was positive for poorly differentiated carcinoma with squamoid features. The cells were positive for CK7, CK5/6, and P63 and are negative for CK20, GCDFP, TTF1, ER, [place], Napsin and YEMI 3.This was felt to be most CW a new squamous lung cancer.    PET CT yesterday demonstrated the following:a hypermetabolic, large mass in the right middle lobe with an SUV max of 20.23. There is hypermetabolic activity extending from this mass tracking along the pleura. There is a hypermetabolic right hilar lymph node demonstrating an SUV max of 20.2. In addition there is are 2 hypermetabolic subcarinal lymph nodes with the larger demonstrating an SUV max of 14.2. An additional right paratracheal lymph node is seen with an SUV max of 26.1. Findings are consistent with metastatic lung cancer.      Previous cancer history:She had a stage I, ER negative carcinoma of the    left breast in 1990, treated with lumpectomy and radiation therapy.  In      1993, she developed a stage I, ER positive carcinoma of the right breast     treated with lumpectomy and radiation.  In 1995, she developed left breast   cancer recurrence, treated with lumpectomy, brachytherapy and four  cycles    of Adriamycin and Cytoxan.  In 1996, she developed a new right breast        cancer T2 N0 poorly differentiated, treated with four cycles of              preoperative Taxol followed by mastectomy.        On October 17, 2016 left mammogram showed increased calcifications in the left breast at 3:00.  On October 27 a biopsy showed DCIS with solid, cribriform, and micropapillary patterns.  Tumor was 95% ER positive at 95% [place] positive    On November 7, 2016 she underwent left mastectomy which showed 8 mm of DCIS and negative margins.    She completed radiation together with weekly chemotherapy with carboplatin and Taxol  for stage IIIA disease.    She completed therapy on September 13 and received 7 chemotherapy treatments.    CT scan from September 29 showed that the right middle lobe mass had decreased to 4.2 x 2.3 cm from 4.8 x 4.3 cm.  Stable 4 mm pulmonary nodule in the left lower lobe.      Interval History:  Ms. Kirk has tolerated treatment without issue. She has no specific complaints.  She was concerned about her transient hyperthyroidism which resolved spontaneously, and has swung to hypothroidism.     Review of Systems   Constitutional: Negative for activity change, appetite change, fatigue, fever and unexpected weight change.   HENT: Negative for trouble swallowing.    Respiratory: Negative for cough and shortness of breath.    Cardiovascular: Negative for chest pain.   Gastrointestinal: Negative for abdominal pain, constipation, nausea and vomiting.   Musculoskeletal: Negative for back pain.   Neurological: Negative for headaches.   Psychiatric/Behavioral: Negative for dysphoric mood. The patient is not nervous/anxious.        Objective:       Vitals:    03/02/18 1333   BP: 118/63   Pulse: 97   Temp: 98.6 °F (37 °C)       Physical Exam   Constitutional: She is oriented to person, place, and time. She appears well-developed and well-nourished.   HENT:   Mouth/Throat: No oropharyngeal exudate.    Eyes: No scleral icterus.   Cardiovascular: Normal rate and regular rhythm.    Pulmonary/Chest: Effort normal and breath sounds normal. She has no wheezes. She has no rales.   Abdominal: Soft. She exhibits no mass. There is no tenderness.   Lymphadenopathy:     She has no cervical adenopathy.     She has no axillary adenopathy.        Right: No supraclavicular adenopathy present.        Left: No supraclavicular adenopathy present.   Neurological: She is alert and oriented to person, place, and time.   Skin: No rash noted. No erythema.   Psychiatric: She has a normal mood and affect. Her behavior is normal. Thought content normal.         Lab Results   Component Value Date    WBC 4.81 03/02/2018    HGB 9.7 (L) 03/02/2018    HCT 30.1 (L) 03/02/2018     03/02/2018    CHOL 208 (H) 12/21/2015    TRIG 145 12/21/2015    HDL 38 (L) 12/21/2015    ALT 16 03/02/2018    AST 19 03/02/2018     03/02/2018    K 3.6 03/02/2018     03/02/2018    CREATININE 0.9 03/02/2018    BUN 23 03/02/2018    CO2 24 03/02/2018    TSH 20.570 (H) 02/16/2018    HGBA1C 6.1 12/21/2015       Assessment:   CT scan of the chest shows 2.7 x 4.3 cm right middle lobe density with surrounding groundglass opacity consistent with post radiation changes.  This is stable 4 mm left lower lobe pulmonary nodule.  The no other findings.    Metabolic profile shows normal hepatic and renal function, TSH is 61 and free T4 is less than 0.4    No diagnosis found.  3.  Treatment-induced hypothyroidism  Plan:     will continue her current therapy every 2 weeks.    Continue thyroid replacement---TSH has shown marked improvement in the last month, return to clinic in 1 month with

## 2018-03-02 NOTE — PLAN OF CARE
Problem: Patient Care Overview  Goal: Plan of Care Review  1554-Patient tolerated treatment well. Discharged without complaints or S/S of adverse event. AVS given.  Instructed to call provider for any questions or concerns.

## 2018-03-02 NOTE — PLAN OF CARE
Problem: Patient Care Overview  Goal: Individualization & Mutuality  Left chest PAC  Warm blankets   Outcome: Ongoing (interventions implemented as appropriate)  1420-Labs , hx, and medications reviewed, patient had labs today and was seen by MD prior to arrival. Assessment completed. Discussed plan of care with patient. Patient in agreement. Chair reclined and warm blanket and snack offered.

## 2018-03-12 ENCOUNTER — TELEPHONE (OUTPATIENT)
Dept: HEMATOLOGY/ONCOLOGY | Facility: CLINIC | Age: 63
End: 2018-03-12

## 2018-03-12 NOTE — TELEPHONE ENCOUNTER
----- Message from Skye Jimenez sent at 3/12/2018 11:13 AM CDT -----  Contact: Pt  Pt returning your call        Pt call back number [phone]

## 2018-03-12 NOTE — TELEPHONE ENCOUNTER
----- Message from Dorys Mcdonald MA sent at 3/12/2018  7:50 AM CDT -----  Contact: Pt   We can move Jamaicasharita Rod to [place] the following week or Karen  ----- Message -----  From: Laly Tang  Sent: 3/9/2018   2:52 PM  To: Dorys Mcdonald MA    Do you have an idea on who to move. Karen has no morning apts available that day so the patients appointment would change all together  ----- Message -----  From: Dorys Mcdonald MA  Sent: 3/9/2018   2:15 PM  To: Laly Tang    She will need to see Dr Gunderson can we move someone to Karen. She will not see Karen again if Ne[place]ali is here  ----- Message -----  From: Laly Tang  Sent: 3/9/2018   2:00 PM  To: ALTAGRACIA Rogel Dr. Cole does not have anything available on 3/29  ----- Message -----  From: Maverick Alvarez  Sent: 3/9/2018   1:11 PM  To: Laly Tang    Will like to reschedule 3/29 appt with Karen     Pt states she will only like to be seen by dr gunderson     Contact::[phone]

## 2018-03-16 ENCOUNTER — INFUSION (OUTPATIENT)
Dept: INFUSION THERAPY | Facility: HOSPITAL | Age: 63
End: 2018-03-16
Attending: INTERNAL MEDICINE
Payer: COMMERCIAL

## 2018-03-16 VITALS
RESPIRATION RATE: 18 BRPM | HEART RATE: 91 BPM | DIASTOLIC BLOOD PRESSURE: 69 MMHG | TEMPERATURE: 98 F | SYSTOLIC BLOOD PRESSURE: 98 MMHG

## 2018-03-16 DIAGNOSIS — Z51.11 ENCOUNTER FOR ANTINEOPLASTIC CHEMOTHERAPY: ICD-10-CM

## 2018-03-16 DIAGNOSIS — C34.2 PRIMARY CANCER OF RIGHT MIDDLE LOBE OF LUNG: Primary | ICD-10-CM

## 2018-03-16 LAB
ALBUMIN SERPL BCP-MCNC: 3.3 G/DL
ALP SERPL-CCNC: 70 U/L
ALT SERPL W/O P-5'-P-CCNC: 10 U/L
ANION GAP SERPL CALC-SCNC: 7 MMOL/L
AST SERPL-CCNC: 17 U/L
BILIRUB SERPL-MCNC: 0.5 MG/DL
BUN SERPL-MCNC: 25 MG/DL
CALCIUM SERPL-MCNC: 9.6 MG/DL
CHLORIDE SERPL-SCNC: 105 MMOL/L
CO2 SERPL-SCNC: 27 MMOL/L
CREAT SERPL-MCNC: 1.1 MG/DL
ERYTHROCYTE [DISTWIDTH] IN BLOOD BY AUTOMATED COUNT: 17.1 %
EST. GFR  (AFRICAN AMERICAN): >60 ML/MIN/1.73 M^2
EST. GFR  (NON AFRICAN AMERICAN): 53.2 ML/MIN/1.73 M^2
GLUCOSE SERPL-MCNC: 98 MG/DL
HCT VFR BLD AUTO: 30.6 %
HGB BLD-MCNC: 9.8 G/DL
IMM GRANULOCYTES # BLD AUTO: 0.02 K/UL
MCH RBC QN AUTO: 28.7 PG
MCHC RBC AUTO-ENTMCNC: 32 G/DL
MCV RBC AUTO: 90 FL
NEUTROPHILS # BLD AUTO: 3.8 K/UL
PLATELET # BLD AUTO: 210 K/UL
PMV BLD AUTO: 10.2 FL
POTASSIUM SERPL-SCNC: 3.9 MMOL/L
PROT SERPL-MCNC: 7.2 G/DL
RBC # BLD AUTO: 3.42 M/UL
SODIUM SERPL-SCNC: 139 MMOL/L
T4 FREE SERPL-MCNC: 1.15 NG/DL
TSH SERPL DL<=0.005 MIU/L-ACNC: 0.11 UIU/ML
WBC # BLD AUTO: 5.17 K/UL

## 2018-03-16 PROCEDURE — 63600175 PHARM REV CODE 636 W HCPCS: Performed by: INTERNAL MEDICINE

## 2018-03-16 PROCEDURE — A4216 STERILE WATER/SALINE, 10 ML: HCPCS | Performed by: INTERNAL MEDICINE

## 2018-03-16 PROCEDURE — 80053 COMPREHEN METABOLIC PANEL: CPT

## 2018-03-16 PROCEDURE — 84439 ASSAY OF FREE THYROXINE: CPT

## 2018-03-16 PROCEDURE — 85027 COMPLETE CBC AUTOMATED: CPT

## 2018-03-16 PROCEDURE — C9492 INJECTION, DURVALUMAB: HCPCS | Performed by: INTERNAL MEDICINE

## 2018-03-16 PROCEDURE — 84443 ASSAY THYROID STIM HORMONE: CPT

## 2018-03-16 PROCEDURE — 96413 CHEMO IV INFUSION 1 HR: CPT

## 2018-03-16 PROCEDURE — 25000003 PHARM REV CODE 250: Performed by: INTERNAL MEDICINE

## 2018-03-16 RX ORDER — HEPARIN 100 UNIT/ML
500 SYRINGE INTRAVENOUS
Status: DISCONTINUED | OUTPATIENT
Start: 2018-03-16 | End: 2018-03-16 | Stop reason: HOSPADM

## 2018-03-16 RX ORDER — SODIUM CHLORIDE 0.9 % (FLUSH) 0.9 %
10 SYRINGE (ML) INJECTION
Status: CANCELLED | OUTPATIENT
Start: 2018-03-16

## 2018-03-16 RX ORDER — HEPARIN 100 UNIT/ML
500 SYRINGE INTRAVENOUS
Status: COMPLETED | OUTPATIENT
Start: 2018-03-16 | End: 2018-03-16

## 2018-03-16 RX ORDER — DIPHENHYDRAMINE HYDROCHLORIDE 50 MG/ML
50 INJECTION INTRAMUSCULAR; INTRAVENOUS ONCE AS NEEDED
Status: DISCONTINUED | OUTPATIENT
Start: 2018-03-16 | End: 2018-03-16 | Stop reason: HOSPADM

## 2018-03-16 RX ORDER — HEPARIN 100 UNIT/ML
500 SYRINGE INTRAVENOUS
Status: CANCELLED | OUTPATIENT
Start: 2018-03-16

## 2018-03-16 RX ORDER — SODIUM CHLORIDE 0.9 % (FLUSH) 0.9 %
10 SYRINGE (ML) INJECTION
Status: DISCONTINUED | OUTPATIENT
Start: 2018-03-16 | End: 2018-03-16 | Stop reason: HOSPADM

## 2018-03-16 RX ORDER — SODIUM CHLORIDE 0.9 % (FLUSH) 0.9 %
10 SYRINGE (ML) INJECTION
Status: COMPLETED | OUTPATIENT
Start: 2018-03-16 | End: 2018-03-16

## 2018-03-16 RX ORDER — EPINEPHRINE 0.3 MG/.3ML
1 INJECTION SUBCUTANEOUS ONCE AS NEEDED
Status: DISCONTINUED | OUTPATIENT
Start: 2018-03-16 | End: 2018-03-16 | Stop reason: HOSPADM

## 2018-03-16 RX ADMIN — SODIUM CHLORIDE: 9 INJECTION, SOLUTION INTRAVENOUS at 03:03

## 2018-03-16 RX ADMIN — SODIUM CHLORIDE, PRESERVATIVE FREE 10 ML: 5 INJECTION INTRAVENOUS at 04:03

## 2018-03-16 RX ADMIN — SODIUM CHLORIDE, PRESERVATIVE FREE 10 ML: 5 INJECTION INTRAVENOUS at 01:03

## 2018-03-16 RX ADMIN — HEPARIN 500 UNITS: 100 SYRINGE at 01:03

## 2018-03-16 RX ADMIN — SODIUM CHLORIDE 805 MG: 9 INJECTION, SOLUTION INTRAVENOUS at 03:03

## 2018-03-16 RX ADMIN — HEPARIN 500 UNITS: 100 SYRINGE at 04:03

## 2018-03-16 NOTE — PLAN OF CARE
Problem: Patient Care Overview  Goal: Individualization & Mutuality  Left chest PAC  Warm blankets   Outcome: Ongoing (interventions implemented as appropriate)  1450-Labs , hx, and medications reviewed, patient had labs today but no MD appointment. Assessment completed. Discussed plan of care with patient. Patient in agreement. Chair reclined and warm blanket and snack offered.

## 2018-03-16 NOTE — NURSING
Pt arrived port access and labs. Port flushes easily with good blood return, labs sent.  Port left access for chemo today.  Pt now waiting on labs and chemo with .

## 2018-03-16 NOTE — PLAN OF CARE
Problem: Patient Care Overview  Goal: Plan of Care Review  6946-Patient tolerated treatment well. Discharged without complaints or S/S of adverse event. AVS given.  Instructed to call provider for any questions or concerns.

## 2018-03-28 NOTE — PROGRESS NOTES
Subjective:       Patient ID: Fauzia Kirk is a 62 y.o. female.    Chief Complaint: No chief complaint on file.    HPI Ms. Kirk is a very pleasant 62-year-old  female who returned  for F/U of right lung cancer.    She has been receiving adjuvant therapy with Durvalumab.  She has had 10 treatments thus far.    She has tolerated that well.  She did have some transient hyperthyroidism which resolved spontaneously.  She then developed hypothyroidism for which she has been on thyroid replacement(100mcg).      Her major complaint today has been some itching related to her thyroid replacement medication.  Her breathing is good and she has no Smith can cough.  She has no pain.  Appetite and bowel function have been good.          Her last CT scan of the chest in February 2018 showed a stable  2.7 x 4.3 cm right middle lobe density with surrounding groundglass opacity consistent with post radiation changes.  This is stable 4 mm left lower lobe pulmonary nodule      In December 2016 she began to have some blood in her mucus after coughing.In  April she developed a persistent cough and saw her physician on April 18.  Chest x-ray at that time showed a rounded opacity in the right lung overlying the major fissure.   Chest CT in May showed a 4.8 x 4.0 cm, rounded, soft tissue mass in the middle lobe between the medial and lateral segments. There was pre-carinal adenopathy.    Subsequently she underwent bronchoscopy with EBUS on 6/9/17. Needle biopsy of the medistinal nodes was positive for poorly differentiated carcinoma with squamoid features. The cells were positive for CK7, CK5/6, and P63 and are negative for CK20, GCDFP, TTF1, ER, MI, Napsin and YEMI 3.This was felt to be most CW a new squamous lung cancer.    PET CT yesterday demonstrated the following:a hypermetabolic, large mass in the right middle lobe with an SUV max of 20.23. There is hypermetabolic activity extending from this mass tracking along the  pleura. There is a hypermetabolic right hilar lymph node demonstrating an SUV max of 20.2. In addition there is are 2 hypermetabolic subcarinal lymph nodes with the larger demonstrating an SUV max of 14.2. An additional right paratracheal lymph node is seen with an SUV max of 26.1. Findings are consistent with metastatic lung cancer.      Previous cancer history:She had a stage I, ER negative carcinoma of the    left breast in 1990, treated with lumpectomy and radiation therapy.  In      1993, she developed a stage I, ER positive carcinoma of the right breast     treated with lumpectomy and radiation.  In 1995, she developed left breast   cancer recurrence, treated with lumpectomy, brachytherapy and four cycles    of Adriamycin and Cytoxan.  In 1996, she developed a new right breast        cancer T2 N0 poorly differentiated, treated with four cycles of              preoperative Taxol followed by mastectomy.        On October 17, 2016 left mammogram showed increased calcifications in the left breast at 3:00.  On October 27 a biopsy showed DCIS with solid, cribriform, and micropapillary patterns.  Tumor was 95% ER positive at 95% LA positive    On November 7, 2016 she underwent left mastectomy which showed 8 mm of DCIS and negative margins.    She completed radiation together with weekly chemotherapy with carboplatin and Taxol  for stage IIIA disease.    She completed therapy on September 13 and received 7 chemotherapy treatments.    CT scan from September 29 showed that the right middle lobe mass had decreased to 4.2 x 2.3 cm from 4.8 x 4.3 cm.  Stable 4 mm pulmonary nodule in the left lower lobe.    Review of Systems   Constitutional: Negative for activity change, appetite change, fatigue, fever and unexpected weight change.   HENT: Negative for trouble swallowing.    Respiratory: Negative for cough and shortness of breath.    Cardiovascular: Negative for chest pain.   Gastrointestinal: Negative for abdominal pain,  constipation, nausea and vomiting.   Musculoskeletal: Negative for back pain.   Skin:        pruritis   Neurological: Negative for headaches.   Psychiatric/Behavioral: Negative for dysphoric mood. The patient is not nervous/anxious.        Objective:      Physical Exam   Constitutional: She is oriented to person, place, and time. She appears well-developed and well-nourished.   HENT:   Mouth/Throat: No oropharyngeal exudate.   Eyes: No scleral icterus.   Cardiovascular: Normal rate and regular rhythm.    Pulmonary/Chest: Effort normal and breath sounds normal. She has no wheezes. She has no rales.       Abdominal: Soft. She exhibits no mass. There is no tenderness.   Lymphadenopathy:     She has no cervical adenopathy.     She has no axillary adenopathy.        Right: No supraclavicular adenopathy present.        Left: No supraclavicular adenopathy present.   Neurological: She is alert and oriented to person, place, and time.   Skin: No rash noted. No erythema.   Psychiatric: She has a normal mood and affect. Her behavior is normal. Thought content normal.       Assessment:     1. Primary cancer of right middle lobe of lung    2. Encounter for antineoplastic chemotherapy      3.  Treatment-induced hypothyroidism  Plan:     Continue Durvalumab. RTC 4 weeks.  Will change thyroid to branded synthroid.    Distress Screening Results: Psychosocial Distress screening score of Distress Score: 1 noted and reviewed. No intervention indicated.

## 2018-03-29 ENCOUNTER — INFUSION (OUTPATIENT)
Dept: INFUSION THERAPY | Facility: HOSPITAL | Age: 63
End: 2018-03-29
Attending: INTERNAL MEDICINE
Payer: COMMERCIAL

## 2018-03-29 ENCOUNTER — OFFICE VISIT (OUTPATIENT)
Dept: HEMATOLOGY/ONCOLOGY | Facility: CLINIC | Age: 63
End: 2018-03-29
Payer: COMMERCIAL

## 2018-03-29 VITALS
TEMPERATURE: 98 F | DIASTOLIC BLOOD PRESSURE: 67 MMHG | RESPIRATION RATE: 17 BRPM | SYSTOLIC BLOOD PRESSURE: 129 MMHG | HEART RATE: 96 BPM | BODY MASS INDEX: 29.53 KG/M2 | WEIGHT: 183 LBS

## 2018-03-29 VITALS
SYSTOLIC BLOOD PRESSURE: 121 MMHG | TEMPERATURE: 98 F | RESPIRATION RATE: 20 BRPM | HEART RATE: 86 BPM | DIASTOLIC BLOOD PRESSURE: 73 MMHG

## 2018-03-29 DIAGNOSIS — C34.2 PRIMARY CANCER OF RIGHT MIDDLE LOBE OF LUNG: Primary | ICD-10-CM

## 2018-03-29 DIAGNOSIS — E03.2 HYPOTHYROIDISM DUE TO MEDICATION: ICD-10-CM

## 2018-03-29 DIAGNOSIS — E03.9 HYPOTHYROIDISM (ACQUIRED): ICD-10-CM

## 2018-03-29 DIAGNOSIS — Z51.11 ENCOUNTER FOR ANTINEOPLASTIC CHEMOTHERAPY: ICD-10-CM

## 2018-03-29 LAB
ALBUMIN SERPL BCP-MCNC: 3.1 G/DL
ALP SERPL-CCNC: 64 U/L
ALT SERPL W/O P-5'-P-CCNC: 11 U/L
ANION GAP SERPL CALC-SCNC: 11 MMOL/L
AST SERPL-CCNC: 16 U/L
BILIRUB SERPL-MCNC: 0.4 MG/DL
BUN SERPL-MCNC: 24 MG/DL
CALCIUM SERPL-MCNC: 9.2 MG/DL
CHLORIDE SERPL-SCNC: 107 MMOL/L
CO2 SERPL-SCNC: 23 MMOL/L
CREAT SERPL-MCNC: 1 MG/DL
ERYTHROCYTE [DISTWIDTH] IN BLOOD BY AUTOMATED COUNT: 16.5 %
EST. GFR  (AFRICAN AMERICAN): >60 ML/MIN/1.73 M^2
EST. GFR  (NON AFRICAN AMERICAN): 59.7 ML/MIN/1.73 M^2
GLUCOSE SERPL-MCNC: 142 MG/DL
HCT VFR BLD AUTO: 32.6 %
HGB BLD-MCNC: 10.4 G/DL
IMM GRANULOCYTES # BLD AUTO: 0.01 K/UL
MCH RBC QN AUTO: 29.1 PG
MCHC RBC AUTO-ENTMCNC: 31.9 G/DL
MCV RBC AUTO: 91 FL
NEUTROPHILS # BLD AUTO: 3.3 K/UL
PLATELET # BLD AUTO: 236 K/UL
PMV BLD AUTO: 10.3 FL
POTASSIUM SERPL-SCNC: 3.8 MMOL/L
PROT SERPL-MCNC: 6.8 G/DL
RBC # BLD AUTO: 3.58 M/UL
SODIUM SERPL-SCNC: 141 MMOL/L
T4 FREE SERPL-MCNC: <0.4 NG/DL
TSH SERPL DL<=0.005 MIU/L-ACNC: 28.07 UIU/ML
WBC # BLD AUTO: 4.62 K/UL

## 2018-03-29 PROCEDURE — C9492 INJECTION, DURVALUMAB: HCPCS | Mod: TB | Performed by: INTERNAL MEDICINE

## 2018-03-29 PROCEDURE — 3078F DIAST BP <80 MM HG: CPT | Mod: CPTII,S$GLB,, | Performed by: INTERNAL MEDICINE

## 2018-03-29 PROCEDURE — 84439 ASSAY OF FREE THYROXINE: CPT

## 2018-03-29 PROCEDURE — 63600175 PHARM REV CODE 636 W HCPCS: Performed by: INTERNAL MEDICINE

## 2018-03-29 PROCEDURE — 85027 COMPLETE CBC AUTOMATED: CPT

## 2018-03-29 PROCEDURE — 25000003 PHARM REV CODE 250: Performed by: INTERNAL MEDICINE

## 2018-03-29 PROCEDURE — 80053 COMPREHEN METABOLIC PANEL: CPT

## 2018-03-29 PROCEDURE — 99214 OFFICE O/P EST MOD 30 MIN: CPT | Mod: S$GLB,,, | Performed by: INTERNAL MEDICINE

## 2018-03-29 PROCEDURE — 96413 CHEMO IV INFUSION 1 HR: CPT

## 2018-03-29 PROCEDURE — A4216 STERILE WATER/SALINE, 10 ML: HCPCS | Performed by: INTERNAL MEDICINE

## 2018-03-29 PROCEDURE — 3074F SYST BP LT 130 MM HG: CPT | Mod: CPTII,S$GLB,, | Performed by: INTERNAL MEDICINE

## 2018-03-29 PROCEDURE — 99999 PR PBB SHADOW E&M-EST. PATIENT-LVL III: CPT | Mod: PBBFAC,,, | Performed by: INTERNAL MEDICINE

## 2018-03-29 PROCEDURE — 84443 ASSAY THYROID STIM HORMONE: CPT

## 2018-03-29 RX ORDER — HEPARIN 100 UNIT/ML
500 SYRINGE INTRAVENOUS
Status: DISCONTINUED | OUTPATIENT
Start: 2018-03-29 | End: 2018-03-29 | Stop reason: HOSPADM

## 2018-03-29 RX ORDER — LEVOTHYROXINE SODIUM 100 UG/1
100 TABLET ORAL DAILY
Qty: 30 TABLET | Refills: 11 | Status: SHIPPED | OUTPATIENT
Start: 2018-03-29 | End: 2018-05-23

## 2018-03-29 RX ORDER — SODIUM CHLORIDE 0.9 % (FLUSH) 0.9 %
10 SYRINGE (ML) INJECTION
Status: COMPLETED | OUTPATIENT
Start: 2018-03-29 | End: 2018-03-29

## 2018-03-29 RX ORDER — SODIUM CHLORIDE 0.9 % (FLUSH) 0.9 %
10 SYRINGE (ML) INJECTION
Status: DISCONTINUED | OUTPATIENT
Start: 2018-03-29 | End: 2018-03-29 | Stop reason: HOSPADM

## 2018-03-29 RX ORDER — SODIUM CHLORIDE 0.9 % (FLUSH) 0.9 %
10 SYRINGE (ML) INJECTION
Status: CANCELLED | OUTPATIENT
Start: 2018-03-30

## 2018-03-29 RX ORDER — HEPARIN 100 UNIT/ML
500 SYRINGE INTRAVENOUS
Status: CANCELLED | OUTPATIENT
Start: 2018-03-29

## 2018-03-29 RX ORDER — DIPHENHYDRAMINE HYDROCHLORIDE 50 MG/ML
50 INJECTION INTRAMUSCULAR; INTRAVENOUS ONCE AS NEEDED
Status: DISCONTINUED | OUTPATIENT
Start: 2018-03-29 | End: 2018-03-29 | Stop reason: HOSPADM

## 2018-03-29 RX ORDER — EPINEPHRINE 0.3 MG/.3ML
1 INJECTION SUBCUTANEOUS ONCE AS NEEDED
Status: CANCELLED | OUTPATIENT
Start: 2018-03-30 | End: 2018-03-30

## 2018-03-29 RX ORDER — HEPARIN 100 UNIT/ML
500 SYRINGE INTRAVENOUS
Status: COMPLETED | OUTPATIENT
Start: 2018-03-29 | End: 2018-03-29

## 2018-03-29 RX ORDER — EPINEPHRINE 0.3 MG/.3ML
1 INJECTION SUBCUTANEOUS ONCE AS NEEDED
Status: DISCONTINUED | OUTPATIENT
Start: 2018-03-29 | End: 2018-03-29 | Stop reason: HOSPADM

## 2018-03-29 RX ORDER — DIPHENHYDRAMINE HYDROCHLORIDE 50 MG/ML
50 INJECTION INTRAMUSCULAR; INTRAVENOUS ONCE AS NEEDED
Status: CANCELLED | OUTPATIENT
Start: 2018-03-30 | End: 2018-03-30

## 2018-03-29 RX ORDER — SODIUM CHLORIDE 0.9 % (FLUSH) 0.9 %
10 SYRINGE (ML) INJECTION
Status: CANCELLED | OUTPATIENT
Start: 2018-03-29

## 2018-03-29 RX ORDER — HEPARIN 100 UNIT/ML
500 SYRINGE INTRAVENOUS
Status: CANCELLED | OUTPATIENT
Start: 2018-03-30

## 2018-03-29 RX ADMIN — HEPARIN 500 UNITS: 100 SYRINGE at 02:03

## 2018-03-29 RX ADMIN — SODIUM CHLORIDE, PRESERVATIVE FREE 10 ML: 5 INJECTION INTRAVENOUS at 10:03

## 2018-03-29 RX ADMIN — SODIUM CHLORIDE: 9 INJECTION, SOLUTION INTRAVENOUS at 01:03

## 2018-03-29 RX ADMIN — SODIUM CHLORIDE, PRESERVATIVE FREE 10 ML: 5 INJECTION INTRAVENOUS at 02:03

## 2018-03-29 RX ADMIN — HEPARIN 500 UNITS: 100 SYRINGE at 10:03

## 2018-03-29 RX ADMIN — SODIUM CHLORIDE 805 MG: 9 INJECTION, SOLUTION INTRAVENOUS at 01:03

## 2018-03-29 NOTE — PLAN OF CARE
Problem: Patient Care Overview  Goal: Plan of Care Review  Outcome: Ongoing (interventions implemented as appropriate)  Pt tolerated Imfinzi with no complications. Pt instructed to call MD with any problems. NAD. Pt discharged home independently with spouse at side.

## 2018-04-10 RX ORDER — HEPARIN 100 UNIT/ML
500 SYRINGE INTRAVENOUS
Status: CANCELLED | OUTPATIENT
Start: 2018-04-13

## 2018-04-10 RX ORDER — EPINEPHRINE 0.3 MG/.3ML
1 INJECTION SUBCUTANEOUS ONCE AS NEEDED
Status: CANCELLED | OUTPATIENT
Start: 2018-04-13 | End: 2018-04-13

## 2018-04-10 RX ORDER — DIPHENHYDRAMINE HYDROCHLORIDE 50 MG/ML
50 INJECTION INTRAMUSCULAR; INTRAVENOUS ONCE AS NEEDED
Status: CANCELLED | OUTPATIENT
Start: 2018-04-13 | End: 2018-04-13

## 2018-04-10 RX ORDER — SODIUM CHLORIDE 0.9 % (FLUSH) 0.9 %
10 SYRINGE (ML) INJECTION
Status: CANCELLED | OUTPATIENT
Start: 2018-04-13

## 2018-04-13 ENCOUNTER — INFUSION (OUTPATIENT)
Dept: INFUSION THERAPY | Facility: HOSPITAL | Age: 63
End: 2018-04-13
Attending: INTERNAL MEDICINE
Payer: COMMERCIAL

## 2018-04-13 VITALS
SYSTOLIC BLOOD PRESSURE: 121 MMHG | DIASTOLIC BLOOD PRESSURE: 70 MMHG | WEIGHT: 183 LBS | HEART RATE: 82 BPM | HEIGHT: 66 IN | BODY MASS INDEX: 29.41 KG/M2 | RESPIRATION RATE: 18 BRPM | TEMPERATURE: 98 F

## 2018-04-13 DIAGNOSIS — Z51.11 ENCOUNTER FOR ANTINEOPLASTIC CHEMOTHERAPY: ICD-10-CM

## 2018-04-13 DIAGNOSIS — C34.2 PRIMARY CANCER OF RIGHT MIDDLE LOBE OF LUNG: Primary | ICD-10-CM

## 2018-04-13 LAB
ALBUMIN SERPL BCP-MCNC: 3.2 G/DL
ALP SERPL-CCNC: 65 U/L
ALT SERPL W/O P-5'-P-CCNC: 17 U/L
ANION GAP SERPL CALC-SCNC: 6 MMOL/L
AST SERPL-CCNC: 22 U/L
BILIRUB SERPL-MCNC: 0.6 MG/DL
BUN SERPL-MCNC: 24 MG/DL
CALCIUM SERPL-MCNC: 9.4 MG/DL
CHLORIDE SERPL-SCNC: 105 MMOL/L
CO2 SERPL-SCNC: 28 MMOL/L
CREAT SERPL-MCNC: 1 MG/DL
ERYTHROCYTE [DISTWIDTH] IN BLOOD BY AUTOMATED COUNT: 15.8 %
EST. GFR  (AFRICAN AMERICAN): >60 ML/MIN/1.73 M^2
EST. GFR  (NON AFRICAN AMERICAN): 59.7 ML/MIN/1.73 M^2
GLUCOSE SERPL-MCNC: 92 MG/DL
HCT VFR BLD AUTO: 33 %
HGB BLD-MCNC: 10.4 G/DL
IMM GRANULOCYTES # BLD AUTO: 0.01 K/UL
MCH RBC QN AUTO: 28.1 PG
MCHC RBC AUTO-ENTMCNC: 31.5 G/DL
MCV RBC AUTO: 89 FL
NEUTROPHILS # BLD AUTO: 2.7 K/UL
PLATELET # BLD AUTO: 221 K/UL
PMV BLD AUTO: 10 FL
POTASSIUM SERPL-SCNC: 4.1 MMOL/L
PROT SERPL-MCNC: 7 G/DL
RBC # BLD AUTO: 3.7 M/UL
SODIUM SERPL-SCNC: 139 MMOL/L
T4 FREE SERPL-MCNC: 0.47 NG/DL
TSH SERPL DL<=0.005 MIU/L-ACNC: 50.27 UIU/ML
WBC # BLD AUTO: 3.87 K/UL

## 2018-04-13 PROCEDURE — A4216 STERILE WATER/SALINE, 10 ML: HCPCS | Performed by: INTERNAL MEDICINE

## 2018-04-13 PROCEDURE — 84439 ASSAY OF FREE THYROXINE: CPT

## 2018-04-13 PROCEDURE — 25000003 PHARM REV CODE 250: Performed by: INTERNAL MEDICINE

## 2018-04-13 PROCEDURE — 80053 COMPREHEN METABOLIC PANEL: CPT

## 2018-04-13 PROCEDURE — 63600175 PHARM REV CODE 636 W HCPCS: Performed by: INTERNAL MEDICINE

## 2018-04-13 PROCEDURE — 96413 CHEMO IV INFUSION 1 HR: CPT

## 2018-04-13 PROCEDURE — 84443 ASSAY THYROID STIM HORMONE: CPT

## 2018-04-13 PROCEDURE — C9492 INJECTION, DURVALUMAB: HCPCS | Mod: TB | Performed by: INTERNAL MEDICINE

## 2018-04-13 PROCEDURE — 85027 COMPLETE CBC AUTOMATED: CPT

## 2018-04-13 RX ORDER — SODIUM CHLORIDE 0.9 % (FLUSH) 0.9 %
10 SYRINGE (ML) INJECTION
Status: DISCONTINUED | OUTPATIENT
Start: 2018-04-13 | End: 2018-04-13 | Stop reason: HOSPADM

## 2018-04-13 RX ORDER — DIPHENHYDRAMINE HYDROCHLORIDE 50 MG/ML
50 INJECTION INTRAMUSCULAR; INTRAVENOUS ONCE AS NEEDED
Status: DISCONTINUED | OUTPATIENT
Start: 2018-04-13 | End: 2018-04-13 | Stop reason: HOSPADM

## 2018-04-13 RX ORDER — HEPARIN 100 UNIT/ML
500 SYRINGE INTRAVENOUS
Status: DISCONTINUED | OUTPATIENT
Start: 2018-04-13 | End: 2018-04-13 | Stop reason: HOSPADM

## 2018-04-13 RX ORDER — SODIUM CHLORIDE 0.9 % (FLUSH) 0.9 %
10 SYRINGE (ML) INJECTION
Status: COMPLETED | OUTPATIENT
Start: 2018-04-13 | End: 2018-04-13

## 2018-04-13 RX ORDER — EPINEPHRINE 0.3 MG/.3ML
1 INJECTION SUBCUTANEOUS ONCE AS NEEDED
Status: DISCONTINUED | OUTPATIENT
Start: 2018-04-13 | End: 2018-04-13 | Stop reason: HOSPADM

## 2018-04-13 RX ORDER — HEPARIN 100 UNIT/ML
500 SYRINGE INTRAVENOUS
Status: CANCELLED | OUTPATIENT
Start: 2018-04-13

## 2018-04-13 RX ORDER — HEPARIN 100 UNIT/ML
500 SYRINGE INTRAVENOUS
Status: COMPLETED | OUTPATIENT
Start: 2018-04-13 | End: 2018-04-13

## 2018-04-13 RX ORDER — SODIUM CHLORIDE 0.9 % (FLUSH) 0.9 %
10 SYRINGE (ML) INJECTION
Status: CANCELLED | OUTPATIENT
Start: 2018-04-13

## 2018-04-13 RX ADMIN — HEPARIN 500 UNITS: 100 SYRINGE at 11:04

## 2018-04-13 RX ADMIN — SODIUM CHLORIDE: 9 INJECTION, SOLUTION INTRAVENOUS at 12:04

## 2018-04-13 RX ADMIN — HEPARIN 500 UNITS: 100 SYRINGE at 01:04

## 2018-04-13 RX ADMIN — DURVALUMAB 805 MG: 500 INJECTION, SOLUTION INTRAVENOUS at 12:04

## 2018-04-13 RX ADMIN — SODIUM CHLORIDE, PRESERVATIVE FREE 10 ML: 5 INJECTION INTRAVENOUS at 01:04

## 2018-04-13 RX ADMIN — SODIUM CHLORIDE, PRESERVATIVE FREE 10 ML: 5 INJECTION INTRAVENOUS at 11:04

## 2018-04-13 NOTE — PLAN OF CARE
Problem: Patient Care Overview  Goal: Plan of Care Review  1400-Patient tolerated treatment well, MD was on the unit and saw patient at chairside. Discharged without complaints or S/S of adverse event. AVS given.  Instructed to call provider for any questions or concerns.

## 2018-04-13 NOTE — NURSING
Pt arrived for port access and labs.  Port flushes easily with good blood return, labs sent.  Port left access for chemo today.  Pt now waiting on lab results.

## 2018-04-13 NOTE — PLAN OF CARE
Problem: Patient Care Overview  Goal: Individualization & Mutuality  Left chest PAC  Warm blankets   Outcome: Ongoing (interventions implemented as appropriate)  1200-Labs , hx, and medications reviewed, no MD appointment this week. Assessment completed. Discussed plan of care with patient. Patient in agreement. Chair reclined and warm blanket and snack offered.

## 2018-04-20 ENCOUNTER — OFFICE VISIT (OUTPATIENT)
Dept: FAMILY MEDICINE | Facility: CLINIC | Age: 63
End: 2018-04-20
Payer: COMMERCIAL

## 2018-04-20 VITALS
DIASTOLIC BLOOD PRESSURE: 100 MMHG | SYSTOLIC BLOOD PRESSURE: 120 MMHG | BODY MASS INDEX: 30.44 KG/M2 | OXYGEN SATURATION: 98 % | WEIGHT: 189.38 LBS | HEIGHT: 66 IN | HEART RATE: 99 BPM | TEMPERATURE: 99 F

## 2018-04-20 DIAGNOSIS — C77.9 REGIONAL LYMPH NODE METASTASIS PRESENT: ICD-10-CM

## 2018-04-20 DIAGNOSIS — D05.12 DUCTAL CARCINOMA IN SITU (DCIS) OF LEFT BREAST: ICD-10-CM

## 2018-04-20 DIAGNOSIS — E03.2 HYPOTHYROIDISM DUE TO MEDICATION: ICD-10-CM

## 2018-04-20 DIAGNOSIS — C34.2 PRIMARY CANCER OF RIGHT MIDDLE LOBE OF LUNG: ICD-10-CM

## 2018-04-20 DIAGNOSIS — I10 ESSENTIAL HYPERTENSION: Primary | ICD-10-CM

## 2018-04-20 DIAGNOSIS — Z85.3 HISTORY OF LEFT BREAST CANCER: ICD-10-CM

## 2018-04-20 PROCEDURE — 99999 PR PBB SHADOW E&M-EST. PATIENT-LVL III: CPT | Mod: PBBFAC,,, | Performed by: FAMILY MEDICINE

## 2018-04-20 PROCEDURE — 3080F DIAST BP >= 90 MM HG: CPT | Mod: CPTII,S$GLB,, | Performed by: FAMILY MEDICINE

## 2018-04-20 PROCEDURE — 99214 OFFICE O/P EST MOD 30 MIN: CPT | Mod: S$GLB,,, | Performed by: FAMILY MEDICINE

## 2018-04-20 PROCEDURE — 3074F SYST BP LT 130 MM HG: CPT | Mod: CPTII,S$GLB,, | Performed by: FAMILY MEDICINE

## 2018-04-20 RX ORDER — AMLODIPINE BESYLATE 5 MG/1
5 TABLET ORAL DAILY
Qty: 30 TABLET | Refills: 1 | Status: SHIPPED | OUTPATIENT
Start: 2018-04-20 | End: 2018-05-15 | Stop reason: SDUPTHER

## 2018-04-20 NOTE — PATIENT INSTRUCTIONS

## 2018-04-22 NOTE — PROGRESS NOTES
Routine Office Visit    Patient Name: Fauzia Kirk    : 1955  MRN: [id number]    Subjective:  Fauzia is a 62 y.o. female who presents today for     1. Essential hypertension - pt is here for her blood pressure follow-up. She feels her current blood pressure regimen is not working well for her. She states she felt that the verapamil worked better. She was advised to stop the verapamil at some point. She would like to discuss other options. She was unable to tolerate ace because she says it caused her to cough. She does not want anything in the ace/arb family.  She has not taken any bp medications today.   2. She has right lung cancer (hx of breast cancer) - currently seeing heme/onc Dr. Mai -     Review of Systems   Constitutional: Negative for chills and fever.   HENT: Negative for congestion.    Eyes: Negative for blurred vision.   Respiratory: Negative for cough.    Cardiovascular: Negative for chest pain.   Gastrointestinal: Negative for abdominal pain, constipation, diarrhea, heartburn, nausea and vomiting.   Genitourinary: Negative for dysuria.   Musculoskeletal: Negative for myalgias.   Skin: Negative for itching and rash.   Neurological: Negative for dizziness and headaches.   Psychiatric/Behavioral: Negative for depression.       Active Problem List  Patient Active Problem List   Diagnosis    Hypertension    History of left breast cancer    s/p L mastectomy, SLNBx     Ductal carcinoma in situ (DCIS) of left breast    Primary cancer of right middle lobe of lung    Regional lymph node metastasis present    Encounter for antineoplastic chemotherapy    Hyperthyroidism    Hypothyroidism due to medication       Past Surgical History  Past Surgical History:   Procedure Laterality Date    BREAST LUMPECTOMY       SECTION, CLASSIC      LIPOMA RESECTION      MASTECTOMY         Family History  Family History   Problem Relation Age of Onset    Lymphoma Mother     Prostate cancer  "Father     Stomach cancer Sister     Breast cancer Sister     Diabetes Brother     Breast cancer Paternal Aunt     Breast cancer Paternal Grandmother        Social History  Social History     Social History    Marital status:      Spouse name: N/A    Number of children: N/A    Years of education: N/A     Occupational History    Not on file.     Social History Main Topics    Smoking status: Never Smoker    Smokeless tobacco: Never Used    Alcohol use No    Drug use: No    Sexual activity: Yes     Partners: Male     Other Topics Concern    Not on file     Social History Narrative    No narrative on file       Medications and Allergies  Reviewed and updated.   Current Outpatient Prescriptions   Medication Sig    gabapentin (NEURONTIN) 300 MG capsule Take 1 capsule (300 mg total) by mouth every evening.    levothyroxine (SYNTHROID) 100 MCG tablet Take 1 tablet (100 mcg total) by mouth once daily.    lidocaine-prilocaine (EMLA) cream Apply topically as needed.    omeprazole (PRILOSEC) 40 MG capsule Take 1 capsule (40 mg total) by mouth once daily.    amLODIPine (NORVASC) 5 MG tablet Take 1 tablet (5 mg total) by mouth once daily.     No current facility-administered medications for this visit.        Physical Exam  BP (!) 120/100 (BP Location: Right arm, Patient Position: Sitting, BP Method: Large (Manual))   Pulse 99   Temp 98.5 °F (36.9 °C) (Oral)   Ht 5' 6" (1.676 m)   Wt 85.9 kg (189 lb 6 oz)   SpO2 98%   BMI 30.57 kg/m²   Physical Exam   Constitutional: She is oriented to person, place, and time. She appears well-developed and well-nourished.   HENT:   Head: Normocephalic and atraumatic.   Eyes: Conjunctivae and EOM are normal. Pupils are equal, round, and reactive to light.   Neck: Normal range of motion. Neck supple.   Cardiovascular: Normal rate, regular rhythm and normal heart sounds.  Exam reveals no gallop and no friction rub.    No murmur heard.  Pulmonary/Chest: Breath " sounds normal. No respiratory distress.   Abdominal: Soft. Bowel sounds are normal. She exhibits no distension. There is no tenderness.   Musculoskeletal: Normal range of motion.   Lymphadenopathy:     She has no cervical adenopathy.   Neurological: She is alert and oriented to person, place, and time.   Skin: Skin is warm.   Psychiatric: She has a normal mood and affect.         Assessment/Plan:  Fauzia Kirk is a 62 y.o. female who presents today for :    Problem List Items Addressed This Visit        Cardiac/Vascular    Hypertension - Primary    Relevant Medications    amLODIPine (NORVASC) 5 MG tablet  Stop losartan / hctz  Common side effects of this medication were discussed with the patient. Questions regarding medications were discussed during this visit.   F/u in 4 weeks with me         Oncology    Ductal carcinoma in situ (DCIS) of left breast    History of left breast cancer    Primary cancer of right middle lobe of lung    Regional lymph node metastasis present    Continue f/u with heme/onc - Dr. Mai       Endocrine    Hypothyroidism due to medication  The current medical regimen is effective;  continue present plan and medications.              Follow-up in about 4 weeks (around 5/18/2018), or if symptoms worsen or fail to improve.

## 2018-04-27 ENCOUNTER — OFFICE VISIT (OUTPATIENT)
Dept: HEMATOLOGY/ONCOLOGY | Facility: CLINIC | Age: 63
End: 2018-04-27
Payer: COMMERCIAL

## 2018-04-27 ENCOUNTER — TELEPHONE (OUTPATIENT)
Dept: HEMATOLOGY/ONCOLOGY | Facility: CLINIC | Age: 63
End: 2018-04-27

## 2018-04-27 ENCOUNTER — INFUSION (OUTPATIENT)
Dept: INFUSION THERAPY | Facility: HOSPITAL | Age: 63
End: 2018-04-27
Attending: INTERNAL MEDICINE
Payer: COMMERCIAL

## 2018-04-27 VITALS
SYSTOLIC BLOOD PRESSURE: 141 MMHG | HEART RATE: 94 BPM | RESPIRATION RATE: 16 BRPM | TEMPERATURE: 98 F | DIASTOLIC BLOOD PRESSURE: 96 MMHG | BODY MASS INDEX: 30.25 KG/M2 | WEIGHT: 187.38 LBS

## 2018-04-27 VITALS
RESPIRATION RATE: 18 BRPM | WEIGHT: 187.38 LBS | TEMPERATURE: 98 F | BODY MASS INDEX: 30.11 KG/M2 | SYSTOLIC BLOOD PRESSURE: 119 MMHG | DIASTOLIC BLOOD PRESSURE: 62 MMHG | HEART RATE: 88 BPM | HEIGHT: 66 IN

## 2018-04-27 DIAGNOSIS — E03.2 HYPOTHYROIDISM DUE TO MEDICATION: ICD-10-CM

## 2018-04-27 DIAGNOSIS — Z51.11 ENCOUNTER FOR ANTINEOPLASTIC CHEMOTHERAPY: ICD-10-CM

## 2018-04-27 DIAGNOSIS — C34.2 PRIMARY CANCER OF RIGHT MIDDLE LOBE OF LUNG: Primary | ICD-10-CM

## 2018-04-27 DIAGNOSIS — Z85.3 HISTORY OF LEFT BREAST CANCER: ICD-10-CM

## 2018-04-27 LAB
ALBUMIN SERPL BCP-MCNC: 3.2 G/DL
ALP SERPL-CCNC: 76 U/L
ALT SERPL W/O P-5'-P-CCNC: 16 U/L
ANION GAP SERPL CALC-SCNC: 7 MMOL/L
AST SERPL-CCNC: 22 U/L
BILIRUB SERPL-MCNC: 0.7 MG/DL
BUN SERPL-MCNC: 18 MG/DL
CALCIUM SERPL-MCNC: 9.3 MG/DL
CHLORIDE SERPL-SCNC: 109 MMOL/L
CO2 SERPL-SCNC: 25 MMOL/L
CREAT SERPL-MCNC: 0.8 MG/DL
ERYTHROCYTE [DISTWIDTH] IN BLOOD BY AUTOMATED COUNT: 15 %
EST. GFR  (AFRICAN AMERICAN): >60 ML/MIN/1.73 M^2
EST. GFR  (NON AFRICAN AMERICAN): >60 ML/MIN/1.73 M^2
GLUCOSE SERPL-MCNC: 95 MG/DL
HCT VFR BLD AUTO: 31.9 %
HGB BLD-MCNC: 10.2 G/DL
IMM GRANULOCYTES # BLD AUTO: 0.02 K/UL
MCH RBC QN AUTO: 29.1 PG
MCHC RBC AUTO-ENTMCNC: 32 G/DL
MCV RBC AUTO: 91 FL
NEUTROPHILS # BLD AUTO: 3.3 K/UL
PLATELET # BLD AUTO: 197 K/UL
PMV BLD AUTO: 10 FL
POTASSIUM SERPL-SCNC: 4.2 MMOL/L
PROT SERPL-MCNC: 7.1 G/DL
RBC # BLD AUTO: 3.5 M/UL
SODIUM SERPL-SCNC: 141 MMOL/L
TSH SERPL DL<=0.005 MIU/L-ACNC: 2.39 UIU/ML
WBC # BLD AUTO: 4.61 K/UL

## 2018-04-27 PROCEDURE — 85027 COMPLETE CBC AUTOMATED: CPT

## 2018-04-27 PROCEDURE — A4216 STERILE WATER/SALINE, 10 ML: HCPCS | Performed by: INTERNAL MEDICINE

## 2018-04-27 PROCEDURE — 99999 PR PBB SHADOW E&M-EST. PATIENT-LVL III: CPT | Mod: PBBFAC,,, | Performed by: INTERNAL MEDICINE

## 2018-04-27 PROCEDURE — 3080F DIAST BP >= 90 MM HG: CPT | Mod: CPTII,S$GLB,, | Performed by: INTERNAL MEDICINE

## 2018-04-27 PROCEDURE — 96413 CHEMO IV INFUSION 1 HR: CPT

## 2018-04-27 PROCEDURE — 3077F SYST BP >= 140 MM HG: CPT | Mod: CPTII,S$GLB,, | Performed by: INTERNAL MEDICINE

## 2018-04-27 PROCEDURE — 99214 OFFICE O/P EST MOD 30 MIN: CPT | Mod: S$GLB,,, | Performed by: INTERNAL MEDICINE

## 2018-04-27 PROCEDURE — 84443 ASSAY THYROID STIM HORMONE: CPT

## 2018-04-27 PROCEDURE — 80053 COMPREHEN METABOLIC PANEL: CPT

## 2018-04-27 PROCEDURE — C9492 INJECTION, DURVALUMAB: HCPCS | Mod: TB | Performed by: INTERNAL MEDICINE

## 2018-04-27 PROCEDURE — 63600175 PHARM REV CODE 636 W HCPCS: Performed by: INTERNAL MEDICINE

## 2018-04-27 PROCEDURE — 63600175 PHARM REV CODE 636 W HCPCS: Mod: TB | Performed by: INTERNAL MEDICINE

## 2018-04-27 PROCEDURE — 25000003 PHARM REV CODE 250: Performed by: INTERNAL MEDICINE

## 2018-04-27 RX ORDER — HEPARIN 100 UNIT/ML
500 SYRINGE INTRAVENOUS
Status: DISCONTINUED | OUTPATIENT
Start: 2018-04-27 | End: 2018-04-27 | Stop reason: HOSPADM

## 2018-04-27 RX ORDER — SODIUM CHLORIDE 0.9 % (FLUSH) 0.9 %
10 SYRINGE (ML) INJECTION
Status: CANCELLED | OUTPATIENT
Start: 2018-04-27

## 2018-04-27 RX ORDER — SODIUM CHLORIDE 0.9 % (FLUSH) 0.9 %
10 SYRINGE (ML) INJECTION
Status: DISCONTINUED | OUTPATIENT
Start: 2018-04-27 | End: 2018-04-27 | Stop reason: HOSPADM

## 2018-04-27 RX ORDER — DIPHENHYDRAMINE HYDROCHLORIDE 50 MG/ML
50 INJECTION INTRAMUSCULAR; INTRAVENOUS ONCE AS NEEDED
Status: CANCELLED | OUTPATIENT
Start: 2018-04-27 | End: 2018-04-27

## 2018-04-27 RX ORDER — EPINEPHRINE 0.3 MG/.3ML
1 INJECTION SUBCUTANEOUS ONCE AS NEEDED
Status: DISCONTINUED | OUTPATIENT
Start: 2018-04-27 | End: 2018-04-27 | Stop reason: HOSPADM

## 2018-04-27 RX ORDER — EPINEPHRINE 0.3 MG/.3ML
1 INJECTION SUBCUTANEOUS ONCE AS NEEDED
Status: CANCELLED | OUTPATIENT
Start: 2018-04-27 | End: 2018-04-27

## 2018-04-27 RX ORDER — DIPHENHYDRAMINE HYDROCHLORIDE 50 MG/ML
50 INJECTION INTRAMUSCULAR; INTRAVENOUS ONCE AS NEEDED
Status: DISCONTINUED | OUTPATIENT
Start: 2018-04-27 | End: 2018-04-27 | Stop reason: HOSPADM

## 2018-04-27 RX ORDER — HEPARIN 100 UNIT/ML
500 SYRINGE INTRAVENOUS
Status: CANCELLED | OUTPATIENT
Start: 2018-04-27

## 2018-04-27 RX ORDER — HEPARIN 100 UNIT/ML
500 SYRINGE INTRAVENOUS
Status: COMPLETED | OUTPATIENT
Start: 2018-04-27 | End: 2018-04-27

## 2018-04-27 RX ADMIN — SODIUM CHLORIDE, PRESERVATIVE FREE 10 ML: 5 INJECTION INTRAVENOUS at 11:04

## 2018-04-27 RX ADMIN — SODIUM CHLORIDE: 0.9 INJECTION, SOLUTION INTRAVENOUS at 09:04

## 2018-04-27 RX ADMIN — HEPARIN 500 UNITS: 100 SYRINGE at 08:04

## 2018-04-27 RX ADMIN — HEPARIN 500 UNITS: 100 SYRINGE at 11:04

## 2018-04-27 RX ADMIN — DURVALUMAB 805 MG: 120 INJECTION, SOLUTION INTRAVENOUS at 10:04

## 2018-04-27 NOTE — TELEPHONE ENCOUNTER
----- Message from Maverick Alvarez sent at 4/27/2018 11:44 AM CDT -----  Contact: Pt   Pt will like to reschedule 5/24 lab and appt with dr gunderson to Friday 5/25     Pt Contact:[phone]

## 2018-04-27 NOTE — PLAN OF CARE
Problem: Patient Care Overview  Goal: Individualization & Mutuality  Left chest PAC  Warm blankets   Outcome: Ongoing (interventions implemented as appropriate)  0930-Labs , hx, and medications reviewed, patient was seen by Md prior to arrival. Assessment completed. Discussed plan of care with patient. Patient in agreement. Chair reclined and warm blanket and snack offered.

## 2018-04-27 NOTE — TELEPHONE ENCOUNTER
Called and spoke with patient in regards to her moving her chemo to Thursday 5/24. I told her I would speak with the nurse to make sure it was okay that she have it one day before and call her back. Patient voiced appreciation.

## 2018-04-27 NOTE — NURSING
0830  Pt here for PAC lab draw, no new complaints or concerns; labs drawn per protocol, tolerated well.

## 2018-04-27 NOTE — PROGRESS NOTES
Subjective:       Patient ID: Fauzia Kirk is a 62 y.o. female.    Chief Complaint: No chief complaint on file.    HPI Ms. Kirk is a very pleasant 62-year-old  female who returned  for F/U of right lung cancer.    She has been receiving adjuvant therapy with Durvalumab.  She has had 12 treatments thus far.    She has tolerated that well.  She did have some transient hyperthyroidism which resolved spontaneously.  She then developed hypothyroidism for which she has been on thyroid replacement(100mcg).  She had changed to Synthroid because of itching; however, that has continued to be associated with some diffuse pruritus.  She's been using Benadryl on an as-needed basis for that.    Otherwise, she's been feeling well.  Appetite and bowel function have been good and she has no shortness of breath.  She has occasional cough which she relates to sinus drainage.  She has some right leg pain which is unchanged.                Her last CT scan of the chest in February 2018 showed a stable  2.7 x 4.3 cm right middle lobe density with surrounding groundglass opacity consistent with post radiation changes.  This is stable 4 mm left lower lobe pulmonary nodule      In December 2016 she began to have some blood in her mucus after coughing.In  April she developed a persistent cough and saw her physician on April 18.  Chest x-ray at that time showed a rounded opacity in the right lung overlying the major fissure.   Chest CT in May showed a 4.8 x 4.0 cm, rounded, soft tissue mass in the middle lobe between the medial and lateral segments. There was pre-carinal adenopathy.    Subsequently she underwent bronchoscopy with EBUS on 6/9/17. Needle biopsy of the medistinal nodes was positive for poorly differentiated carcinoma with squamoid features. The cells were positive for CK7, CK5/6, and P63 and are negative for CK20, GCDFP, TTF1, ER, NJ, Napsin and YEMI 3.This was felt to be most CW a new squamous lung cancer.    PET CT  yesterday demonstrated the following:a hypermetabolic, large mass in the right middle lobe with an SUV max of 20.23. There is hypermetabolic activity extending from this mass tracking along the pleura. There is a hypermetabolic right hilar lymph node demonstrating an SUV max of 20.2. In addition there is are 2 hypermetabolic subcarinal lymph nodes with the larger demonstrating an SUV max of 14.2. An additional right paratracheal lymph node is seen with an SUV max of 26.1. Findings are consistent with metastatic lung cancer.      Previous cancer history:She had a stage I, ER negative carcinoma of the    left breast in 1990, treated with lumpectomy and radiation therapy.  In      1993, she developed a stage I, ER positive carcinoma of the right breast     treated with lumpectomy and radiation.  In 1995, she developed left breast   cancer recurrence, treated with lumpectomy, brachytherapy and four cycles    of Adriamycin and Cytoxan.  In 1996, she developed a new right breast        cancer T2 N0 poorly differentiated, treated with four cycles of              preoperative Taxol followed by mastectomy.        On October 17, 2016 left mammogram showed increased calcifications in the left breast at 3:00.  On October 27 a biopsy showed DCIS with solid, cribriform, and micropapillary patterns.  Tumor was 95% ER positive at 95% MI positive    On November 7, 2016 she underwent left mastectomy which showed 8 mm of DCIS and negative margins.    She completed radiation together with weekly chemotherapy with carboplatin and Taxol  for stage IIIA disease.    She completed therapy on September 13 and received 7 chemotherapy treatments.    CT scan from September 29 showed that the right middle lobe mass had decreased to 4.2 x 2.3 cm from 4.8 x 4.3 cm.  Stable 4 mm pulmonary nodule in the left lower lobe.    Review of Systems   Constitutional: Negative for activity change, appetite change, fatigue, fever and unexpected weight change.    HENT: Positive for postnasal drip. Negative for trouble swallowing.    Respiratory: Negative for cough and shortness of breath.    Cardiovascular: Negative for chest pain.   Gastrointestinal: Negative for abdominal pain, constipation, nausea and vomiting.   Musculoskeletal: Negative for back pain.        Some right leg pain -unchanged   Skin:        pruritis   Neurological: Negative for headaches.   Psychiatric/Behavioral: Negative for dysphoric mood. The patient is not nervous/anxious.        Objective:      Physical Exam   Constitutional: She is oriented to person, place, and time. She appears well-developed and well-nourished.   HENT:   Mouth/Throat: No oropharyngeal exudate.   Eyes: No scleral icterus.   Cardiovascular: Normal rate and regular rhythm.    Pulmonary/Chest: Effort normal and breath sounds normal. She has no wheezes. She has no rales.       Abdominal: Soft. She exhibits no mass. There is no tenderness.   Lymphadenopathy:     She has no cervical adenopathy.     She has no axillary adenopathy.        Right: No supraclavicular adenopathy present.        Left: No supraclavicular adenopathy present.   Neurological: She is alert and oriented to person, place, and time.   Skin: No rash noted. No erythema.   Psychiatric: She has a normal mood and affect. Her behavior is normal. Thought content normal.       Assessment:     1. Primary cancer of right middle lobe of lung    2. Encounter for antineoplastic chemotherapy    3. History of left breast cancer    4. Hypothyroidism due to medication      3.  Treatment-induced hypothyroidism  Plan:     Continue Durvalumab. RTC 4 weeks.  Continue thyroid replacement.    Distress Screening Results: Psychosocial Distress screening score of Distress Score: 2 noted and reviewed. No intervention indicated.

## 2018-04-27 NOTE — PLAN OF CARE
Problem: Patient Care Overview  Goal: Plan of Care Review  1118-Patient tolerated treatment well. Discharged without complaints or S/S of adverse event. AVS given.  Instructed to call provider for any questions or concerns.

## 2018-04-27 NOTE — TELEPHONE ENCOUNTER
----- Message from Dorys Mcdonald MA sent at 4/27/2018  3:37 PM CDT -----  Contact: Pt    Per Dr Gunderson in 4 weeks she can come one day early and get her treatment on Thursday also  ----- Message -----  From: Wyatt uGnderson MD  Sent: 4/27/2018   3:36 PM  To: Dorys Mcdonald MA    Sees me in 4 weeks  ----- Message -----  From: Dorys Mcdonald MA  Sent: 4/27/2018   1:54 PM  To: Wyatt Gunderson MD    Patient due back in two weeks on Friday you have nothing can she come 1 day early to get the treatment?  ----- Message -----  From: Laly Tang  Sent: 4/27/2018   1:37 PM  To: Dorys Mcdonald MA    I called patient she would like her chemo the same day as she see's Dr. Gunderson please advise if this is okay. I called upstairs earlier and nobody answered the phone so I scheduled it the day after     ----- Message -----  From: Maverick Alvarez  Sent: 4/27/2018  11:44 AM  To: Laly Tang    Pt will like to reschedule 5/24 lab and appt with dr gunderson to Friday 5/25     Pt Contact:[phone]

## 2018-04-27 NOTE — TELEPHONE ENCOUNTER
Returned call, spoke with patient and let her know I rescheduled her chemo to 5/24 per Dr. Mai's okay. Patient voiced appreciation

## 2018-05-08 RX ORDER — HEPARIN 100 UNIT/ML
500 SYRINGE INTRAVENOUS
Status: CANCELLED | OUTPATIENT
Start: 2018-05-11

## 2018-05-08 RX ORDER — SODIUM CHLORIDE 0.9 % (FLUSH) 0.9 %
10 SYRINGE (ML) INJECTION
Status: CANCELLED | OUTPATIENT
Start: 2018-05-11

## 2018-05-08 RX ORDER — DIPHENHYDRAMINE HYDROCHLORIDE 50 MG/ML
50 INJECTION INTRAMUSCULAR; INTRAVENOUS ONCE AS NEEDED
Status: CANCELLED | OUTPATIENT
Start: 2018-05-11 | End: 2018-05-11

## 2018-05-08 RX ORDER — EPINEPHRINE 0.3 MG/.3ML
1 INJECTION SUBCUTANEOUS ONCE AS NEEDED
Status: CANCELLED | OUTPATIENT
Start: 2018-05-11 | End: 2018-05-11

## 2018-05-11 ENCOUNTER — INFUSION (OUTPATIENT)
Dept: INFUSION THERAPY | Facility: HOSPITAL | Age: 63
End: 2018-05-11
Attending: INTERNAL MEDICINE
Payer: COMMERCIAL

## 2018-05-11 VITALS
SYSTOLIC BLOOD PRESSURE: 123 MMHG | HEART RATE: 95 BPM | RESPIRATION RATE: 18 BRPM | DIASTOLIC BLOOD PRESSURE: 71 MMHG | TEMPERATURE: 98 F

## 2018-05-11 DIAGNOSIS — C34.2 PRIMARY CANCER OF RIGHT MIDDLE LOBE OF LUNG: Primary | ICD-10-CM

## 2018-05-11 DIAGNOSIS — Z51.11 ENCOUNTER FOR ANTINEOPLASTIC CHEMOTHERAPY: ICD-10-CM

## 2018-05-11 LAB
ALBUMIN SERPL BCP-MCNC: 3.3 G/DL
ALP SERPL-CCNC: 72 U/L
ALT SERPL W/O P-5'-P-CCNC: 11 U/L
ANION GAP SERPL CALC-SCNC: 6 MMOL/L
AST SERPL-CCNC: 13 U/L
BILIRUB SERPL-MCNC: 0.3 MG/DL
BUN SERPL-MCNC: 23 MG/DL
CALCIUM SERPL-MCNC: 9.5 MG/DL
CHLORIDE SERPL-SCNC: 107 MMOL/L
CO2 SERPL-SCNC: 28 MMOL/L
CREAT SERPL-MCNC: 0.8 MG/DL
ERYTHROCYTE [DISTWIDTH] IN BLOOD BY AUTOMATED COUNT: 14.1 %
EST. GFR  (AFRICAN AMERICAN): >60 ML/MIN/1.73 M^2
EST. GFR  (NON AFRICAN AMERICAN): >60 ML/MIN/1.73 M^2
GLUCOSE SERPL-MCNC: 74 MG/DL
HCT VFR BLD AUTO: 32.4 %
HGB BLD-MCNC: 10.2 G/DL
IMM GRANULOCYTES # BLD AUTO: 0.01 K/UL
MCH RBC QN AUTO: 28.5 PG
MCHC RBC AUTO-ENTMCNC: 31.5 G/DL
MCV RBC AUTO: 91 FL
NEUTROPHILS # BLD AUTO: 3.8 K/UL
PLATELET # BLD AUTO: 227 K/UL
PMV BLD AUTO: 9.7 FL
POTASSIUM SERPL-SCNC: 4.1 MMOL/L
PROT SERPL-MCNC: 7.4 G/DL
RBC # BLD AUTO: 3.58 M/UL
SODIUM SERPL-SCNC: 141 MMOL/L
WBC # BLD AUTO: 5.14 K/UL

## 2018-05-11 PROCEDURE — 85027 COMPLETE CBC AUTOMATED: CPT

## 2018-05-11 PROCEDURE — C9492 INJECTION, DURVALUMAB: HCPCS | Mod: TB | Performed by: INTERNAL MEDICINE

## 2018-05-11 PROCEDURE — 25000003 PHARM REV CODE 250: Performed by: INTERNAL MEDICINE

## 2018-05-11 PROCEDURE — A4216 STERILE WATER/SALINE, 10 ML: HCPCS | Performed by: INTERNAL MEDICINE

## 2018-05-11 PROCEDURE — 63600175 PHARM REV CODE 636 W HCPCS: Performed by: INTERNAL MEDICINE

## 2018-05-11 PROCEDURE — 36591 DRAW BLOOD OFF VENOUS DEVICE: CPT

## 2018-05-11 PROCEDURE — 80053 COMPREHEN METABOLIC PANEL: CPT

## 2018-05-11 PROCEDURE — 96413 CHEMO IV INFUSION 1 HR: CPT

## 2018-05-11 RX ORDER — HEPARIN 100 UNIT/ML
500 SYRINGE INTRAVENOUS
Status: CANCELLED | OUTPATIENT
Start: 2018-05-11

## 2018-05-11 RX ORDER — HEPARIN 100 UNIT/ML
500 SYRINGE INTRAVENOUS
Status: DISCONTINUED | OUTPATIENT
Start: 2018-05-11 | End: 2018-05-11 | Stop reason: HOSPADM

## 2018-05-11 RX ORDER — SODIUM CHLORIDE 0.9 % (FLUSH) 0.9 %
10 SYRINGE (ML) INJECTION
Status: COMPLETED | OUTPATIENT
Start: 2018-05-11 | End: 2018-05-11

## 2018-05-11 RX ORDER — SODIUM CHLORIDE 0.9 % (FLUSH) 0.9 %
10 SYRINGE (ML) INJECTION
Status: CANCELLED | OUTPATIENT
Start: 2018-05-11

## 2018-05-11 RX ORDER — HEPARIN 100 UNIT/ML
500 SYRINGE INTRAVENOUS
Status: COMPLETED | OUTPATIENT
Start: 2018-05-11 | End: 2018-05-11

## 2018-05-11 RX ORDER — EPINEPHRINE 0.3 MG/.3ML
1 INJECTION SUBCUTANEOUS ONCE AS NEEDED
Status: DISCONTINUED | OUTPATIENT
Start: 2018-05-11 | End: 2018-05-11 | Stop reason: HOSPADM

## 2018-05-11 RX ORDER — DIPHENHYDRAMINE HYDROCHLORIDE 50 MG/ML
50 INJECTION INTRAMUSCULAR; INTRAVENOUS ONCE AS NEEDED
Status: DISCONTINUED | OUTPATIENT
Start: 2018-05-11 | End: 2018-05-11 | Stop reason: HOSPADM

## 2018-05-11 RX ORDER — SODIUM CHLORIDE 0.9 % (FLUSH) 0.9 %
10 SYRINGE (ML) INJECTION
Status: DISCONTINUED | OUTPATIENT
Start: 2018-05-11 | End: 2018-05-11 | Stop reason: HOSPADM

## 2018-05-11 RX ADMIN — SODIUM CHLORIDE, PRESERVATIVE FREE 10 ML: 5 INJECTION INTRAVENOUS at 04:05

## 2018-05-11 RX ADMIN — HEPARIN 500 UNITS: 100 SYRINGE at 04:05

## 2018-05-11 RX ADMIN — Medication 10 ML: at 02:05

## 2018-05-11 RX ADMIN — DURVALUMAB 805 MG: 500 INJECTION, SOLUTION INTRAVENOUS at 03:05

## 2018-05-11 RX ADMIN — HEPARIN 500 UNITS: 100 SYRINGE at 02:05

## 2018-05-11 NOTE — PLAN OF CARE
Problem: Patient Care Overview  Goal: Plan of Care Review  1628-Patient tolerated treatment well. Discharged without complaints or S/S of adverse event. AVS given.  Instructed to call provider for any questions or concerns.

## 2018-05-11 NOTE — PLAN OF CARE
Problem: Patient Care Overview  Goal: Individualization & Mutuality  Left chest PAC  Warm blankets   Outcome: Ongoing (interventions implemented as appropriate)  1500-Labs , hx, and medications reviewed, no MD appointment this week. Assessment completed. Discussed plan of care with patient. Patient in agreement. Chair reclined and warm blanket and snack offered.

## 2018-05-15 DIAGNOSIS — I10 ESSENTIAL HYPERTENSION: ICD-10-CM

## 2018-05-15 RX ORDER — AMLODIPINE BESYLATE 5 MG/1
5 TABLET ORAL DAILY
Qty: 90 TABLET | Refills: 1 | Status: SHIPPED | OUTPATIENT
Start: 2018-05-15 | End: 2018-05-23 | Stop reason: SDUPTHER

## 2018-05-15 NOTE — TELEPHONE ENCOUNTER
----- Message from Akin Giordano sent at 5/15/2018  9:47 AM CDT -----  Contact: Destiny [phone]  REFILL: amLODIPine (NORVASC) 5 MG tablet    PHARMACY: [facility] - [place] ([place], [place] - [address] [place] [address]

## 2018-05-23 ENCOUNTER — OFFICE VISIT (OUTPATIENT)
Dept: FAMILY MEDICINE | Facility: CLINIC | Age: 63
End: 2018-05-23
Payer: COMMERCIAL

## 2018-05-23 VITALS
DIASTOLIC BLOOD PRESSURE: 74 MMHG | OXYGEN SATURATION: 97 % | HEIGHT: 66 IN | BODY MASS INDEX: 30.29 KG/M2 | HEART RATE: 110 BPM | WEIGHT: 188.5 LBS | SYSTOLIC BLOOD PRESSURE: 120 MMHG | TEMPERATURE: 99 F

## 2018-05-23 DIAGNOSIS — E03.2 HYPOTHYROIDISM DUE TO MEDICATION: ICD-10-CM

## 2018-05-23 DIAGNOSIS — C77.9 REGIONAL LYMPH NODE METASTASIS PRESENT: ICD-10-CM

## 2018-05-23 DIAGNOSIS — C34.2 PRIMARY CANCER OF RIGHT MIDDLE LOBE OF LUNG: ICD-10-CM

## 2018-05-23 DIAGNOSIS — I10 ESSENTIAL HYPERTENSION: Primary | ICD-10-CM

## 2018-05-23 DIAGNOSIS — Z85.3 HISTORY OF LEFT BREAST CANCER: ICD-10-CM

## 2018-05-23 PROCEDURE — 3074F SYST BP LT 130 MM HG: CPT | Mod: CPTII,S$GLB,, | Performed by: FAMILY MEDICINE

## 2018-05-23 PROCEDURE — 3078F DIAST BP <80 MM HG: CPT | Mod: CPTII,S$GLB,, | Performed by: FAMILY MEDICINE

## 2018-05-23 PROCEDURE — 99214 OFFICE O/P EST MOD 30 MIN: CPT | Mod: S$GLB,,, | Performed by: FAMILY MEDICINE

## 2018-05-23 PROCEDURE — 99999 PR PBB SHADOW E&M-EST. PATIENT-LVL III: CPT | Mod: PBBFAC,,, | Performed by: FAMILY MEDICINE

## 2018-05-23 PROCEDURE — 3008F BODY MASS INDEX DOCD: CPT | Mod: CPTII,S$GLB,, | Performed by: FAMILY MEDICINE

## 2018-05-23 RX ORDER — AMLODIPINE BESYLATE 5 MG/1
5 TABLET ORAL DAILY
Qty: 90 TABLET | Refills: 1 | Status: SHIPPED | OUTPATIENT
Start: 2018-05-23 | End: 2018-11-15 | Stop reason: SDUPTHER

## 2018-05-23 RX ORDER — LEVOTHYROXINE SODIUM 100 UG/1
100 TABLET ORAL DAILY
Qty: 90 TABLET | Refills: 1 | Status: SHIPPED | OUTPATIENT
Start: 2018-05-23 | End: 2018-05-24

## 2018-05-23 NOTE — PATIENT INSTRUCTIONS

## 2018-05-23 NOTE — PROGRESS NOTES
Subjective:       Patient ID: Fauzia Kirk is a 62 y.o. female.    Chief Complaint: No chief complaint on file.    HPI Ms. Kirk is a very pleasant 62-year-old  female who returned  for F/U of right lung cancer.    She has been receiving adjuvant therapy with Durvalumab.  She has had 14 treatments thus far.    She has tolerated that well.  She did have some transient hyperthyroidism which resolved spontaneously.  She then developed hypothyroidism for which she has been on thyroid replacement(100mcg).  Her major issue without has been some itching.    Today she reports that her breathing is been good.  She is not having any significant cough.  Appetite and bowel function have been good.  She does have some right leg pain which has been present since her chemotherapy.                Her last CT scan of the chest in February 2018 showed a stable  2.7 x 4.3 cm right middle lobe density with surrounding groundglass opacity consistent with post radiation changes.  This is stable 4 mm left lower lobe pulmonary nodule      In December 2016 she began to have some blood in her mucus after coughing.In  April she developed a persistent cough and saw her physician on April 18.  Chest x-ray at that time showed a rounded opacity in the right lung overlying the major fissure.   Chest CT in May showed a 4.8 x 4.0 cm, rounded, soft tissue mass in the middle lobe between the medial and lateral segments. There was pre-carinal adenopathy.    Subsequently she underwent bronchoscopy with EBUS on 6/9/17. Needle biopsy of the medistinal nodes was positive for poorly differentiated carcinoma with squamoid features. The cells were positive for CK7, CK5/6, and P63 and are negative for CK20, GCDFP, TTF1, ER, [place], Napsin and YEMI 3.This was felt to be most CW a new squamous lung cancer.    PET CT yesterday demonstrated the following:a hypermetabolic, large mass in the right middle lobe with an SUV max of 20.23. There is hypermetabolic  activity extending from this mass tracking along the pleura. There is a hypermetabolic right hilar lymph node demonstrating an SUV max of 20.2. In addition there is are 2 hypermetabolic subcarinal lymph nodes with the larger demonstrating an SUV max of 14.2. An additional right paratracheal lymph node is seen with an SUV max of 26.1. Findings are consistent with metastatic lung cancer.      Previous cancer history:She had a stage I, ER negative carcinoma of the    left breast in 1990, treated with lumpectomy and radiation therapy.  In      1993, she developed a stage I, ER positive carcinoma of the right breast     treated with lumpectomy and radiation.  In 1995, she developed left breast   cancer recurrence, treated with lumpectomy, brachytherapy and four cycles    of Adriamycin and Cytoxan.  In 1996, she developed a new right breast        cancer T2 N0 poorly differentiated, treated with four cycles of              preoperative Taxol followed by mastectomy.        On October 17, 2016 left mammogram showed increased calcifications in the left breast at 3:00.  On October 27 a biopsy showed DCIS with solid, cribriform, and micropapillary patterns.  Tumor was 95% ER positive at 95% MS positive    On November 7, 2016 she underwent left mastectomy which showed 8 mm of DCIS and negative margins.    She completed radiation together with weekly chemotherapy with carboplatin and Taxol  for stage IIIA disease.    She completed therapy on September 13 and received 7 chemotherapy treatments.    CT scan from September 29 showed that the right middle lobe mass had decreased to 4.2 x 2.3 cm from 4.8 x 4.3 cm.  Stable 4 mm pulmonary nodule in the left lower lobe.    Review of Systems   Constitutional: Negative for activity change, appetite change, fatigue, fever and unexpected weight change.   HENT: Positive for postnasal drip. Negative for trouble swallowing.    Respiratory: Negative for cough and shortness of breath.     Cardiovascular: Negative for chest pain.   Gastrointestinal: Negative for abdominal pain, constipation, nausea and vomiting.   Musculoskeletal: Negative for back pain.        Some right leg pain -unchanged   Skin:        pruritis   Neurological: Negative for headaches.   Psychiatric/Behavioral: Negative for dysphoric mood. The patient is not nervous/anxious.        Objective:      Physical Exam   Constitutional: She is oriented to person, place, and time. She appears well-developed and well-nourished.   HENT:   Mouth/Throat: No oropharyngeal exudate.   Eyes: No scleral icterus.   Cardiovascular: Normal rate and regular rhythm.    Pulmonary/Chest: Effort normal and breath sounds normal. She has no wheezes. She has no rales.   Abdominal: Soft. She exhibits no mass. There is no tenderness.   Lymphadenopathy:     She has no cervical adenopathy.     She has no axillary adenopathy.        Right: No supraclavicular adenopathy present.        Left: No supraclavicular adenopathy present.   Neurological: She is alert and oriented to person, place, and time.   Skin: No rash noted. No erythema.   Psychiatric: She has a normal mood and affect. Her behavior is normal. Thought content normal.       Assessment:     1. Primary cancer of right middle lobe of lung    2. Encounter for antineoplastic chemotherapy    3. History of left breast cancer     4.  Treatment-induced hypothyroidism  Plan:     Continue Durvalumab. RTC 4 weeks.  Continue thyroid replacement.    Distress Screening Results: Psychosocial Distress screening score of Distress Score: 0 noted and reviewed. No intervention indicated.

## 2018-05-24 ENCOUNTER — TELEPHONE (OUTPATIENT)
Dept: ENDOCRINOLOGY | Facility: HOSPITAL | Age: 63
End: 2018-05-24

## 2018-05-24 ENCOUNTER — OFFICE VISIT (OUTPATIENT)
Dept: HEMATOLOGY/ONCOLOGY | Facility: CLINIC | Age: 63
End: 2018-05-24
Payer: COMMERCIAL

## 2018-05-24 ENCOUNTER — INFUSION (OUTPATIENT)
Dept: INFUSION THERAPY | Facility: HOSPITAL | Age: 63
End: 2018-05-24
Attending: INTERNAL MEDICINE
Payer: COMMERCIAL

## 2018-05-24 VITALS
HEART RATE: 91 BPM | RESPIRATION RATE: 17 BRPM | SYSTOLIC BLOOD PRESSURE: 134 MMHG | DIASTOLIC BLOOD PRESSURE: 81 MMHG | TEMPERATURE: 98 F | WEIGHT: 185.19 LBS | BODY MASS INDEX: 29.89 KG/M2

## 2018-05-24 DIAGNOSIS — E03.9 HYPOTHYROIDISM, UNSPECIFIED TYPE: Primary | ICD-10-CM

## 2018-05-24 DIAGNOSIS — Z51.11 ENCOUNTER FOR ANTINEOPLASTIC CHEMOTHERAPY: ICD-10-CM

## 2018-05-24 DIAGNOSIS — R94.6 BORDERLINE ABNORMAL TFTS: Primary | ICD-10-CM

## 2018-05-24 DIAGNOSIS — E03.2 HYPOTHYROIDISM DUE TO MEDICATION: ICD-10-CM

## 2018-05-24 DIAGNOSIS — Z85.3 HISTORY OF LEFT BREAST CANCER: ICD-10-CM

## 2018-05-24 DIAGNOSIS — C34.2 PRIMARY CANCER OF RIGHT MIDDLE LOBE OF LUNG: Primary | ICD-10-CM

## 2018-05-24 DIAGNOSIS — C34.2 PRIMARY CANCER OF RIGHT MIDDLE LOBE OF LUNG: ICD-10-CM

## 2018-05-24 LAB
ALBUMIN SERPL BCP-MCNC: 3.1 G/DL
ALP SERPL-CCNC: 74 U/L
ALT SERPL W/O P-5'-P-CCNC: 9 U/L
ANION GAP SERPL CALC-SCNC: 7 MMOL/L
AST SERPL-CCNC: 15 U/L
BASOPHILS # BLD AUTO: 0.02 K/UL
BASOPHILS NFR BLD: 0.4 %
BILIRUB SERPL-MCNC: 0.5 MG/DL
BUN SERPL-MCNC: 18 MG/DL
CALCIUM SERPL-MCNC: 9.5 MG/DL
CHLORIDE SERPL-SCNC: 107 MMOL/L
CO2 SERPL-SCNC: 27 MMOL/L
CREAT SERPL-MCNC: 0.8 MG/DL
DIFFERENTIAL METHOD: ABNORMAL
EOSINOPHIL # BLD AUTO: 0.1 K/UL
EOSINOPHIL NFR BLD: 2.6 %
ERYTHROCYTE [DISTWIDTH] IN BLOOD BY AUTOMATED COUNT: 14.2 %
EST. GFR  (AFRICAN AMERICAN): >60 ML/MIN/1.73 M^2
EST. GFR  (NON AFRICAN AMERICAN): >60 ML/MIN/1.73 M^2
GLUCOSE SERPL-MCNC: 87 MG/DL
HCT VFR BLD AUTO: 33.4 %
HGB BLD-MCNC: 10.3 G/DL
IMM GRANULOCYTES # BLD AUTO: 0.02 K/UL
IMM GRANULOCYTES NFR BLD AUTO: 0.4 %
LYMPHOCYTES # BLD AUTO: 0.7 K/UL
LYMPHOCYTES NFR BLD: 14.8 %
MCH RBC QN AUTO: 27.8 PG
MCHC RBC AUTO-ENTMCNC: 30.8 G/DL
MCV RBC AUTO: 90 FL
MONOCYTES # BLD AUTO: 0.4 K/UL
MONOCYTES NFR BLD: 9.2 %
NEUTROPHILS # BLD AUTO: 3.3 K/UL
NEUTROPHILS NFR BLD: 72.6 %
NRBC BLD-RTO: 0 /100 WBC
PLATELET # BLD AUTO: 238 K/UL
PMV BLD AUTO: 9.4 FL
POTASSIUM SERPL-SCNC: 4.2 MMOL/L
PROT SERPL-MCNC: 7.2 G/DL
RBC # BLD AUTO: 3.7 M/UL
SODIUM SERPL-SCNC: 141 MMOL/L
T4 FREE SERPL-MCNC: 1.15 NG/DL
TSH SERPL DL<=0.005 MIU/L-ACNC: 0.08 UIU/ML
WBC # BLD AUTO: 4.58 K/UL

## 2018-05-24 PROCEDURE — 3075F SYST BP GE 130 - 139MM HG: CPT | Mod: CPTII,S$GLB,, | Performed by: INTERNAL MEDICINE

## 2018-05-24 PROCEDURE — 85025 COMPLETE CBC W/AUTO DIFF WBC: CPT

## 2018-05-24 PROCEDURE — 84443 ASSAY THYROID STIM HORMONE: CPT

## 2018-05-24 PROCEDURE — 63600175 PHARM REV CODE 636 W HCPCS: Performed by: INTERNAL MEDICINE

## 2018-05-24 PROCEDURE — 80053 COMPREHEN METABOLIC PANEL: CPT

## 2018-05-24 PROCEDURE — C9492 INJECTION, DURVALUMAB: HCPCS | Mod: TB | Performed by: INTERNAL MEDICINE

## 2018-05-24 PROCEDURE — A4216 STERILE WATER/SALINE, 10 ML: HCPCS | Performed by: INTERNAL MEDICINE

## 2018-05-24 PROCEDURE — 25000003 PHARM REV CODE 250: Performed by: INTERNAL MEDICINE

## 2018-05-24 PROCEDURE — 3079F DIAST BP 80-89 MM HG: CPT | Mod: CPTII,S$GLB,, | Performed by: INTERNAL MEDICINE

## 2018-05-24 PROCEDURE — 96413 CHEMO IV INFUSION 1 HR: CPT

## 2018-05-24 PROCEDURE — 63600175 PHARM REV CODE 636 W HCPCS: Mod: TB | Performed by: INTERNAL MEDICINE

## 2018-05-24 PROCEDURE — 99999 PR PBB SHADOW E&M-EST. PATIENT-LVL III: CPT | Mod: PBBFAC,,, | Performed by: INTERNAL MEDICINE

## 2018-05-24 PROCEDURE — 99214 OFFICE O/P EST MOD 30 MIN: CPT | Mod: S$GLB,,, | Performed by: INTERNAL MEDICINE

## 2018-05-24 PROCEDURE — 3008F BODY MASS INDEX DOCD: CPT | Mod: CPTII,S$GLB,, | Performed by: INTERNAL MEDICINE

## 2018-05-24 PROCEDURE — 84439 ASSAY OF FREE THYROXINE: CPT

## 2018-05-24 RX ORDER — SODIUM CHLORIDE 0.9 % (FLUSH) 0.9 %
10 SYRINGE (ML) INJECTION
Status: CANCELLED | OUTPATIENT
Start: 2018-05-25

## 2018-05-24 RX ORDER — HEPARIN 100 UNIT/ML
500 SYRINGE INTRAVENOUS
Status: DISCONTINUED | OUTPATIENT
Start: 2018-05-24 | End: 2018-05-24 | Stop reason: HOSPADM

## 2018-05-24 RX ORDER — EPINEPHRINE 0.3 MG/.3ML
1 INJECTION SUBCUTANEOUS ONCE AS NEEDED
Status: CANCELLED | OUTPATIENT
Start: 2018-05-25 | End: 2018-05-25

## 2018-05-24 RX ORDER — SODIUM CHLORIDE 0.9 % (FLUSH) 0.9 %
10 SYRINGE (ML) INJECTION
Status: DISCONTINUED | OUTPATIENT
Start: 2018-05-24 | End: 2018-05-24 | Stop reason: HOSPADM

## 2018-05-24 RX ORDER — HEPARIN 100 UNIT/ML
500 SYRINGE INTRAVENOUS
Status: CANCELLED | OUTPATIENT
Start: 2018-05-25

## 2018-05-24 RX ORDER — EPINEPHRINE 0.3 MG/.3ML
1 INJECTION SUBCUTANEOUS ONCE AS NEEDED
Status: DISCONTINUED | OUTPATIENT
Start: 2018-05-24 | End: 2018-05-24 | Stop reason: HOSPADM

## 2018-05-24 RX ORDER — DIPHENHYDRAMINE HYDROCHLORIDE 50 MG/ML
50 INJECTION INTRAMUSCULAR; INTRAVENOUS ONCE AS NEEDED
Status: CANCELLED | OUTPATIENT
Start: 2018-05-25 | End: 2018-05-25

## 2018-05-24 RX ORDER — SODIUM CHLORIDE 0.9 % (FLUSH) 0.9 %
10 SYRINGE (ML) INJECTION
Status: CANCELLED | OUTPATIENT
Start: 2018-05-24

## 2018-05-24 RX ORDER — HEPARIN 100 UNIT/ML
500 SYRINGE INTRAVENOUS
Status: CANCELLED | OUTPATIENT
Start: 2018-05-24

## 2018-05-24 RX ORDER — DIPHENHYDRAMINE HYDROCHLORIDE 50 MG/ML
50 INJECTION INTRAMUSCULAR; INTRAVENOUS ONCE AS NEEDED
Status: DISCONTINUED | OUTPATIENT
Start: 2018-05-24 | End: 2018-05-24 | Stop reason: HOSPADM

## 2018-05-24 RX ORDER — SODIUM CHLORIDE 0.9 % (FLUSH) 0.9 %
10 SYRINGE (ML) INJECTION
Status: COMPLETED | OUTPATIENT
Start: 2018-05-24 | End: 2018-05-24

## 2018-05-24 RX ORDER — HEPARIN 100 UNIT/ML
500 SYRINGE INTRAVENOUS
Status: COMPLETED | OUTPATIENT
Start: 2018-05-24 | End: 2018-05-24

## 2018-05-24 RX ORDER — LEVOTHYROXINE SODIUM 88 UG/1
88 TABLET ORAL
Qty: 30 TABLET | Refills: 3 | Status: SHIPPED | OUTPATIENT
Start: 2018-05-24 | End: 2019-05-01 | Stop reason: SDUPTHER

## 2018-05-24 RX ADMIN — Medication 500 UNITS: at 10:05

## 2018-05-24 RX ADMIN — DURVALUMAB 805 MG: 500 INJECTION, SOLUTION INTRAVENOUS at 11:05

## 2018-05-24 RX ADMIN — SODIUM CHLORIDE, PRESERVATIVE FREE 10 ML: 5 INJECTION INTRAVENOUS at 10:05

## 2018-05-24 RX ADMIN — SODIUM CHLORIDE, PRESERVATIVE FREE 500 UNITS: 5 INJECTION INTRAVENOUS at 12:05

## 2018-05-24 NOTE — NURSING
Pt arrived for port access and labs.  Port flushes easily with good blood return, labs sent.  Port left access for chemo.  Pt now going to MD herediat

## 2018-05-24 NOTE — PROGRESS NOTES
Routine Office Visit    Patient Name: Fauzia Kirk    : 1955  MRN: [id number]    Subjective:  Fauzia is a 62 y.o. female who presents today for     1. Essential hypertension - pt is here for her blood pressure follow-up. She feels the new medication (amlodipine) is working well for her. She is concerned that the medication may not work after she completes her chemotherapy. She completes chemo in 6 months.   2. She has right lung cancer (hx of breast cancer) - currently seeing heme/onc Dr. Mai     Review of Systems   Constitutional: Negative for chills and fever.   HENT: Negative for congestion.    Eyes: Negative for blurred vision.   Respiratory: Negative for cough.    Cardiovascular: Negative for chest pain.   Gastrointestinal: Negative for abdominal pain, constipation, diarrhea, heartburn, nausea and vomiting.   Genitourinary: Negative for dysuria.   Musculoskeletal: Negative for myalgias.   Skin: Negative for itching and rash.   Neurological: Negative for dizziness and headaches.   Psychiatric/Behavioral: Negative for depression.       Active Problem List  Patient Active Problem List   Diagnosis    Hypertension    History of left breast cancer    s/p L mastectomy, SLNBx     Ductal carcinoma in situ (DCIS) of left breast    Primary cancer of right middle lobe of lung    Regional lymph node metastasis present    Encounter for antineoplastic chemotherapy    Hyperthyroidism    Hypothyroidism due to medication       Past Surgical History  Past Surgical History:   Procedure Laterality Date    BREAST LUMPECTOMY       SECTION, CLASSIC      LIPOMA RESECTION      MASTECTOMY         Family History  Family History   Problem Relation Age of Onset    Lymphoma Mother     Prostate cancer Father     Stomach cancer Sister     Breast cancer Sister     Diabetes Brother     Breast cancer Paternal Aunt     Breast cancer Paternal Grandmother        Social History  Social History     Social  "History    Marital status:      Spouse name: N/A    Number of children: N/A    Years of education: N/A     Occupational History    Not on file.     Social History Main Topics    Smoking status: Never Smoker    Smokeless tobacco: Never Used    Alcohol use No    Drug use: No    Sexual activity: Yes     Partners: Male     Other Topics Concern    Not on file     Social History Narrative    No narrative on file       Medications and Allergies  Reviewed and updated.   Current Outpatient Prescriptions   Medication Sig    amLODIPine (NORVASC) 5 MG tablet Take 1 tablet (5 mg total) by mouth once daily.    gabapentin (NEURONTIN) 300 MG capsule Take 1 capsule (300 mg total) by mouth every evening.    lidocaine-prilocaine (EMLA) cream Apply topically as needed.    omeprazole (PRILOSEC) 40 MG capsule Take 1 capsule (40 mg total) by mouth once daily.    LEVOXYL 100 mcg tablet Take 1 tablet (100 mcg total) by mouth once daily.     No current facility-administered medications for this visit.      Facility-Administered Medications Ordered in Other Visits   Medication    alteplase injection 2 mg    diphenhydrAMINE injection 50 mg    EPINEPHrine (EPIPEN) 0.3 mg/0.3 mL pen injection 1 mg    heparin, porcine (PF) 100 unit/mL injection flush 500 Units    hydrocortisone sodium succinate injection 100 mg    sodium chloride 0.9% 100 mL flush bag    sodium chloride 0.9% 500 mL flush bag    sodium chloride 0.9% flush 10 mL       Physical Exam  /74 (BP Location: Right arm, Patient Position: Sitting, BP Method: Large (Manual))   Pulse 110   Temp 98.7 °F (37.1 °C) (Oral)   Ht 5' 6" (1.676 m)   Wt 85.5 kg (188 lb 7.9 oz)   SpO2 97%   BMI 30.42 kg/m²   Physical Exam   Constitutional: She is oriented to person, place, and time. She appears well-developed and well-nourished.   HENT:   Head: Normocephalic and atraumatic.   Eyes: Conjunctivae and EOM are normal. Pupils are equal, round, and reactive to " light.   Neck: Normal range of motion. Neck supple.   Cardiovascular: Normal rate, regular rhythm and normal heart sounds.  Exam reveals no gallop and no friction rub.    No murmur heard.  Pulmonary/Chest: Breath sounds normal. No respiratory distress.   Abdominal: Soft. Bowel sounds are normal. She exhibits no distension. There is no tenderness.   Musculoskeletal: Normal range of motion.   Lymphadenopathy:     She has no cervical adenopathy.   Neurological: She is alert and oriented to person, place, and time.   Skin: Skin is warm.   Psychiatric: She has a normal mood and affect.         Assessment/Plan:  Fauzia Kirk is a 62 y.o. female who presents today for :    Problem List Items Addressed This Visit        Cardiac/Vascular    Hypertension - Primary    Relevant Medications    amLODIPine (NORVASC) 5 MG tablet  The current medical regimen is effective;  continue present plan and medications.      Other Relevant Orders    NURSING COMMUNICATION: Create [facility] Account    Hypertension Digital Medicine ([facility]) Enrollment Order (Completed)    Hypertension Digital Medicine ([facility]): Assign Onboarding Questionnaires (Completed)       Oncology    History of left breast cancer    Primary cancer of right middle lobe of lung    Regional lymph node metastasis present    Continue f/u with Dr. Mai       Endocrine    Hypothyroidism due to medication  The current medical regimen is effective;  continue present plan and medications.              Follow-up in about 6 months (around 11/23/2018), or if symptoms worsen or fail to improve.

## 2018-05-25 DIAGNOSIS — Z51.11 ENCOUNTER FOR CHEMOTHERAPY MANAGEMENT: Primary | ICD-10-CM

## 2018-06-01 ENCOUNTER — TELEPHONE (OUTPATIENT)
Dept: HEMATOLOGY/ONCOLOGY | Facility: CLINIC | Age: 63
End: 2018-06-01

## 2018-06-01 NOTE — TELEPHONE ENCOUNTER
Patient called in and spoke with Maria Fernanda in regards to her infusions and wanted to change everything to Fridays, as she only wants to come in on a Friday. Maria Fernanda stated she was very rude and was yelling at her. She stated she changed the patients apt on 6/7 to 6/8 for her labs and chemo. She called me and asked me to change the patients apts from 6/21 to 6/22. I explained to Maria Fernanda that Dr. Peralta is off on 6/22 and annie is not here so we had to schedule her on 6/21 but I told her I would call the patient and explain this.   I called the patient and explained why we had her scheduled on 6/7 following her last apt which was on a Thursday and then also explained that we had her on 6/21 because Dr. Mai was off on 6/22. Patient stated that she understood and asked that we note it in the system she only ever wants Friday appointments unless there is a conflict with Dr. Mai's schedule. Patient was happy her apt on 6/7 was changed to 6/8 but she allison call her regarding her apts on 6/21 because she has a problem she needs to speak with her about. I told her I would send a message and have her call back. Patient voiced appreciation.

## 2018-06-01 NOTE — TELEPHONE ENCOUNTER
----- Message from Dorys Mcdonald MA sent at 6/1/2018 11:37 AM CDT -----  I spoke to her please call and reschedule her appointments. She will be out of town. June 25 labs, scans, Dr Mai at 11:30 with chemo same day. If we cant do the scan on June 25 she can come on Saturday for the scan.  ----- Message -----  From: Laly Tang  Sent: 6/1/2018  11:16 AM  To: Dorys Mcdonald MA    Good morning, this patient called in and spoke with Maria Fernanda in regards to her infusions and wanted to change everything to Fridays, as she only wants to come in on a Friday. Maria Fernanda stated she was very rude and was yelling at her. She stated she changed the patients apt on 6/7 to 6/8 for her labs and chemo. She called me and asked me to change the patients apts from 6/21 to 6/22. I explained to Maria Fernanda that Dr. Peralta is off on 6/22 and annie is not here so we had to schedule her on 6/21 but I told her I would call the patient and explain this.   I called the patient and explained why we had her scheduled on 6/7 following her last apt which was on a Thursday and then also explained that we had her on 6/21 because Dr. Mai was off on 6/22. Patient stated that she understood and asked that we note it in the system she only ever wants Friday appointments unless there is a conflict with Dr. Mai's schedule. Patient was happy her apt on 6/7 was changed to 6/8 but she asked that you call her regarding her apts on 6/21 because she has a problem she needs to speak with you about.    Contact number is [phone]

## 2018-06-08 ENCOUNTER — INFUSION (OUTPATIENT)
Dept: INFUSION THERAPY | Facility: HOSPITAL | Age: 63
End: 2018-06-08
Attending: INTERNAL MEDICINE
Payer: COMMERCIAL

## 2018-06-08 VITALS
TEMPERATURE: 99 F | DIASTOLIC BLOOD PRESSURE: 77 MMHG | HEART RATE: 99 BPM | SYSTOLIC BLOOD PRESSURE: 131 MMHG | RESPIRATION RATE: 18 BRPM

## 2018-06-08 DIAGNOSIS — Z51.11 ENCOUNTER FOR ANTINEOPLASTIC CHEMOTHERAPY: ICD-10-CM

## 2018-06-08 DIAGNOSIS — C34.2 PRIMARY CANCER OF RIGHT MIDDLE LOBE OF LUNG: Primary | ICD-10-CM

## 2018-06-08 LAB
ALBUMIN SERPL BCP-MCNC: 3.1 G/DL
ALP SERPL-CCNC: 75 U/L
ALT SERPL W/O P-5'-P-CCNC: 10 U/L
ANION GAP SERPL CALC-SCNC: 7 MMOL/L
AST SERPL-CCNC: 15 U/L
BILIRUB SERPL-MCNC: 0.3 MG/DL
BUN SERPL-MCNC: 22 MG/DL
CALCIUM SERPL-MCNC: 9.3 MG/DL
CHLORIDE SERPL-SCNC: 108 MMOL/L
CO2 SERPL-SCNC: 25 MMOL/L
CREAT SERPL-MCNC: 0.9 MG/DL
ERYTHROCYTE [DISTWIDTH] IN BLOOD BY AUTOMATED COUNT: 14.4 %
EST. GFR  (AFRICAN AMERICAN): >60 ML/MIN/1.73 M^2
EST. GFR  (NON AFRICAN AMERICAN): >60 ML/MIN/1.73 M^2
GLUCOSE SERPL-MCNC: 102 MG/DL
HCT VFR BLD AUTO: 34.4 %
HGB BLD-MCNC: 11.1 G/DL
IMM GRANULOCYTES # BLD AUTO: 0.02 K/UL
MCH RBC QN AUTO: 28.5 PG
MCHC RBC AUTO-ENTMCNC: 32.3 G/DL
MCV RBC AUTO: 88 FL
NEUTROPHILS # BLD AUTO: 4 K/UL
PLATELET # BLD AUTO: 230 K/UL
PMV BLD AUTO: 10.3 FL
POTASSIUM SERPL-SCNC: 3.9 MMOL/L
PROT SERPL-MCNC: 7.2 G/DL
RBC # BLD AUTO: 3.89 M/UL
SODIUM SERPL-SCNC: 140 MMOL/L
WBC # BLD AUTO: 5.34 K/UL

## 2018-06-08 PROCEDURE — 63600175 PHARM REV CODE 636 W HCPCS: Performed by: INTERNAL MEDICINE

## 2018-06-08 PROCEDURE — C9492 INJECTION, DURVALUMAB: HCPCS | Mod: TB | Performed by: INTERNAL MEDICINE

## 2018-06-08 PROCEDURE — 25000003 PHARM REV CODE 250: Performed by: INTERNAL MEDICINE

## 2018-06-08 PROCEDURE — 80053 COMPREHEN METABOLIC PANEL: CPT

## 2018-06-08 PROCEDURE — A4216 STERILE WATER/SALINE, 10 ML: HCPCS | Performed by: INTERNAL MEDICINE

## 2018-06-08 PROCEDURE — 96413 CHEMO IV INFUSION 1 HR: CPT

## 2018-06-08 PROCEDURE — 85027 COMPLETE CBC AUTOMATED: CPT

## 2018-06-08 RX ORDER — HEPARIN 100 UNIT/ML
500 SYRINGE INTRAVENOUS
Status: CANCELLED | OUTPATIENT
Start: 2018-06-08

## 2018-06-08 RX ORDER — HEPARIN 100 UNIT/ML
500 SYRINGE INTRAVENOUS
Status: DISCONTINUED | OUTPATIENT
Start: 2018-06-08 | End: 2018-06-08 | Stop reason: HOSPADM

## 2018-06-08 RX ORDER — SODIUM CHLORIDE 0.9 % (FLUSH) 0.9 %
10 SYRINGE (ML) INJECTION
Status: DISCONTINUED | OUTPATIENT
Start: 2018-06-08 | End: 2018-06-08 | Stop reason: HOSPADM

## 2018-06-08 RX ORDER — EPINEPHRINE 0.3 MG/.3ML
1 INJECTION SUBCUTANEOUS ONCE AS NEEDED
Status: CANCELLED | OUTPATIENT
Start: 2018-06-09 | End: 2018-06-09

## 2018-06-08 RX ORDER — DIPHENHYDRAMINE HYDROCHLORIDE 50 MG/ML
50 INJECTION INTRAMUSCULAR; INTRAVENOUS ONCE AS NEEDED
Status: DISCONTINUED | OUTPATIENT
Start: 2018-06-08 | End: 2018-06-08 | Stop reason: HOSPADM

## 2018-06-08 RX ORDER — HEPARIN 100 UNIT/ML
500 SYRINGE INTRAVENOUS
Status: COMPLETED | OUTPATIENT
Start: 2018-06-08 | End: 2018-06-08

## 2018-06-08 RX ORDER — SODIUM CHLORIDE 0.9 % (FLUSH) 0.9 %
10 SYRINGE (ML) INJECTION
Status: CANCELLED | OUTPATIENT
Start: 2018-06-09

## 2018-06-08 RX ORDER — SODIUM CHLORIDE 0.9 % (FLUSH) 0.9 %
10 SYRINGE (ML) INJECTION
Status: CANCELLED | OUTPATIENT
Start: 2018-06-08

## 2018-06-08 RX ORDER — SODIUM CHLORIDE 0.9 % (FLUSH) 0.9 %
10 SYRINGE (ML) INJECTION
Status: COMPLETED | OUTPATIENT
Start: 2018-06-08 | End: 2018-06-08

## 2018-06-08 RX ORDER — DIPHENHYDRAMINE HYDROCHLORIDE 50 MG/ML
50 INJECTION INTRAMUSCULAR; INTRAVENOUS ONCE AS NEEDED
Status: CANCELLED | OUTPATIENT
Start: 2018-06-09 | End: 2018-06-09

## 2018-06-08 RX ORDER — EPINEPHRINE 0.3 MG/.3ML
1 INJECTION SUBCUTANEOUS ONCE AS NEEDED
Status: DISCONTINUED | OUTPATIENT
Start: 2018-06-08 | End: 2018-06-08 | Stop reason: HOSPADM

## 2018-06-08 RX ORDER — HEPARIN 100 UNIT/ML
500 SYRINGE INTRAVENOUS
Status: CANCELLED | OUTPATIENT
Start: 2018-06-09

## 2018-06-08 RX ADMIN — SODIUM CHLORIDE, PRESERVATIVE FREE 10 ML: 5 INJECTION INTRAVENOUS at 03:06

## 2018-06-08 RX ADMIN — DURVALUMAB 805 MG: 500 INJECTION, SOLUTION INTRAVENOUS at 02:06

## 2018-06-08 RX ADMIN — SODIUM CHLORIDE: 0.9 INJECTION, SOLUTION INTRAVENOUS at 02:06

## 2018-06-08 RX ADMIN — SODIUM CHLORIDE, PRESERVATIVE FREE 10 ML: 5 INJECTION INTRAVENOUS at 01:06

## 2018-06-08 RX ADMIN — HEPARIN 500 UNITS: 100 SYRINGE at 01:06

## 2018-06-08 RX ADMIN — HEPARIN 500 UNITS: 100 SYRINGE at 03:06

## 2018-06-08 NOTE — PLAN OF CARE
Problem: Patient Care Overview  Goal: Plan of Care Review  1530-Patient tolerated treatment well. Discharged without complaints or S/S of adverse event. AVS given.  Instructed to call provider for any questions or concerns.

## 2018-06-08 NOTE — PLAN OF CARE
Problem: Patient Care Overview  Goal: Individualization & Mutuality  Left chest PAC  Warm blankets   Outcome: Ongoing (interventions implemented as appropriate)  1410-Labs , hx, and medications reviewed, patient had labs earlier, no MD appointment today, port already accessed on arrival. Assessment completed. Discussed plan of care with patient. Patient in agreement. Chair reclined and warm blanket and snack offered.

## 2018-06-08 NOTE — NURSING
. Blood drawn from port for labs without difficulty.  Pt. Tolerated procedure well. Discharged without complaints or signs of adverse effects.

## 2018-06-25 ENCOUNTER — INFUSION (OUTPATIENT)
Dept: INFUSION THERAPY | Facility: HOSPITAL | Age: 63
End: 2018-06-25
Attending: INTERNAL MEDICINE
Payer: COMMERCIAL

## 2018-06-25 ENCOUNTER — OFFICE VISIT (OUTPATIENT)
Dept: HEMATOLOGY/ONCOLOGY | Facility: CLINIC | Age: 63
End: 2018-06-25
Payer: COMMERCIAL

## 2018-06-25 ENCOUNTER — HOSPITAL ENCOUNTER (OUTPATIENT)
Dept: RADIOLOGY | Facility: HOSPITAL | Age: 63
Discharge: HOME | End: 2018-06-25
Attending: INTERNAL MEDICINE
Payer: COMMERCIAL

## 2018-06-25 VITALS
HEART RATE: 98 BPM | SYSTOLIC BLOOD PRESSURE: 142 MMHG | DIASTOLIC BLOOD PRESSURE: 76 MMHG | RESPIRATION RATE: 18 BRPM | WEIGHT: 189 LBS | BODY MASS INDEX: 30.37 KG/M2 | TEMPERATURE: 98 F | HEIGHT: 66 IN

## 2018-06-25 VITALS
DIASTOLIC BLOOD PRESSURE: 74 MMHG | RESPIRATION RATE: 17 BRPM | WEIGHT: 189.63 LBS | BODY MASS INDEX: 30.6 KG/M2 | SYSTOLIC BLOOD PRESSURE: 137 MMHG | HEART RATE: 91 BPM | TEMPERATURE: 98 F

## 2018-06-25 DIAGNOSIS — C34.2 PRIMARY CANCER OF RIGHT MIDDLE LOBE OF LUNG: Primary | ICD-10-CM

## 2018-06-25 DIAGNOSIS — Z51.11 ENCOUNTER FOR ANTINEOPLASTIC CHEMOTHERAPY: ICD-10-CM

## 2018-06-25 DIAGNOSIS — Z51.11 ENCOUNTER FOR CHEMOTHERAPY MANAGEMENT: ICD-10-CM

## 2018-06-25 DIAGNOSIS — C34.2 PRIMARY CANCER OF RIGHT MIDDLE LOBE OF LUNG: ICD-10-CM

## 2018-06-25 LAB
ALBUMIN SERPL BCP-MCNC: 3.1 G/DL
ALP SERPL-CCNC: 74 U/L
ALT SERPL W/O P-5'-P-CCNC: 11 U/L
ANION GAP SERPL CALC-SCNC: 10 MMOL/L
AST SERPL-CCNC: 14 U/L
BILIRUB SERPL-MCNC: 0.5 MG/DL
BUN SERPL-MCNC: 16 MG/DL
CALCIUM SERPL-MCNC: 9.4 MG/DL
CHLORIDE SERPL-SCNC: 107 MMOL/L
CO2 SERPL-SCNC: 24 MMOL/L
CREAT SERPL-MCNC: 0.8 MG/DL
ERYTHROCYTE [DISTWIDTH] IN BLOOD BY AUTOMATED COUNT: 15.2 %
EST. GFR  (AFRICAN AMERICAN): >60 ML/MIN/1.73 M^2
EST. GFR  (NON AFRICAN AMERICAN): >60 ML/MIN/1.73 M^2
GLUCOSE SERPL-MCNC: 127 MG/DL
HCT VFR BLD AUTO: 34.7 %
HGB BLD-MCNC: 11 G/DL
IMM GRANULOCYTES # BLD AUTO: 0.02 K/UL
MCH RBC QN AUTO: 27.8 PG
MCHC RBC AUTO-ENTMCNC: 31.7 G/DL
MCV RBC AUTO: 88 FL
NEUTROPHILS # BLD AUTO: 3.5 K/UL
PLATELET # BLD AUTO: 238 K/UL
PMV BLD AUTO: 10.4 FL
POTASSIUM SERPL-SCNC: 3.7 MMOL/L
PROT SERPL-MCNC: 7 G/DL
RBC # BLD AUTO: 3.95 M/UL
SODIUM SERPL-SCNC: 141 MMOL/L
T4 FREE SERPL-MCNC: 1.12 NG/DL
TSH SERPL DL<=0.005 MIU/L-ACNC: 0.14 UIU/ML
WBC # BLD AUTO: 4.72 K/UL

## 2018-06-25 PROCEDURE — 3078F DIAST BP <80 MM HG: CPT | Mod: CPTII,S$GLB,, | Performed by: INTERNAL MEDICINE

## 2018-06-25 PROCEDURE — C9492 INJECTION, DURVALUMAB: HCPCS | Mod: TB | Performed by: INTERNAL MEDICINE

## 2018-06-25 PROCEDURE — 80053 COMPREHEN METABOLIC PANEL: CPT

## 2018-06-25 PROCEDURE — 84443 ASSAY THYROID STIM HORMONE: CPT

## 2018-06-25 PROCEDURE — 25000003 PHARM REV CODE 250: Performed by: INTERNAL MEDICINE

## 2018-06-25 PROCEDURE — 3075F SYST BP GE 130 - 139MM HG: CPT | Mod: CPTII,S$GLB,, | Performed by: INTERNAL MEDICINE

## 2018-06-25 PROCEDURE — 63600175 PHARM REV CODE 636 W HCPCS: Mod: TB | Performed by: INTERNAL MEDICINE

## 2018-06-25 PROCEDURE — 63600175 PHARM REV CODE 636 W HCPCS: Performed by: INTERNAL MEDICINE

## 2018-06-25 PROCEDURE — 71250 CT THORAX DX C-: CPT | Mod: 26,,, | Performed by: RADIOLOGY

## 2018-06-25 PROCEDURE — 3008F BODY MASS INDEX DOCD: CPT | Mod: CPTII,S$GLB,, | Performed by: INTERNAL MEDICINE

## 2018-06-25 PROCEDURE — 99214 OFFICE O/P EST MOD 30 MIN: CPT | Mod: S$GLB,,, | Performed by: INTERNAL MEDICINE

## 2018-06-25 PROCEDURE — A4216 STERILE WATER/SALINE, 10 ML: HCPCS | Performed by: INTERNAL MEDICINE

## 2018-06-25 PROCEDURE — 96413 CHEMO IV INFUSION 1 HR: CPT

## 2018-06-25 PROCEDURE — 99999 PR PBB SHADOW E&M-EST. PATIENT-LVL III: CPT | Mod: PBBFAC,,, | Performed by: INTERNAL MEDICINE

## 2018-06-25 PROCEDURE — 71250 CT THORAX DX C-: CPT | Mod: TC

## 2018-06-25 PROCEDURE — 84439 ASSAY OF FREE THYROXINE: CPT

## 2018-06-25 PROCEDURE — 85027 COMPLETE CBC AUTOMATED: CPT

## 2018-06-25 RX ORDER — HEPARIN 100 UNIT/ML
500 SYRINGE INTRAVENOUS
Status: DISCONTINUED | OUTPATIENT
Start: 2018-06-25 | End: 2018-06-25 | Stop reason: HOSPADM

## 2018-06-25 RX ORDER — EPINEPHRINE 0.3 MG/.3ML
1 INJECTION SUBCUTANEOUS ONCE AS NEEDED
Status: CANCELLED | OUTPATIENT
Start: 2018-06-25 | End: 2018-06-25

## 2018-06-25 RX ORDER — DIPHENHYDRAMINE HYDROCHLORIDE 50 MG/ML
50 INJECTION INTRAMUSCULAR; INTRAVENOUS ONCE AS NEEDED
Status: CANCELLED | OUTPATIENT
Start: 2018-06-25 | End: 2018-06-25

## 2018-06-25 RX ORDER — SODIUM CHLORIDE 0.9 % (FLUSH) 0.9 %
10 SYRINGE (ML) INJECTION
Status: COMPLETED | OUTPATIENT
Start: 2018-06-25 | End: 2018-06-25

## 2018-06-25 RX ORDER — HEPARIN 100 UNIT/ML
500 SYRINGE INTRAVENOUS
Status: CANCELLED | OUTPATIENT
Start: 2018-06-25

## 2018-06-25 RX ORDER — SODIUM CHLORIDE 0.9 % (FLUSH) 0.9 %
10 SYRINGE (ML) INJECTION
Status: DISCONTINUED | OUTPATIENT
Start: 2018-06-25 | End: 2018-06-25 | Stop reason: HOSPADM

## 2018-06-25 RX ORDER — EPINEPHRINE 0.3 MG/.3ML
1 INJECTION SUBCUTANEOUS ONCE AS NEEDED
Status: DISCONTINUED | OUTPATIENT
Start: 2018-06-25 | End: 2018-06-25 | Stop reason: HOSPADM

## 2018-06-25 RX ORDER — FLUOCINONIDE 0.5 MG/G
CREAM TOPICAL
Qty: 30 G | Refills: 1 | Status: SHIPPED | OUTPATIENT
Start: 2018-06-25 | End: 2019-02-04

## 2018-06-25 RX ORDER — SODIUM CHLORIDE 0.9 % (FLUSH) 0.9 %
10 SYRINGE (ML) INJECTION
Status: CANCELLED | OUTPATIENT
Start: 2018-06-25

## 2018-06-25 RX ORDER — DIPHENHYDRAMINE HYDROCHLORIDE 50 MG/ML
50 INJECTION INTRAMUSCULAR; INTRAVENOUS ONCE AS NEEDED
Status: DISCONTINUED | OUTPATIENT
Start: 2018-06-25 | End: 2018-06-25 | Stop reason: HOSPADM

## 2018-06-25 RX ORDER — HEPARIN 100 UNIT/ML
500 SYRINGE INTRAVENOUS
Status: COMPLETED | OUTPATIENT
Start: 2018-06-25 | End: 2018-06-25

## 2018-06-25 RX ADMIN — SODIUM CHLORIDE, PRESERVATIVE FREE 10 ML: 5 INJECTION INTRAVENOUS at 09:06

## 2018-06-25 RX ADMIN — HEPARIN 500 UNITS: 100 SYRINGE at 02:06

## 2018-06-25 RX ADMIN — HEPARIN 500 UNITS: 100 SYRINGE at 09:06

## 2018-06-25 RX ADMIN — SODIUM CHLORIDE 805 MG: 900 INJECTION, SOLUTION INTRAVENOUS at 01:06

## 2018-06-25 RX ADMIN — SODIUM CHLORIDE: 9 INJECTION, SOLUTION INTRAVENOUS at 01:06

## 2018-06-25 RX ADMIN — SODIUM CHLORIDE, PRESERVATIVE FREE 10 ML: 5 INJECTION INTRAVENOUS at 02:06

## 2018-06-25 NOTE — PROGRESS NOTES
Subjective:       Patient ID: Fauzia Kirk is a 62 y.o. female.    Chief Complaint: Primary cancer of right middle lobe of lung    HPI Ms. Kirk is a very pleasant 62-year-old  female who returned  for F/U of right lung cancer.    She has been receiving adjuvant therapy with Durvalumab.  She has had 14 treatments thus far.    She has tolerated that well.  She did have some transient hyperthyroidism which resolved spontaneously.  She then developed hypothyroidism for which she has been on thyroid replacement(100mcg).  Her major issue without has been some itching.    Today she reports that her breathing is been good.  She is not having any significant cough.  Appetite and bowel function have been good.  She does have some right leg pain which has been present since her chemotherapy.                Her last CT scan of the chest in February 2018 showed a stable  2.7 x 4.3 cm right middle lobe density with surrounding groundglass opacity consistent with post radiation changes.  This is stable 4 mm left lower lobe pulmonary nodule      In December 2016 she began to have some blood in her mucus after coughing.In  April she developed a persistent cough and saw her physician on April 18.  Chest x-ray at that time showed a rounded opacity in the right lung overlying the major fissure.   Chest CT in May showed a 4.8 x 4.0 cm, rounded, soft tissue mass in the middle lobe between the medial and lateral segments. There was pre-carinal adenopathy.    Subsequently she underwent bronchoscopy with EBUS on 6/9/17. Needle biopsy of the medistinal nodes was positive for poorly differentiated carcinoma with squamoid features. The cells were positive for CK7, CK5/6, and P63 and are negative for CK20, GCDFP, TTF1, ER, [place], Napsin and YEMI 3.This was felt to be most CW a new squamous lung cancer.    PET CT yesterday demonstrated the following:a hypermetabolic, large mass in the right middle lobe with an SUV max of 20.23. There is  hypermetabolic activity extending from this mass tracking along the pleura. There is a hypermetabolic right hilar lymph node demonstrating an SUV max of 20.2. In addition there is are 2 hypermetabolic subcarinal lymph nodes with the larger demonstrating an SUV max of 14.2. An additional right paratracheal lymph node is seen with an SUV max of 26.1. Findings are consistent with metastatic lung cancer.      Previous cancer history:She had a stage I, ER negative carcinoma of the    left breast in 1990, treated with lumpectomy and radiation therapy.  In      1993, she developed a stage I, ER positive carcinoma of the right breast     treated with lumpectomy and radiation.  In 1995, she developed left breast   cancer recurrence, treated with lumpectomy, brachytherapy and four cycles    of Adriamycin and Cytoxan.  In 1996, she developed a new right breast        cancer T2 N0 poorly differentiated, treated with four cycles of              preoperative Taxol followed by mastectomy.        On October 17, 2016 left mammogram showed increased calcifications in the left breast at 3:00.  On October 27 a biopsy showed DCIS with solid, cribriform, and micropapillary patterns.  Tumor was 95% ER positive at 95% MN positive    On November 7, 2016 she underwent left mastectomy which showed 8 mm of DCIS and negative margins.    She completed radiation together with weekly chemotherapy with carboplatin and Taxol  for stage IIIA disease.    She completed therapy on September 13 and received 7 chemotherapy treatments.    CT scan from September 29 showed that the right middle lobe mass had decreased to 4.2 x 2.3 cm from 4.8 x 4.3 cm.  Stable 4 mm pulmonary nodule in the left lower lobe.    Review of Systems   Constitutional: Negative for activity change, appetite change, fatigue, fever and unexpected weight change.   HENT: Positive for postnasal drip. Negative for trouble swallowing.    Respiratory: Negative for cough and shortness of  breath.    Cardiovascular: Negative for chest pain.   Gastrointestinal: Negative for abdominal pain, constipation, nausea and vomiting.   Musculoskeletal: Negative for back pain.        Some right leg pain -unchanged   Skin:        pruritis   Neurological: Negative for headaches.   Psychiatric/Behavioral: Negative for dysphoric mood. The patient is not nervous/anxious.        Objective:      Physical Exam   Constitutional: She is oriented to person, place, and time. She appears well-developed and well-nourished.   HENT:   Mouth/Throat: No oropharyngeal exudate.   Eyes: No scleral icterus.   Cardiovascular: Normal rate and regular rhythm.    Pulmonary/Chest: Effort normal and breath sounds normal. She has no wheezes. She has no rales.   Abdominal: Soft. She exhibits no mass. There is no tenderness.   Lymphadenopathy:     She has no cervical adenopathy.     She has no axillary adenopathy.        Right: No supraclavicular adenopathy present.        Left: No supraclavicular adenopathy present.   Neurological: She is alert and oriented to person, place, and time.   Skin: No rash noted. No erythema.   Psychiatric: She has a normal mood and affect. Her behavior is normal. Thought content normal.       Assessment:   CT shows a decrease in the size of the right lung lesion now measuring 1.7 x 1.4 cm  1. Primary cancer of right middle lobe of lung    2. Encounter for antineoplastic chemotherapy     4.  Treatment-induced hypothyroidism  Plan:     Continue Durvalumab. RTC 4 weeks.  Continue thyroid replacement.    Distress Screening Results: Psychosocial Distress screening score of Distress Score: 2 noted and reviewed. No intervention indicated.

## 2018-06-25 NOTE — PLAN OF CARE
Problem: Chemotherapy Effects (Adult)  Goal: Signs and Symptoms of Listed Potential Problems Will be Absent, Minimized or Managed (Chemotherapy Effects)  Signs and symptoms of listed potential problems will be absent, minimized or managed by discharge/transition of care (reference Chemotherapy Effects (Adult) CPG).   Outcome: Ongoing (interventions implemented as appropriate)  Pt here for imfinzi infusion D1C17, labs, hx, meds, allergies reviewed, pt with no complaints at this time, reclined in chair, warm blanket provided, continue to monitor

## 2018-06-25 NOTE — PLAN OF CARE
Problem: Patient Care Overview  Goal: Plan of Care Review  Outcome: Ongoing (interventions implemented as appropriate)  Pt tolerated Imfinzi infusion without issue, avs given, pt to rtc 7/12/18, no distress noted upon d/c to home

## 2018-07-06 RX ORDER — SODIUM CHLORIDE 0.9 % (FLUSH) 0.9 %
10 SYRINGE (ML) INJECTION
Status: CANCELLED | OUTPATIENT
Start: 2018-07-09

## 2018-07-06 RX ORDER — EPINEPHRINE 0.3 MG/.3ML
1 INJECTION SUBCUTANEOUS ONCE AS NEEDED
Status: CANCELLED | OUTPATIENT
Start: 2018-07-09 | End: 2018-07-09

## 2018-07-06 RX ORDER — HEPARIN 100 UNIT/ML
500 SYRINGE INTRAVENOUS
Status: CANCELLED | OUTPATIENT
Start: 2018-07-09

## 2018-07-06 RX ORDER — DIPHENHYDRAMINE HYDROCHLORIDE 50 MG/ML
50 INJECTION INTRAMUSCULAR; INTRAVENOUS ONCE AS NEEDED
Status: CANCELLED | OUTPATIENT
Start: 2018-07-09 | End: 2018-07-09

## 2018-07-12 ENCOUNTER — INFUSION (OUTPATIENT)
Dept: INFUSION THERAPY | Facility: HOSPITAL | Age: 63
End: 2018-07-12
Attending: INTERNAL MEDICINE
Payer: COMMERCIAL

## 2018-07-12 VITALS
SYSTOLIC BLOOD PRESSURE: 137 MMHG | TEMPERATURE: 98 F | RESPIRATION RATE: 18 BRPM | DIASTOLIC BLOOD PRESSURE: 76 MMHG | HEART RATE: 99 BPM

## 2018-07-12 DIAGNOSIS — C34.2 PRIMARY CANCER OF RIGHT MIDDLE LOBE OF LUNG: Primary | ICD-10-CM

## 2018-07-12 DIAGNOSIS — Z51.11 ENCOUNTER FOR ANTINEOPLASTIC CHEMOTHERAPY: ICD-10-CM

## 2018-07-12 LAB
ALBUMIN SERPL BCP-MCNC: 3.1 G/DL
ALP SERPL-CCNC: 88 U/L
ALT SERPL W/O P-5'-P-CCNC: 11 U/L
ANION GAP SERPL CALC-SCNC: 8 MMOL/L
AST SERPL-CCNC: 15 U/L
BILIRUB SERPL-MCNC: 0.4 MG/DL
BUN SERPL-MCNC: 22 MG/DL
CALCIUM SERPL-MCNC: 9.3 MG/DL
CHLORIDE SERPL-SCNC: 109 MMOL/L
CO2 SERPL-SCNC: 25 MMOL/L
CREAT SERPL-MCNC: 0.9 MG/DL
ERYTHROCYTE [DISTWIDTH] IN BLOOD BY AUTOMATED COUNT: 15.5 %
EST. GFR  (AFRICAN AMERICAN): >60 ML/MIN/1.73 M^2
EST. GFR  (NON AFRICAN AMERICAN): >60 ML/MIN/1.73 M^2
GLUCOSE SERPL-MCNC: 133 MG/DL
HCT VFR BLD AUTO: 35.6 %
HGB BLD-MCNC: 11.4 G/DL
IMM GRANULOCYTES # BLD AUTO: 0.02 K/UL
MCH RBC QN AUTO: 28.1 PG
MCHC RBC AUTO-ENTMCNC: 32 G/DL
MCV RBC AUTO: 88 FL
NEUTROPHILS # BLD AUTO: 3.1 K/UL
PLATELET # BLD AUTO: 240 K/UL
PMV BLD AUTO: 10.8 FL
POTASSIUM SERPL-SCNC: 3.9 MMOL/L
PROT SERPL-MCNC: 7.2 G/DL
RBC # BLD AUTO: 4.06 M/UL
SODIUM SERPL-SCNC: 142 MMOL/L
WBC # BLD AUTO: 4.56 K/UL

## 2018-07-12 PROCEDURE — 96413 CHEMO IV INFUSION 1 HR: CPT

## 2018-07-12 PROCEDURE — 63600175 PHARM REV CODE 636 W HCPCS: Performed by: INTERNAL MEDICINE

## 2018-07-12 PROCEDURE — 80053 COMPREHEN METABOLIC PANEL: CPT

## 2018-07-12 PROCEDURE — 85027 COMPLETE CBC AUTOMATED: CPT

## 2018-07-12 PROCEDURE — C9492 INJECTION, DURVALUMAB: HCPCS | Mod: TB | Performed by: INTERNAL MEDICINE

## 2018-07-12 PROCEDURE — 25000003 PHARM REV CODE 250: Performed by: INTERNAL MEDICINE

## 2018-07-12 PROCEDURE — A4216 STERILE WATER/SALINE, 10 ML: HCPCS | Performed by: INTERNAL MEDICINE

## 2018-07-12 RX ORDER — SODIUM CHLORIDE 0.9 % (FLUSH) 0.9 %
10 SYRINGE (ML) INJECTION
Status: DISCONTINUED | OUTPATIENT
Start: 2018-07-12 | End: 2018-07-12 | Stop reason: HOSPADM

## 2018-07-12 RX ORDER — DIPHENHYDRAMINE HYDROCHLORIDE 50 MG/ML
50 INJECTION INTRAMUSCULAR; INTRAVENOUS ONCE AS NEEDED
Status: DISCONTINUED | OUTPATIENT
Start: 2018-07-12 | End: 2018-07-12 | Stop reason: HOSPADM

## 2018-07-12 RX ORDER — SODIUM CHLORIDE 0.9 % (FLUSH) 0.9 %
10 SYRINGE (ML) INJECTION
Status: COMPLETED | OUTPATIENT
Start: 2018-07-12 | End: 2018-07-12

## 2018-07-12 RX ORDER — HEPARIN 100 UNIT/ML
500 SYRINGE INTRAVENOUS
Status: COMPLETED | OUTPATIENT
Start: 2018-07-12 | End: 2018-07-12

## 2018-07-12 RX ORDER — HEPARIN 100 UNIT/ML
500 SYRINGE INTRAVENOUS
Status: CANCELLED | OUTPATIENT
Start: 2018-07-12

## 2018-07-12 RX ORDER — EPINEPHRINE 0.3 MG/.3ML
1 INJECTION SUBCUTANEOUS ONCE AS NEEDED
Status: DISCONTINUED | OUTPATIENT
Start: 2018-07-12 | End: 2018-07-12 | Stop reason: HOSPADM

## 2018-07-12 RX ORDER — HEPARIN 100 UNIT/ML
500 SYRINGE INTRAVENOUS
Status: DISCONTINUED | OUTPATIENT
Start: 2018-07-12 | End: 2018-07-12 | Stop reason: HOSPADM

## 2018-07-12 RX ORDER — SODIUM CHLORIDE 0.9 % (FLUSH) 0.9 %
10 SYRINGE (ML) INJECTION
Status: CANCELLED | OUTPATIENT
Start: 2018-07-12

## 2018-07-12 RX ADMIN — HEPARIN 500 UNITS: 100 SYRINGE at 11:07

## 2018-07-12 RX ADMIN — DURVALUMAB 805 MG: 500 INJECTION, SOLUTION INTRAVENOUS at 12:07

## 2018-07-12 RX ADMIN — SODIUM CHLORIDE: 0.9 INJECTION, SOLUTION INTRAVENOUS at 12:07

## 2018-07-12 RX ADMIN — SODIUM CHLORIDE, PRESERVATIVE FREE 10 ML: 5 INJECTION INTRAVENOUS at 01:07

## 2018-07-12 RX ADMIN — HEPARIN 500 UNITS: 100 SYRINGE at 01:07

## 2018-07-12 RX ADMIN — SODIUM CHLORIDE, PRESERVATIVE FREE 10 ML: 5 INJECTION INTRAVENOUS at 11:07

## 2018-07-12 NOTE — PLAN OF CARE
Problem: Patient Care Overview  Goal: Plan of Care Review  Outcome: Ongoing (interventions implemented as appropriate)  1337-Patient tolerated treatment well. Discharged without complaints or S/S of adverse event. AVS given.  Instructed to call provider for any questions or concerns.

## 2018-07-12 NOTE — PLAN OF CARE
Problem: Patient Care Overview  Goal: Individualization & Mutuality  Left chest PAC  Warm blankets   Outcome: Ongoing (interventions implemented as appropriate)  1145-Labs , hx, and medications reviewed, waiting on todays labs for treatment. Assessment completed. Discussed plan of care with patient. Patient in agreement. Chair reclined and warm blanket and snack offered.

## 2018-07-26 NOTE — PROGRESS NOTES
Subjective:       Patient ID: Fauzia Kirk is a 63 y.o. female.    Chief Complaint: No chief complaint on file.    HPI Ms. Kirk is a very pleasant 62-year-old  female who returned  for F/U of right lung cancer.    She has been receiving adjuvant therapy with Durvalumab.  She has had 16 treatments thus far.    She has some occasional cough but otherwise has been feeling well.  She is having no unusual pain.  Appetite and bowel function has been good.      She continues on Synthroid replacement for her drug-induced hypothyroidism.    In JuneCT showed a decrease in the size of the right lung lesion now measuring 1.7 x 1.4 cm      Her last CT scan of the chest in February 2018 showed a stable  2.7 x 4.3 cm right middle lobe density with surrounding groundglass opacity consistent with post radiation changes.  This is stable 4 mm left lower lobe pulmonary nodule      In December 2016 she began to have some blood in her mucus after coughing.In  April she developed a persistent cough and saw her physician on April 18.  Chest x-ray at that time showed a rounded opacity in the right lung overlying the major fissure.   Chest CT in May showed a 4.8 x 4.0 cm, rounded, soft tissue mass in the middle lobe between the medial and lateral segments. There was pre-carinal adenopathy.    Subsequently she underwent bronchoscopy with EBUS on 6/9/17. Needle biopsy of the medistinal nodes was positive for poorly differentiated carcinoma with squamoid features. The cells were positive for CK7, CK5/6, and P63 and are negative for CK20, GCDFP, TTF1, ER, IA, Napsin and YEMI 3.This was felt to be most CW a new squamous lung cancer.    PET CT yesterday demonstrated the following:a hypermetabolic, large mass in the right middle lobe with an SUV max of 20.23. There is hypermetabolic activity extending from this mass tracking along the pleura. There is a hypermetabolic right hilar lymph node demonstrating an SUV max of 20.2. In addition  there is are 2 hypermetabolic subcarinal lymph nodes with the larger demonstrating an SUV max of 14.2. An additional right paratracheal lymph node is seen with an SUV max of 26.1. Findings are consistent with metastatic lung cancer.      Previous cancer history:She had a stage I, ER negative carcinoma of the    left breast in 1990, treated with lumpectomy and radiation therapy.  In      1993, she developed a stage I, ER positive carcinoma of the right breast     treated with lumpectomy and radiation.  In 1995, she developed left breast   cancer recurrence, treated with lumpectomy, brachytherapy and four cycles    of Adriamycin and Cytoxan.  In 1996, she developed a new right breast        cancer T2 N0 poorly differentiated, treated with four cycles of              preoperative Taxol followed by mastectomy.        On October 17, 2016 left mammogram showed increased calcifications in the left breast at 3:00.  On October 27 a biopsy showed DCIS with solid, cribriform, and micropapillary patterns.  Tumor was 95% ER positive at 95% VA positive    On November 7, 2016 she underwent left mastectomy which showed 8 mm of DCIS and negative margins.    She completed radiation together with weekly chemotherapy with carboplatin and Taxol  for stage IIIA disease.    She completed therapy on September 13 and received 7 chemotherapy treatments.    CT scan from September 29 showed that the right middle lobe mass had decreased to 4.2 x 2.3 cm from 4.8 x 4.3 cm.  Stable 4 mm pulmonary nodule in the left lower lobe.    Review of Systems   Constitutional: Negative for activity change, appetite change, fatigue, fever and unexpected weight change.   HENT: Positive for postnasal drip. Negative for trouble swallowing.    Respiratory: Positive for cough. Negative for shortness of breath.    Cardiovascular: Negative for chest pain.   Gastrointestinal: Negative for abdominal pain, constipation, nausea and vomiting.   Musculoskeletal: Negative  for back pain.   Skin:        pruritis   Neurological: Negative for headaches.   Psychiatric/Behavioral: Negative for dysphoric mood. The patient is not nervous/anxious.        Objective:      Physical Exam   Constitutional: She is oriented to person, place, and time. She appears well-developed and well-nourished.   HENT:   Mouth/Throat: No oropharyngeal exudate.   Eyes: No scleral icterus.   Cardiovascular: Normal rate and regular rhythm.    Pulmonary/Chest: Effort normal and breath sounds normal. She has no wheezes. She has no rales.   Abdominal: Soft. She exhibits no mass. There is no tenderness.   Lymphadenopathy:     She has no cervical adenopathy.     She has no axillary adenopathy.        Right: No supraclavicular adenopathy present.        Left: No supraclavicular adenopathy present.   Neurological: She is alert and oriented to person, place, and time.   Skin: No rash noted. No erythema.   Psychiatric: She has a normal mood and affect. Her behavior is normal. Thought content normal.       Assessment:     1. Primary cancer of right middle lobe of lung    2. Hypothyroidism due to medication     4.  Treatment-induced hypothyroidism  Plan:     Continue Durvalumab. RTC 4 weeks.  Continue thyroid replacement.    Distress Screening Results: Psychosocial Distress screening score of Distress Score: (S) 0 noted and reviewed. No intervention indicated.

## 2018-07-27 ENCOUNTER — OFFICE VISIT (OUTPATIENT)
Dept: HEMATOLOGY/ONCOLOGY | Facility: CLINIC | Age: 63
End: 2018-07-27
Payer: COMMERCIAL

## 2018-07-27 ENCOUNTER — INFUSION (OUTPATIENT)
Dept: INFUSION THERAPY | Facility: HOSPITAL | Age: 63
End: 2018-07-27
Attending: INTERNAL MEDICINE
Payer: COMMERCIAL

## 2018-07-27 VITALS
RESPIRATION RATE: 18 BRPM | TEMPERATURE: 99 F | DIASTOLIC BLOOD PRESSURE: 73 MMHG | SYSTOLIC BLOOD PRESSURE: 142 MMHG | HEART RATE: 91 BPM

## 2018-07-27 VITALS
WEIGHT: 191.13 LBS | HEART RATE: 99 BPM | SYSTOLIC BLOOD PRESSURE: 142 MMHG | OXYGEN SATURATION: 99 % | BODY MASS INDEX: 30.72 KG/M2 | RESPIRATION RATE: 18 BRPM | HEIGHT: 66 IN | DIASTOLIC BLOOD PRESSURE: 76 MMHG | TEMPERATURE: 98 F

## 2018-07-27 DIAGNOSIS — C34.2 PRIMARY CANCER OF RIGHT MIDDLE LOBE OF LUNG: Primary | ICD-10-CM

## 2018-07-27 DIAGNOSIS — E03.2 HYPOTHYROIDISM DUE TO MEDICATION: ICD-10-CM

## 2018-07-27 DIAGNOSIS — Z51.11 ENCOUNTER FOR ANTINEOPLASTIC CHEMOTHERAPY: ICD-10-CM

## 2018-07-27 LAB
ALBUMIN SERPL BCP-MCNC: 3.3 G/DL
ALP SERPL-CCNC: 77 U/L
ALT SERPL W/O P-5'-P-CCNC: 10 U/L
ANION GAP SERPL CALC-SCNC: 6 MMOL/L
AST SERPL-CCNC: 16 U/L
BILIRUB SERPL-MCNC: 0.5 MG/DL
BUN SERPL-MCNC: 24 MG/DL
CALCIUM SERPL-MCNC: 9.5 MG/DL
CHLORIDE SERPL-SCNC: 106 MMOL/L
CO2 SERPL-SCNC: 28 MMOL/L
CREAT SERPL-MCNC: 0.9 MG/DL
ERYTHROCYTE [DISTWIDTH] IN BLOOD BY AUTOMATED COUNT: 15.7 %
EST. GFR  (AFRICAN AMERICAN): >60 ML/MIN/1.73 M^2
EST. GFR  (NON AFRICAN AMERICAN): >60 ML/MIN/1.73 M^2
GLUCOSE SERPL-MCNC: 93 MG/DL
HCT VFR BLD AUTO: 34.8 %
HGB BLD-MCNC: 11.1 G/DL
IMM GRANULOCYTES # BLD AUTO: 0.01 K/UL
MCH RBC QN AUTO: 27.8 PG
MCHC RBC AUTO-ENTMCNC: 31.9 G/DL
MCV RBC AUTO: 87 FL
NEUTROPHILS # BLD AUTO: 3 K/UL
PLATELET # BLD AUTO: 208 K/UL
PMV BLD AUTO: 10.2 FL
POTASSIUM SERPL-SCNC: 4.1 MMOL/L
PROT SERPL-MCNC: 7.3 G/DL
RBC # BLD AUTO: 3.99 M/UL
SODIUM SERPL-SCNC: 140 MMOL/L
WBC # BLD AUTO: 4.57 K/UL

## 2018-07-27 PROCEDURE — A4216 STERILE WATER/SALINE, 10 ML: HCPCS | Performed by: INTERNAL MEDICINE

## 2018-07-27 PROCEDURE — 3078F DIAST BP <80 MM HG: CPT | Mod: CPTII,S$GLB,, | Performed by: INTERNAL MEDICINE

## 2018-07-27 PROCEDURE — 3008F BODY MASS INDEX DOCD: CPT | Mod: CPTII,S$GLB,, | Performed by: INTERNAL MEDICINE

## 2018-07-27 PROCEDURE — C9492 INJECTION, DURVALUMAB: HCPCS | Mod: TB | Performed by: INTERNAL MEDICINE

## 2018-07-27 PROCEDURE — 3077F SYST BP >= 140 MM HG: CPT | Mod: CPTII,S$GLB,, | Performed by: INTERNAL MEDICINE

## 2018-07-27 PROCEDURE — 25000003 PHARM REV CODE 250: Performed by: INTERNAL MEDICINE

## 2018-07-27 PROCEDURE — 63600175 PHARM REV CODE 636 W HCPCS: Mod: TB | Performed by: INTERNAL MEDICINE

## 2018-07-27 PROCEDURE — 99999 PR PBB SHADOW E&M-EST. PATIENT-LVL III: CPT | Mod: PBBFAC,,, | Performed by: INTERNAL MEDICINE

## 2018-07-27 PROCEDURE — 80053 COMPREHEN METABOLIC PANEL: CPT

## 2018-07-27 PROCEDURE — 85027 COMPLETE CBC AUTOMATED: CPT

## 2018-07-27 PROCEDURE — 99214 OFFICE O/P EST MOD 30 MIN: CPT | Mod: S$GLB,,, | Performed by: INTERNAL MEDICINE

## 2018-07-27 PROCEDURE — 63600175 PHARM REV CODE 636 W HCPCS: Performed by: INTERNAL MEDICINE

## 2018-07-27 PROCEDURE — 96413 CHEMO IV INFUSION 1 HR: CPT

## 2018-07-27 RX ORDER — EPINEPHRINE 0.3 MG/.3ML
1 INJECTION SUBCUTANEOUS ONCE AS NEEDED
Status: DISCONTINUED | OUTPATIENT
Start: 2018-07-27 | End: 2018-07-27 | Stop reason: HOSPADM

## 2018-07-27 RX ORDER — HEPARIN 100 UNIT/ML
500 SYRINGE INTRAVENOUS
Status: CANCELLED | OUTPATIENT
Start: 2018-07-27

## 2018-07-27 RX ORDER — SODIUM CHLORIDE 0.9 % (FLUSH) 0.9 %
10 SYRINGE (ML) INJECTION
Status: CANCELLED | OUTPATIENT
Start: 2018-07-27

## 2018-07-27 RX ORDER — SODIUM CHLORIDE 0.9 % (FLUSH) 0.9 %
10 SYRINGE (ML) INJECTION
Status: COMPLETED | OUTPATIENT
Start: 2018-07-27 | End: 2018-07-27

## 2018-07-27 RX ORDER — SODIUM CHLORIDE 0.9 % (FLUSH) 0.9 %
10 SYRINGE (ML) INJECTION
Status: DISCONTINUED | OUTPATIENT
Start: 2018-07-27 | End: 2018-07-27 | Stop reason: HOSPADM

## 2018-07-27 RX ORDER — DIPHENHYDRAMINE HYDROCHLORIDE 50 MG/ML
50 INJECTION INTRAMUSCULAR; INTRAVENOUS ONCE AS NEEDED
Status: CANCELLED | OUTPATIENT
Start: 2018-07-27 | End: 2018-07-27

## 2018-07-27 RX ORDER — HEPARIN 100 UNIT/ML
500 SYRINGE INTRAVENOUS
Status: COMPLETED | OUTPATIENT
Start: 2018-07-27 | End: 2018-07-27

## 2018-07-27 RX ORDER — HEPARIN 100 UNIT/ML
500 SYRINGE INTRAVENOUS
Status: DISCONTINUED | OUTPATIENT
Start: 2018-07-27 | End: 2018-07-27 | Stop reason: HOSPADM

## 2018-07-27 RX ORDER — DIPHENHYDRAMINE HYDROCHLORIDE 50 MG/ML
50 INJECTION INTRAMUSCULAR; INTRAVENOUS ONCE AS NEEDED
Status: DISCONTINUED | OUTPATIENT
Start: 2018-07-27 | End: 2018-07-27 | Stop reason: HOSPADM

## 2018-07-27 RX ORDER — EPINEPHRINE 0.3 MG/.3ML
1 INJECTION SUBCUTANEOUS ONCE AS NEEDED
Status: CANCELLED | OUTPATIENT
Start: 2018-07-27 | End: 2018-07-27

## 2018-07-27 RX ADMIN — DURVALUMAB 805 MG: 500 INJECTION, SOLUTION INTRAVENOUS at 10:07

## 2018-07-27 RX ADMIN — HEPARIN 500 UNITS: 100 SYRINGE at 09:07

## 2018-07-27 RX ADMIN — HEPARIN 500 UNITS: 100 SYRINGE at 11:07

## 2018-07-27 RX ADMIN — SODIUM CHLORIDE: 9 INJECTION, SOLUTION INTRAVENOUS at 10:07

## 2018-07-27 RX ADMIN — SODIUM CHLORIDE, PRESERVATIVE FREE 10 ML: 5 INJECTION INTRAVENOUS at 09:07

## 2018-07-27 NOTE — NURSING
Labs drawn from port.  Port heparinized and left in place for scheduled chemo.  Specimens sent to lab.  Patient ambulated off unit with steady gait to MD jaimes.

## 2018-07-27 NOTE — PLAN OF CARE
Problem: Patient Care Overview  Goal: Plan of Care Review  Outcome: Ongoing (interventions implemented as appropriate)  Pt received Imfinzi; tolerated well. VSS and NAD. VSS and NAD. Pt instructed to call MD with any concerns. Pt discharged home independently.

## 2018-08-03 RX ORDER — HEPARIN 100 UNIT/ML
500 SYRINGE INTRAVENOUS
Status: CANCELLED | OUTPATIENT
Start: 2018-08-08

## 2018-08-03 RX ORDER — EPINEPHRINE 0.3 MG/.3ML
1 INJECTION SUBCUTANEOUS ONCE AS NEEDED
Status: CANCELLED | OUTPATIENT
Start: 2018-08-08 | End: 2018-08-08

## 2018-08-03 RX ORDER — DIPHENHYDRAMINE HYDROCHLORIDE 50 MG/ML
50 INJECTION INTRAMUSCULAR; INTRAVENOUS ONCE AS NEEDED
Status: CANCELLED | OUTPATIENT
Start: 2018-08-08 | End: 2018-08-08

## 2018-08-03 RX ORDER — SODIUM CHLORIDE 0.9 % (FLUSH) 0.9 %
10 SYRINGE (ML) INJECTION
Status: CANCELLED | OUTPATIENT
Start: 2018-08-08

## 2018-08-10 ENCOUNTER — INFUSION (OUTPATIENT)
Dept: INFUSION THERAPY | Facility: HOSPITAL | Age: 63
End: 2018-08-10
Attending: INTERNAL MEDICINE
Payer: COMMERCIAL

## 2018-08-10 VITALS
TEMPERATURE: 98 F | RESPIRATION RATE: 18 BRPM | SYSTOLIC BLOOD PRESSURE: 125 MMHG | DIASTOLIC BLOOD PRESSURE: 70 MMHG | HEART RATE: 95 BPM

## 2018-08-10 VITALS
RESPIRATION RATE: 16 BRPM | DIASTOLIC BLOOD PRESSURE: 71 MMHG | HEART RATE: 91 BPM | SYSTOLIC BLOOD PRESSURE: 133 MMHG | TEMPERATURE: 98 F

## 2018-08-10 DIAGNOSIS — C34.2 PRIMARY CANCER OF RIGHT MIDDLE LOBE OF LUNG: Primary | ICD-10-CM

## 2018-08-10 DIAGNOSIS — Z51.11 ENCOUNTER FOR ANTINEOPLASTIC CHEMOTHERAPY: ICD-10-CM

## 2018-08-10 LAB
ALBUMIN SERPL BCP-MCNC: 3.3 G/DL
ALP SERPL-CCNC: 89 U/L
ALT SERPL W/O P-5'-P-CCNC: 10 U/L
ANION GAP SERPL CALC-SCNC: 8 MMOL/L
AST SERPL-CCNC: 14 U/L
BILIRUB SERPL-MCNC: 0.7 MG/DL
BUN SERPL-MCNC: 20 MG/DL
CALCIUM SERPL-MCNC: 9.4 MG/DL
CHLORIDE SERPL-SCNC: 106 MMOL/L
CO2 SERPL-SCNC: 26 MMOL/L
CREAT SERPL-MCNC: 0.9 MG/DL
ERYTHROCYTE [DISTWIDTH] IN BLOOD BY AUTOMATED COUNT: 15.8 %
EST. GFR  (AFRICAN AMERICAN): >60 ML/MIN/1.73 M^2
EST. GFR  (NON AFRICAN AMERICAN): >60 ML/MIN/1.73 M^2
GLUCOSE SERPL-MCNC: 89 MG/DL
HCT VFR BLD AUTO: 36.1 %
HGB BLD-MCNC: 11.6 G/DL
IMM GRANULOCYTES # BLD AUTO: 0.01 K/UL
MCH RBC QN AUTO: 27.8 PG
MCHC RBC AUTO-ENTMCNC: 32.1 G/DL
MCV RBC AUTO: 86 FL
NEUTROPHILS # BLD AUTO: 2.9 K/UL
PLATELET # BLD AUTO: 209 K/UL
PMV BLD AUTO: 9.9 FL
POTASSIUM SERPL-SCNC: 3.9 MMOL/L
PROT SERPL-MCNC: 7.4 G/DL
RBC # BLD AUTO: 4.18 M/UL
SODIUM SERPL-SCNC: 140 MMOL/L
WBC # BLD AUTO: 4.44 K/UL

## 2018-08-10 PROCEDURE — A4216 STERILE WATER/SALINE, 10 ML: HCPCS | Performed by: INTERNAL MEDICINE

## 2018-08-10 PROCEDURE — 85027 COMPLETE CBC AUTOMATED: CPT

## 2018-08-10 PROCEDURE — 36591 DRAW BLOOD OFF VENOUS DEVICE: CPT

## 2018-08-10 PROCEDURE — C9492 INJECTION, DURVALUMAB: HCPCS | Mod: TB | Performed by: INTERNAL MEDICINE

## 2018-08-10 PROCEDURE — 25000003 PHARM REV CODE 250: Performed by: INTERNAL MEDICINE

## 2018-08-10 PROCEDURE — 63600175 PHARM REV CODE 636 W HCPCS: Performed by: INTERNAL MEDICINE

## 2018-08-10 PROCEDURE — 80053 COMPREHEN METABOLIC PANEL: CPT

## 2018-08-10 PROCEDURE — 96413 CHEMO IV INFUSION 1 HR: CPT

## 2018-08-10 RX ORDER — DIPHENHYDRAMINE HYDROCHLORIDE 50 MG/ML
50 INJECTION INTRAMUSCULAR; INTRAVENOUS ONCE AS NEEDED
Status: DISCONTINUED | OUTPATIENT
Start: 2018-08-10 | End: 2018-08-10 | Stop reason: HOSPADM

## 2018-08-10 RX ORDER — SODIUM CHLORIDE 0.9 % (FLUSH) 0.9 %
10 SYRINGE (ML) INJECTION
Status: CANCELLED | OUTPATIENT
Start: 2018-08-10

## 2018-08-10 RX ORDER — EPINEPHRINE 0.3 MG/.3ML
0.3 INJECTION SUBCUTANEOUS ONCE AS NEEDED
Status: DISCONTINUED | OUTPATIENT
Start: 2018-08-10 | End: 2018-08-10 | Stop reason: HOSPADM

## 2018-08-10 RX ORDER — HEPARIN 100 UNIT/ML
500 SYRINGE INTRAVENOUS
Status: COMPLETED | OUTPATIENT
Start: 2018-08-10 | End: 2018-08-10

## 2018-08-10 RX ORDER — SODIUM CHLORIDE 0.9 % (FLUSH) 0.9 %
10 SYRINGE (ML) INJECTION
Status: DISCONTINUED | OUTPATIENT
Start: 2018-08-10 | End: 2018-08-10 | Stop reason: HOSPADM

## 2018-08-10 RX ORDER — HEPARIN 100 UNIT/ML
500 SYRINGE INTRAVENOUS
Status: DISCONTINUED | OUTPATIENT
Start: 2018-08-10 | End: 2018-08-10 | Stop reason: HOSPADM

## 2018-08-10 RX ORDER — HEPARIN 100 UNIT/ML
500 SYRINGE INTRAVENOUS
Status: CANCELLED | OUTPATIENT
Start: 2018-08-10

## 2018-08-10 RX ORDER — SODIUM CHLORIDE 0.9 % (FLUSH) 0.9 %
10 SYRINGE (ML) INJECTION
Status: COMPLETED | OUTPATIENT
Start: 2018-08-10 | End: 2018-08-10

## 2018-08-10 RX ADMIN — DURVALUMAB 805 MG: 500 INJECTION, SOLUTION INTRAVENOUS at 12:08

## 2018-08-10 RX ADMIN — HEPARIN 500 UNITS: 100 SYRINGE at 11:08

## 2018-08-10 RX ADMIN — SODIUM CHLORIDE, PRESERVATIVE FREE 10 ML: 5 INJECTION INTRAVENOUS at 11:08

## 2018-08-10 RX ADMIN — HEPARIN 500 UNITS: 100 SYRINGE at 01:08

## 2018-08-10 RX ADMIN — Medication 10 ML: at 01:08

## 2018-08-10 NOTE — PLAN OF CARE
Problem: Patient Care Overview  Goal: Individualization & Mutuality  Left chest PAC  Warm blankets   Outcome: Ongoing (interventions implemented as appropriate)  1145-Labs , hx, and medications reviewed, patient had labs drawn earlier today. Assessment completed. Discussed plan of care with patient. Patient in agreement. Chair reclined and warm blanket and snack offered.

## 2018-08-10 NOTE — PLAN OF CARE
Problem: Patient Care Overview  Goal: Plan of Care Review  1308-Patient tolerated treatment well. Discharged without complaints or S/S of adverse event. AVS given.  Instructed to call provider for any questions or concerns.

## 2018-08-10 NOTE — NURSING
1110:  Pt here for labs drawn from port, prior to chemo.  VSS, NAD noted, denies any new issues.  LCW-PAC accessed, flushes easily, brisk blood return noted.  CBC and CMP drawn from port as ordered.  Port flushed and heparinized, clamped and capped prior to discharge.

## 2018-08-24 ENCOUNTER — OFFICE VISIT (OUTPATIENT)
Dept: HEMATOLOGY/ONCOLOGY | Facility: CLINIC | Age: 63
End: 2018-08-24
Payer: COMMERCIAL

## 2018-08-24 ENCOUNTER — INFUSION (OUTPATIENT)
Dept: INFUSION THERAPY | Facility: HOSPITAL | Age: 63
End: 2018-08-24
Attending: INTERNAL MEDICINE
Payer: COMMERCIAL

## 2018-08-24 VITALS
DIASTOLIC BLOOD PRESSURE: 73 MMHG | HEIGHT: 66 IN | RESPIRATION RATE: 18 BRPM | TEMPERATURE: 99 F | SYSTOLIC BLOOD PRESSURE: 139 MMHG | OXYGEN SATURATION: 99 % | BODY MASS INDEX: 30.54 KG/M2 | WEIGHT: 190.06 LBS | HEART RATE: 95 BPM

## 2018-08-24 VITALS — DIASTOLIC BLOOD PRESSURE: 74 MMHG | RESPIRATION RATE: 18 BRPM | SYSTOLIC BLOOD PRESSURE: 132 MMHG | HEART RATE: 88 BPM

## 2018-08-24 DIAGNOSIS — C34.2 PRIMARY CANCER OF RIGHT MIDDLE LOBE OF LUNG: Primary | ICD-10-CM

## 2018-08-24 DIAGNOSIS — Z51.11 ENCOUNTER FOR ANTINEOPLASTIC CHEMOTHERAPY: ICD-10-CM

## 2018-08-24 LAB
ALBUMIN SERPL BCP-MCNC: 3.2 G/DL
ALP SERPL-CCNC: 85 U/L
ALT SERPL W/O P-5'-P-CCNC: 10 U/L
ANION GAP SERPL CALC-SCNC: 7 MMOL/L
AST SERPL-CCNC: 16 U/L
BILIRUB SERPL-MCNC: 0.7 MG/DL
BUN SERPL-MCNC: 17 MG/DL
CALCIUM SERPL-MCNC: 9.5 MG/DL
CHLORIDE SERPL-SCNC: 105 MMOL/L
CO2 SERPL-SCNC: 27 MMOL/L
CREAT SERPL-MCNC: 0.8 MG/DL
ERYTHROCYTE [DISTWIDTH] IN BLOOD BY AUTOMATED COUNT: 15.9 %
EST. GFR  (AFRICAN AMERICAN): >60 ML/MIN/1.73 M^2
EST. GFR  (NON AFRICAN AMERICAN): >60 ML/MIN/1.73 M^2
GLUCOSE SERPL-MCNC: 95 MG/DL
HCT VFR BLD AUTO: 34 %
HGB BLD-MCNC: 10.9 G/DL
IMM GRANULOCYTES # BLD AUTO: 0.02 K/UL
MCH RBC QN AUTO: 27.7 PG
MCHC RBC AUTO-ENTMCNC: 32.1 G/DL
MCV RBC AUTO: 87 FL
NEUTROPHILS # BLD AUTO: 2.6 K/UL
PLATELET # BLD AUTO: 206 K/UL
PMV BLD AUTO: 9.5 FL
POTASSIUM SERPL-SCNC: 4.1 MMOL/L
PROT SERPL-MCNC: 7.1 G/DL
RBC # BLD AUTO: 3.93 M/UL
SODIUM SERPL-SCNC: 139 MMOL/L
T4 FREE SERPL-MCNC: 1.19 NG/DL
TSH SERPL DL<=0.005 MIU/L-ACNC: 0.05 UIU/ML
WBC # BLD AUTO: 4.15 K/UL

## 2018-08-24 PROCEDURE — 84443 ASSAY THYROID STIM HORMONE: CPT

## 2018-08-24 PROCEDURE — A4216 STERILE WATER/SALINE, 10 ML: HCPCS | Performed by: INTERNAL MEDICINE

## 2018-08-24 PROCEDURE — 3078F DIAST BP <80 MM HG: CPT | Mod: CPTII,S$GLB,, | Performed by: INTERNAL MEDICINE

## 2018-08-24 PROCEDURE — 85027 COMPLETE CBC AUTOMATED: CPT

## 2018-08-24 PROCEDURE — 99214 OFFICE O/P EST MOD 30 MIN: CPT | Mod: S$GLB,,, | Performed by: INTERNAL MEDICINE

## 2018-08-24 PROCEDURE — 84439 ASSAY OF FREE THYROXINE: CPT

## 2018-08-24 PROCEDURE — 3008F BODY MASS INDEX DOCD: CPT | Mod: CPTII,S$GLB,, | Performed by: INTERNAL MEDICINE

## 2018-08-24 PROCEDURE — 63600175 PHARM REV CODE 636 W HCPCS: Performed by: INTERNAL MEDICINE

## 2018-08-24 PROCEDURE — 99999 PR PBB SHADOW E&M-EST. PATIENT-LVL III: CPT | Mod: PBBFAC,,, | Performed by: INTERNAL MEDICINE

## 2018-08-24 PROCEDURE — 96413 CHEMO IV INFUSION 1 HR: CPT

## 2018-08-24 PROCEDURE — 80053 COMPREHEN METABOLIC PANEL: CPT

## 2018-08-24 PROCEDURE — 3075F SYST BP GE 130 - 139MM HG: CPT | Mod: CPTII,S$GLB,, | Performed by: INTERNAL MEDICINE

## 2018-08-24 PROCEDURE — C9492 INJECTION, DURVALUMAB: HCPCS | Mod: TB | Performed by: INTERNAL MEDICINE

## 2018-08-24 PROCEDURE — 25000003 PHARM REV CODE 250: Performed by: INTERNAL MEDICINE

## 2018-08-24 RX ORDER — DIPHENHYDRAMINE HYDROCHLORIDE 50 MG/ML
50 INJECTION INTRAMUSCULAR; INTRAVENOUS ONCE AS NEEDED
Status: CANCELLED | OUTPATIENT
Start: 2018-08-25 | End: 2018-08-25

## 2018-08-24 RX ORDER — SODIUM CHLORIDE 0.9 % (FLUSH) 0.9 %
10 SYRINGE (ML) INJECTION
Status: COMPLETED | OUTPATIENT
Start: 2018-08-24 | End: 2018-08-24

## 2018-08-24 RX ORDER — SODIUM CHLORIDE 0.9 % (FLUSH) 0.9 %
10 SYRINGE (ML) INJECTION
Status: CANCELLED | OUTPATIENT
Start: 2018-08-24

## 2018-08-24 RX ORDER — HEPARIN 100 UNIT/ML
500 SYRINGE INTRAVENOUS
Status: COMPLETED | OUTPATIENT
Start: 2018-08-24 | End: 2018-08-24

## 2018-08-24 RX ORDER — EPINEPHRINE 0.3 MG/.3ML
0.3 INJECTION SUBCUTANEOUS ONCE AS NEEDED
Status: CANCELLED | OUTPATIENT
Start: 2018-08-25 | End: 2018-08-25

## 2018-08-24 RX ORDER — DIPHENHYDRAMINE HYDROCHLORIDE 50 MG/ML
50 INJECTION INTRAMUSCULAR; INTRAVENOUS ONCE AS NEEDED
Status: DISCONTINUED | OUTPATIENT
Start: 2018-08-24 | End: 2018-08-24 | Stop reason: HOSPADM

## 2018-08-24 RX ORDER — HEPARIN 100 UNIT/ML
500 SYRINGE INTRAVENOUS
Status: CANCELLED | OUTPATIENT
Start: 2018-08-24

## 2018-08-24 RX ORDER — SODIUM CHLORIDE 0.9 % (FLUSH) 0.9 %
10 SYRINGE (ML) INJECTION
Status: DISCONTINUED | OUTPATIENT
Start: 2018-08-24 | End: 2018-08-24 | Stop reason: HOSPADM

## 2018-08-24 RX ORDER — HEPARIN 100 UNIT/ML
500 SYRINGE INTRAVENOUS
Status: DISCONTINUED | OUTPATIENT
Start: 2018-08-24 | End: 2018-08-24 | Stop reason: HOSPADM

## 2018-08-24 RX ORDER — HEPARIN 100 UNIT/ML
500 SYRINGE INTRAVENOUS
Status: CANCELLED | OUTPATIENT
Start: 2018-08-25

## 2018-08-24 RX ORDER — SODIUM CHLORIDE 0.9 % (FLUSH) 0.9 %
10 SYRINGE (ML) INJECTION
Status: CANCELLED | OUTPATIENT
Start: 2018-08-25

## 2018-08-24 RX ORDER — EPINEPHRINE 0.3 MG/.3ML
0.3 INJECTION SUBCUTANEOUS ONCE AS NEEDED
Status: DISCONTINUED | OUTPATIENT
Start: 2018-08-24 | End: 2018-08-24 | Stop reason: HOSPADM

## 2018-08-24 RX ADMIN — HEPARIN 500 UNITS: 100 SYRINGE at 09:08

## 2018-08-24 RX ADMIN — HEPARIN 500 UNITS: 100 SYRINGE at 12:08

## 2018-08-24 RX ADMIN — Medication 10 ML: at 09:08

## 2018-08-24 RX ADMIN — SODIUM CHLORIDE, PRESERVATIVE FREE 10 ML: 5 INJECTION INTRAVENOUS at 12:08

## 2018-08-24 RX ADMIN — SODIUM CHLORIDE: 0.9 INJECTION, SOLUTION INTRAVENOUS at 11:08

## 2018-08-24 RX ADMIN — DURVALUMAB 805 MG: 120 INJECTION, SOLUTION INTRAVENOUS at 11:08

## 2018-08-24 NOTE — PLAN OF CARE
Problem: Patient Care Overview  Goal: Individualization & Mutuality  Left chest PAC  Warm blankets   Outcome: Ongoing (interventions implemented as appropriate)  1110-Labs , hx, and medications reviewed, patient was seen by MD prior to arrival. Assessment completed. Discussed plan of care with patient. Patient in agreement. Chair reclined and warm blanket and snack offered.

## 2018-08-24 NOTE — PROGRESS NOTES
Subjective:       Patient ID: Fauzia Kirk is a 63 y.o. female.    Chief Complaint: No chief complaint on file.    HPI Ms. Kirk is a very pleasant 62-year-old  female who returned  for F/U of right lung cancer.    She has been receiving adjuvant therapy with Durvalumab.   She began that in March 2018 and has had 20 treatments thus far.       today she reports she has no new problems.  She continues to tolerate the medication w    She continues on Synthroid replacement for her drug-induced hypothyroidism.    In June, CT showed a decrease in the size of the right lung lesion now measuring 1.7 x 1.4 cm      Her last CT scan of the chest in February 2018 showed a stable  2.7 x 4.3 cm right middle lobe density with surrounding groundglass opacity consistent with post radiation changes.  This is stable 4 mm left lower lobe pulmonary nodule      In December 2016 she began to have some blood in her mucus after coughing.In  April she developed a persistent cough and saw her physician on April 18.  Chest x-ray at that time showed a rounded opacity in the right lung overlying the major fissure.   Chest CT in May showed a 4.8 x 4.0 cm, rounded, soft tissue mass in the middle lobe between the medial and lateral segments. There was pre-carinal adenopathy.    Subsequently she underwent bronchoscopy with EBUS on 6/9/17. Needle biopsy of the medistinal nodes was positive for poorly differentiated carcinoma with squamoid features. The cells were positive for CK7, CK5/6, and P63 and are negative for CK20, GCDFP, TTF1, ER, WI, Napsin and YEMI 3.This was felt to be most CW a new squamous lung cancer.    PET CT yesterday demonstrated the following:a hypermetabolic, large mass in the right middle lobe with an SUV max of 20.23. There is hypermetabolic activity extending from this mass tracking along the pleura. There is a hypermetabolic right hilar lymph node demonstrating an SUV max of 20.2. In addition there is are 2  hypermetabolic subcarinal lymph nodes with the larger demonstrating an SUV max of 14.2. An additional right paratracheal lymph node is seen with an SUV max of 26.1. Findings are consistent with metastatic lung cancer.      Previous cancer history:She had a stage I, ER negative carcinoma of the    left breast in 1990, treated with lumpectomy and radiation therapy.  In      1993, she developed a stage I, ER positive carcinoma of the right breast     treated with lumpectomy and radiation.  In 1995, she developed left breast   cancer recurrence, treated with lumpectomy, brachytherapy and four cycles    of Adriamycin and Cytoxan.  In 1996, she developed a new right breast        cancer T2 N0 poorly differentiated, treated with four cycles of              preoperative Taxol followed by mastectomy.        On October 17, 2016 left mammogram showed increased calcifications in the left breast at 3:00.  On October 27 a biopsy showed DCIS with solid, cribriform, and micropapillary patterns.  Tumor was 95% ER positive at 95% CA positive    On November 7, 2016 she underwent left mastectomy which showed 8 mm of DCIS and negative margins.    She completed radiation together with weekly chemotherapy with carboplatin and Taxol  for stage IIIA disease.    She completed therapy on September 13 and received 7 chemotherapy treatments.    CT scan from September 29 showed that the right middle lobe mass had decreased to 4.2 x 2.3 cm from 4.8 x 4.3 cm.  Stable 4 mm pulmonary nodule in the left lower lobe.    Review of Systems   Constitutional: Negative for activity change, appetite change, fatigue, fever and unexpected weight change.   HENT: Positive for postnasal drip. Negative for trouble swallowing.    Respiratory: Positive for cough. Negative for shortness of breath.    Cardiovascular: Negative for chest pain.   Gastrointestinal: Negative for abdominal pain, constipation, nausea and vomiting.   Musculoskeletal: Negative for back pain.    Skin:        pruritis   Neurological: Negative for headaches.   Psychiatric/Behavioral: Negative for dysphoric mood. The patient is not nervous/anxious.        Objective:      Physical Exam   Constitutional: She is oriented to person, place, and time. She appears well-developed and well-nourished.   HENT:   Mouth/Throat: No oropharyngeal exudate.   Eyes: No scleral icterus.   Cardiovascular: Normal rate and regular rhythm.   Pulmonary/Chest: Effort normal and breath sounds normal. She has no wheezes. She has no rales.   Abdominal: Soft. She exhibits no mass. There is no tenderness.   Lymphadenopathy:     She has no cervical adenopathy.     She has no axillary adenopathy.        Right: No supraclavicular adenopathy present.        Left: No supraclavicular adenopathy present.   Neurological: She is alert and oriented to person, place, and time.   Skin: No rash noted. No erythema.   Psychiatric: She has a normal mood and affect. Her behavior is normal. Thought content normal.       Assessment:    metabolic profile is unremarkable, CBC remarkable only for hemoglobin 10.9  1. Primary cancer of right middle lobe of lung    2. Encounter for antineoplastic chemotherapy     4.  Treatment-induced hypothyroidism  Plan:     Continue Durvalumab. RTC 4 weeks.  Continue thyroid replacement.

## 2018-08-24 NOTE — PLAN OF CARE
Problem: Patient Care Overview  Goal: Plan of Care Review  1226-Patient tolerated treatment well. Discharged without complaints or S/S of adverse event. AVS given.  Instructed to call provider for any questions or concerns.

## 2018-08-24 NOTE — NURSING
Pt arrived for port access and labs.  Port flushes easily with good blood return, labs sent. Port left access for chemo today.  Pt now going to MD appt.

## 2018-09-07 ENCOUNTER — INFUSION (OUTPATIENT)
Dept: INFUSION THERAPY | Facility: HOSPITAL | Age: 63
End: 2018-09-07
Attending: INTERNAL MEDICINE
Payer: COMMERCIAL

## 2018-09-07 VITALS
SYSTOLIC BLOOD PRESSURE: 129 MMHG | RESPIRATION RATE: 18 BRPM | DIASTOLIC BLOOD PRESSURE: 87 MMHG | TEMPERATURE: 99 F | HEART RATE: 102 BPM

## 2018-09-07 DIAGNOSIS — Z51.11 ENCOUNTER FOR ANTINEOPLASTIC CHEMOTHERAPY: ICD-10-CM

## 2018-09-07 DIAGNOSIS — C34.2 PRIMARY CANCER OF RIGHT MIDDLE LOBE OF LUNG: Primary | ICD-10-CM

## 2018-09-07 LAB
ALBUMIN SERPL BCP-MCNC: 3.2 G/DL
ALP SERPL-CCNC: 84 U/L
ALT SERPL W/O P-5'-P-CCNC: 11 U/L
ANION GAP SERPL CALC-SCNC: 8 MMOL/L
AST SERPL-CCNC: 15 U/L
BILIRUB SERPL-MCNC: 0.5 MG/DL
BUN SERPL-MCNC: 18 MG/DL
CALCIUM SERPL-MCNC: 9.5 MG/DL
CHLORIDE SERPL-SCNC: 107 MMOL/L
CO2 SERPL-SCNC: 25 MMOL/L
CREAT SERPL-MCNC: 1 MG/DL
ERYTHROCYTE [DISTWIDTH] IN BLOOD BY AUTOMATED COUNT: 15.6 %
EST. GFR  (AFRICAN AMERICAN): >60 ML/MIN/1.73 M^2
EST. GFR  (NON AFRICAN AMERICAN): 59.3 ML/MIN/1.73 M^2
GLUCOSE SERPL-MCNC: 121 MG/DL
HCT VFR BLD AUTO: 34.9 %
HGB BLD-MCNC: 11.1 G/DL
IMM GRANULOCYTES # BLD AUTO: 0.02 K/UL
MCH RBC QN AUTO: 27.5 PG
MCHC RBC AUTO-ENTMCNC: 31.8 G/DL
MCV RBC AUTO: 87 FL
NEUTROPHILS # BLD AUTO: 3.6 K/UL
PLATELET # BLD AUTO: 218 K/UL
PMV BLD AUTO: 9.7 FL
POTASSIUM SERPL-SCNC: 3.7 MMOL/L
PROT SERPL-MCNC: 7.2 G/DL
RBC # BLD AUTO: 4.03 M/UL
SODIUM SERPL-SCNC: 140 MMOL/L
WBC # BLD AUTO: 5.2 K/UL

## 2018-09-07 PROCEDURE — 63600175 PHARM REV CODE 636 W HCPCS: Mod: TB | Performed by: INTERNAL MEDICINE

## 2018-09-07 PROCEDURE — 80053 COMPREHEN METABOLIC PANEL: CPT

## 2018-09-07 PROCEDURE — 25000003 PHARM REV CODE 250: Performed by: INTERNAL MEDICINE

## 2018-09-07 PROCEDURE — C9492 INJECTION, DURVALUMAB: HCPCS | Mod: TB | Performed by: INTERNAL MEDICINE

## 2018-09-07 PROCEDURE — 85027 COMPLETE CBC AUTOMATED: CPT

## 2018-09-07 PROCEDURE — A4216 STERILE WATER/SALINE, 10 ML: HCPCS | Performed by: INTERNAL MEDICINE

## 2018-09-07 PROCEDURE — 96413 CHEMO IV INFUSION 1 HR: CPT

## 2018-09-07 PROCEDURE — 63600175 PHARM REV CODE 636 W HCPCS: Performed by: INTERNAL MEDICINE

## 2018-09-07 PROCEDURE — 96523 IRRIG DRUG DELIVERY DEVICE: CPT

## 2018-09-07 RX ORDER — HEPARIN 100 UNIT/ML
500 SYRINGE INTRAVENOUS
Status: CANCELLED | OUTPATIENT
Start: 2018-09-07

## 2018-09-07 RX ORDER — EPINEPHRINE 0.3 MG/.3ML
0.3 INJECTION SUBCUTANEOUS ONCE AS NEEDED
Status: DISCONTINUED | OUTPATIENT
Start: 2018-09-07 | End: 2018-09-07 | Stop reason: HOSPADM

## 2018-09-07 RX ORDER — SODIUM CHLORIDE 0.9 % (FLUSH) 0.9 %
10 SYRINGE (ML) INJECTION
Status: DISCONTINUED | OUTPATIENT
Start: 2018-09-07 | End: 2018-09-07 | Stop reason: HOSPADM

## 2018-09-07 RX ORDER — DIPHENHYDRAMINE HYDROCHLORIDE 50 MG/ML
50 INJECTION INTRAMUSCULAR; INTRAVENOUS ONCE AS NEEDED
Status: DISCONTINUED | OUTPATIENT
Start: 2018-09-07 | End: 2018-09-07 | Stop reason: HOSPADM

## 2018-09-07 RX ORDER — SODIUM CHLORIDE 0.9 % (FLUSH) 0.9 %
10 SYRINGE (ML) INJECTION
Status: CANCELLED | OUTPATIENT
Start: 2018-09-07

## 2018-09-07 RX ORDER — HEPARIN 100 UNIT/ML
500 SYRINGE INTRAVENOUS
Status: DISCONTINUED | OUTPATIENT
Start: 2018-09-07 | End: 2018-09-07 | Stop reason: HOSPADM

## 2018-09-07 RX ORDER — HEPARIN 100 UNIT/ML
500 SYRINGE INTRAVENOUS
Status: COMPLETED | OUTPATIENT
Start: 2018-09-07 | End: 2018-09-07

## 2018-09-07 RX ORDER — SODIUM CHLORIDE 0.9 % (FLUSH) 0.9 %
10 SYRINGE (ML) INJECTION
Status: COMPLETED | OUTPATIENT
Start: 2018-09-07 | End: 2018-09-07

## 2018-09-07 RX ADMIN — HEPARIN 500 UNITS: 100 SYRINGE at 01:09

## 2018-09-07 RX ADMIN — Medication 10 ML: at 01:09

## 2018-09-07 RX ADMIN — DURVALUMAB 805 MG: 120 INJECTION, SOLUTION INTRAVENOUS at 12:09

## 2018-09-07 RX ADMIN — HEPARIN 500 UNITS: 100 SYRINGE at 11:09

## 2018-09-07 RX ADMIN — Medication 10 ML: at 11:09

## 2018-09-07 RX ADMIN — SODIUM CHLORIDE: 0.9 INJECTION, SOLUTION INTRAVENOUS at 12:09

## 2018-09-07 NOTE — PLAN OF CARE
Problem: Patient Care Overview  Goal: Individualization & Mutuality  Left chest PAC  Warm blankets   Outcome: Ongoing (interventions implemented as appropriate)  1200-Labs , hx, and medications reviewed, labs done today, no md appointment needed. Assessment completed. Discussed plan of care with patient. Patient in agreement. Chair reclined and warm blanket and snack offered.

## 2018-09-07 NOTE — PLAN OF CARE
Problem: Patient Care Overview  Goal: Plan of Care Review  Outcome: Ongoing (interventions implemented as appropriate)  1347-Patient tolerated treatment well. Discharged without complaints or S/S of adverse event. AVS given.  Instructed to call provider for any questions or concerns.

## 2018-09-07 NOTE — NURSING
1105 pt for port access and lab draw, for chemo today , port accessed per policy without issue, good blood return, flushed easily , remains accessed pt for chemo today, port flushed per policy

## 2018-09-21 ENCOUNTER — OFFICE VISIT (OUTPATIENT)
Dept: HEMATOLOGY/ONCOLOGY | Facility: CLINIC | Age: 63
End: 2018-09-21
Payer: COMMERCIAL

## 2018-09-21 ENCOUNTER — INFUSION (OUTPATIENT)
Dept: INFUSION THERAPY | Facility: HOSPITAL | Age: 63
End: 2018-09-21
Attending: INTERNAL MEDICINE
Payer: COMMERCIAL

## 2018-09-21 VITALS
SYSTOLIC BLOOD PRESSURE: 179 MMHG | HEIGHT: 66 IN | HEART RATE: 99 BPM | BODY MASS INDEX: 31.22 KG/M2 | RESPIRATION RATE: 18 BRPM | WEIGHT: 194.25 LBS | DIASTOLIC BLOOD PRESSURE: 61 MMHG | TEMPERATURE: 98 F

## 2018-09-21 VITALS
DIASTOLIC BLOOD PRESSURE: 69 MMHG | SYSTOLIC BLOOD PRESSURE: 141 MMHG | BODY MASS INDEX: 31.22 KG/M2 | WEIGHT: 194.25 LBS | HEIGHT: 66 IN | TEMPERATURE: 98 F | HEART RATE: 96 BPM

## 2018-09-21 DIAGNOSIS — C34.2 PRIMARY CANCER OF RIGHT MIDDLE LOBE OF LUNG: Primary | ICD-10-CM

## 2018-09-21 DIAGNOSIS — D05.12 DUCTAL CARCINOMA IN SITU (DCIS) OF LEFT BREAST: ICD-10-CM

## 2018-09-21 DIAGNOSIS — C77.9 REGIONAL LYMPH NODE METASTASIS PRESENT: ICD-10-CM

## 2018-09-21 DIAGNOSIS — Z51.11 ENCOUNTER FOR ANTINEOPLASTIC CHEMOTHERAPY: ICD-10-CM

## 2018-09-21 DIAGNOSIS — C34.2 PRIMARY CANCER OF RIGHT MIDDLE LOBE OF LUNG: ICD-10-CM

## 2018-09-21 DIAGNOSIS — E03.2 HYPOTHYROIDISM DUE TO MEDICATION: Primary | ICD-10-CM

## 2018-09-21 LAB
ALBUMIN SERPL BCP-MCNC: 3.2 G/DL
ALP SERPL-CCNC: 87 U/L
ALT SERPL W/O P-5'-P-CCNC: 10 U/L
ANION GAP SERPL CALC-SCNC: 6 MMOL/L
AST SERPL-CCNC: 14 U/L
BILIRUB SERPL-MCNC: 0.4 MG/DL
BUN SERPL-MCNC: 21 MG/DL
CALCIUM SERPL-MCNC: 9.5 MG/DL
CHLORIDE SERPL-SCNC: 109 MMOL/L
CO2 SERPL-SCNC: 26 MMOL/L
CREAT SERPL-MCNC: 0.9 MG/DL
ERYTHROCYTE [DISTWIDTH] IN BLOOD BY AUTOMATED COUNT: 15.5 %
EST. GFR  (AFRICAN AMERICAN): >60 ML/MIN/1.73 M^2
EST. GFR  (NON AFRICAN AMERICAN): >60 ML/MIN/1.73 M^2
GLUCOSE SERPL-MCNC: 115 MG/DL
HCT VFR BLD AUTO: 34 %
HGB BLD-MCNC: 10.9 G/DL
IMM GRANULOCYTES # BLD AUTO: 0.03 K/UL
MCH RBC QN AUTO: 27.8 PG
MCHC RBC AUTO-ENTMCNC: 32.1 G/DL
MCV RBC AUTO: 87 FL
NEUTROPHILS # BLD AUTO: 3.6 K/UL
PLATELET # BLD AUTO: 216 K/UL
PMV BLD AUTO: 9.6 FL
POTASSIUM SERPL-SCNC: 3.8 MMOL/L
PROT SERPL-MCNC: 7.3 G/DL
RBC # BLD AUTO: 3.92 M/UL
SODIUM SERPL-SCNC: 141 MMOL/L
T4 FREE SERPL-MCNC: 1.19 NG/DL
TSH SERPL DL<=0.005 MIU/L-ACNC: 0.02 UIU/ML
WBC # BLD AUTO: 5.26 K/UL

## 2018-09-21 PROCEDURE — C9492 INJECTION, DURVALUMAB: HCPCS | Mod: TB | Performed by: INTERNAL MEDICINE

## 2018-09-21 PROCEDURE — 3008F BODY MASS INDEX DOCD: CPT | Mod: CPTII,S$GLB,, | Performed by: INTERNAL MEDICINE

## 2018-09-21 PROCEDURE — 96413 CHEMO IV INFUSION 1 HR: CPT

## 2018-09-21 PROCEDURE — 63600175 PHARM REV CODE 636 W HCPCS: Performed by: INTERNAL MEDICINE

## 2018-09-21 PROCEDURE — 84439 ASSAY OF FREE THYROXINE: CPT

## 2018-09-21 PROCEDURE — 1101F PT FALLS ASSESS-DOCD LE1/YR: CPT | Mod: CPTII,S$GLB,, | Performed by: INTERNAL MEDICINE

## 2018-09-21 PROCEDURE — 36591 DRAW BLOOD OFF VENOUS DEVICE: CPT

## 2018-09-21 PROCEDURE — 25000003 PHARM REV CODE 250: Performed by: INTERNAL MEDICINE

## 2018-09-21 PROCEDURE — 3077F SYST BP >= 140 MM HG: CPT | Mod: CPTII,S$GLB,, | Performed by: INTERNAL MEDICINE

## 2018-09-21 PROCEDURE — 99999 PR PBB SHADOW E&M-EST. PATIENT-LVL III: CPT | Mod: PBBFAC,,, | Performed by: INTERNAL MEDICINE

## 2018-09-21 PROCEDURE — 36415 COLL VENOUS BLD VENIPUNCTURE: CPT

## 2018-09-21 PROCEDURE — 84443 ASSAY THYROID STIM HORMONE: CPT

## 2018-09-21 PROCEDURE — A4216 STERILE WATER/SALINE, 10 ML: HCPCS | Performed by: INTERNAL MEDICINE

## 2018-09-21 PROCEDURE — 85027 COMPLETE CBC AUTOMATED: CPT

## 2018-09-21 PROCEDURE — 99215 OFFICE O/P EST HI 40 MIN: CPT | Mod: S$GLB,,, | Performed by: INTERNAL MEDICINE

## 2018-09-21 PROCEDURE — 3078F DIAST BP <80 MM HG: CPT | Mod: CPTII,S$GLB,, | Performed by: INTERNAL MEDICINE

## 2018-09-21 PROCEDURE — 80053 COMPREHEN METABOLIC PANEL: CPT

## 2018-09-21 RX ORDER — EPINEPHRINE 0.3 MG/.3ML
0.3 INJECTION SUBCUTANEOUS ONCE AS NEEDED
Status: CANCELLED | OUTPATIENT
Start: 2018-09-22 | End: 2018-09-22

## 2018-09-21 RX ORDER — SODIUM CHLORIDE 0.9 % (FLUSH) 0.9 %
10 SYRINGE (ML) INJECTION
Status: CANCELLED | OUTPATIENT
Start: 2018-11-02

## 2018-09-21 RX ORDER — SODIUM CHLORIDE 0.9 % (FLUSH) 0.9 %
10 SYRINGE (ML) INJECTION
Status: CANCELLED | OUTPATIENT
Start: 2018-09-21

## 2018-09-21 RX ORDER — DIPHENHYDRAMINE HYDROCHLORIDE 50 MG/ML
50 INJECTION INTRAMUSCULAR; INTRAVENOUS ONCE AS NEEDED
Status: CANCELLED | OUTPATIENT
Start: 2018-10-20 | End: 2018-10-20

## 2018-09-21 RX ORDER — HEPARIN 100 UNIT/ML
500 SYRINGE INTRAVENOUS
Status: COMPLETED | OUTPATIENT
Start: 2018-09-21 | End: 2018-09-21

## 2018-09-21 RX ORDER — DIPHENHYDRAMINE HYDROCHLORIDE 50 MG/ML
50 INJECTION INTRAMUSCULAR; INTRAVENOUS ONCE AS NEEDED
Status: CANCELLED | OUTPATIENT
Start: 2018-11-02 | End: 2018-10-06

## 2018-09-21 RX ORDER — DIPHENHYDRAMINE HYDROCHLORIDE 50 MG/ML
50 INJECTION INTRAMUSCULAR; INTRAVENOUS ONCE AS NEEDED
Status: DISCONTINUED | OUTPATIENT
Start: 2018-09-21 | End: 2018-09-21 | Stop reason: HOSPADM

## 2018-09-21 RX ORDER — EPINEPHRINE 0.3 MG/.3ML
0.3 INJECTION SUBCUTANEOUS ONCE AS NEEDED
Status: CANCELLED | OUTPATIENT
Start: 2018-11-02 | End: 2018-10-06

## 2018-09-21 RX ORDER — HEPARIN 100 UNIT/ML
500 SYRINGE INTRAVENOUS
Status: DISCONTINUED | OUTPATIENT
Start: 2018-09-21 | End: 2018-09-21 | Stop reason: HOSPADM

## 2018-09-21 RX ORDER — EPINEPHRINE 0.3 MG/.3ML
0.3 INJECTION SUBCUTANEOUS ONCE AS NEEDED
Status: CANCELLED | OUTPATIENT
Start: 2018-10-20 | End: 2018-10-20

## 2018-09-21 RX ORDER — HEPARIN 100 UNIT/ML
500 SYRINGE INTRAVENOUS
Status: CANCELLED | OUTPATIENT
Start: 2018-09-22

## 2018-09-21 RX ORDER — SODIUM CHLORIDE 0.9 % (FLUSH) 0.9 %
10 SYRINGE (ML) INJECTION
Status: COMPLETED | OUTPATIENT
Start: 2018-09-21 | End: 2018-09-21

## 2018-09-21 RX ORDER — SODIUM CHLORIDE 0.9 % (FLUSH) 0.9 %
10 SYRINGE (ML) INJECTION
Status: DISCONTINUED | OUTPATIENT
Start: 2018-09-21 | End: 2018-09-21 | Stop reason: HOSPADM

## 2018-09-21 RX ORDER — SODIUM CHLORIDE 0.9 % (FLUSH) 0.9 %
10 SYRINGE (ML) INJECTION
Status: CANCELLED | OUTPATIENT
Start: 2018-10-20

## 2018-09-21 RX ORDER — SODIUM CHLORIDE 0.9 % (FLUSH) 0.9 %
10 SYRINGE (ML) INJECTION
Status: CANCELLED | OUTPATIENT
Start: 2018-09-22

## 2018-09-21 RX ORDER — DIPHENHYDRAMINE HYDROCHLORIDE 50 MG/ML
50 INJECTION INTRAMUSCULAR; INTRAVENOUS ONCE AS NEEDED
Status: CANCELLED | OUTPATIENT
Start: 2018-09-22 | End: 2018-09-22

## 2018-09-21 RX ORDER — DIPHENHYDRAMINE HYDROCHLORIDE 50 MG/ML
50 INJECTION INTRAMUSCULAR; INTRAVENOUS ONCE AS NEEDED
Status: CANCELLED | OUTPATIENT
Start: 2018-10-06 | End: 2018-10-06

## 2018-09-21 RX ORDER — HEPARIN 100 UNIT/ML
500 SYRINGE INTRAVENOUS
Status: CANCELLED | OUTPATIENT
Start: 2018-09-21

## 2018-09-21 RX ORDER — HEPARIN 100 UNIT/ML
500 SYRINGE INTRAVENOUS
Status: CANCELLED | OUTPATIENT
Start: 2018-11-02

## 2018-09-21 RX ORDER — HEPARIN 100 UNIT/ML
500 SYRINGE INTRAVENOUS
Status: CANCELLED | OUTPATIENT
Start: 2018-10-06

## 2018-09-21 RX ORDER — HEPARIN 100 UNIT/ML
500 SYRINGE INTRAVENOUS
Status: CANCELLED | OUTPATIENT
Start: 2018-10-20

## 2018-09-21 RX ORDER — SODIUM CHLORIDE 0.9 % (FLUSH) 0.9 %
10 SYRINGE (ML) INJECTION
Status: CANCELLED | OUTPATIENT
Start: 2018-10-06

## 2018-09-21 RX ORDER — EPINEPHRINE 0.3 MG/.3ML
0.3 INJECTION SUBCUTANEOUS ONCE AS NEEDED
Status: CANCELLED | OUTPATIENT
Start: 2018-10-06 | End: 2018-10-06

## 2018-09-21 RX ORDER — EPINEPHRINE 0.3 MG/.3ML
0.3 INJECTION SUBCUTANEOUS ONCE AS NEEDED
Status: DISCONTINUED | OUTPATIENT
Start: 2018-09-21 | End: 2018-09-21 | Stop reason: HOSPADM

## 2018-09-21 RX ADMIN — HEPARIN 500 UNITS: 100 SYRINGE at 01:09

## 2018-09-21 RX ADMIN — HEPARIN 500 UNITS: 100 SYRINGE at 05:09

## 2018-09-21 RX ADMIN — SODIUM CHLORIDE: 0.9 INJECTION, SOLUTION INTRAVENOUS at 02:09

## 2018-09-21 RX ADMIN — DURVALUMAB 880 MG: 120 INJECTION, SOLUTION INTRAVENOUS at 04:09

## 2018-09-21 RX ADMIN — Medication 10 ML: at 01:09

## 2018-09-21 RX ADMIN — Medication 10 ML: at 05:09

## 2018-09-21 NOTE — PROGRESS NOTES
Subjective:       Patient ID: Fauzia Kirk is a 63 y.o. female.    Chief Complaint: No chief complaint on file.    HPI Ms. Kirk is a very pleasant 62-year-old  female who returned  for F/U of right lung cancer.    She has been receiving adjuvant therapy with Durvalumab.   She began that in March 2018 and has had 23 treatments thus far.       today she reports she has no new problems.  She continues to tolerate the medication without issue and is ready to proceed with therapy.    She continues on Synthroid replacement for her drug-induced hypothyroidism.    In June, CT showed a decrease in the size of the right lung lesion now measuring 1.7 x 1.4 cm      Her last CT scan of the chest in February 2018 showed a stable  2.7 x 4.3 cm right middle lobe density with surrounding groundglass opacity consistent with post radiation changes.  This is stable 4 mm left lower lobe pulmonary nodule      In December 2016 she began to have some blood in her mucus after coughing.In  April she developed a persistent cough and saw her physician on April 18.  Chest x-ray at that time showed a rounded opacity in the right lung overlying the major fissure.   Chest CT in May showed a 4.8 x 4.0 cm, rounded, soft tissue mass in the middle lobe between the medial and lateral segments. There was pre-carinal adenopathy.    Subsequently she underwent bronchoscopy with EBUS on 6/9/17. Needle biopsy of the medistinal nodes was positive for poorly differentiated carcinoma with squamoid features. The cells were positive for CK7, CK5/6, and P63 and are negative for CK20, GCDFP, TTF1, ER, MT, Napsin and YEMI 3.This was felt to be most CW a new squamous lung cancer.    PET CT yesterday demonstrated the following:a hypermetabolic, large mass in the right middle lobe with an SUV max of 20.23. There is hypermetabolic activity extending from this mass tracking along the pleura. There is a hypermetabolic right hilar lymph node demonstrating an  SUV max of 20.2. In addition there is are 2 hypermetabolic subcarinal lymph nodes with the larger demonstrating an SUV max of 14.2. An additional right paratracheal lymph node is seen with an SUV max of 26.1. Findings are consistent with metastatic lung cancer.      Previous cancer history:She had a stage I, ER negative carcinoma of the    left breast in 1990, treated with lumpectomy and radiation therapy.  In      1993, she developed a stage I, ER positive carcinoma of the right breast     treated with lumpectomy and radiation.  In 1995, she developed left breast   cancer recurrence, treated with lumpectomy, brachytherapy and four cycles    of Adriamycin and Cytoxan.  In 1996, she developed a new right breast        cancer T2 N0 poorly differentiated, treated with four cycles of              preoperative Taxol followed by mastectomy.        On October 17, 2016 left mammogram showed increased calcifications in the left breast at 3:00.  On October 27 a biopsy showed DCIS with solid, cribriform, and micropapillary patterns.  Tumor was 95% ER positive at 95% NC positive    On November 7, 2016 she underwent left mastectomy which showed 8 mm of DCIS and negative margins.    She completed radiation together with weekly chemotherapy with carboplatin and Taxol  for stage IIIA disease.    She completed therapy on September 13 and received 7 chemotherapy treatments.    CT scan from September 29 showed that the right middle lobe mass had decreased to 4.2 x 2.3 cm from 4.8 x 4.3 cm.  Stable 4 mm pulmonary nodule in the left lower lobe.    Review of Systems   Constitutional: Negative for activity change, appetite change, fatigue, fever and unexpected weight change.   HENT: Positive for postnasal drip. Negative for trouble swallowing.    Respiratory: Positive for cough. Negative for shortness of breath.    Cardiovascular: Negative for chest pain.   Gastrointestinal: Negative for abdominal pain, constipation, nausea and vomiting.    Musculoskeletal: Negative for back pain.   Skin:        pruritis   Neurological: Negative for headaches.   Psychiatric/Behavioral: Negative for dysphoric mood. The patient is not nervous/anxious.        Objective:      Physical Exam   Constitutional: She is oriented to person, place, and time. She appears well-developed and well-nourished.   HENT:   Mouth/Throat: No oropharyngeal exudate.   Eyes: No scleral icterus.   Cardiovascular: Normal rate and regular rhythm.   Pulmonary/Chest: Effort normal and breath sounds normal. She has no wheezes. She has no rales.   Abdominal: Soft. She exhibits no mass. There is no tenderness.   Lymphadenopathy:     She has no cervical adenopathy.     She has no axillary adenopathy.        Right: No supraclavicular adenopathy present.        Left: No supraclavicular adenopathy present.   Neurological: She is alert and oriented to person, place, and time.   Skin: No rash noted. No erythema.   Psychiatric: She has a normal mood and affect. Her behavior is normal. Thought content normal.       Assessment:    metabolic profile is unremarkable, CBC remarkable only for hemoglobin 10.9  No diagnosis found. 4.  Treatment-induced hypothyroidism  Plan:     Continue Durvalumab. RTC 4 weeks.  Continue thyroid replacement.

## 2018-09-21 NOTE — PLAN OF CARE
Problem: Patient Care Overview  Goal: Plan of Care Review  Outcome: Ongoing (interventions implemented as appropriate)  Pt tolerated treatment without adverse effects. VSS. Provided AVS & verbalized understanding of RTC date. DC with family ambulating independently.

## 2018-10-05 ENCOUNTER — INFUSION (OUTPATIENT)
Dept: INFUSION THERAPY | Facility: HOSPITAL | Age: 63
End: 2018-10-05
Attending: INTERNAL MEDICINE
Payer: COMMERCIAL

## 2018-10-05 VITALS
TEMPERATURE: 98 F | RESPIRATION RATE: 18 BRPM | DIASTOLIC BLOOD PRESSURE: 86 MMHG | SYSTOLIC BLOOD PRESSURE: 136 MMHG | HEART RATE: 97 BPM

## 2018-10-05 DIAGNOSIS — Z51.11 ENCOUNTER FOR ANTINEOPLASTIC CHEMOTHERAPY: ICD-10-CM

## 2018-10-05 DIAGNOSIS — C34.2 PRIMARY CANCER OF RIGHT MIDDLE LOBE OF LUNG: Primary | ICD-10-CM

## 2018-10-05 LAB
ALBUMIN SERPL BCP-MCNC: 3.2 G/DL
ALP SERPL-CCNC: 84 U/L
ALT SERPL W/O P-5'-P-CCNC: 9 U/L
ANION GAP SERPL CALC-SCNC: 7 MMOL/L
AST SERPL-CCNC: 14 U/L
BILIRUB SERPL-MCNC: 0.5 MG/DL
BUN SERPL-MCNC: 22 MG/DL
CALCIUM SERPL-MCNC: 9.3 MG/DL
CHLORIDE SERPL-SCNC: 108 MMOL/L
CO2 SERPL-SCNC: 26 MMOL/L
CREAT SERPL-MCNC: 0.8 MG/DL
ERYTHROCYTE [DISTWIDTH] IN BLOOD BY AUTOMATED COUNT: 15.2 %
EST. GFR  (AFRICAN AMERICAN): >60 ML/MIN/1.73 M^2
EST. GFR  (NON AFRICAN AMERICAN): >60 ML/MIN/1.73 M^2
GLUCOSE SERPL-MCNC: 95 MG/DL
HCT VFR BLD AUTO: 34.4 %
HGB BLD-MCNC: 10.9 G/DL
IMM GRANULOCYTES # BLD AUTO: 0.03 K/UL
MCH RBC QN AUTO: 27.5 PG
MCHC RBC AUTO-ENTMCNC: 31.7 G/DL
MCV RBC AUTO: 87 FL
NEUTROPHILS # BLD AUTO: 3.9 K/UL
PLATELET # BLD AUTO: 231 K/UL
PMV BLD AUTO: 9.8 FL
POTASSIUM SERPL-SCNC: 4.1 MMOL/L
PROT SERPL-MCNC: 7.1 G/DL
RBC # BLD AUTO: 3.96 M/UL
SODIUM SERPL-SCNC: 141 MMOL/L
WBC # BLD AUTO: 5.44 K/UL

## 2018-10-05 PROCEDURE — 80053 COMPREHEN METABOLIC PANEL: CPT

## 2018-10-05 PROCEDURE — 63600175 PHARM REV CODE 636 W HCPCS: Mod: TB | Performed by: INTERNAL MEDICINE

## 2018-10-05 PROCEDURE — C9492 INJECTION, DURVALUMAB: HCPCS | Mod: TB | Performed by: INTERNAL MEDICINE

## 2018-10-05 PROCEDURE — A4216 STERILE WATER/SALINE, 10 ML: HCPCS | Performed by: INTERNAL MEDICINE

## 2018-10-05 PROCEDURE — 85027 COMPLETE CBC AUTOMATED: CPT

## 2018-10-05 PROCEDURE — 25000003 PHARM REV CODE 250: Performed by: INTERNAL MEDICINE

## 2018-10-05 PROCEDURE — 96413 CHEMO IV INFUSION 1 HR: CPT

## 2018-10-05 PROCEDURE — 63600175 PHARM REV CODE 636 W HCPCS: Performed by: INTERNAL MEDICINE

## 2018-10-05 RX ORDER — SODIUM CHLORIDE 0.9 % (FLUSH) 0.9 %
10 SYRINGE (ML) INJECTION
Status: DISCONTINUED | OUTPATIENT
Start: 2018-10-05 | End: 2018-10-05 | Stop reason: HOSPADM

## 2018-10-05 RX ORDER — HEPARIN 100 UNIT/ML
500 SYRINGE INTRAVENOUS
Status: COMPLETED | OUTPATIENT
Start: 2018-10-05 | End: 2018-10-05

## 2018-10-05 RX ORDER — EPINEPHRINE 0.3 MG/.3ML
0.3 INJECTION SUBCUTANEOUS ONCE AS NEEDED
Status: DISCONTINUED | OUTPATIENT
Start: 2018-10-05 | End: 2018-10-05 | Stop reason: HOSPADM

## 2018-10-05 RX ORDER — SODIUM CHLORIDE 0.9 % (FLUSH) 0.9 %
10 SYRINGE (ML) INJECTION
Status: COMPLETED | OUTPATIENT
Start: 2018-10-05 | End: 2018-10-05

## 2018-10-05 RX ORDER — DIPHENHYDRAMINE HYDROCHLORIDE 50 MG/ML
50 INJECTION INTRAMUSCULAR; INTRAVENOUS ONCE AS NEEDED
Status: DISCONTINUED | OUTPATIENT
Start: 2018-10-05 | End: 2018-10-05 | Stop reason: HOSPADM

## 2018-10-05 RX ORDER — HEPARIN 100 UNIT/ML
500 SYRINGE INTRAVENOUS
Status: DISCONTINUED | OUTPATIENT
Start: 2018-10-05 | End: 2018-10-05 | Stop reason: HOSPADM

## 2018-10-05 RX ADMIN — HEPARIN 500 UNITS: 100 SYRINGE at 10:10

## 2018-10-05 RX ADMIN — SODIUM CHLORIDE: 9 INJECTION, SOLUTION INTRAVENOUS at 10:10

## 2018-10-05 RX ADMIN — HEPARIN 500 UNITS: 100 SYRINGE at 12:10

## 2018-10-05 RX ADMIN — Medication 10 ML: at 10:10

## 2018-10-05 RX ADMIN — Medication 10 ML: at 12:10

## 2018-10-05 RX ADMIN — DURVALUMAB 880 MG: 120 INJECTION, SOLUTION INTRAVENOUS at 11:10

## 2018-10-05 NOTE — PLAN OF CARE
Problem: Chemotherapy Effects (Adult)  Goal: Signs and Symptoms of Listed Potential Problems Will be Absent, Minimized or Managed (Chemotherapy Effects)  Signs and symptoms of listed potential problems will be absent, minimized or managed by discharge/transition of care (reference Chemotherapy Effects (Adult) CPG).   Outcome: Ongoing (interventions implemented as appropriate)  Pt here for Imfinzi infusion, accompanied by spouse, no new complaints or concerns at present; discussed treatment plan for today, all questions answered, pt agrees to proceed pending CMP lab result

## 2018-10-18 NOTE — PROGRESS NOTES
Subjective:       Patient ID: Fauzia Kirk is a 63 y.o. female.    Chief Complaint: No chief complaint on file.    HPI     Mrs Hernandez returns today for follow up.  Briefly, she is a 63 year old patient of Dr. Mai's with a history of breast cancer and also a recent diagnosis of lung cancer, currently receiving darvalumab in the adjuvant setting.  Davrvalumab was initiated in November 2017, and today is her 25th biweekly infusion.    For detailed oncologic history please refer to Dr. Padilla's note of 9/21 which I had the opportunity to review.  Her WBCs are 4,300 /mm3, Hg 11 gr/dl, Ht 34.8 5 and platelets 227,000 /mm3.  Her albumin is 3.2 gr/dl and her LFTs are normal.    Review of Systems    Overall she feels well. She denies any anxiety, depression, easy bruising, fevers, chills, night  sweats, weight loss, nausea, vomiting, diarrhea, constipation, diplopia, blurred vision, headache, chest pain, palpitations, shortness of breath, breast pain, abdominal pain, extremity pain, or difficulty ambulating.  The remainder of the ten-point ROS, including general, skin, lymph, H/N, cardiorespiratory, GI, , Neuro, Endocrine, and psychiatric is negative.     Objective:      Physical Exam      She is alert, oriented to time, place, person, pleasant, well      nourished, in no acute physical distress.  She is here with her .                                  VITAL SIGNS:  Reviewed                                      HEENT:  Normal.  There are no nasal, oral, lip, gingival, auricular, lid,    or conjunctival lesions.  Mucosae are moist and pink, and there is no        thrush.  Pupils are equal, reactive to light and accommodation.              Extraocular muscle movements are intact.  Dentition is good.                                     NECK:  Supple without JVD, adenopathy, or thyromegaly.                       LUNGS:  Clear to auscultation without wheezing, rales, or rhonchi.           CARDIOVASCULAR:   Reveals an S1, S2, no murmurs, no rubs, no gallops.         ABDOMEN:  Soft, nontender, without organomegaly.  Bowel sounds are    present.                                                                     EXTREMITIES:  No cyanosis, clubbing, or edema.                               BREASTS:  Deferred     A portacath is seen in the left subclavian area.                                 LYMPHATIC:  There is no cervical, axillary, or supraclavicular adenopathy.   SKIN:  Warm and moist, without petechiae, rashes, induration, or ecchymoses.           NEUROLOGIC:  DTRs are 0-1+ bilaterally, symmetrical, motor function is 5/5,  and cranial nerves are  within normal limits.    Assessment:       1. History of left breast cancer    2. Primary cancer of right middle lobe of lung    3. Encounter for antineoplastic chemotherapy        Plan:        She may proceed with her next darvalumab infusion and return in two weeks to see Dr. Mai for her final infusion.  Her port should be removed after her next infusion.  Her multiple questions were answered to her satisfaction.

## 2018-10-19 ENCOUNTER — INFUSION (OUTPATIENT)
Dept: INFUSION THERAPY | Facility: HOSPITAL | Age: 63
End: 2018-10-19
Attending: INTERNAL MEDICINE
Payer: COMMERCIAL

## 2018-10-19 ENCOUNTER — OFFICE VISIT (OUTPATIENT)
Dept: HEMATOLOGY/ONCOLOGY | Facility: CLINIC | Age: 63
End: 2018-10-19
Payer: COMMERCIAL

## 2018-10-19 VITALS
WEIGHT: 193.56 LBS | OXYGEN SATURATION: 98 % | HEART RATE: 92 BPM | HEIGHT: 66 IN | SYSTOLIC BLOOD PRESSURE: 153 MMHG | DIASTOLIC BLOOD PRESSURE: 81 MMHG | TEMPERATURE: 99 F | BODY MASS INDEX: 31.11 KG/M2 | RESPIRATION RATE: 16 BRPM

## 2018-10-19 VITALS
TEMPERATURE: 98 F | RESPIRATION RATE: 18 BRPM | HEART RATE: 97 BPM | SYSTOLIC BLOOD PRESSURE: 139 MMHG | DIASTOLIC BLOOD PRESSURE: 72 MMHG

## 2018-10-19 DIAGNOSIS — C34.2 PRIMARY CANCER OF RIGHT MIDDLE LOBE OF LUNG: ICD-10-CM

## 2018-10-19 DIAGNOSIS — C34.2 PRIMARY CANCER OF RIGHT MIDDLE LOBE OF LUNG: Primary | ICD-10-CM

## 2018-10-19 DIAGNOSIS — Z85.3 HISTORY OF LEFT BREAST CANCER: Primary | ICD-10-CM

## 2018-10-19 DIAGNOSIS — Z51.11 ENCOUNTER FOR ANTINEOPLASTIC CHEMOTHERAPY: ICD-10-CM

## 2018-10-19 LAB
ALBUMIN SERPL BCP-MCNC: 3.2 G/DL
ALP SERPL-CCNC: 85 U/L
ALT SERPL W/O P-5'-P-CCNC: 10 U/L
ANION GAP SERPL CALC-SCNC: 6 MMOL/L
AST SERPL-CCNC: 15 U/L
BILIRUB SERPL-MCNC: 0.4 MG/DL
BUN SERPL-MCNC: 20 MG/DL
CALCIUM SERPL-MCNC: 9.5 MG/DL
CHLORIDE SERPL-SCNC: 110 MMOL/L
CO2 SERPL-SCNC: 25 MMOL/L
CREAT SERPL-MCNC: 0.8 MG/DL
ERYTHROCYTE [DISTWIDTH] IN BLOOD BY AUTOMATED COUNT: 14.8 %
EST. GFR  (AFRICAN AMERICAN): >60 ML/MIN/1.73 M^2
EST. GFR  (NON AFRICAN AMERICAN): >60 ML/MIN/1.73 M^2
GLUCOSE SERPL-MCNC: 91 MG/DL
HCT VFR BLD AUTO: 34.8 %
HGB BLD-MCNC: 11 G/DL
IMM GRANULOCYTES # BLD AUTO: 0.01 K/UL
MCH RBC QN AUTO: 27.5 PG
MCHC RBC AUTO-ENTMCNC: 31.6 G/DL
MCV RBC AUTO: 87 FL
NEUTROPHILS # BLD AUTO: 3 K/UL
PLATELET # BLD AUTO: 227 K/UL
PMV BLD AUTO: 9.8 FL
POTASSIUM SERPL-SCNC: 4 MMOL/L
PROT SERPL-MCNC: 7.3 G/DL
RBC # BLD AUTO: 4 M/UL
SODIUM SERPL-SCNC: 141 MMOL/L
T4 FREE SERPL-MCNC: 1.28 NG/DL
TSH SERPL DL<=0.005 MIU/L-ACNC: 0.01 UIU/ML
WBC # BLD AUTO: 4.34 K/UL

## 2018-10-19 PROCEDURE — 99999 PR PBB SHADOW E&M-EST. PATIENT-LVL III: CPT | Mod: PBBFAC,,, | Performed by: INTERNAL MEDICINE

## 2018-10-19 PROCEDURE — C9492 INJECTION, DURVALUMAB: HCPCS | Mod: TB | Performed by: INTERNAL MEDICINE

## 2018-10-19 PROCEDURE — 80053 COMPREHEN METABOLIC PANEL: CPT

## 2018-10-19 PROCEDURE — 25000003 PHARM REV CODE 250: Performed by: INTERNAL MEDICINE

## 2018-10-19 PROCEDURE — A4216 STERILE WATER/SALINE, 10 ML: HCPCS | Performed by: INTERNAL MEDICINE

## 2018-10-19 PROCEDURE — 3079F DIAST BP 80-89 MM HG: CPT | Mod: CPTII,S$GLB,, | Performed by: INTERNAL MEDICINE

## 2018-10-19 PROCEDURE — 84439 ASSAY OF FREE THYROXINE: CPT

## 2018-10-19 PROCEDURE — 3077F SYST BP >= 140 MM HG: CPT | Mod: CPTII,S$GLB,, | Performed by: INTERNAL MEDICINE

## 2018-10-19 PROCEDURE — 63600175 PHARM REV CODE 636 W HCPCS: Performed by: INTERNAL MEDICINE

## 2018-10-19 PROCEDURE — 99215 OFFICE O/P EST HI 40 MIN: CPT | Mod: S$GLB,,, | Performed by: INTERNAL MEDICINE

## 2018-10-19 PROCEDURE — 3008F BODY MASS INDEX DOCD: CPT | Mod: CPTII,S$GLB,, | Performed by: INTERNAL MEDICINE

## 2018-10-19 PROCEDURE — 85027 COMPLETE CBC AUTOMATED: CPT

## 2018-10-19 PROCEDURE — 1101F PT FALLS ASSESS-DOCD LE1/YR: CPT | Mod: CPTII,S$GLB,, | Performed by: INTERNAL MEDICINE

## 2018-10-19 PROCEDURE — 84443 ASSAY THYROID STIM HORMONE: CPT

## 2018-10-19 PROCEDURE — 96413 CHEMO IV INFUSION 1 HR: CPT

## 2018-10-19 RX ORDER — HEPARIN 100 UNIT/ML
500 SYRINGE INTRAVENOUS
Status: DISCONTINUED | OUTPATIENT
Start: 2018-10-19 | End: 2018-10-19 | Stop reason: HOSPADM

## 2018-10-19 RX ORDER — EPINEPHRINE 0.3 MG/.3ML
0.3 INJECTION SUBCUTANEOUS ONCE AS NEEDED
Status: CANCELLED | OUTPATIENT
Start: 2018-10-21 | End: 2018-10-21

## 2018-10-19 RX ORDER — SODIUM CHLORIDE 0.9 % (FLUSH) 0.9 %
10 SYRINGE (ML) INJECTION
Status: CANCELLED | OUTPATIENT
Start: 2018-10-21

## 2018-10-19 RX ORDER — DIPHENHYDRAMINE HYDROCHLORIDE 50 MG/ML
50 INJECTION INTRAMUSCULAR; INTRAVENOUS ONCE AS NEEDED
Status: CANCELLED | OUTPATIENT
Start: 2018-10-21 | End: 2018-10-21

## 2018-10-19 RX ORDER — SODIUM CHLORIDE 0.9 % (FLUSH) 0.9 %
10 SYRINGE (ML) INJECTION
Status: DISCONTINUED | OUTPATIENT
Start: 2018-10-19 | End: 2018-10-19 | Stop reason: HOSPADM

## 2018-10-19 RX ORDER — SODIUM CHLORIDE 0.9 % (FLUSH) 0.9 %
10 SYRINGE (ML) INJECTION
Status: COMPLETED | OUTPATIENT
Start: 2018-10-19 | End: 2018-10-19

## 2018-10-19 RX ORDER — EPINEPHRINE 0.3 MG/.3ML
0.3 INJECTION SUBCUTANEOUS ONCE AS NEEDED
Status: DISCONTINUED | OUTPATIENT
Start: 2018-10-19 | End: 2018-10-19 | Stop reason: HOSPADM

## 2018-10-19 RX ORDER — HEPARIN 100 UNIT/ML
500 SYRINGE INTRAVENOUS
Status: CANCELLED | OUTPATIENT
Start: 2018-10-21

## 2018-10-19 RX ORDER — HEPARIN 100 UNIT/ML
500 SYRINGE INTRAVENOUS
Status: COMPLETED | OUTPATIENT
Start: 2018-10-19 | End: 2018-10-19

## 2018-10-19 RX ORDER — DIPHENHYDRAMINE HYDROCHLORIDE 50 MG/ML
50 INJECTION INTRAMUSCULAR; INTRAVENOUS ONCE AS NEEDED
Status: DISCONTINUED | OUTPATIENT
Start: 2018-10-19 | End: 2018-10-19 | Stop reason: HOSPADM

## 2018-10-19 RX ADMIN — Medication 10 ML: at 09:10

## 2018-10-19 RX ADMIN — Medication 10 ML: at 12:10

## 2018-10-19 RX ADMIN — DURVALUMAB 880 MG: 120 INJECTION, SOLUTION INTRAVENOUS at 11:10

## 2018-10-19 RX ADMIN — SODIUM CHLORIDE: 9 INJECTION, SOLUTION INTRAVENOUS at 11:10

## 2018-10-19 RX ADMIN — HEPARIN 500 UNITS: 100 SYRINGE at 09:10

## 2018-10-19 RX ADMIN — HEPARIN 500 UNITS: 100 SYRINGE at 12:10

## 2018-10-19 NOTE — PLAN OF CARE
Problem: Patient Care Overview  Goal: Plan of Care Review  Outcome: Ongoing (interventions implemented as appropriate)  1246-Patient tolerated treatment well. Discharged without complaints or S/S of adverse event. AVS given.  Instructed to call provider for any questions or concerns.

## 2018-10-19 NOTE — PLAN OF CARE
Problem: Patient Care Overview  Goal: Individualization & Mutuality  Left chest PAC  Warm blankets   Outcome: Ongoing (interventions implemented as appropriate)  1140-Labs , hx, and medications reviewed, patient was seen by MD prior to arrival. Assessment completed. Discussed plan of care with patient. Patient in agreement. Chair reclined and warm blanket and snack offered.

## 2018-10-24 ENCOUNTER — TELEPHONE (OUTPATIENT)
Dept: HEMATOLOGY/ONCOLOGY | Facility: CLINIC | Age: 63
End: 2018-10-24

## 2018-10-24 DIAGNOSIS — C34.2 PRIMARY CANCER OF RIGHT MIDDLE LOBE OF LUNG: Primary | ICD-10-CM

## 2018-10-24 NOTE — TELEPHONE ENCOUNTER
----- Message from Marybel Lisa RN sent at 10/24/2018  9:12 AM CDT -----  Contact: Pt   Pt will need to be seen on this day (11/2) for her final infusion.    ----- Message -----  From: Laly Tang  Sent: 10/24/2018   9:08 AM  To: Sergei POTTER Staff    Please advise, this is a Dr. Mai patient. We have her scheduled to see Dr. Bills on 11/2 as Dr. Mai will not be in and this is her last treatment so I was told she needed to be seen.     ----- Message -----  From: Maverick Alvarez  Sent: 10/24/2018   9:02 AM  To: Laly Tang    Pt states she's not due to see  on 11/2 and will like to cancel. She states she sees the dr every other treatment     Contact::[phone]

## 2018-10-24 NOTE — TELEPHONE ENCOUNTER
Spoke with pt to update about upcoming infusion on 11/2 at 1130.  Pt will have a f/u appt in 2 weeks with chest CT.  Pt verbalized understanding.     ----- Message from Wyatt Mai MD sent at 10/24/2018 11:40 AM CDT -----  Contact: Pt   I am fine with her not seeing any body for that last treatment then coming to see me 2 weeks later with follow-up CT chest  ----- Message -----  From: Marybel Lisa RN  Sent: 10/24/2018   9:33 AM  To: Wyatt Mai MD        ----- Message -----  From: Laly Tang  Sent: 10/24/2018   9:28 AM  To: Sergei POTTER Staff    Called patient back to explain that she needs to be seen as this is her last treatment. She stated that she wanted a nurse or Dr. Mai to call her because she does not believe this is correct.   She also does not want to see Dr. Bills.     ----- Message -----  From: Marybel Lisa RN  Sent: 10/24/2018   9:12 AM  To: Laly Tang    Pt will need to be seen on this day (11/2) for her final infusion.    ----- Message -----  From: Laly Tang  Sent: 10/24/2018   9:08 AM  To: Sergei POTTER Staff    Please advise, this is a Dr. Mai patient. We have her scheduled to see Dr. Bills on 11/2 as Dr. Mai will not be in and this is her last treatment so I was told she needed to be seen.     ----- Message -----  From: Maverick Alvarez  Sent: 10/24/2018   9:02 AM  To: Laly Tang    Pt states she's not due to see  on 11/2 and will like to cancel. She states she sees the dr every other treatment     Contact::[phone]

## 2018-10-24 NOTE — TELEPHONE ENCOUNTER
Called patient back to explain that she needs to be seen as this is her last treatment. She stated that she wanted a nurse or Dr. Mai to call her because she does not believe this is correct. I told her I would have them check. She voiced understanding.  She also stated she does not want to see Dr. Bills and if she needs to see someone she wants us to put her with someone else.

## 2018-11-02 ENCOUNTER — INFUSION (OUTPATIENT)
Dept: INFUSION THERAPY | Facility: HOSPITAL | Age: 63
End: 2018-11-02
Attending: INTERNAL MEDICINE
Payer: COMMERCIAL

## 2018-11-02 VITALS
RESPIRATION RATE: 20 BRPM | TEMPERATURE: 98 F | HEIGHT: 66 IN | DIASTOLIC BLOOD PRESSURE: 84 MMHG | WEIGHT: 193.81 LBS | HEART RATE: 97 BPM | BODY MASS INDEX: 31.15 KG/M2 | SYSTOLIC BLOOD PRESSURE: 132 MMHG

## 2018-11-02 DIAGNOSIS — C34.2 PRIMARY CANCER OF RIGHT MIDDLE LOBE OF LUNG: Primary | ICD-10-CM

## 2018-11-02 DIAGNOSIS — Z51.11 ENCOUNTER FOR ANTINEOPLASTIC CHEMOTHERAPY: ICD-10-CM

## 2018-11-02 LAB
ALBUMIN SERPL BCP-MCNC: 3.2 G/DL
ALP SERPL-CCNC: 85 U/L
ALT SERPL W/O P-5'-P-CCNC: 9 U/L
ANION GAP SERPL CALC-SCNC: 6 MMOL/L
AST SERPL-CCNC: 15 U/L
BILIRUB SERPL-MCNC: 0.7 MG/DL
BUN SERPL-MCNC: 14 MG/DL
CALCIUM SERPL-MCNC: 9.6 MG/DL
CHLORIDE SERPL-SCNC: 105 MMOL/L
CO2 SERPL-SCNC: 27 MMOL/L
CREAT SERPL-MCNC: 0.8 MG/DL
ERYTHROCYTE [DISTWIDTH] IN BLOOD BY AUTOMATED COUNT: 15 %
EST. GFR  (AFRICAN AMERICAN): >60 ML/MIN/1.73 M^2
EST. GFR  (NON AFRICAN AMERICAN): >60 ML/MIN/1.73 M^2
GLUCOSE SERPL-MCNC: 90 MG/DL
HCT VFR BLD AUTO: 36.7 %
HGB BLD-MCNC: 11.6 G/DL
IMM GRANULOCYTES # BLD AUTO: 0.02 K/UL
MCH RBC QN AUTO: 27.5 PG
MCHC RBC AUTO-ENTMCNC: 31.6 G/DL
MCV RBC AUTO: 87 FL
NEUTROPHILS # BLD AUTO: 3.7 K/UL
PLATELET # BLD AUTO: 248 K/UL
PMV BLD AUTO: 9.9 FL
POTASSIUM SERPL-SCNC: 4 MMOL/L
PROT SERPL-MCNC: 7.4 G/DL
RBC # BLD AUTO: 4.22 M/UL
SODIUM SERPL-SCNC: 138 MMOL/L
WBC # BLD AUTO: 5.35 K/UL

## 2018-11-02 PROCEDURE — 63600175 PHARM REV CODE 636 W HCPCS: Mod: TB | Performed by: INTERNAL MEDICINE

## 2018-11-02 PROCEDURE — 63600175 PHARM REV CODE 636 W HCPCS: Performed by: INTERNAL MEDICINE

## 2018-11-02 PROCEDURE — 80053 COMPREHEN METABOLIC PANEL: CPT

## 2018-11-02 PROCEDURE — A4216 STERILE WATER/SALINE, 10 ML: HCPCS | Performed by: INTERNAL MEDICINE

## 2018-11-02 PROCEDURE — C9492 INJECTION, DURVALUMAB: HCPCS | Mod: TB | Performed by: INTERNAL MEDICINE

## 2018-11-02 PROCEDURE — 25000003 PHARM REV CODE 250: Performed by: INTERNAL MEDICINE

## 2018-11-02 PROCEDURE — 96413 CHEMO IV INFUSION 1 HR: CPT

## 2018-11-02 PROCEDURE — 36591 DRAW BLOOD OFF VENOUS DEVICE: CPT

## 2018-11-02 PROCEDURE — 85027 COMPLETE CBC AUTOMATED: CPT

## 2018-11-02 RX ORDER — EPINEPHRINE 0.3 MG/.3ML
0.3 INJECTION SUBCUTANEOUS ONCE AS NEEDED
Status: DISCONTINUED | OUTPATIENT
Start: 2018-11-02 | End: 2018-11-02 | Stop reason: HOSPADM

## 2018-11-02 RX ORDER — SODIUM CHLORIDE 0.9 % (FLUSH) 0.9 %
10 SYRINGE (ML) INJECTION
Status: DISCONTINUED | OUTPATIENT
Start: 2018-11-02 | End: 2018-11-02 | Stop reason: HOSPADM

## 2018-11-02 RX ORDER — HEPARIN 100 UNIT/ML
500 SYRINGE INTRAVENOUS
Status: COMPLETED | OUTPATIENT
Start: 2018-11-02 | End: 2018-11-02

## 2018-11-02 RX ORDER — DIPHENHYDRAMINE HYDROCHLORIDE 50 MG/ML
50 INJECTION INTRAMUSCULAR; INTRAVENOUS ONCE AS NEEDED
Status: DISCONTINUED | OUTPATIENT
Start: 2018-11-02 | End: 2018-11-02 | Stop reason: HOSPADM

## 2018-11-02 RX ORDER — HEPARIN 100 UNIT/ML
500 SYRINGE INTRAVENOUS
Status: DISCONTINUED | OUTPATIENT
Start: 2018-11-02 | End: 2018-11-02 | Stop reason: HOSPADM

## 2018-11-02 RX ORDER — SODIUM CHLORIDE 0.9 % (FLUSH) 0.9 %
10 SYRINGE (ML) INJECTION
Status: COMPLETED | OUTPATIENT
Start: 2018-11-02 | End: 2018-11-02

## 2018-11-02 RX ADMIN — SODIUM CHLORIDE: 0.9 INJECTION, SOLUTION INTRAVENOUS at 11:11

## 2018-11-02 RX ADMIN — Medication 10 ML: at 10:11

## 2018-11-02 RX ADMIN — HEPARIN 500 UNITS: 100 SYRINGE at 01:11

## 2018-11-02 RX ADMIN — Medication 10 ML: at 01:11

## 2018-11-02 RX ADMIN — DURVALUMAB 880 MG: 120 INJECTION, SOLUTION INTRAVENOUS at 12:11

## 2018-11-02 RX ADMIN — HEPARIN 500 UNITS: 100 SYRINGE at 10:11

## 2018-11-02 NOTE — PLAN OF CARE
Problem: Chemotherapy Effects (Adult)  Goal: Signs and Symptoms of Listed Potential Problems Will be Absent, Minimized or Managed (Chemotherapy Effects)  Signs and symptoms of listed potential problems will be absent, minimized or managed by discharge/transition of care (reference Chemotherapy Effects (Adult) CPG).   Outcome: Ongoing (interventions implemented as appropriate)  Here for Imfinzi.

## 2018-11-02 NOTE — PLAN OF CARE
Problem: Patient Care Overview  Goal: Plan of Care Review  Outcome: Ongoing (interventions implemented as appropriate)  Tolerated treatment well.  Advised to call MD for any problems or concerns.  AVS given.  Completed current treatment plan.  RTC as needed.  NAD noted upon discharge.

## 2018-11-15 DIAGNOSIS — I10 ESSENTIAL HYPERTENSION: ICD-10-CM

## 2018-11-15 RX ORDER — AMLODIPINE BESYLATE 5 MG/1
5 TABLET ORAL DAILY
Qty: 90 TABLET | Refills: 1 | Status: SHIPPED | OUTPATIENT
Start: 2018-11-15 | End: 2019-02-04 | Stop reason: SDUPTHER

## 2018-11-15 NOTE — TELEPHONE ENCOUNTER
----- Message from Jessica Gibson sent at 11/15/2018  9:46 AM CST -----  Contact: pt  Can the clinic reply in [facility]: no      Please refill the medication(s) listed below. The patient can be reached at this phone number (_[phone]) once it is called into the pharmacy.      Medication #1amLODIPine (NORVASC) 5 MG tablet       Medication #2      Preferred Pharmacy:[facility] on [address]

## 2018-11-19 NOTE — PROGRESS NOTES
Subjective:       Patient ID: Fauzia Kikr is a 63 y.o. female.    Chief Complaint: No chief complaint on file.    HPI Ms. Kirk is a very pleasant 62-year-old  female who returned  for F/U of right lung cancer.    She has been receiving adjuvant therapy with Durvalumab.   She began that in March 2018 and has had 26 treatments thus far.    Today she reports no new issues.  She has chronic cough which is unchanged.  She denies any shortness of breath.  Appetite and bowel function has been good.  She continues to have her chronic pruritic rash.  Her right leg pain is unchanged.      She continues on Synthroid replacement for her drug-induced hypothyroidism.       History:  In December 2016 she began to have some blood in her mucus after coughing.In  April she developed a persistent cough and saw her physician on April 18.  Chest x-ray at that time showed a rounded opacity in the right lung overlying the major fissure.   Chest CT in May showed a 4.8 x 4.0 cm, rounded, soft tissue mass in the middle lobe between the medial and lateral segments. There was pre-carinal adenopathy.    Subsequently she underwent bronchoscopy with EBUS on 6/9/17. Needle biopsy of the medistinal nodes was positive for poorly differentiated carcinoma with squamoid features. The cells were positive for CK7, CK5/6, and P63 and are negative for CK20, GCDFP, TTF1, ER, WV, Napsin and YEMI 3.This was felt to be most CW a new squamous lung cancer.    PET CT  demonstrated a hypermetabolic, large mass in the right middle lobe with an SUV max of 20.23. There was hypermetabolic activity extending from this mass tracking along the pleura. There was a hypermetabolic right hilar lymph node demonstrating an SUV max of 20.2. In addition there were 2 hypermetabolic subcarinal lymph nodes with the larger demonstrating an SUV max of 14.2. An additional right paratracheal lymph node was seen with an SUV max of 26.1.    She completed radiation together  with weekly chemotherapy with carboplatin and Taxol  for stage IIIA disease.    She completed therapy on September 13 and received 7 chemotherapy treatments.    CT scan from September 29 showed that the right middle lobe mass had decreased to 4.2 x 2.3 cm from 4.8 x 4.3 cm.  Stable 4 mm pulmonary nodule in the left lower lobe.    She then began adjuvant immunotherapy in November 2017.      Previous cancer history:She had a stage I, ER negative carcinoma of the    left breast in 1990, treated with lumpectomy and radiation therapy.  In      1993, she developed a stage I, ER positive carcinoma of the right breast     treated with lumpectomy and radiation.  In 1995, she developed left breast   cancer recurrence, treated with lumpectomy, brachytherapy and four cycles    of Adriamycin and Cytoxan.  In 1996, she developed a new right breast        cancer T2 N0 poorly differentiated, treated with four cycles of              preoperative Taxol followed by mastectomy.        On October 17, 2016 left mammogram showed increased calcifications in the left breast at 3:00.  On October 27 a biopsy showed DCIS with solid, cribriform, and micropapillary patterns.  Tumor was 95% ER positive at 95% RI positive    On November 7, 2016 she underwent left mastectomy which showed 8 mm of DCIS and negative margins.        Review of Systems   Constitutional: Negative for activity change, appetite change, fatigue, fever and unexpected weight change.   HENT: Negative for postnasal drip and trouble swallowing.    Respiratory: Positive for cough. Negative for shortness of breath.    Cardiovascular: Negative for chest pain.   Gastrointestinal: Negative for abdominal pain, constipation, nausea and vomiting.   Musculoskeletal: Negative for back pain.        Chronic right leg pain   Skin: Positive for rash.        pruritis   Neurological: Negative for headaches.   Psychiatric/Behavioral: Negative for dysphoric mood. The patient is not  nervous/anxious.        Objective:      Physical Exam   Constitutional: She is oriented to person, place, and time. She appears well-developed and well-nourished.   HENT:   Mouth/Throat: No oropharyngeal exudate.   Eyes: No scleral icterus.   Cardiovascular: Normal rate and regular rhythm.   Pulmonary/Chest: Effort normal and breath sounds normal. She has no wheezes. She has no rales.   Abdominal: Soft. She exhibits no mass. There is no tenderness.   Lymphadenopathy:     She has no cervical adenopathy.     She has no axillary adenopathy.        Right: No supraclavicular adenopathy present.        Left: No supraclavicular adenopathy present.   Neurological: She is alert and oriented to person, place, and time.   Skin: No rash noted. No erythema.   Psychiatric: She has a normal mood and affect. Her behavior is normal. Thought content normal.       Assessment:    CT of the chest shows continued decrease in the left middle lobe nodule together with some post radiation change.  No new lesions are seen.  1. Primary cancer of right middle lobe of lung    2. Regional lymph node metastasis present    3. Encounter for antineoplastic chemotherapy     4.  Treatment-induced hypothyroidism  Plan:       Return to clinic in 3 months with labs.  Port flush  Continue thyroid replacement.    Distress Screening Results: Psychosocial Distress screening score of Distress Score: 0 noted and reviewed. No intervention indicated.

## 2018-11-20 ENCOUNTER — OFFICE VISIT (OUTPATIENT)
Dept: HEMATOLOGY/ONCOLOGY | Facility: CLINIC | Age: 63
End: 2018-11-20
Payer: COMMERCIAL

## 2018-11-20 ENCOUNTER — HOSPITAL ENCOUNTER (OUTPATIENT)
Dept: RADIOLOGY | Facility: HOSPITAL | Age: 63
Discharge: HOME | End: 2018-11-20
Attending: INTERNAL MEDICINE
Payer: COMMERCIAL

## 2018-11-20 ENCOUNTER — TELEPHONE (OUTPATIENT)
Dept: HEMATOLOGY/ONCOLOGY | Facility: CLINIC | Age: 63
End: 2018-11-20

## 2018-11-20 VITALS
HEIGHT: 66 IN | SYSTOLIC BLOOD PRESSURE: 152 MMHG | HEART RATE: 93 BPM | OXYGEN SATURATION: 98 % | TEMPERATURE: 98 F | RESPIRATION RATE: 18 BRPM | DIASTOLIC BLOOD PRESSURE: 82 MMHG | WEIGHT: 195.31 LBS | BODY MASS INDEX: 31.39 KG/M2

## 2018-11-20 DIAGNOSIS — C34.2 PRIMARY CANCER OF RIGHT MIDDLE LOBE OF LUNG: Primary | ICD-10-CM

## 2018-11-20 DIAGNOSIS — C34.2 PRIMARY CANCER OF RIGHT MIDDLE LOBE OF LUNG: ICD-10-CM

## 2018-11-20 DIAGNOSIS — C77.9 REGIONAL LYMPH NODE METASTASIS PRESENT: ICD-10-CM

## 2018-11-20 DIAGNOSIS — Z51.11 ENCOUNTER FOR ANTINEOPLASTIC CHEMOTHERAPY: ICD-10-CM

## 2018-11-20 PROCEDURE — 1101F PT FALLS ASSESS-DOCD LE1/YR: CPT | Mod: CPTII,S$GLB,, | Performed by: INTERNAL MEDICINE

## 2018-11-20 PROCEDURE — 3077F SYST BP >= 140 MM HG: CPT | Mod: CPTII,S$GLB,, | Performed by: INTERNAL MEDICINE

## 2018-11-20 PROCEDURE — 99214 OFFICE O/P EST MOD 30 MIN: CPT | Mod: S$GLB,,, | Performed by: INTERNAL MEDICINE

## 2018-11-20 PROCEDURE — 71260 CT THORAX DX C+: CPT | Mod: TC

## 2018-11-20 PROCEDURE — 3008F BODY MASS INDEX DOCD: CPT | Mod: CPTII,S$GLB,, | Performed by: INTERNAL MEDICINE

## 2018-11-20 PROCEDURE — 99999 PR PBB SHADOW E&M-EST. PATIENT-LVL III: CPT | Mod: PBBFAC,,, | Performed by: INTERNAL MEDICINE

## 2018-11-20 PROCEDURE — 3079F DIAST BP 80-89 MM HG: CPT | Mod: CPTII,S$GLB,, | Performed by: INTERNAL MEDICINE

## 2018-11-20 PROCEDURE — 71260 CT THORAX DX C+: CPT | Mod: 26,,, | Performed by: RADIOLOGY

## 2018-11-20 PROCEDURE — 25500020 PHARM REV CODE 255: Performed by: INTERNAL MEDICINE

## 2018-11-20 RX ADMIN — IOHEXOL 100 ML: 350 INJECTION, SOLUTION INTRAVENOUS at 09:11

## 2018-11-20 NOTE — TELEPHONE ENCOUNTER
Spoke with pt about questions about getting port flushed and how often this needs to be done and if an appt needs to be made to do so. Pt verbalized understanding of information given.           ----- Message from Desirae Hinson sent at 11/20/2018 12:49 PM CST -----  Contact: Self   Pt is requesting a call back. Pt would not say what she needed to speak with the nurse about.

## 2018-12-13 ENCOUNTER — TELEPHONE (OUTPATIENT)
Dept: HEMATOLOGY/ONCOLOGY | Facility: CLINIC | Age: 63
End: 2018-12-13

## 2018-12-13 RX ORDER — HEPARIN 100 UNIT/ML
500 SYRINGE INTRAVENOUS
Status: CANCELLED | OUTPATIENT
Start: 2018-12-13

## 2018-12-13 RX ORDER — SODIUM CHLORIDE 0.9 % (FLUSH) 0.9 %
10 SYRINGE (ML) INJECTION
Status: CANCELLED | OUTPATIENT
Start: 2018-12-13

## 2018-12-13 NOTE — TELEPHONE ENCOUNTER
Returned call to patient to discuss her concerns. Patient inquired about port flush (patient last had this completed on 11/20) explained to patient that recommendation for flushing is about 4-6 weeks. Offered having this done beginning of the new year, in agreement, explained that would have  reach out to arrange, she voiced understanding.

## 2018-12-13 NOTE — TELEPHONE ENCOUNTER
----- Message from Noris Roberts sent at 12/13/2018 10:23 AM CST -----  Contact: Pt  Pt called requesting that Dr. Mai's nurse give her a call. Pt did not state the reason      Call back [phone]

## 2019-01-02 ENCOUNTER — INFUSION (OUTPATIENT)
Dept: INFUSION THERAPY | Facility: HOSPITAL | Age: 64
End: 2019-01-02
Attending: INTERNAL MEDICINE
Payer: COMMERCIAL

## 2019-01-02 DIAGNOSIS — C34.2 PRIMARY CANCER OF RIGHT MIDDLE LOBE OF LUNG: Primary | ICD-10-CM

## 2019-01-02 PROCEDURE — A4216 STERILE WATER/SALINE, 10 ML: HCPCS | Performed by: INTERNAL MEDICINE

## 2019-01-02 PROCEDURE — 63600175 PHARM REV CODE 636 W HCPCS: Performed by: INTERNAL MEDICINE

## 2019-01-02 PROCEDURE — 25000003 PHARM REV CODE 250: Performed by: INTERNAL MEDICINE

## 2019-01-02 PROCEDURE — 96523 IRRIG DRUG DELIVERY DEVICE: CPT

## 2019-01-02 RX ORDER — SODIUM CHLORIDE 0.9 % (FLUSH) 0.9 %
10 SYRINGE (ML) INJECTION
Status: CANCELLED | OUTPATIENT
Start: 2019-01-02

## 2019-01-02 RX ORDER — HEPARIN 100 UNIT/ML
500 SYRINGE INTRAVENOUS
Status: COMPLETED | OUTPATIENT
Start: 2019-01-02 | End: 2019-01-02

## 2019-01-02 RX ORDER — SODIUM CHLORIDE 0.9 % (FLUSH) 0.9 %
10 SYRINGE (ML) INJECTION
Status: COMPLETED | OUTPATIENT
Start: 2019-01-02 | End: 2019-01-02

## 2019-01-02 RX ORDER — HEPARIN 100 UNIT/ML
500 SYRINGE INTRAVENOUS
Status: CANCELLED | OUTPATIENT
Start: 2019-01-02

## 2019-01-02 RX ADMIN — Medication 10 ML: at 01:01

## 2019-01-02 RX ADMIN — HEPARIN 500 UNITS: 100 SYRINGE at 01:01

## 2019-01-24 ENCOUNTER — OFFICE VISIT (OUTPATIENT)
Dept: FAMILY MEDICINE | Facility: CLINIC | Age: 64
End: 2019-01-24
Payer: COMMERCIAL

## 2019-01-24 VITALS
DIASTOLIC BLOOD PRESSURE: 88 MMHG | HEART RATE: 96 BPM | OXYGEN SATURATION: 99 % | SYSTOLIC BLOOD PRESSURE: 140 MMHG | WEIGHT: 200.63 LBS | BODY MASS INDEX: 32.24 KG/M2 | TEMPERATURE: 99 F | HEIGHT: 66 IN

## 2019-01-24 DIAGNOSIS — J01.00 ACUTE NON-RECURRENT MAXILLARY SINUSITIS: Primary | ICD-10-CM

## 2019-01-24 DIAGNOSIS — R09.82 PND (POST-NASAL DRIP): ICD-10-CM

## 2019-01-24 DIAGNOSIS — R05.9 COUGH: ICD-10-CM

## 2019-01-24 DIAGNOSIS — I10 ESSENTIAL HYPERTENSION: ICD-10-CM

## 2019-01-24 PROCEDURE — 3008F BODY MASS INDEX DOCD: CPT | Mod: CPTII,S$GLB,, | Performed by: FAMILY MEDICINE

## 2019-01-24 PROCEDURE — 3079F DIAST BP 80-89 MM HG: CPT | Mod: CPTII,S$GLB,, | Performed by: FAMILY MEDICINE

## 2019-01-24 PROCEDURE — 99214 PR OFFICE/OUTPT VISIT, EST, LEVL IV, 30-39 MIN: ICD-10-PCS | Mod: S$GLB,,, | Performed by: FAMILY MEDICINE

## 2019-01-24 PROCEDURE — 3077F PR MOST RECENT SYSTOLIC BLOOD PRESSURE >= 140 MM HG: ICD-10-PCS | Mod: CPTII,S$GLB,, | Performed by: FAMILY MEDICINE

## 2019-01-24 PROCEDURE — 99999 PR PBB SHADOW E&M-EST. PATIENT-LVL III: CPT | Mod: PBBFAC,,, | Performed by: FAMILY MEDICINE

## 2019-01-24 PROCEDURE — 3079F PR MOST RECENT DIASTOLIC BLOOD PRESSURE 80-89 MM HG: ICD-10-PCS | Mod: CPTII,S$GLB,, | Performed by: FAMILY MEDICINE

## 2019-01-24 PROCEDURE — 3008F PR BODY MASS INDEX (BMI) DOCUMENTED: ICD-10-PCS | Mod: CPTII,S$GLB,, | Performed by: FAMILY MEDICINE

## 2019-01-24 PROCEDURE — 99214 OFFICE O/P EST MOD 30 MIN: CPT | Mod: S$GLB,,, | Performed by: FAMILY MEDICINE

## 2019-01-24 PROCEDURE — 1101F PT FALLS ASSESS-DOCD LE1/YR: CPT | Mod: CPTII,S$GLB,, | Performed by: FAMILY MEDICINE

## 2019-01-24 PROCEDURE — 3077F SYST BP >= 140 MM HG: CPT | Mod: CPTII,S$GLB,, | Performed by: FAMILY MEDICINE

## 2019-01-24 PROCEDURE — 1101F PR PT FALLS ASSESS DOC 0-1 FALLS W/OUT INJ PAST YR: ICD-10-PCS | Mod: CPTII,S$GLB,, | Performed by: FAMILY MEDICINE

## 2019-01-24 PROCEDURE — 99999 PR PBB SHADOW E&M-EST. PATIENT-LVL III: ICD-10-PCS | Mod: PBBFAC,,, | Performed by: FAMILY MEDICINE

## 2019-01-24 RX ORDER — PROMETHAZINE HYDROCHLORIDE AND DEXTROMETHORPHAN HYDROBROMIDE 6.25; 15 MG/5ML; MG/5ML
5 SYRUP ORAL 3 TIMES DAILY PRN
Qty: 118 ML | Refills: 1 | Status: SHIPPED | OUTPATIENT
Start: 2019-01-24 | End: 2019-08-22

## 2019-01-24 RX ORDER — AMOXICILLIN 875 MG/1
875 TABLET, FILM COATED ORAL EVERY 12 HOURS
Qty: 20 TABLET | Refills: 0 | Status: SHIPPED | OUTPATIENT
Start: 2019-01-24 | End: 2019-02-04

## 2019-01-24 NOTE — PROGRESS NOTES
Office Visit    Patient Name: Fauzia Kirk    : 1955  MRN: [id number]      Assessment/Plan:  Fauzia Kirk is a 63 y.o. female who presents today for :    Acute non-recurrent maxillary sinusitis  -     amoxicillin (AMOXIL) 875 MG tablet; Take 1 tablet (875 mg total) by mouth every 12 (twelve) hours.  Dispense: 20 tablet; Refill: 0    PND (post-nasal drip)    Cough  -     promethazine-dextromethorphan (PROMETHAZINE-DM) 6.25-15 mg/5 mL Syrp; Take 5 mLs by mouth 3 (three) times daily as needed (cough).  Dispense: 118 mL; Refill: 1  -start Abx. Counseled pt that cough may persist for up to 2-3 weeks  -advised to continue supportive therapy and symptom management. May try Nasal Saline Rinses. Cepacol prn for sore throat. Claritin PRN for drainage  -advised to use air humidifier/filter at home, changing AC filters regularly, avoid second-hand smoking  -stressed hydration (water) and rest, as well as frequent hand washing and covering coughs to prevent spread of respiratory illnesses  -     Tylenol every 4-6 hours as needed for fever, headaches, sore throat, ear pain, bodyaches, and/or nasal/sinus inflammation  -RTC if Sx worsens or call clinic back if pt has any concerns.    Essential hypertension  - continue current medications   - ?advised DASH diet, portion control, regular cardiovascular exercises  - ?counseled on weight loss  -f/u with BP logs in 2 weeks if BP is not consistently <150/90      Follow-up for worsening Sx or as needed.     This note was created by combination of typed  and Dragon dictation.  Transcription errors may be present.  If there are any questions, please contact me.      ----------------------------------------------------------------------------------------------------------------------      HPI:  Patient Care Team:  Era Urena MD as PCP - General (Family Medicine)    Fauzia is a 63 y.o. female with      Patient Active Problem List   Diagnosis    Hypertension     History of left breast cancer    s/p L mastectomy, SLNBx 11/7    Ductal carcinoma in situ (DCIS) of left breast    Primary cancer of right middle lobe of lung    Regional lymph node metastasis present    Encounter for antineoplastic chemotherapy    Hyperthyroidism    Hypothyroidism due to medication     This patient is new to me    Patient presents today with :  Sinus Problem    And congestion and sinus pressure for the past 4 days, she has +productive cough of clear phlegm - sometimes feels like she can't cough it cough, +throat drainage. +Decreased appetite.  +sick contact with similar Sx.  Medications tried at home: OTC meds.  No sore throat.  No body aches.    No ear pain.  +subjective F/C  She has been compliant with her Amlodipine at home daily, BP has been controlled, although slightly elevated today due to her acute URI.      Additional ROS    No dysphagia  No CP/SOB/palpitations/swelling  No nausea/vomiting/abd pain/no diarrhea  No rashes      Patient Active Problem List   Diagnosis    Hypertension    History of left breast cancer    s/p L mastectomy, SLNBx 11/7    Ductal carcinoma in situ (DCIS) of left breast    Primary cancer of right middle lobe of lung    Regional lymph node metastasis present    Encounter for antineoplastic chemotherapy    Hyperthyroidism    Hypothyroidism due to medication       Current Medications  Medications reviewed and updated.       Current Outpatient Medications:     amLODIPine (NORVASC) 5 MG tablet, Take 1 tablet (5 mg total) by mouth once daily., Disp: 90 tablet, Rfl: 1    fluocinonide 0.05% (LIDEX) 0.05 % cream, Apply to affected area daily, Disp: 30 g, Rfl: 1    levothyroxine (SYNTHROID) 88 MCG tablet, Take 1 tablet (88 mcg total) by mouth before breakfast., Disp: 30 tablet, Rfl: 3    lidocaine-prilocaine (EMLA) cream, Apply topically as needed., Disp: 5 g, Rfl: 3    amoxicillin (AMOXIL) 875 MG tablet, Take 1 tablet (875 mg total) by mouth every 12  (twelve) hours., Disp: 20 tablet, Rfl: 0    gabapentin (NEURONTIN) 300 MG capsule, Take 1 capsule (300 mg total) by mouth every evening., Disp: 30 capsule, Rfl: 6    omeprazole (PRILOSEC) 40 MG capsule, Take 1 capsule (40 mg total) by mouth once daily., Disp: 30 capsule, Rfl: 6    promethazine-dextromethorphan (PROMETHAZINE-DM) 6.25-15 mg/5 mL Syrp, Take 5 mLs by mouth 3 (three) times daily as needed (cough)., Disp: 118 mL, Rfl: 1    Past Surgical History:   Procedure Laterality Date    BREAST LUMPECTOMY       SECTION, CLASSIC      ENDOBRONCHIAL NAVIGATION N/A 2017    [facility] 2ND [facility]    INJECTION-SENTINEL NODE Left 2016    [facility] 2ND [facility]    DLFFRCLBQ-FLAO-G-CATH N/A 2017    [facility] at [facility] 2ND FLR    LIPOMA RESECTION      MASTECTOMY      TOTAL MASTECTOMY Left 2016    [facility] at [facility] 2ND [facility]LR       Family History   Problem Relation Age of Onset    Lymphoma Mother     Prostate cancer Father     Stomach cancer Sister     Breast cancer Sister     Diabetes Brother     Breast cancer Paternal Aunt     Breast cancer Paternal Grandmother        Social History     Socioeconomic History    Marital status:      Spouse name: Not on file    Number of children: Not on file    Years of education: Not on file    Highest education level: Not on file   Social Needs    Financial resource strain: Not on file    Food insecurity - worry: Not on file    Food insecurity - inability: Not on file    Transportation needs - medical: Not on file    Transportation needs - non-medical: Not on file   Occupational History    Not on file   Tobacco Use    Smoking status: Never Smoker    Smokeless tobacco: Never Used   Substance and Sexual Activity    Alcohol use: No     Alcohol/week: 0.0 oz    Drug use: No    Sexual activity: Yes     Partners: Male  "  Other Topics Concern    Not on file   Social History Narrative    Not on file             Allergies   Review of patient's allergies indicates:  No Known Allergies          Review of Systems  See HPI      Physical Exam  BP (!) 140/88   Pulse 96   Temp 99.2 °F (37.3 °C) (Oral)   Ht 5' 6" (1.676 m)   Wt 91 kg (200 lb 9.9 oz)   SpO2 99%   BMI 32.38 kg/m²     GEN: NAD, well developed  HEENT: NCAT, PERRLA, EOMI, sclera clear, anicteric, TM clear bilaterally with normal light reflex, mild nasal turbinate swelling, MMM with no lesions, O/P clear - no tonsillar swelling/discharge , +mild drainage, +minimal clear nasal discharge, moderate frontal/maxillary TTP, No trismus/uvula deviation  NECK: normal, supple with midline trachea, no LAD, no thyromegaly  LUNGS: CTAB, no w/r/r, no increased work of breathing  HEART: RRR, normal S1 and S2, no m/r/g  ABD: s/nt/nd, NABS  SKIN: normal turgor, no rashes, no other lesions.   PSYCH: AOx3, appropriate mood and affect    "

## 2019-01-25 NOTE — PROGRESS NOTES
"REFERRING PHYSICIAN: Dr. Mai     DIAGNOSIS: SCC of the right lung, [id number], stage IIIA     INTERVAL SINCE COMPLETION: 3 months     INTERVAL HISTORY: Ms. Kirk returns 3 months after completing chemoradiotherapy for her stage IIIA squamous cell CA of the right lung.  She is a never smoker and previously had had adjuvant radiotherapy to both breasts in the 1990s.  For her lung cancer she received 60Gy in 30 fractions concurrently with weekly carbo/taxol chemotherapy. Chemoradiation was completed on 9/8/17.  Since being seen in October she initiated adjuvant durvalumab q2 weeks and just received her 3rd cycle.  She feels well c/w how she felt during chemoRT.  She was noted to be tachycardic by Dr. Stevens and had an EKG (normal) and thyroid function tests showing low TSH and high T4.  She was started on methimazole and referred to endocrinology.  She says she doesn't like the way methimazole makes her feel.  She denies diarrhea, heat intolerance, palpitations.  She has slightly increased cough and mild associated chest wall pain.  She has not had any interval imaging.    She is eating well with stable weight.  No headaches.  She reminded me I told her we would get a repeat brain MRI at some point due to something nonspecific seen on staging brain MRI.    PHYSICAL EXAMINATION:  VITAL SIGNS: BP (!) 152/77 (BP Location: Right arm, Patient Position: Sitting, BP Method: Large (Automatic))   Pulse 92   Resp 16   Ht 5' 6" (1.676 m)   Wt 80.4 kg (177 lb 4 oz)   BMI 28.61 kg/m²   GENERAL: Patient is alert and oriented, in no acute distress.  HEENT: Extraocular muscles are intact.  Oropharynx is clear without lesions.    LYMPH: There is no cervical or supraclavicular adenopathy palpated.    CHEST: Breath sounds clear bilaterally.  No rales.  No rhonchi.  Unlabored respirations.  CARDIOVASCULAR: Normal S1, S2.  Normal rate.  Regular rhythm.  ABDOMEN: Bowel sounds normal.  No tenderness.  No abdominal distention.  No " hepatomegaly.  No splenomegaly.  EXTREMITIES: No clubbing, cyanosis, edema.  MSK:  No spinal or chest wall tenderness.  NEUROLOGIC: Cranial nerves II through XII are grossly intact.  Sensation is intact.  Strength is 5 out of 5 in the upper and lower extremities bilaterally.    ASSESSMENT:   61 yo female never smoker 3 months s/p chemoRT for stage IIIA SCC of the right lung, now on durvulumab.  Clinically doing relatively well, new dx of hyperthyroidism.    PLAN:   She will f/u with endocrinology for management of her hyperthyroidism, and will follow up with Dr. Mai for consideration of scheduling for restaging scans.  She has had 3 cycles of durvalumab so far, and I will defer to Dr. Mai regarding her scan schedule.  Regarding her cough, she is in the appropriate window for radiation pneumonitis.  Her cough is mild and recent CXR was not particularly impressive, so I will follow her for now.  She will call me if her cough worsens or if she develops fevers or SOB.    Will order a brain MRI for follow up and see her in a month.  25 minutes was spent in follow up, of which >50% was spent in face to face counseling.       No lymphadedenopathy

## 2019-02-04 ENCOUNTER — PATIENT MESSAGE (OUTPATIENT)
Dept: ADMINISTRATIVE | Facility: OTHER | Age: 64
End: 2019-02-04

## 2019-02-04 ENCOUNTER — OFFICE VISIT (OUTPATIENT)
Dept: FAMILY MEDICINE | Facility: CLINIC | Age: 64
End: 2019-02-04
Payer: COMMERCIAL

## 2019-02-04 VITALS
OXYGEN SATURATION: 98 % | TEMPERATURE: 99 F | HEIGHT: 66 IN | WEIGHT: 199.31 LBS | HEART RATE: 114 BPM | SYSTOLIC BLOOD PRESSURE: 120 MMHG | DIASTOLIC BLOOD PRESSURE: 70 MMHG | BODY MASS INDEX: 32.03 KG/M2

## 2019-02-04 DIAGNOSIS — L30.9 DERMATITIS: Primary | ICD-10-CM

## 2019-02-04 DIAGNOSIS — D05.12 DUCTAL CARCINOMA IN SITU (DCIS) OF LEFT BREAST: ICD-10-CM

## 2019-02-04 DIAGNOSIS — C34.2 PRIMARY CANCER OF RIGHT MIDDLE LOBE OF LUNG: ICD-10-CM

## 2019-02-04 DIAGNOSIS — R00.0 TACHYCARDIA: ICD-10-CM

## 2019-02-04 DIAGNOSIS — I10 ESSENTIAL HYPERTENSION: ICD-10-CM

## 2019-02-04 DIAGNOSIS — E03.2 HYPOTHYROIDISM DUE TO MEDICATION: ICD-10-CM

## 2019-02-04 PROCEDURE — 99215 PR OFFICE/OUTPT VISIT, EST, LEVL V, 40-54 MIN: ICD-10-PCS | Mod: S$GLB,,, | Performed by: FAMILY MEDICINE

## 2019-02-04 PROCEDURE — 3078F DIAST BP <80 MM HG: CPT | Mod: CPTII,S$GLB,, | Performed by: FAMILY MEDICINE

## 2019-02-04 PROCEDURE — 3078F PR MOST RECENT DIASTOLIC BLOOD PRESSURE < 80 MM HG: ICD-10-PCS | Mod: CPTII,S$GLB,, | Performed by: FAMILY MEDICINE

## 2019-02-04 PROCEDURE — 99215 OFFICE O/P EST HI 40 MIN: CPT | Mod: S$GLB,,, | Performed by: FAMILY MEDICINE

## 2019-02-04 PROCEDURE — 99999 PR PBB SHADOW E&M-EST. PATIENT-LVL IV: CPT | Mod: PBBFAC,,, | Performed by: FAMILY MEDICINE

## 2019-02-04 PROCEDURE — 3008F PR BODY MASS INDEX (BMI) DOCUMENTED: ICD-10-PCS | Mod: CPTII,S$GLB,, | Performed by: FAMILY MEDICINE

## 2019-02-04 PROCEDURE — 3074F PR MOST RECENT SYSTOLIC BLOOD PRESSURE < 130 MM HG: ICD-10-PCS | Mod: CPTII,S$GLB,, | Performed by: FAMILY MEDICINE

## 2019-02-04 PROCEDURE — 1101F PT FALLS ASSESS-DOCD LE1/YR: CPT | Mod: CPTII,S$GLB,, | Performed by: FAMILY MEDICINE

## 2019-02-04 PROCEDURE — 3008F BODY MASS INDEX DOCD: CPT | Mod: CPTII,S$GLB,, | Performed by: FAMILY MEDICINE

## 2019-02-04 PROCEDURE — 99999 PR PBB SHADOW E&M-EST. PATIENT-LVL IV: ICD-10-PCS | Mod: PBBFAC,,, | Performed by: FAMILY MEDICINE

## 2019-02-04 PROCEDURE — 1101F PR PT FALLS ASSESS DOC 0-1 FALLS W/OUT INJ PAST YR: ICD-10-PCS | Mod: CPTII,S$GLB,, | Performed by: FAMILY MEDICINE

## 2019-02-04 PROCEDURE — 3074F SYST BP LT 130 MM HG: CPT | Mod: CPTII,S$GLB,, | Performed by: FAMILY MEDICINE

## 2019-02-04 RX ORDER — CLOTRIMAZOLE AND BETAMETHASONE DIPROPIONATE 10; .64 MG/G; MG/G
CREAM TOPICAL 2 TIMES DAILY
Qty: 45 G | Refills: 1 | Status: SHIPPED | OUTPATIENT
Start: 2019-02-04 | End: 2020-01-24 | Stop reason: SDUPTHER

## 2019-02-04 RX ORDER — AMLODIPINE BESYLATE 10 MG/1
10 TABLET ORAL DAILY
Qty: 90 TABLET | Refills: 1 | Status: SHIPPED | OUTPATIENT
Start: 2019-02-04 | End: 2019-07-30 | Stop reason: SDUPTHER

## 2019-02-04 NOTE — PATIENT INSTRUCTIONS

## 2019-02-04 NOTE — PROGRESS NOTES
Routine Office Visit    Patient Name: Fauzia Kirk    : 1955  MRN: [id number]    Subjective:  Fauzia is a 63 y.o. female who presents today for     1. Blood pressure follow-up - pt has noted that blood pressure has been increased. She took norvasc 5, two tablets today and noticed improvement her blood pressure. She feels that medication is working well for her. She completed her chemotherapy in October.   2. Right lung cancer (hx of breast cancer) - has follow up with Dr. Mai and repeat CT scan scheduled  3. Dermatitis - pt has a very itchy rash on her right elbow and on right upper chest. Rash is very itchy. She has tried multiple otc creams without relief of symptoms. She states peroxide does help with the stinging sensation but does not help with the rash. She states her daughter was on clotrimazole/betamethasone for similar rash and it helped. She would like this prescription. She does not believe she has eczema as she has never had eczema in the past. She states she believes it is due to the thyroid medication. Skin rash started shortly after she was started on thyroid medication.  4. Tachycardia - pt states she has had faster heart rate in the past. She was advise it may have been due to medication. She did have a cardiac work up which was negative. She states she has also been under a lot of stress in the past day. She broke her tooth and had some other stressors she did not want to discuss today.     Review of Systems   Constitutional: Negative for chills and fever.   HENT: Negative for congestion.    Eyes: Negative for blurred vision.   Respiratory: Negative for cough.    Cardiovascular: Positive for palpitations. Negative for chest pain.   Gastrointestinal: Negative for abdominal pain, constipation, diarrhea, heartburn, nausea and vomiting.   Genitourinary: Negative for dysuria.   Musculoskeletal: Negative for myalgias.   Skin: Positive for itching and rash.   Neurological: Negative for  dizziness and headaches.   Psychiatric/Behavioral: Negative for depression.       Active Problem List  Patient Active Problem List   Diagnosis    Hypertension    History of left breast cancer    s/p L mastectomy, SLNBx     Ductal carcinoma in situ (DCIS) of left breast    Primary cancer of right middle lobe of lung    Regional lymph node metastasis present    Encounter for antineoplastic chemotherapy    Hyperthyroidism    Hypothyroidism due to medication       Past Surgical History  Past Surgical History:   Procedure Laterality Date    BREAST LUMPECTOMY       SECTION, CLASSIC      ENDOBRONCHIAL NAVIGATION N/A 2017    [facility] 2ND [facility]    INJECTION-SENTINEL NODE Left 2016    [facility] at [facility] OR 2ND [facility]    TESANRQKI-ONEN-C-CATH N/A 2017    [facility] at [facility] OR 2ND [facility]LR    LIPOMA RESECTION      MASTECTOMY      TOTAL MASTECTOMY Left 2016    Performed by [facility] at [facility] OR 2ND [facility]LR       Family History  Family History   Problem Relation Age of Onset    Lymphoma Mother     Prostate cancer Father     Stomach cancer Sister     Breast cancer Sister     Diabetes Brother     Breast cancer Paternal Aunt     Breast cancer Paternal Grandmother        Social History  Social History     Socioeconomic History    Marital status:      Spouse name: Not on file    Number of children: Not on file    Years of education: Not on file    Highest education level: Not on file   Social Needs    Financial resource strain: Not on file    Food insecurity - worry: Not on file    Food insecurity - inability: Not on file    Transportation needs - medical: Not on file    Transportation needs - non-medical: Not on file   Occupational History    Not on file   Tobacco Use    Smoking status: Never Smoker    Smokeless tobacco: Never Used   Substance and Sexual Activity    Alcohol use:  "No     Alcohol/week: 0.0 oz    Drug use: No    Sexual activity: Yes     Partners: Male   Other Topics Concern    Not on file   Social History Narrative    Not on file       Medications and Allergies  Reviewed and updated.   Current Outpatient Medications   Medication Sig    amLODIPine (NORVASC) 10 MG tablet Take 1 tablet (10 mg total) by mouth once daily.    gabapentin (NEURONTIN) 300 MG capsule Take 1 capsule (300 mg total) by mouth every evening.    levothyroxine (SYNTHROID) 88 MCG tablet Take 1 tablet (88 mcg total) by mouth before breakfast.    lidocaine-prilocaine (EMLA) cream Apply topically as needed.    promethazine-dextromethorphan (PROMETHAZINE-DM) 6.25-15 mg/5 mL Syrp Take 5 mLs by mouth 3 (three) times daily as needed (cough).    clotrimazole-betamethasone 1-0.05% (LOTRISONE) cream Apply topically 2 (two) times daily.     No current facility-administered medications for this visit.        Physical Exam  /70 (BP Location: Left arm, Patient Position: Sitting, BP Method: Large (Manual))   Pulse (!) 114   Temp 98.5 °F (36.9 °C) (Oral)   Ht 5' 6" (1.676 m)   Wt 90.4 kg (199 lb 4.7 oz)   SpO2 98%   BMI 32.17 kg/m²   Physical Exam   Constitutional: She is oriented to person, place, and time. She appears well-developed and well-nourished.   HENT:   Head: Normocephalic and atraumatic.   Eyes: Conjunctivae and EOM are normal. Pupils are equal, round, and reactive to light.   Neck: Normal range of motion. Neck supple.   Cardiovascular: Regular rhythm and normal heart sounds. Tachycardia present. Exam reveals no gallop and no friction rub.   No murmur heard.  Pulmonary/Chest: Breath sounds normal. No respiratory distress.   Abdominal: Soft. Bowel sounds are normal. She exhibits no distension. There is no tenderness.   Musculoskeletal: Normal range of motion.   Lymphadenopathy:     She has no cervical adenopathy.   Neurological: She is alert and oriented to person, place, and time.   Skin: Skin " is warm.   Psychiatric: She has a normal mood and affect.         Assessment/Plan:  Fauzia Kirk is a 63 y.o. female who presents today for :    Problem List Items Addressed This Visit        Cardiac/Vascular    Hypertension    Relevant Medications    amLODIPine (NORVASC) 10 MG tablet    Other Relevant Orders    NURSING COMMUNICATION: Create [facility] Account    Hypertension Digital Medicine (HDMP) Enrollment Order (Completed)    Hypertension Digital Medicine (HDMP): Assign Onboarding Questionnaires (Completed)       Oncology    Ductal carcinoma in situ (DCIS) of left breast    Primary cancer of right middle lobe of lung  Continue f/u with heme/onc - Dr. Mai       Endocrine    Hypothyroidism due to medication  The current medical regimen is effective;  continue present plan and medications.        Other Visit Diagnoses     Dermatitis    -  Primary    Relevant Orders    Ambulatory referral to Dermatology  Send in clotrimazole / betamethasone to pharmacy   Suspect pt has eczema but pt states she cannot have eczema as she has never had this in the past.   Recommend evaluation to rule out eczema vs psoriasis       Tachycardia      Recommend to sign up for digital hypertension program for blood pressure and pulse check  Pt states symptoms are intermittent and she believes faster heart rate is related to stress.   Will continue to follow-trends   Pt has tachycardia but normal rhythm   If unable to sign up, will refer to cardiology for further evaluation             Greater than 45 minutes was spent with this patient with greater than 50% spent with face-to-face counseling  Follow-up if symptoms worsen or fail to improve.

## 2019-02-06 ENCOUNTER — PATIENT OUTREACH (OUTPATIENT)
Dept: OTHER | Facility: OTHER | Age: 64
End: 2019-02-06

## 2019-02-06 NOTE — PROGRESS NOTES
1st attempt for enrollment call. Left voicemail.         Last 5 Patient Entered Readings                                      Current 30 Day Average: 121/81     Recent Readings 2/5/2019 2/5/2019 2/5/2019 2/4/2019 2/4/2019    SBP (mmHg) 132 162 138 109 117    DBP (mmHg) 83 88 85 76 76    Pulse 96 101 97 107 107

## 2019-02-20 NOTE — PROGRESS NOTES
Subjective:       Patient ID: Fauzia Kirk is a 63 y.o. female.    Chief Complaint: No chief complaint on file.    HPI Ms. Kirk is a very pleasant 62-year-old  female who returned  for F/U of right lung cancer.      She had chemo/XRT then adjuvant therapy with Durvalumab.    That was completed November 2018.    Overall, she is feeling well.  Her itching and rash have improved.  She has had no change in her breathing.  She has no consistent cough.  Appetite and bowel function are excellent.  Other than her chronic leg pain she has no other pain.             History:  In December 2016 she began to have some blood in her mucus after coughing.In  April she developed a persistent cough and saw her physician on April 18.  Chest x-ray at that time showed a rounded opacity in the right lung overlying the major fissure.   Chest CT in May showed a 4.8 x 4.0 cm, rounded, soft tissue mass in the middle lobe between the medial and lateral segments. There was pre-carinal adenopathy.    Subsequently she underwent bronchoscopy with EBUS on 6/9/17. Needle biopsy of the medistinal nodes was positive for poorly differentiated carcinoma with squamoid features. The cells were positive for CK7, CK5/6, and P63 and are negative for CK20, GCDFP, TTF1, ER, DC, Napsin and YEMI 3.This was felt to be most CW a new squamous lung cancer.    PET CT  demonstrated a hypermetabolic, large mass in the right middle lobe with an SUV max of 20.23. There was hypermetabolic activity extending from this mass tracking along the pleura. There was a hypermetabolic right hilar lymph node demonstrating an SUV max of 20.2. In addition there were 2 hypermetabolic subcarinal lymph nodes with the larger demonstrating an SUV max of 14.2. An additional right paratracheal lymph node was seen with an SUV max of 26.1.    She completed radiation together with weekly chemotherapy with carboplatin and Taxol  for stage IIIA disease.    She completed therapy on  September 13 and received 7 chemotherapy treatments.    CT scan from September 29 showed that the right middle lobe mass had decreased to 4.2 x 2.3 cm from 4.8 x 4.3 cm.  Stable 4 mm pulmonary nodule in the left lower lobe.    She then began adjuvant immunotherapy in November 2017.  She completed 26 cycles of Durvalumab on 11/2/18.      Previous cancer history:She had a stage I, ER negative carcinoma of the    left breast in 1990, treated with lumpectomy and radiation therapy.  In      1993, she developed a stage I, ER positive carcinoma of the right breast     treated with lumpectomy and radiation.  In 1995, she developed left breast   cancer recurrence, treated with lumpectomy, brachytherapy and four cycles    of Adriamycin and Cytoxan.  In 1996, she developed a new right breast        cancer T2 N0 poorly differentiated, treated with four cycles of              preoperative Taxol followed by mastectomy.        On October 17, 2016 left mammogram showed increased calcifications in the left breast at 3:00.  On October 27 a biopsy showed DCIS with solid, cribriform, and micropapillary patterns.  Tumor was 95% ER positive at 95% [place] positive    On November 7, 2016 she underwent left mastectomy which showed 8 mm of DCIS and negative margins.        Review of Systems   Constitutional: Negative for activity change, appetite change, fatigue, fever and unexpected weight change.   HENT: Negative for postnasal drip and trouble swallowing.    Respiratory: Positive for cough. Negative for shortness of breath.    Cardiovascular: Negative for chest pain.   Gastrointestinal: Negative for abdominal pain, constipation, nausea and vomiting.   Musculoskeletal: Negative for back pain.        Chronic right leg pain   Skin: Positive for rash.        pruritis improved   Neurological: Negative for headaches.   Psychiatric/Behavioral: Negative for dysphoric mood. The patient is not nervous/anxious.        Objective:      Physical Exam    Constitutional: She is oriented to person, place, and time. She appears well-developed and well-nourished.   HENT:   Mouth/Throat: No oropharyngeal exudate.   Eyes: No scleral icterus.   Cardiovascular: Normal rate and regular rhythm.   Pulmonary/Chest: Effort normal and breath sounds normal. She has no wheezes. She has no rales.   Abdominal: Soft. She exhibits no mass. There is no tenderness.   Lymphadenopathy:     She has no cervical adenopathy.     She has no axillary adenopathy.        Right: No supraclavicular adenopathy present.        Left: No supraclavicular adenopathy present.   Neurological: She is alert and oriented to person, place, and time.   Skin: No rash noted. No erythema.   Psychiatric: She has a normal mood and affect. Her behavior is normal. Thought content normal.       Assessment:    CT of the chest - no change in right lower lobe post radiation changes and residual mass    Labs:  CBC normal other than hemoglobin 11.9, metabolic profile is unremarkable, TSH 5.1  1. Primary cancer of right middle lobe of lung     4.  Treatment-induced hypothyroidism  Plan:       Return to clinic in 3 months with labs and chest CT..  Port flush  Continue thyroid replacement.

## 2019-02-21 ENCOUNTER — TELEPHONE (OUTPATIENT)
Dept: HEMATOLOGY/ONCOLOGY | Facility: CLINIC | Age: 64
End: 2019-02-21

## 2019-02-21 ENCOUNTER — HOSPITAL ENCOUNTER (OUTPATIENT)
Dept: RADIOLOGY | Facility: HOSPITAL | Age: 64
Discharge: HOME | End: 2019-02-21
Attending: INTERNAL MEDICINE
Payer: COMMERCIAL

## 2019-02-21 ENCOUNTER — INFUSION (OUTPATIENT)
Dept: INFUSION THERAPY | Facility: HOSPITAL | Age: 64
End: 2019-02-21
Attending: INTERNAL MEDICINE
Payer: COMMERCIAL

## 2019-02-21 ENCOUNTER — OFFICE VISIT (OUTPATIENT)
Dept: HEMATOLOGY/ONCOLOGY | Facility: CLINIC | Age: 64
End: 2019-02-21
Payer: COMMERCIAL

## 2019-02-21 VITALS
BODY MASS INDEX: 32.03 KG/M2 | OXYGEN SATURATION: 100 % | RESPIRATION RATE: 20 BRPM | TEMPERATURE: 98 F | HEART RATE: 95 BPM | HEIGHT: 66 IN | WEIGHT: 199.31 LBS | DIASTOLIC BLOOD PRESSURE: 74 MMHG | SYSTOLIC BLOOD PRESSURE: 140 MMHG

## 2019-02-21 DIAGNOSIS — E03.2 HYPOTHYROIDISM DUE TO MEDICATION: ICD-10-CM

## 2019-02-21 DIAGNOSIS — C34.2 PRIMARY CANCER OF RIGHT MIDDLE LOBE OF LUNG: ICD-10-CM

## 2019-02-21 DIAGNOSIS — C34.2 PRIMARY CANCER OF RIGHT MIDDLE LOBE OF LUNG: Primary | ICD-10-CM

## 2019-02-21 LAB
ALBUMIN SERPL BCP-MCNC: 3.4 G/DL
ALP SERPL-CCNC: 105 U/L
ALT SERPL W/O P-5'-P-CCNC: 9 U/L
ANION GAP SERPL CALC-SCNC: 7 MMOL/L
AST SERPL-CCNC: 15 U/L
BILIRUB SERPL-MCNC: 0.6 MG/DL
BUN SERPL-MCNC: 15 MG/DL
CALCIUM SERPL-MCNC: 9.6 MG/DL
CHLORIDE SERPL-SCNC: 104 MMOL/L
CO2 SERPL-SCNC: 26 MMOL/L
CREAT SERPL-MCNC: 0.8 MG/DL
CREAT SERPL-MCNC: 0.8 MG/DL (ref 0.5–1.4)
ERYTHROCYTE [DISTWIDTH] IN BLOOD BY AUTOMATED COUNT: 15.5 %
EST. GFR  (AFRICAN AMERICAN): >60 ML/MIN/1.73 M^2
EST. GFR  (NON AFRICAN AMERICAN): >60 ML/MIN/1.73 M^2
GLUCOSE SERPL-MCNC: 100 MG/DL
HCT VFR BLD AUTO: 37.7 %
HGB BLD-MCNC: 11.9 G/DL
IMM GRANULOCYTES # BLD AUTO: 0.03 K/UL
MCH RBC QN AUTO: 27.5 PG
MCHC RBC AUTO-ENTMCNC: 31.6 G/DL
MCV RBC AUTO: 87 FL
NEUTROPHILS # BLD AUTO: 3.9 K/UL
PLATELET # BLD AUTO: 254 K/UL
PMV BLD AUTO: 9.7 FL
POTASSIUM SERPL-SCNC: 4.1 MMOL/L
PROT SERPL-MCNC: 7.6 G/DL
RBC # BLD AUTO: 4.33 M/UL
SAMPLE: NORMAL
SODIUM SERPL-SCNC: 137 MMOL/L
T4 FREE SERPL-MCNC: 0.61 NG/DL
TSH SERPL DL<=0.005 MIU/L-ACNC: 5.15 UIU/ML
WBC # BLD AUTO: 5.76 K/UL

## 2019-02-21 PROCEDURE — 1101F PT FALLS ASSESS-DOCD LE1/YR: CPT | Mod: CPTII,S$GLB,, | Performed by: INTERNAL MEDICINE

## 2019-02-21 PROCEDURE — 71260 CT THORAX DX C+: CPT | Mod: TC

## 2019-02-21 PROCEDURE — 25000003 PHARM REV CODE 250: Performed by: INTERNAL MEDICINE

## 2019-02-21 PROCEDURE — 3008F BODY MASS INDEX DOCD: CPT | Mod: CPTII,S$GLB,, | Performed by: INTERNAL MEDICINE

## 2019-02-21 PROCEDURE — 3077F SYST BP >= 140 MM HG: CPT | Mod: CPTII,S$GLB,, | Performed by: INTERNAL MEDICINE

## 2019-02-21 PROCEDURE — 99213 OFFICE O/P EST LOW 20 MIN: CPT | Mod: S$GLB,,, | Performed by: INTERNAL MEDICINE

## 2019-02-21 PROCEDURE — 3008F PR BODY MASS INDEX (BMI) DOCUMENTED: ICD-10-PCS | Mod: CPTII,S$GLB,, | Performed by: INTERNAL MEDICINE

## 2019-02-21 PROCEDURE — 36591 DRAW BLOOD OFF VENOUS DEVICE: CPT

## 2019-02-21 PROCEDURE — 3077F PR MOST RECENT SYSTOLIC BLOOD PRESSURE >= 140 MM HG: ICD-10-PCS | Mod: CPTII,S$GLB,, | Performed by: INTERNAL MEDICINE

## 2019-02-21 PROCEDURE — 25500020 PHARM REV CODE 255: Performed by: INTERNAL MEDICINE

## 2019-02-21 PROCEDURE — 84443 ASSAY THYROID STIM HORMONE: CPT

## 2019-02-21 PROCEDURE — 3078F PR MOST RECENT DIASTOLIC BLOOD PRESSURE < 80 MM HG: ICD-10-PCS | Mod: CPTII,S$GLB,, | Performed by: INTERNAL MEDICINE

## 2019-02-21 PROCEDURE — 3078F DIAST BP <80 MM HG: CPT | Mod: CPTII,S$GLB,, | Performed by: INTERNAL MEDICINE

## 2019-02-21 PROCEDURE — 71260 CT THORAX DX C+: CPT | Mod: 26,,, | Performed by: RADIOLOGY

## 2019-02-21 PROCEDURE — 99213 PR OFFICE/OUTPT VISIT, EST, LEVL III, 20-29 MIN: ICD-10-PCS | Mod: S$GLB,,, | Performed by: INTERNAL MEDICINE

## 2019-02-21 PROCEDURE — 36415 COLL VENOUS BLD VENIPUNCTURE: CPT

## 2019-02-21 PROCEDURE — 85027 COMPLETE CBC AUTOMATED: CPT

## 2019-02-21 PROCEDURE — 1101F PR PT FALLS ASSESS DOC 0-1 FALLS W/OUT INJ PAST YR: ICD-10-PCS | Mod: CPTII,S$GLB,, | Performed by: INTERNAL MEDICINE

## 2019-02-21 PROCEDURE — 84439 ASSAY OF FREE THYROXINE: CPT

## 2019-02-21 PROCEDURE — 80053 COMPREHEN METABOLIC PANEL: CPT

## 2019-02-21 PROCEDURE — 71260 CT CHEST WITH CONTRAST: ICD-10-PCS | Mod: 26,,, | Performed by: RADIOLOGY

## 2019-02-21 PROCEDURE — 63600175 PHARM REV CODE 636 W HCPCS: Performed by: INTERNAL MEDICINE

## 2019-02-21 PROCEDURE — 99999 PR PBB SHADOW E&M-EST. PATIENT-LVL III: CPT | Mod: PBBFAC,,, | Performed by: INTERNAL MEDICINE

## 2019-02-21 PROCEDURE — A4216 STERILE WATER/SALINE, 10 ML: HCPCS | Performed by: INTERNAL MEDICINE

## 2019-02-21 PROCEDURE — 99999 PR PBB SHADOW E&M-EST. PATIENT-LVL III: ICD-10-PCS | Mod: PBBFAC,,, | Performed by: INTERNAL MEDICINE

## 2019-02-21 RX ORDER — HEPARIN 100 UNIT/ML
500 SYRINGE INTRAVENOUS
Status: COMPLETED | OUTPATIENT
Start: 2019-02-21 | End: 2019-02-21

## 2019-02-21 RX ORDER — SODIUM CHLORIDE 0.9 % (FLUSH) 0.9 %
10 SYRINGE (ML) INJECTION
Status: COMPLETED | OUTPATIENT
Start: 2019-02-21 | End: 2019-02-21

## 2019-02-21 RX ORDER — HEPARIN 100 UNIT/ML
500 SYRINGE INTRAVENOUS
Status: CANCELLED | OUTPATIENT
Start: 2019-02-21

## 2019-02-21 RX ORDER — SODIUM CHLORIDE 0.9 % (FLUSH) 0.9 %
10 SYRINGE (ML) INJECTION
Status: CANCELLED | OUTPATIENT
Start: 2019-02-21

## 2019-02-21 RX ADMIN — HEPARIN SODIUM (PORCINE) LOCK FLUSH IV SOLN 100 UNIT/ML 500 UNITS: 100 SOLUTION at 10:02

## 2019-02-21 RX ADMIN — Medication 10 ML: at 10:02

## 2019-02-21 RX ADMIN — IOHEXOL 75 ML: 350 INJECTION, SOLUTION INTRAVENOUS at 09:02

## 2019-02-21 NOTE — TELEPHONE ENCOUNTER
Spoke to pt about thyroid medication. Pt is taking daily on an empty stomach. Advised pt to speak with her primary care physician about possibly getting on a higher dose of medication since her thyroid level was low and out of the normal range.       ----- Message from Wyatt Mai MD sent at 2/21/2019 12:50 PM CST -----  Thyroid low - is she taking daily on an empty stomach?  If she is taking it correctly, then she needs a higher dose

## 2019-02-21 NOTE — PROGRESS NOTES
Patient presented for  CT-Scan exam, requested for port to be access,  power port to left upper chest,  Assessed, accessed, blood return noted, flushed with 10ml normal saline, post CT-Scan procedure, saline flushed 10ml, patient's  next scheduled appointment is to  chemo infusion clinic, spoke to [id number] No from chemo infusion, it is pkay to leave port accessed, patient's port will be de-access from chemo infusion clinic, patient is informed, verbalized understanding of this given information.

## 2019-03-15 NOTE — PROGRESS NOTES
2nd attempt for enrollment call. Left voicemail. Sent My Chart message.        Last 5 Patient Entered Readings                                      Current 30 Day Average: 129/81     Recent Readings 3/14/2019 3/14/2019 3/12/2019 3/11/2019 3/10/2019    SBP (mmHg) 128 142 128 133 139    DBP (mmHg) 82 95 87 80 91    Pulse 101 97 107 96 94

## 2019-03-26 NOTE — PROGRESS NOTES
Last 5 Patient Entered Readings                                      Current 30 Day Average: 128/81     Recent Readings 3/22/2019 3/21/2019 3/20/2019 3/18/2019 3/17/2019    SBP (mmHg) 112 114 128 113 125    DBP (mmHg) 70 86 79 62 77    Pulse 111 104 112 96 98        Called patient to complete enrollment for HDMP. Patient states that she was enrolled to monitor pulse, and not BP. Explained goal of HDMP. Patient is not necessarily interesting in BP management, but more concerned about her pulse. Consider discharge.       Called patient back to discuss enrollment.   Patient is not interested in Digital Medicine monitoring for HTN. Informed patient that she is still able to monitor her pulse and BP using the Cloud[employer] device, and data will be stored in the [employer] giovanna. Also encouraged her to keep a written log. No further questions.

## 2019-05-01 RX ORDER — LEVOTHYROXINE SODIUM 88 UG/1
88 TABLET ORAL
Qty: 30 TABLET | Refills: 3 | Status: SHIPPED | OUTPATIENT
Start: 2019-05-01 | End: 2019-09-25 | Stop reason: SDUPTHER

## 2019-05-01 NOTE — TELEPHONE ENCOUNTER
----- Message from Akin Giordano sent at 5/1/2019 11:15 AM CDT -----  Contact: Fauzia [phone]  Type: RX Refill Request    Who Called: Fauzia     Refill or New Rx:Refill     RX Name and Strength:levothyroxine (SYNTHROID) 88 MCG tablet    Is this a 30 day or 90 day RX:30 day     Preferred Pharmacy with phone number:[facility] - [place] ([address], [address]    Would the patient rather a call back or a response via [facility]? No call back is necessary    Best Call Back Number:[phone]

## 2019-05-08 ENCOUNTER — TELEPHONE (OUTPATIENT)
Dept: HEMATOLOGY/ONCOLOGY | Facility: CLINIC | Age: 64
End: 2019-05-08

## 2019-05-08 NOTE — TELEPHONE ENCOUNTER
Spoke with patient to let her know we were able to get her scheduled for a port flush tomorrow at 12:30. Pt agreeable to this date/time and verbalized understanding of this information       ----- Message from Jane Solis sent at 5/8/2019 10:34 AM CDT -----  Contact: pt   Pt called to speak with nurse have some questions about getting Port flushed   Callback#[phone]  Thank You  ZEKE Solis

## 2019-05-09 ENCOUNTER — INFUSION (OUTPATIENT)
Dept: INFUSION THERAPY | Facility: HOSPITAL | Age: 64
End: 2019-05-09
Attending: INTERNAL MEDICINE
Payer: COMMERCIAL

## 2019-05-09 DIAGNOSIS — C34.2 PRIMARY CANCER OF RIGHT MIDDLE LOBE OF LUNG: Primary | ICD-10-CM

## 2019-05-09 PROCEDURE — 25000003 PHARM REV CODE 250: Performed by: INTERNAL MEDICINE

## 2019-05-09 PROCEDURE — A4216 STERILE WATER/SALINE, 10 ML: HCPCS | Performed by: INTERNAL MEDICINE

## 2019-05-09 PROCEDURE — 63600175 PHARM REV CODE 636 W HCPCS: Performed by: INTERNAL MEDICINE

## 2019-05-09 PROCEDURE — 96523 IRRIG DRUG DELIVERY DEVICE: CPT

## 2019-05-09 RX ORDER — SODIUM CHLORIDE 0.9 % (FLUSH) 0.9 %
10 SYRINGE (ML) INJECTION
Status: COMPLETED | OUTPATIENT
Start: 2019-05-09 | End: 2019-05-09

## 2019-05-09 RX ORDER — SODIUM CHLORIDE 0.9 % (FLUSH) 0.9 %
10 SYRINGE (ML) INJECTION
Status: CANCELLED | OUTPATIENT
Start: 2019-05-09

## 2019-05-09 RX ORDER — HEPARIN 100 UNIT/ML
500 SYRINGE INTRAVENOUS
Status: COMPLETED | OUTPATIENT
Start: 2019-05-09 | End: 2019-05-09

## 2019-05-09 RX ORDER — HEPARIN 100 UNIT/ML
500 SYRINGE INTRAVENOUS
Status: CANCELLED | OUTPATIENT
Start: 2019-05-09

## 2019-05-09 RX ADMIN — SODIUM CHLORIDE, PRESERVATIVE FREE 10 ML: 5 INJECTION INTRAVENOUS at 11:05

## 2019-05-09 RX ADMIN — HEPARIN 500 UNITS: 100 SYRINGE at 11:05

## 2019-05-09 NOTE — NURSING
Here for port flush.  No complaints voiced.  PAC accessed, flushed, heplocked, and deaccessed without difficulty.  RTC as needed.  NAD noted upon discharged.

## 2019-06-13 NOTE — PROGRESS NOTES
Subjective:       Patient ID: Fauzia Kirk is a 63 y.o. female.    Chief Complaint: No chief complaint on file.    HPI Ms. Kirk is a very pleasant 63-year-old  female who returned  for F/U of right lung cancer.      She had chemo/XRT then adjuvant therapy with Durvalumab.    That was completed November 2018.    Today she reports that she has been doing well.  She has no shortness of breath or significant cough.  Her only pain is her chronic right leg pain which is unchanged.  Appetite been excellent       History:  In December 2016 she began to have some blood in her mucus after coughing.In  April she developed a persistent cough and saw her physician on April 18.  Chest x-ray at that time showed a rounded opacity in the right lung overlying the major fissure.   Chest CT in May showed a 4.8 x 4.0 cm, rounded, soft tissue mass in the middle lobe between the medial and lateral segments. There was pre-carinal adenopathy.    Subsequently she underwent bronchoscopy with EBUS on 6/9/17. Needle biopsy of the medistinal nodes was positive for poorly differentiated carcinoma with squamoid features. The cells were positive for CK7, CK5/6, and P63 and are negative for CK20, GCDFP, TTF1, ER, [place], Napsin and YEMI 3.This was felt to be most CW a new squamous lung cancer.    PET CT  demonstrated a hypermetabolic, large mass in the right middle lobe with an SUV max of 20.23. There was hypermetabolic activity extending from this mass tracking along the pleura. There was a hypermetabolic right hilar lymph node demonstrating an SUV max of 20.2. In addition there were 2 hypermetabolic subcarinal lymph nodes with the larger demonstrating an SUV max of 14.2. An additional right paratracheal lymph node was seen with an SUV max of 26.1.    She completed radiation together with weekly chemotherapy with carboplatin and Taxol  for stage IIIA disease.    She completed therapy on September 13 and received 7 chemotherapy treatments.     CT scan from September 29 showed that the right middle lobe mass had decreased to 4.2 x 2.3 cm from 4.8 x 4.3 cm.  Stable 4 mm pulmonary nodule in the left lower lobe.    She then began adjuvant immunotherapy in November 2017.  She completed 26 cycles of Durvalumab on 11/2/18.      Previous cancer history:She had a stage I, ER negative carcinoma of the    left breast in 1990, treated with lumpectomy and radiation therapy.  In      1993, she developed a stage I, ER positive carcinoma of the right breast     treated with lumpectomy and radiation.  In 1995, she developed left breast   cancer recurrence, treated with lumpectomy, brachytherapy and four cycles    of Adriamycin and Cytoxan.  In 1996, she developed a new right breast        cancer T2 N0 poorly differentiated, treated with four cycles of              preoperative Taxol followed by mastectomy.        On October 17, 2016 left mammogram showed increased calcifications in the left breast at 3:00.  On October 27 a biopsy showed DCIS with solid, cribriform, and micropapillary patterns.  Tumor was 95% ER positive at 95% [place] positive    On November 7, 2016 she underwent left mastectomy which showed 8 mm of DCIS and negative margins.        Review of Systems   Constitutional: Negative for activity change, appetite change, fatigue, fever and unexpected weight change.   HENT: Negative for postnasal drip and trouble swallowing.    Respiratory: Negative for cough and shortness of breath.    Cardiovascular: Negative for chest pain.   Gastrointestinal: Negative for abdominal pain, constipation, nausea and vomiting.   Musculoskeletal: Negative for back pain.        Chronic right leg pain   Skin: Positive for rash.        pruritis improved   Neurological: Negative for headaches.   Psychiatric/Behavioral: Negative for dysphoric mood. The patient is not nervous/anxious.        Objective:      Physical Exam   Constitutional: She is oriented to person, place, and time. She  appears well-developed and well-nourished.   HENT:   Mouth/Throat: No oropharyngeal exudate.   Eyes: No scleral icterus.   Cardiovascular: Normal rate and regular rhythm.   Pulmonary/Chest: Effort normal and breath sounds normal. She has no wheezes. She has no rales.   Abdominal: Soft. She exhibits no mass. There is no tenderness.   Lymphadenopathy:     She has no cervical adenopathy.     She has no axillary adenopathy.        Right: No supraclavicular adenopathy present.        Left: No supraclavicular adenopathy present.   Neurological: She is alert and oriented to person, place, and time.   Skin: No rash noted. No erythema.   Psychiatric: She has a normal mood and affect. Her behavior is normal. Thought content normal.       Assessment:    CT -1.4 cm right middle lobe mass, unchanged  CBC shows hemoglobin 11.3, otherwise unremarkable.  TSH is normal  1. Primary cancer of right middle lobe of lung     4.  Treatment-induced hypothyroidism  Plan:       Return to clinic in 3 months with labs and chest CT..  Port flush  Continue thyroid replacement.

## 2019-06-14 ENCOUNTER — HOSPITAL ENCOUNTER (OUTPATIENT)
Dept: RADIOLOGY | Facility: HOSPITAL | Age: 64
Discharge: HOME | End: 2019-06-14
Attending: INTERNAL MEDICINE
Payer: COMMERCIAL

## 2019-06-14 ENCOUNTER — INFUSION (OUTPATIENT)
Dept: INFUSION THERAPY | Facility: HOSPITAL | Age: 64
End: 2019-06-14
Attending: INTERNAL MEDICINE
Payer: COMMERCIAL

## 2019-06-14 ENCOUNTER — OFFICE VISIT (OUTPATIENT)
Dept: HEMATOLOGY/ONCOLOGY | Facility: CLINIC | Age: 64
End: 2019-06-14
Payer: COMMERCIAL

## 2019-06-14 VITALS
HEIGHT: 66 IN | WEIGHT: 201.25 LBS | HEART RATE: 106 BPM | DIASTOLIC BLOOD PRESSURE: 78 MMHG | OXYGEN SATURATION: 100 % | RESPIRATION RATE: 20 BRPM | BODY MASS INDEX: 32.34 KG/M2 | SYSTOLIC BLOOD PRESSURE: 154 MMHG | TEMPERATURE: 99 F

## 2019-06-14 DIAGNOSIS — C34.2 PRIMARY CANCER OF RIGHT MIDDLE LOBE OF LUNG: Primary | ICD-10-CM

## 2019-06-14 DIAGNOSIS — C34.2 PRIMARY CANCER OF RIGHT MIDDLE LOBE OF LUNG: ICD-10-CM

## 2019-06-14 DIAGNOSIS — E03.2 HYPOTHYROIDISM DUE TO MEDICATION: ICD-10-CM

## 2019-06-14 DIAGNOSIS — E03.9 HYPOTHYROIDISM, UNSPECIFIED TYPE: ICD-10-CM

## 2019-06-14 DIAGNOSIS — Z85.3 HISTORY OF LEFT BREAST CANCER: ICD-10-CM

## 2019-06-14 DIAGNOSIS — Z51.11 ENCOUNTER FOR ANTINEOPLASTIC CHEMOTHERAPY: ICD-10-CM

## 2019-06-14 LAB
ALBUMIN SERPL BCP-MCNC: 3.3 G/DL (ref 3.5–5.2)
ALP SERPL-CCNC: 99 U/L (ref 55–135)
ALT SERPL W/O P-5'-P-CCNC: 7 U/L (ref 10–44)
ANION GAP SERPL CALC-SCNC: 9 MMOL/L (ref 8–16)
AST SERPL-CCNC: 13 U/L (ref 10–40)
BILIRUB SERPL-MCNC: 0.4 MG/DL (ref 0.1–1)
BUN SERPL-MCNC: 15 MG/DL (ref 8–23)
CALCIUM SERPL-MCNC: 9.6 MG/DL (ref 8.7–10.5)
CHLORIDE SERPL-SCNC: 106 MMOL/L (ref 95–110)
CO2 SERPL-SCNC: 25 MMOL/L (ref 23–29)
CREAT SERPL-MCNC: 0.8 MG/DL (ref 0.5–1.4)
ERYTHROCYTE [DISTWIDTH] IN BLOOD BY AUTOMATED COUNT: 15.2 % (ref 11.5–14.5)
EST. GFR  (AFRICAN AMERICAN): >60 ML/MIN/1.73 M^2
EST. GFR  (NON AFRICAN AMERICAN): >60 ML/MIN/1.73 M^2
GLUCOSE SERPL-MCNC: 116 MG/DL (ref 70–110)
HCT VFR BLD AUTO: 35.5 % (ref 37–48.5)
HGB BLD-MCNC: 11.4 G/DL (ref 12–16)
IMM GRANULOCYTES # BLD AUTO: 0.03 K/UL (ref 0–0.04)
MCH RBC QN AUTO: 27.5 PG (ref 27–31)
MCHC RBC AUTO-ENTMCNC: 32.1 G/DL (ref 32–36)
MCV RBC AUTO: 86 FL (ref 82–98)
NEUTROPHILS # BLD AUTO: 3.8 K/UL (ref 1.8–7.7)
PLATELET # BLD AUTO: 255 K/UL (ref 150–350)
PMV BLD AUTO: 9.8 FL (ref 9.2–12.9)
POTASSIUM SERPL-SCNC: 4 MMOL/L (ref 3.5–5.1)
PROT SERPL-MCNC: 7.4 G/DL (ref 6–8.4)
RBC # BLD AUTO: 4.15 M/UL (ref 4–5.4)
SODIUM SERPL-SCNC: 140 MMOL/L (ref 136–145)
TSH SERPL DL<=0.005 MIU/L-ACNC: 0.42 UIU/ML (ref 0.4–4)
WBC # BLD AUTO: 5.59 K/UL (ref 3.9–12.7)

## 2019-06-14 PROCEDURE — 71260 CT CHEST WITH CONTRAST: ICD-10-PCS | Mod: 26,,, | Performed by: RADIOLOGY

## 2019-06-14 PROCEDURE — 84443 ASSAY THYROID STIM HORMONE: CPT

## 2019-06-14 PROCEDURE — 71260 CT THORAX DX C+: CPT | Mod: TC

## 2019-06-14 PROCEDURE — 25500020 PHARM REV CODE 255: Performed by: INTERNAL MEDICINE

## 2019-06-14 PROCEDURE — 99999 PR PBB SHADOW E&M-EST. PATIENT-LVL III: CPT | Mod: PBBFAC,,, | Performed by: INTERNAL MEDICINE

## 2019-06-14 PROCEDURE — 71260 CT THORAX DX C+: CPT | Mod: 26,,, | Performed by: RADIOLOGY

## 2019-06-14 PROCEDURE — 85027 COMPLETE CBC AUTOMATED: CPT

## 2019-06-14 PROCEDURE — 3008F BODY MASS INDEX DOCD: CPT | Mod: CPTII,S$GLB,, | Performed by: INTERNAL MEDICINE

## 2019-06-14 PROCEDURE — A4216 STERILE WATER/SALINE, 10 ML: HCPCS | Performed by: INTERNAL MEDICINE

## 2019-06-14 PROCEDURE — 1101F PR PT FALLS ASSESS DOC 0-1 FALLS W/OUT INJ PAST YR: ICD-10-PCS | Mod: CPTII,S$GLB,, | Performed by: INTERNAL MEDICINE

## 2019-06-14 PROCEDURE — 36591 DRAW BLOOD OFF VENOUS DEVICE: CPT

## 2019-06-14 PROCEDURE — 3077F PR MOST RECENT SYSTOLIC BLOOD PRESSURE >= 140 MM HG: ICD-10-PCS | Mod: CPTII,S$GLB,, | Performed by: INTERNAL MEDICINE

## 2019-06-14 PROCEDURE — 3078F DIAST BP <80 MM HG: CPT | Mod: CPTII,S$GLB,, | Performed by: INTERNAL MEDICINE

## 2019-06-14 PROCEDURE — 25000003 PHARM REV CODE 250: Performed by: INTERNAL MEDICINE

## 2019-06-14 PROCEDURE — 99213 OFFICE O/P EST LOW 20 MIN: CPT | Mod: S$GLB,,, | Performed by: INTERNAL MEDICINE

## 2019-06-14 PROCEDURE — 1101F PT FALLS ASSESS-DOCD LE1/YR: CPT | Mod: CPTII,S$GLB,, | Performed by: INTERNAL MEDICINE

## 2019-06-14 PROCEDURE — 63600175 PHARM REV CODE 636 W HCPCS: Performed by: INTERNAL MEDICINE

## 2019-06-14 PROCEDURE — 3077F SYST BP >= 140 MM HG: CPT | Mod: CPTII,S$GLB,, | Performed by: INTERNAL MEDICINE

## 2019-06-14 PROCEDURE — 99213 PR OFFICE/OUTPT VISIT, EST, LEVL III, 20-29 MIN: ICD-10-PCS | Mod: S$GLB,,, | Performed by: INTERNAL MEDICINE

## 2019-06-14 PROCEDURE — 99999 PR PBB SHADOW E&M-EST. PATIENT-LVL III: ICD-10-PCS | Mod: PBBFAC,,, | Performed by: INTERNAL MEDICINE

## 2019-06-14 PROCEDURE — 3008F PR BODY MASS INDEX (BMI) DOCUMENTED: ICD-10-PCS | Mod: CPTII,S$GLB,, | Performed by: INTERNAL MEDICINE

## 2019-06-14 PROCEDURE — 3078F PR MOST RECENT DIASTOLIC BLOOD PRESSURE < 80 MM HG: ICD-10-PCS | Mod: CPTII,S$GLB,, | Performed by: INTERNAL MEDICINE

## 2019-06-14 PROCEDURE — 80053 COMPREHEN METABOLIC PANEL: CPT

## 2019-06-14 RX ORDER — LIDOCAINE AND PRILOCAINE 25; 25 MG/G; MG/G
CREAM TOPICAL
Qty: 5 G | Refills: 3 | Status: SHIPPED | OUTPATIENT
Start: 2019-06-14 | End: 2020-12-22 | Stop reason: SDUPTHER

## 2019-06-14 RX ORDER — SODIUM CHLORIDE 0.9 % (FLUSH) 0.9 %
10 SYRINGE (ML) INJECTION
Status: COMPLETED | OUTPATIENT
Start: 2019-06-14 | End: 2019-06-14

## 2019-06-14 RX ORDER — SODIUM CHLORIDE 0.9 % (FLUSH) 0.9 %
10 SYRINGE (ML) INJECTION
Status: CANCELLED | OUTPATIENT
Start: 2019-06-14

## 2019-06-14 RX ORDER — HEPARIN 100 UNIT/ML
500 SYRINGE INTRAVENOUS
Status: CANCELLED | OUTPATIENT
Start: 2019-06-14

## 2019-06-14 RX ORDER — HEPARIN 100 UNIT/ML
500 SYRINGE INTRAVENOUS
Status: COMPLETED | OUTPATIENT
Start: 2019-06-14 | End: 2019-06-14

## 2019-06-14 RX ADMIN — HEPARIN 500 UNITS: 100 SYRINGE at 08:06

## 2019-06-14 RX ADMIN — IOHEXOL 75 ML: 350 INJECTION, SOLUTION INTRAVENOUS at 10:06

## 2019-06-14 RX ADMIN — Medication 10 ML: at 08:06

## 2019-06-14 NOTE — NURSING
Arrived for lab draw via port with flush. Denies any complaints. Tolerated well. Power loc maintained accessed for CT scan.  DC to CT and MD ambulating independently. Verbalized understanding of s/s to report to MD.

## 2019-06-14 NOTE — PROGRESS NOTES
Nurse called to CT for pt with port accessed by Oncology / contrast line connected and CT done / line removed and Proctor needle removed per pt request  / flush per protocol and band-aid applied / pt AAO w/ NAD /

## 2019-07-30 DIAGNOSIS — I10 ESSENTIAL HYPERTENSION: ICD-10-CM

## 2019-07-30 RX ORDER — AMLODIPINE BESYLATE 10 MG/1
10 TABLET ORAL DAILY
Qty: 90 TABLET | Refills: 1 | Status: SHIPPED | OUTPATIENT
Start: 2019-07-30 | End: 2020-01-24 | Stop reason: SDUPTHER

## 2019-07-30 NOTE — TELEPHONE ENCOUNTER
----- Message from Lesa Worthington sent at 7/30/2019  9:53 AM CDT -----  Contact: Self   Type: RX Refill Request    Who Called:  Self     Refill or New Rx: refill     RX Name and Strength: amLODIPine (NORVASC) 10 MG tablet    How is the patient currently taking it? (ex. 1XDay):     Is this a 30 day or 90 day RX: 90    Preferred Pharmacy with phone number:[facility] - [place] ([address], [address] [phone] (Phone)  [phone] (Fax)        Local or Mail Order: local     Ordering Provider: Romel    Would the patient rather a call back or a response via [facility]?  Call     Best Call Back Number: [phone]

## 2019-08-22 ENCOUNTER — OFFICE VISIT (OUTPATIENT)
Dept: FAMILY MEDICINE | Facility: CLINIC | Age: 64
End: 2019-08-22
Payer: COMMERCIAL

## 2019-08-22 VITALS
WEIGHT: 202.06 LBS | HEIGHT: 66 IN | BODY MASS INDEX: 32.47 KG/M2 | OXYGEN SATURATION: 97 % | HEART RATE: 95 BPM | TEMPERATURE: 99 F | SYSTOLIC BLOOD PRESSURE: 110 MMHG | DIASTOLIC BLOOD PRESSURE: 86 MMHG

## 2019-08-22 DIAGNOSIS — R06.7 SNEEZING: ICD-10-CM

## 2019-08-22 DIAGNOSIS — R09.82 PND (POST-NASAL DRIP): ICD-10-CM

## 2019-08-22 DIAGNOSIS — E03.2 HYPOTHYROIDISM DUE TO MEDICATION: ICD-10-CM

## 2019-08-22 DIAGNOSIS — I10 HYPERTENSION, UNSPECIFIED TYPE: ICD-10-CM

## 2019-08-22 DIAGNOSIS — J01.00 ACUTE NON-RECURRENT MAXILLARY SINUSITIS: Primary | ICD-10-CM

## 2019-08-22 PROCEDURE — 1101F PR PT FALLS ASSESS DOC 0-1 FALLS W/OUT INJ PAST YR: ICD-10-PCS | Mod: CPTII,S$GLB,, | Performed by: FAMILY MEDICINE

## 2019-08-22 PROCEDURE — 99214 OFFICE O/P EST MOD 30 MIN: CPT | Mod: S$GLB,,, | Performed by: FAMILY MEDICINE

## 2019-08-22 PROCEDURE — 3079F DIAST BP 80-89 MM HG: CPT | Mod: CPTII,S$GLB,, | Performed by: FAMILY MEDICINE

## 2019-08-22 PROCEDURE — 3079F PR MOST RECENT DIASTOLIC BLOOD PRESSURE 80-89 MM HG: ICD-10-PCS | Mod: CPTII,S$GLB,, | Performed by: FAMILY MEDICINE

## 2019-08-22 PROCEDURE — 3074F PR MOST RECENT SYSTOLIC BLOOD PRESSURE < 130 MM HG: ICD-10-PCS | Mod: CPTII,S$GLB,, | Performed by: FAMILY MEDICINE

## 2019-08-22 PROCEDURE — 1101F PT FALLS ASSESS-DOCD LE1/YR: CPT | Mod: CPTII,S$GLB,, | Performed by: FAMILY MEDICINE

## 2019-08-22 PROCEDURE — 99999 PR PBB SHADOW E&M-EST. PATIENT-LVL III: ICD-10-PCS | Mod: PBBFAC,,, | Performed by: FAMILY MEDICINE

## 2019-08-22 PROCEDURE — 3074F SYST BP LT 130 MM HG: CPT | Mod: CPTII,S$GLB,, | Performed by: FAMILY MEDICINE

## 2019-08-22 PROCEDURE — 99999 PR PBB SHADOW E&M-EST. PATIENT-LVL III: CPT | Mod: PBBFAC,,, | Performed by: FAMILY MEDICINE

## 2019-08-22 PROCEDURE — 99214 PR OFFICE/OUTPT VISIT, EST, LEVL IV, 30-39 MIN: ICD-10-PCS | Mod: S$GLB,,, | Performed by: FAMILY MEDICINE

## 2019-08-22 RX ORDER — AMOXICILLIN 875 MG/1
875 TABLET, FILM COATED ORAL EVERY 12 HOURS
Qty: 20 TABLET | Refills: 0 | Status: SHIPPED | OUTPATIENT
Start: 2019-08-22 | End: 2021-11-30

## 2019-08-22 RX ORDER — FLUTICASONE PROPIONATE 50 MCG
2 SPRAY, SUSPENSION (ML) NASAL DAILY
Qty: 1 BOTTLE | Refills: 2 | Status: ON HOLD | OUTPATIENT
Start: 2019-08-22 | End: 2022-01-11 | Stop reason: ALTCHOICE

## 2019-08-22 RX ORDER — CHLORPHENIRAMINE MALEATE 4 MG
TABLET ORAL
Qty: 30 TABLET | Refills: 1 | Status: ON HOLD | OUTPATIENT
Start: 2019-08-22 | End: 2022-01-11 | Stop reason: ALTCHOICE

## 2019-08-22 NOTE — PROGRESS NOTES
Office Visit    Patient Name: Fauzia Kirk    : 1955  MRN: [id number]      Assessment/Plan:  Fauzia Kirk is a 64 y.o. female who presents today for :    Acute non-recurrent maxillary sinusitis  -     amoxicillin (AMOXIL) 875 MG tablet; Take 1 tablet (875 mg total) by mouth every 12 (twelve) hours.  Dispense: 20 tablet; Refill: 0  -     fluticasone propionate (FLONASE) 50 mcg/actuation nasal spray; 2 sprays (100 mcg total) by Each Nostril route once daily.  Dispense: 1 Bottle; Refill: 2  PND (post-nasal drip)  -     chlorpheniramine (CHLOR-TRIMETON) 4 mg tablet; Take 1-2 tabs at bedtime as needed for drainage.  Dispense: 30 tablet; Refill: 1  -     fluticasone propionate (FLONASE) 50 mcg/actuation nasal spray; 2 sprays (100 mcg total) by Each Nostril route once daily.  Dispense: 1 Bottle; Refill: 2  Sneezing  -discussed  supportive therapy and symptom management. Cepacol prn for sore throat. Claritin PRN for drainage - advised to use air humidifier/filter at home, changing AC filters regularly, avoid second-hand smoking, as well as using Nasal Saline Rinses.   -hydration (water) and rest, as well as frequent hand washing and covering coughs to prevent spread of respiratory illnesses  -     Tylenol every 4-6 hours as needed for fever, headaches, sore throat, ear pain, bodyaches, and/or nasal/sinus inflammation  -RTC if Sx worsens or call clinic back if pt has any concerns.    Hypertension, unspecified type  Hypothyroidism due to medication  -stable, continue current medication regimen   -f/u  as needed    Follow up for worsening Sx or as needed.     This note was created by combination of typed  and Dragon dictation.  Transcription errors may be present.  If there are any questions, please contact me.      ----------------------------------------------------------------------------------------------------------------------      HPI:  Patient Care Team:  Era Urena MD as PCP -  General (Family Medicine)    Fauzia is a 64 y.o. female with      Patient Active Problem List   Diagnosis    Hypertension    History of left breast cancer    s/p L mastectomy, SLNBx 11/7    Ductal carcinoma in situ (DCIS) of left breast    Primary cancer of right middle lobe of lung    Regional lymph node metastasis present    Encounter for antineoplastic chemotherapy    Hyperthyroidism    Hypothyroidism due to medication       Patient presents today for :  Sinus Problem  and throat drainage the past two days - she occasional coughs up clear phlegm, and endorses increasing facial pressure and pain. She has decreased appetite.  No sick contact with similar Sx.  The only medication she takes for her URI Sx had been Benadryl before bedtime. She has mild sore throat that is tolerable. She denies body aches.  No ear pain.  No F/C    Pt is compliant with daily BP medication at home - no side effects, BP controlled at home.      Additional ROS    No dysphagia  No CP/SOB/palpitations/swelling  No nausea/vomiting/abd pain/no diarrhea  No rashes      Patient Active Problem List   Diagnosis    Hypertension    History of left breast cancer    s/p L mastectomy, SLNBx 11/7    Ductal carcinoma in situ (DCIS) of left breast    Primary cancer of right middle lobe of lung    Regional lymph node metastasis present    Encounter for antineoplastic chemotherapy    Hyperthyroidism    Hypothyroidism due to medication       Current Medications  Medications reviewed and updated.       Current Outpatient Medications:     amLODIPine (NORVASC) 10 MG tablet, Take 1 tablet (10 mg total) by mouth once daily., Disp: 90 tablet, Rfl: 1    levothyroxine (SYNTHROID) 88 MCG tablet, Take 1 tablet (88 mcg total) by mouth before breakfast., Disp: 30 tablet, Rfl: 3    amoxicillin (AMOXIL) 875 MG tablet, Take 1 tablet (875 mg total) by mouth every 12 (twelve) hours., Disp: 20 tablet, Rfl: 0    chlorpheniramine (CHLOR-TRIMETON) 4 mg  tablet, Take 1-2 tabs at bedtime as needed for drainage., Disp: 30 tablet, Rfl: 1    clotrimazole-betamethasone 1-0.05% (LOTRISONE) cream, Apply topically 2 (two) times daily., Disp: 45 g, Rfl: 1    fluticasone propionate (FLONASE) 50 mcg/actuation nasal spray, 2 sprays (100 mcg total) by Each Nostril route once daily., Disp: 1 Bottle, Rfl: 2    gabapentin (NEURONTIN) 300 MG capsule, Take 1 capsule (300 mg total) by mouth every evening., Disp: 30 capsule, Rfl: 6    lidocaine-prilocaine (EMLA) cream, Apply topically as needed., Disp: 5 g, Rfl: 3    Past Surgical History:   Procedure Laterality Date    BREAST LUMPECTOMY       SECTION, CLASSIC      ENDOBRONCHIAL NAVIGATION N/A 2017    Per[facility] at [facility] 2ND FLR    INJECTION-SENTINEL NODE Left 2016    Performed by Jo[facility] at [facility] 2ND [facility]LR    [id number] N/A 2017    Performed by [facility]rendan Ruiz [facility] at [facility] OR 2ND FLR    LIPOMA RESECTION      MASTECTOMY      TOTAL MASTECTOMY Left 2016    [facility]formed by [facility] at [facility] OR 2ND FLR       Family History   Problem Relation Age of Onset    Lymphoma Mother     Prostate cancer Father     Stomach cancer Sister     Breast cancer Sister     Diabetes Brother     Breast cancer Paternal Aunt     Breast cancer Paternal Grandmother        Social History     Socioeconomic History    Marital status:      Spouse name: Not on file    Number of children: Not on file    Years of education: Not on file    Highest education level: Not on file   Occupational History    Not on file   Social Needs    Financial resource strain: Not on file    Food insecurity:     Worry: Not on file     Inability: Not on file    Transportation needs:     Medical: Not on file     Non-medical: Not on file   Tobacco Use    Smoking status: Never Smoker    Smokeless tobacco: Never Used   Substance and Sexual Activity    Alcohol use: No      "Alcohol/week: 0.0 oz    Drug use: No    Sexual activity: Yes     Partners: Male   Lifestyle    Physical activity:     Days per week: Not on file     Minutes per session: Not on file    Stress: Not on file   Relationships    Social connections:     Talks on phone: Not on file     Gets together: Not on file     Attends Gnosticist service: Not on file     Active member of club or organization: Not on file     Attends meetings of clubs or organizations: Not on file     Relationship status: Not on file   Other Topics Concern    Not on file   Social History Narrative    Not on file             Allergies   Review of patient's allergies indicates:  No Known Allergies          Review of Systems  See HPI      Physical Exam  /86   Pulse 95   Temp 98.8 °F (37.1 °C) (Oral)   Ht 5' 6" (1.676 m)   Wt 91.7 kg (202 lb 0.8 oz)   SpO2 97%   BMI 32.61 kg/m²     GEN: NAD, well developed  HEENT: NCAT, PERRLA, EOMI, sclera clear, anicteric, TM clear bilaterally with normal light reflex, mild nasal turbinate swelling, MMM with no lesions, O/P clear - no tonsillar swelling/discharge, +mild drainage, she has moderate clear nasal discharge, moderate maxillary TTP with R>L side, there is no trismus/uvula deviation  NECK: normal, supple with midline trachea, no LAD, no thyromegaly  LUNGS: CTAB, no w/r/r, no increased work of breathing  HEART: RRR, normal S1 and S2, no m/r/g  ABD: s/nt/nd, NABS  SKIN: normal turgor, no rashes, no other lesions.   PSYCH: AOx3, appropriate mood and affect      "

## 2019-09-25 ENCOUNTER — OFFICE VISIT (OUTPATIENT)
Dept: HEMATOLOGY/ONCOLOGY | Facility: CLINIC | Age: 64
End: 2019-09-25
Payer: COMMERCIAL

## 2019-09-25 ENCOUNTER — HOSPITAL ENCOUNTER (OUTPATIENT)
Dept: RADIOLOGY | Facility: HOSPITAL | Age: 64
Discharge: HOME | End: 2019-09-25
Attending: INTERNAL MEDICINE
Payer: COMMERCIAL

## 2019-09-25 ENCOUNTER — INFUSION (OUTPATIENT)
Dept: INFUSION THERAPY | Facility: HOSPITAL | Age: 64
End: 2019-09-25
Attending: INTERNAL MEDICINE
Payer: COMMERCIAL

## 2019-09-25 VITALS
HEIGHT: 66 IN | WEIGHT: 206.13 LBS | DIASTOLIC BLOOD PRESSURE: 82 MMHG | RESPIRATION RATE: 20 BRPM | SYSTOLIC BLOOD PRESSURE: 136 MMHG | BODY MASS INDEX: 33.13 KG/M2 | HEART RATE: 95 BPM | OXYGEN SATURATION: 100 %

## 2019-09-25 DIAGNOSIS — C34.2 PRIMARY CANCER OF RIGHT MIDDLE LOBE OF LUNG: Primary | ICD-10-CM

## 2019-09-25 DIAGNOSIS — E03.2 HYPOTHYROIDISM DUE TO MEDICATION: ICD-10-CM

## 2019-09-25 DIAGNOSIS — C34.2 PRIMARY CANCER OF RIGHT MIDDLE LOBE OF LUNG: ICD-10-CM

## 2019-09-25 DIAGNOSIS — Z85.3 HISTORY OF LEFT BREAST CANCER: ICD-10-CM

## 2019-09-25 LAB
ALBUMIN SERPL BCP-MCNC: 3.4 G/DL (ref 3.5–5.2)
ALP SERPL-CCNC: 95 U/L (ref 55–135)
ALT SERPL W/O P-5'-P-CCNC: 9 U/L (ref 10–44)
ANION GAP SERPL CALC-SCNC: 8 MMOL/L (ref 8–16)
AST SERPL-CCNC: 15 U/L (ref 10–40)
BILIRUB SERPL-MCNC: 0.5 MG/DL (ref 0.1–1)
BUN SERPL-MCNC: 16 MG/DL (ref 8–23)
CALCIUM SERPL-MCNC: 8.9 MG/DL (ref 8.7–10.5)
CHLORIDE SERPL-SCNC: 105 MMOL/L (ref 95–110)
CO2 SERPL-SCNC: 25 MMOL/L (ref 23–29)
CREAT SERPL-MCNC: 1 MG/DL (ref 0.5–1.4)
ERYTHROCYTE [DISTWIDTH] IN BLOOD BY AUTOMATED COUNT: 15.5 % (ref 11.5–14.5)
EST. GFR  (AFRICAN AMERICAN): >60 ML/MIN/1.73 M^2
EST. GFR  (NON AFRICAN AMERICAN): 58.8 ML/MIN/1.73 M^2
GLUCOSE SERPL-MCNC: 114 MG/DL (ref 70–110)
HCT VFR BLD AUTO: 38.3 % (ref 37–48.5)
HGB BLD-MCNC: 11.7 G/DL (ref 12–16)
IMM GRANULOCYTES # BLD AUTO: 0.03 K/UL (ref 0–0.04)
MCH RBC QN AUTO: 26.9 PG (ref 27–31)
MCHC RBC AUTO-ENTMCNC: 30.5 G/DL (ref 32–36)
MCV RBC AUTO: 88 FL (ref 82–98)
NEUTROPHILS # BLD AUTO: 3.5 K/UL (ref 1.8–7.7)
PLATELET # BLD AUTO: 259 K/UL (ref 150–350)
PMV BLD AUTO: 10 FL (ref 9.2–12.9)
POTASSIUM SERPL-SCNC: 3.9 MMOL/L (ref 3.5–5.1)
PROT SERPL-MCNC: 7.6 G/DL (ref 6–8.4)
RBC # BLD AUTO: 4.35 M/UL (ref 4–5.4)
SODIUM SERPL-SCNC: 138 MMOL/L (ref 136–145)
T4 FREE SERPL-MCNC: 0.45 NG/DL (ref 0.71–1.51)
TSH SERPL DL<=0.005 MIU/L-ACNC: 30.8 UIU/ML (ref 0.4–4)
WBC # BLD AUTO: 5.48 K/UL (ref 3.9–12.7)

## 2019-09-25 PROCEDURE — 25000003 PHARM REV CODE 250: Performed by: INTERNAL MEDICINE

## 2019-09-25 PROCEDURE — 84439 ASSAY OF FREE THYROXINE: CPT

## 2019-09-25 PROCEDURE — 71250 CT CHEST WITHOUT CONTRAST: ICD-10-PCS | Mod: 26,,, | Performed by: RADIOLOGY

## 2019-09-25 PROCEDURE — 63600175 PHARM REV CODE 636 W HCPCS: Performed by: INTERNAL MEDICINE

## 2019-09-25 PROCEDURE — 84443 ASSAY THYROID STIM HORMONE: CPT

## 2019-09-25 PROCEDURE — 99214 PR OFFICE/OUTPT VISIT, EST, LEVL IV, 30-39 MIN: ICD-10-PCS | Mod: S$GLB,,, | Performed by: INTERNAL MEDICINE

## 2019-09-25 PROCEDURE — 99999 PR PBB SHADOW E&M-EST. PATIENT-LVL III: CPT | Mod: PBBFAC,,, | Performed by: INTERNAL MEDICINE

## 2019-09-25 PROCEDURE — 1101F PT FALLS ASSESS-DOCD LE1/YR: CPT | Mod: CPTII,S$GLB,, | Performed by: INTERNAL MEDICINE

## 2019-09-25 PROCEDURE — A4216 STERILE WATER/SALINE, 10 ML: HCPCS | Performed by: INTERNAL MEDICINE

## 2019-09-25 PROCEDURE — 85027 COMPLETE CBC AUTOMATED: CPT

## 2019-09-25 PROCEDURE — 3075F SYST BP GE 130 - 139MM HG: CPT | Mod: CPTII,S$GLB,, | Performed by: INTERNAL MEDICINE

## 2019-09-25 PROCEDURE — 3079F DIAST BP 80-89 MM HG: CPT | Mod: CPTII,S$GLB,, | Performed by: INTERNAL MEDICINE

## 2019-09-25 PROCEDURE — 71250 CT THORAX DX C-: CPT | Mod: 26,,, | Performed by: RADIOLOGY

## 2019-09-25 PROCEDURE — 36591 DRAW BLOOD OFF VENOUS DEVICE: CPT

## 2019-09-25 PROCEDURE — 3079F PR MOST RECENT DIASTOLIC BLOOD PRESSURE 80-89 MM HG: ICD-10-PCS | Mod: CPTII,S$GLB,, | Performed by: INTERNAL MEDICINE

## 2019-09-25 PROCEDURE — 1101F PR PT FALLS ASSESS DOC 0-1 FALLS W/OUT INJ PAST YR: ICD-10-PCS | Mod: CPTII,S$GLB,, | Performed by: INTERNAL MEDICINE

## 2019-09-25 PROCEDURE — 99999 PR PBB SHADOW E&M-EST. PATIENT-LVL III: ICD-10-PCS | Mod: PBBFAC,,, | Performed by: INTERNAL MEDICINE

## 2019-09-25 PROCEDURE — 71250 CT THORAX DX C-: CPT | Mod: TC

## 2019-09-25 PROCEDURE — 3075F PR MOST RECENT SYSTOLIC BLOOD PRESS GE 130-139MM HG: ICD-10-PCS | Mod: CPTII,S$GLB,, | Performed by: INTERNAL MEDICINE

## 2019-09-25 PROCEDURE — 80053 COMPREHEN METABOLIC PANEL: CPT

## 2019-09-25 PROCEDURE — 99214 OFFICE O/P EST MOD 30 MIN: CPT | Mod: S$GLB,,, | Performed by: INTERNAL MEDICINE

## 2019-09-25 RX ORDER — SODIUM CHLORIDE 0.9 % (FLUSH) 0.9 %
10 SYRINGE (ML) INJECTION
Status: COMPLETED | OUTPATIENT
Start: 2019-09-25 | End: 2019-09-25

## 2019-09-25 RX ORDER — LEVOTHYROXINE SODIUM 88 UG/1
88 TABLET ORAL
Qty: 30 TABLET | Refills: 3 | Status: SHIPPED | OUTPATIENT
Start: 2019-09-25 | End: 2020-01-24 | Stop reason: SDUPTHER

## 2019-09-25 RX ORDER — HEPARIN 100 UNIT/ML
500 SYRINGE INTRAVENOUS
Status: COMPLETED | OUTPATIENT
Start: 2019-09-25 | End: 2019-09-25

## 2019-09-25 RX ORDER — SODIUM CHLORIDE 0.9 % (FLUSH) 0.9 %
10 SYRINGE (ML) INJECTION
Status: CANCELLED | OUTPATIENT
Start: 2019-09-25

## 2019-09-25 RX ORDER — HEPARIN 100 UNIT/ML
500 SYRINGE INTRAVENOUS
Status: CANCELLED | OUTPATIENT
Start: 2019-09-25

## 2019-09-25 RX ADMIN — HEPARIN SODIUM (PORCINE) LOCK FLUSH IV SOLN 100 UNIT/ML 500 UNITS: 100 SOLUTION at 09:09

## 2019-09-25 RX ADMIN — Medication 10 ML: at 09:09

## 2019-09-25 NOTE — PROGRESS NOTES
Subjective:       Patient ID: Fauzia Kirk is a 64 y.o. female.    Chief Complaint: No chief complaint on file.    HPI Ms. Kirk is a very pleasant 63-year-old  female who returned  for F/U of right lung cancer.      She had chemo/XRT then adjuvant therapy with Durvalumab.    That was completed November 2018.  Today she reports that she has been doing fairly well. She had a recent URI with an increase in her cough but that has improved.  She had no fever.  Appetite and bowel function has been stable.  Her only pain is her chronic leg pain.  She ran out of her thyroid replacement about 2 weeks ago.  Her itching has been better.         History:  In December 2016 she began to have some blood in her mucus after coughing.In  April she developed a persistent cough and saw her physician on April 18.  Chest x-ray at that time showed a rounded opacity in the right lung overlying the major fissure.   Chest CT in May showed a 4.8 x 4.0 cm, rounded, soft tissue mass in the middle lobe between the medial and lateral segments. There was pre-carinal adenopathy.    Subsequently she underwent bronchoscopy with EBUS on 6/9/17. Needle biopsy of the medistinal nodes was positive for poorly differentiated carcinoma with squamoid features. The cells were positive for CK7, CK5/6, and P63 and are negative for CK20, GCDFP, TTF1, ER, NJ, Napsin and YEMI 3.This was felt to be most CW a new squamous lung cancer.    PET CT  demonstrated a hypermetabolic, large mass in the right middle lobe with an SUV max of 20.23. There was hypermetabolic activity extending from this mass tracking along the pleura. There was a hypermetabolic right hilar lymph node demonstrating an SUV max of 20.2. In addition there were 2 hypermetabolic subcarinal lymph nodes with the larger demonstrating an SUV max of 14.2. An additional right paratracheal lymph node was seen with an SUV max of 26.1.    She completed radiation together with weekly chemotherapy  with carboplatin and Taxol  for stage IIIA disease.    She completed therapy on September 13 and received 7 chemotherapy treatments.    CT scan from September 29 showed that the right middle lobe mass had decreased to 4.2 x 2.3 cm from 4.8 x 4.3 cm.  Stable 4 mm pulmonary nodule in the left lower lobe.    She then began adjuvant immunotherapy in November 2017.  She completed 26 cycles of Durvalumab on 11/2/18.      Previous cancer history:She had a stage I, ER negative carcinoma of the    left breast in 1990, treated with lumpectomy and radiation therapy.  In      1993, she developed a stage I, ER positive carcinoma of the right breast     treated with lumpectomy and radiation.  In 1995, she developed left breast   cancer recurrence, treated with lumpectomy, brachytherapy and four cycles    of Adriamycin and Cytoxan.  In 1996, she developed a new right breast        cancer T2 N0 poorly differentiated, treated with four cycles of              preoperative Taxol followed by mastectomy.        On October 17, 2016 left mammogram showed increased calcifications in the left breast at 3:00.  On October 27 a biopsy showed DCIS with solid, cribriform, and micropapillary patterns.  Tumor was 95% ER positive at 95% OK positive    On November 7, 2016 she underwent left mastectomy which showed 8 mm of DCIS and negative margins.        Review of Systems   Constitutional: Negative for activity change, appetite change, fatigue, fever and unexpected weight change.   HENT: Negative for postnasal drip and trouble swallowing.    Respiratory: Negative for cough and shortness of breath.    Cardiovascular: Negative for chest pain.   Gastrointestinal: Negative for abdominal pain, constipation, nausea and vomiting.   Musculoskeletal: Negative for back pain.        Chronic right leg pain   Skin: Positive for rash.        pruritis improved   Neurological: Negative for headaches.   Psychiatric/Behavioral: Negative for dysphoric mood. The  patient is not nervous/anxious.        Objective:      Physical Exam   Constitutional: She is oriented to person, place, and time. She appears well-developed and well-nourished.   HENT:   Mouth/Throat: No oropharyngeal exudate.   Eyes: No scleral icterus.   Cardiovascular: Normal rate and regular rhythm.   Pulmonary/Chest: Effort normal and breath sounds normal. She has no wheezes. She has no rales.   Abdominal: Soft. She exhibits no mass. There is no tenderness.   Lymphadenopathy:     She has no cervical adenopathy.     She has no axillary adenopathy.        Right: No supraclavicular adenopathy present.        Left: No supraclavicular adenopathy present.   Neurological: She is alert and oriented to person, place, and time.   Skin: No rash noted. No erythema.   Psychiatric: She has a normal mood and affect. Her behavior is normal. Thought content normal.       Assessment:    CT -no evidence recurrence.  Metabolic profile shows normal hepatic and renal function, CBC remarkable only for hemoglobin 11.7.  TSH is 30  1. Primary cancer of right middle lobe of lung    2. History of left breast cancer     4.  Treatment-induced hypothyroidism  Plan:       Return to clinic in 3 months with labs and chest CT.  Continue thyroid replacement.

## 2019-12-05 ENCOUNTER — TELEPHONE (OUTPATIENT)
Dept: HEMATOLOGY/ONCOLOGY | Facility: CLINIC | Age: 64
End: 2019-12-05

## 2019-12-05 NOTE — TELEPHONE ENCOUNTER
----- Message from Desirae Crenshaw RN sent at 12/5/2019  9:15 AM CST -----      ----- Message -----  From: Capri Coffey  Sent: 12/5/2019   9:14 AM CST  To: Sergei POTTER Staff    Pt is calling to reschedule her lab on the 1/8 she stated the lab would have to draw blood from her port 5th floor she stated Chemo draws her blood      Pt contact [phone]

## 2020-01-08 ENCOUNTER — TELEPHONE (OUTPATIENT)
Dept: HEMATOLOGY/ONCOLOGY | Facility: CLINIC | Age: 65
End: 2020-01-08

## 2020-01-08 ENCOUNTER — INFUSION (OUTPATIENT)
Dept: INFUSION THERAPY | Facility: HOSPITAL | Age: 65
End: 2020-01-08
Attending: INTERNAL MEDICINE
Payer: COMMERCIAL

## 2020-01-08 ENCOUNTER — HOSPITAL ENCOUNTER (OUTPATIENT)
Dept: RADIOLOGY | Facility: HOSPITAL | Age: 65
Discharge: HOME | End: 2020-01-08
Attending: INTERNAL MEDICINE
Payer: COMMERCIAL

## 2020-01-08 ENCOUNTER — OFFICE VISIT (OUTPATIENT)
Dept: HEMATOLOGY/ONCOLOGY | Facility: CLINIC | Age: 65
End: 2020-01-08
Payer: COMMERCIAL

## 2020-01-08 VITALS
DIASTOLIC BLOOD PRESSURE: 73 MMHG | OXYGEN SATURATION: 98 % | TEMPERATURE: 98 F | BODY MASS INDEX: 32.56 KG/M2 | HEIGHT: 66 IN | RESPIRATION RATE: 16 BRPM | SYSTOLIC BLOOD PRESSURE: 138 MMHG | HEART RATE: 98 BPM | WEIGHT: 202.63 LBS

## 2020-01-08 DIAGNOSIS — E03.2 HYPOTHYROIDISM DUE TO MEDICATION: ICD-10-CM

## 2020-01-08 DIAGNOSIS — C34.2 PRIMARY CANCER OF RIGHT MIDDLE LOBE OF LUNG: ICD-10-CM

## 2020-01-08 DIAGNOSIS — C34.2 PRIMARY CANCER OF RIGHT MIDDLE LOBE OF LUNG: Primary | ICD-10-CM

## 2020-01-08 LAB
ALBUMIN SERPL BCP-MCNC: 3.3 G/DL (ref 3.5–5.2)
ALP SERPL-CCNC: 93 U/L (ref 55–135)
ALT SERPL W/O P-5'-P-CCNC: 10 U/L (ref 10–44)
ANION GAP SERPL CALC-SCNC: 8 MMOL/L (ref 8–16)
AST SERPL-CCNC: 14 U/L (ref 10–40)
BASOPHILS # BLD AUTO: 0.03 K/UL (ref 0–0.2)
BASOPHILS NFR BLD: 0.5 % (ref 0–1.9)
BILIRUB SERPL-MCNC: 0.5 MG/DL (ref 0.1–1)
BUN SERPL-MCNC: 14 MG/DL (ref 8–23)
CALCIUM SERPL-MCNC: 9 MG/DL (ref 8.7–10.5)
CHLORIDE SERPL-SCNC: 107 MMOL/L (ref 95–110)
CO2 SERPL-SCNC: 25 MMOL/L (ref 23–29)
CREAT SERPL-MCNC: 0.8 MG/DL (ref 0.5–1.4)
DIFFERENTIAL METHOD: ABNORMAL
EOSINOPHIL # BLD AUTO: 0.1 K/UL (ref 0–0.5)
EOSINOPHIL NFR BLD: 2.2 % (ref 0–8)
ERYTHROCYTE [DISTWIDTH] IN BLOOD BY AUTOMATED COUNT: 14.9 % (ref 11.5–14.5)
ERYTHROCYTE [DISTWIDTH] IN BLOOD BY AUTOMATED COUNT: 14.9 % (ref 11.5–14.5)
EST. GFR  (AFRICAN AMERICAN): >60 ML/MIN/1.73 M^2
EST. GFR  (NON AFRICAN AMERICAN): >60 ML/MIN/1.73 M^2
GLUCOSE SERPL-MCNC: 135 MG/DL (ref 70–110)
HCT VFR BLD AUTO: 36.2 % (ref 37–48.5)
HCT VFR BLD AUTO: 36.2 % (ref 37–48.5)
HGB BLD-MCNC: 11.1 G/DL (ref 12–16)
HGB BLD-MCNC: 11.1 G/DL (ref 12–16)
IMM GRANULOCYTES # BLD AUTO: 0.02 K/UL (ref 0–0.04)
IMM GRANULOCYTES # BLD AUTO: 0.02 K/UL (ref 0–0.04)
IMM GRANULOCYTES NFR BLD AUTO: 0.3 % (ref 0–0.5)
LYMPHOCYTES # BLD AUTO: 1.4 K/UL (ref 1–4.8)
LYMPHOCYTES NFR BLD: 22.8 % (ref 18–48)
MCH RBC QN AUTO: 27.3 PG (ref 27–31)
MCH RBC QN AUTO: 27.3 PG (ref 27–31)
MCHC RBC AUTO-ENTMCNC: 30.7 G/DL (ref 32–36)
MCHC RBC AUTO-ENTMCNC: 30.7 G/DL (ref 32–36)
MCV RBC AUTO: 89 FL (ref 82–98)
MCV RBC AUTO: 89 FL (ref 82–98)
MONOCYTES # BLD AUTO: 0.4 K/UL (ref 0.3–1)
MONOCYTES NFR BLD: 6.4 % (ref 4–15)
NEUTROPHILS # BLD AUTO: 4.2 K/UL (ref 1.8–7.7)
NEUTROPHILS # BLD AUTO: 4.2 K/UL (ref 1.8–7.7)
NEUTROPHILS NFR BLD: 67.8 % (ref 38–73)
NRBC BLD-RTO: 0 /100 WBC
PLATELET # BLD AUTO: 256 K/UL (ref 150–350)
PLATELET # BLD AUTO: 256 K/UL (ref 150–350)
PMV BLD AUTO: 10.2 FL (ref 9.2–12.9)
PMV BLD AUTO: 10.2 FL (ref 9.2–12.9)
POTASSIUM SERPL-SCNC: 3.8 MMOL/L (ref 3.5–5.1)
PROT SERPL-MCNC: 7.3 G/DL (ref 6–8.4)
RBC # BLD AUTO: 4.07 M/UL (ref 4–5.4)
RBC # BLD AUTO: 4.07 M/UL (ref 4–5.4)
SODIUM SERPL-SCNC: 140 MMOL/L (ref 136–145)
TSH SERPL DL<=0.005 MIU/L-ACNC: 0.43 UIU/ML (ref 0.4–4)
WBC # BLD AUTO: 6.24 K/UL (ref 3.9–12.7)
WBC # BLD AUTO: 6.24 K/UL (ref 3.9–12.7)

## 2020-01-08 PROCEDURE — 99213 OFFICE O/P EST LOW 20 MIN: CPT | Mod: S$GLB,,, | Performed by: INTERNAL MEDICINE

## 2020-01-08 PROCEDURE — 3075F PR MOST RECENT SYSTOLIC BLOOD PRESS GE 130-139MM HG: ICD-10-PCS | Mod: CPTII,S$GLB,, | Performed by: INTERNAL MEDICINE

## 2020-01-08 PROCEDURE — 1126F AMNT PAIN NOTED NONE PRSNT: CPT | Mod: S$GLB,,, | Performed by: INTERNAL MEDICINE

## 2020-01-08 PROCEDURE — 71250 CT THORAX DX C-: CPT | Mod: 26,,, | Performed by: RADIOLOGY

## 2020-01-08 PROCEDURE — 3078F PR MOST RECENT DIASTOLIC BLOOD PRESSURE < 80 MM HG: ICD-10-PCS | Mod: CPTII,S$GLB,, | Performed by: INTERNAL MEDICINE

## 2020-01-08 PROCEDURE — 71250 CT THORAX DX C-: CPT | Mod: TC

## 2020-01-08 PROCEDURE — A4216 STERILE WATER/SALINE, 10 ML: HCPCS | Performed by: INTERNAL MEDICINE

## 2020-01-08 PROCEDURE — 84443 ASSAY THYROID STIM HORMONE: CPT

## 2020-01-08 PROCEDURE — 25000003 PHARM REV CODE 250: Performed by: INTERNAL MEDICINE

## 2020-01-08 PROCEDURE — 3078F DIAST BP <80 MM HG: CPT | Mod: CPTII,S$GLB,, | Performed by: INTERNAL MEDICINE

## 2020-01-08 PROCEDURE — 1101F PR PT FALLS ASSESS DOC 0-1 FALLS W/OUT INJ PAST YR: ICD-10-PCS | Mod: CPTII,S$GLB,, | Performed by: INTERNAL MEDICINE

## 2020-01-08 PROCEDURE — 99999 PR PBB SHADOW E&M-EST. PATIENT-LVL IV: ICD-10-PCS | Mod: PBBFAC,,, | Performed by: INTERNAL MEDICINE

## 2020-01-08 PROCEDURE — 36591 DRAW BLOOD OFF VENOUS DEVICE: CPT

## 2020-01-08 PROCEDURE — 80053 COMPREHEN METABOLIC PANEL: CPT

## 2020-01-08 PROCEDURE — 99999 PR PBB SHADOW E&M-EST. PATIENT-LVL IV: CPT | Mod: PBBFAC,,, | Performed by: INTERNAL MEDICINE

## 2020-01-08 PROCEDURE — 1159F MED LIST DOCD IN RCRD: CPT | Mod: S$GLB,,, | Performed by: INTERNAL MEDICINE

## 2020-01-08 PROCEDURE — 1126F PR PAIN SEVERITY QUANTIFIED, NO PAIN PRESENT: ICD-10-PCS | Mod: S$GLB,,, | Performed by: INTERNAL MEDICINE

## 2020-01-08 PROCEDURE — 99213 PR OFFICE/OUTPT VISIT, EST, LEVL III, 20-29 MIN: ICD-10-PCS | Mod: S$GLB,,, | Performed by: INTERNAL MEDICINE

## 2020-01-08 PROCEDURE — 1159F PR MEDICATION LIST DOCUMENTED IN MEDICAL RECORD: ICD-10-PCS | Mod: S$GLB,,, | Performed by: INTERNAL MEDICINE

## 2020-01-08 PROCEDURE — 3075F SYST BP GE 130 - 139MM HG: CPT | Mod: CPTII,S$GLB,, | Performed by: INTERNAL MEDICINE

## 2020-01-08 PROCEDURE — 71250 CT CHEST WITHOUT CONTRAST: ICD-10-PCS | Mod: 26,,, | Performed by: RADIOLOGY

## 2020-01-08 PROCEDURE — 85025 COMPLETE CBC W/AUTO DIFF WBC: CPT

## 2020-01-08 PROCEDURE — 1101F PT FALLS ASSESS-DOCD LE1/YR: CPT | Mod: CPTII,S$GLB,, | Performed by: INTERNAL MEDICINE

## 2020-01-08 PROCEDURE — 63600175 PHARM REV CODE 636 W HCPCS: Performed by: INTERNAL MEDICINE

## 2020-01-08 RX ORDER — HEPARIN 100 UNIT/ML
500 SYRINGE INTRAVENOUS
Status: COMPLETED | OUTPATIENT
Start: 2020-01-08 | End: 2020-01-08

## 2020-01-08 RX ORDER — SODIUM CHLORIDE 0.9 % (FLUSH) 0.9 %
10 SYRINGE (ML) INJECTION
Status: COMPLETED | OUTPATIENT
Start: 2020-01-08 | End: 2020-01-08

## 2020-01-08 RX ADMIN — Medication 10 ML: at 10:01

## 2020-01-08 RX ADMIN — HEPARIN 500 UNITS: 100 SYRINGE at 10:01

## 2020-01-08 NOTE — TELEPHONE ENCOUNTER
Spoke with patient. Informed her of this information and she is verbalized understanding to remain on her same does of thyroid medication. Pt knows to call if she needs anything.     ----- Message from Wyatt Mai MD sent at 1/8/2020  1:57 PM CST -----  Let her know that her thyroid was good, continue her current does

## 2020-01-08 NOTE — PROGRESS NOTES
Subjective:       Patient ID: Fauzia Kirk is a 64 y.o. female.    Chief Complaint: No chief complaint on file.    HPI Ms. Kirk is a very pleasant 63-year-old  female who returned  for F/U of right lung cancer.      She had chemo/XRT then adjuvant therapy with Durvalumab.    That was completed November 2018.    Physically she really has no new issues.  Unfortunately, her sister recently had a major stroke and is in the neuro ICU.  They are planning to remove her from the ventilator today.  In addition, 1 of her daughters has been treated for breast cancer and another daughter is getting rate ever breast biopsy.         History:  In December 2016 she began to have some blood in her mucus after coughing.In  April she developed a persistent cough and saw her physician on April 18.  Chest x-ray at that time showed a rounded opacity in the right lung overlying the major fissure.   Chest CT in May showed a 4.8 x 4.0 cm, rounded, soft tissue mass in the middle lobe between the medial and lateral segments. There was pre-carinal adenopathy.    Subsequently she underwent bronchoscopy with EBUS on 6/9/17. Needle biopsy of the medistinal nodes was positive for poorly differentiated carcinoma with squamoid features. The cells were positive for CK7, CK5/6, and P63 and are negative for CK20, GCDFP, TTF1, ER, [place], Napsin and YEMI 3.This was felt to be most CW a new squamous lung cancer.    PET CT  demonstrated a hypermetabolic, large mass in the right middle lobe with an SUV max of 20.23. There was hypermetabolic activity extending from this mass tracking along the pleura. There was a hypermetabolic right hilar lymph node demonstrating an SUV max of 20.2. In addition there were 2 hypermetabolic subcarinal lymph nodes with the larger demonstrating an SUV max of 14.2. An additional right paratracheal lymph node was seen with an SUV max of 26.1.    She completed radiation together with weekly chemotherapy with carboplatin  and Taxol  for stage IIIA disease.    She completed therapy on September 13 and received 7 chemotherapy treatments.    CT scan from September 29 showed that the right middle lobe mass had decreased to 4.2 x 2.3 cm from 4.8 x 4.3 cm.  Stable 4 mm pulmonary nodule in the left lower lobe.    She then began adjuvant immunotherapy in November 2017.  She completed 26 cycles of Durvalumab on 11/2/18.      Previous cancer history:She had a stage I, ER negative carcinoma of the    left breast in 1990, treated with lumpectomy and radiation therapy.  In      1993, she developed a stage I, ER positive carcinoma of the right breast     treated with lumpectomy and radiation.  In 1995, she developed left breast   cancer recurrence, treated with lumpectomy, brachytherapy and four cycles    of Adriamycin and Cytoxan.  In 1996, she developed a new right breast        cancer T2 N0 poorly differentiated, treated with four cycles of              preoperative Taxol followed by mastectomy.        On October 17, 2016 left mammogram showed increased calcifications in the left breast at 3:00.  On October 27 a biopsy showed DCIS with solid, cribriform, and micropapillary patterns.  Tumor was 95% ER positive at 95% ND positive    On November 7, 2016 she underwent left mastectomy which showed 8 mm of DCIS and negative margins.        Review of Systems   Constitutional: Negative for activity change, appetite change, fatigue, fever and unexpected weight change.   HENT: Negative for postnasal drip and trouble swallowing.    Respiratory: Negative for cough and shortness of breath.    Cardiovascular: Negative for chest pain.   Gastrointestinal: Negative for abdominal pain, constipation, nausea and vomiting.   Musculoskeletal: Negative for back pain.        Chronic right leg pain   Skin: Positive for rash.        pruritis improved   Neurological: Negative for headaches.   Psychiatric/Behavioral: Negative for dysphoric mood. The patient is not  nervous/anxious.        Objective:      Physical Exam   Constitutional: She is oriented to person, place, and time. She appears well-developed and well-nourished.   HENT:   Mouth/Throat: No oropharyngeal exudate.   Eyes: No scleral icterus.   Cardiovascular: Normal rate and regular rhythm.   Pulmonary/Chest: Effort normal and breath sounds normal. She has no wheezes. She has no rales.   Abdominal: Soft. She exhibits no mass. There is no tenderness.   Lymphadenopathy:     She has no cervical adenopathy.     She has no axillary adenopathy.        Right: No supraclavicular adenopathy present.        Left: No supraclavicular adenopathy present.   Neurological: She is alert and oriented to person, place, and time.   Skin: No rash noted. No erythema.   Psychiatric: She has a normal mood and affect. Her behavior is normal. Thought content normal.       Assessment:    CT shows a stable 1.4 x 1.2 cm focal nodular opacity in the right middle lobe and a stable 5 mm soft tissue nodule in the superior segment of the left lower lobe.  There are no other findings to suggest metastasis.  1. Primary cancer of right middle lobe of lung     4.  Treatment-induced hypothyroidism  Plan:       Return to clinic in 3 months with labs and chest CT.  Continue thyroid replacement.

## 2020-01-24 DIAGNOSIS — E03.2 HYPOTHYROIDISM DUE TO MEDICATION: ICD-10-CM

## 2020-01-24 DIAGNOSIS — I10 ESSENTIAL HYPERTENSION: ICD-10-CM

## 2020-01-24 DIAGNOSIS — L30.9 DERMATITIS: ICD-10-CM

## 2020-01-24 RX ORDER — CLOTRIMAZOLE AND BETAMETHASONE DIPROPIONATE 10; .64 MG/G; MG/G
CREAM TOPICAL 2 TIMES DAILY
Qty: 45 G | Refills: 1 | Status: SHIPPED | OUTPATIENT
Start: 2020-01-24 | End: 2020-06-29 | Stop reason: SDUPTHER

## 2020-01-24 RX ORDER — LEVOTHYROXINE SODIUM 88 UG/1
88 TABLET ORAL
Qty: 30 TABLET | Refills: 3 | Status: SHIPPED | OUTPATIENT
Start: 2020-01-24 | End: 2020-06-05

## 2020-01-24 RX ORDER — AMLODIPINE BESYLATE 10 MG/1
10 TABLET ORAL DAILY
Qty: 90 TABLET | Refills: 1 | Status: SHIPPED | OUTPATIENT
Start: 2020-01-24 | End: 2020-06-29 | Stop reason: SDUPTHER

## 2020-01-24 NOTE — TELEPHONE ENCOUNTER
----- Message from Haydee Rupa sent at 1/24/2020  9:52 AM CST -----  Contact: self [phone]  .Type: RX Refill Request    Who Called:  Self     Have you contacted your pharmacy: no     Refill or New Rx: refill     RX Name and Strength: amLODIPine (NORVASC) 10 MG tablet, levothyroxine (SYNTHROID) 88 MCG tablet, clotrimazole-betamethasone 1-0.05% (LOTRISONE) cream,     Is this a 30 day or 90 day RX: 90 day    Preferred Pharmacy with phone number: .  [facility] - [place] ([place], [place] - [address]  [address] ([place] [address]  Phone: [phone] Fax: [phone]    Local or Mail Order: local     Would the patient rather a call back or a response via [facility]?  Call back     Best Call Back Number: [phone]

## 2020-02-05 ENCOUNTER — TELEPHONE (OUTPATIENT)
Dept: HEMATOLOGY/ONCOLOGY | Facility: CLINIC | Age: 65
End: 2020-02-05

## 2020-02-05 NOTE — TELEPHONE ENCOUNTER
"----- Message from Rupali Peace sent at 2/5/2020 12:11 PM CST -----  Contact: DCL  Name of caller: Destiny  Provider name: Sergei CHAN MD  Contact Preference: [phone]  Is this regarding current patient or new patient?: current   What is the nature of the call?    - pt called to r/s the 4/7 appts stating that she doesn't want   to see a NP, and doesn't want to have the infusion on the   3rd floor she would like to be on 5th flr. Please call and advise.     Additional Notes:   "Thank you for all that you do for our patients'"     "

## 2020-06-05 DIAGNOSIS — E03.2 HYPOTHYROIDISM DUE TO MEDICATION: ICD-10-CM

## 2020-06-05 RX ORDER — LEVOTHYROXINE SODIUM 88 UG/1
TABLET ORAL
Qty: 30 TABLET | Refills: 0 | Status: SHIPPED | OUTPATIENT
Start: 2020-06-05 | End: 2020-06-29 | Stop reason: SDUPTHER

## 2020-06-05 NOTE — TELEPHONE ENCOUNTER
Called patient and no answer a message was left for her to call us and schedule a appointment for any future refills.

## 2020-06-22 ENCOUNTER — TELEPHONE (OUTPATIENT)
Dept: HEMATOLOGY/ONCOLOGY | Facility: CLINIC | Age: 65
End: 2020-06-22

## 2020-06-22 NOTE — TELEPHONE ENCOUNTER
"----- Message from Marybel Lisa RN sent at 6/22/2020  3:26 PM CDT -----  Can someone please reach out to patient to get her set up for port flush on 6/29?    Thank you!  ----- Message -----  From: Rupali Peace  Sent: 6/22/2020   3:08 PM CDT  To: Sergei POTTER Staff    Patient Assist    Name of caller: Fauzia   Provider name: Sergei CHAN MD   Contact Preference: [phone]  Is this regarding current patient or new patient?: current   What is the nature of the call?  -  pt called asking if her port will be flushed 6/29 as well as lab and f/u     Additional Notes:   "Thank you for all that you do for our patients'"            "

## 2020-06-29 ENCOUNTER — HOSPITAL ENCOUNTER (OUTPATIENT)
Dept: RADIOLOGY | Facility: HOSPITAL | Age: 65
Discharge: HOME | End: 2020-06-29
Attending: INTERNAL MEDICINE
Payer: COMMERCIAL

## 2020-06-29 ENCOUNTER — TELEPHONE (OUTPATIENT)
Dept: HEMATOLOGY/ONCOLOGY | Facility: CLINIC | Age: 65
End: 2020-06-29

## 2020-06-29 ENCOUNTER — OFFICE VISIT (OUTPATIENT)
Dept: HEMATOLOGY/ONCOLOGY | Facility: CLINIC | Age: 65
End: 2020-06-29
Payer: COMMERCIAL

## 2020-06-29 ENCOUNTER — INFUSION (OUTPATIENT)
Dept: INFUSION THERAPY | Facility: HOSPITAL | Age: 65
End: 2020-06-29
Attending: INTERNAL MEDICINE
Payer: COMMERCIAL

## 2020-06-29 VITALS
BODY MASS INDEX: 32.88 KG/M2 | OXYGEN SATURATION: 99 % | WEIGHT: 204.56 LBS | DIASTOLIC BLOOD PRESSURE: 91 MMHG | HEIGHT: 66 IN | HEART RATE: 100 BPM | TEMPERATURE: 99 F | SYSTOLIC BLOOD PRESSURE: 142 MMHG | RESPIRATION RATE: 18 BRPM

## 2020-06-29 DIAGNOSIS — E03.2 HYPOTHYROIDISM DUE TO MEDICATION: ICD-10-CM

## 2020-06-29 DIAGNOSIS — D05.12 DUCTAL CARCINOMA IN SITU (DCIS) OF LEFT BREAST: ICD-10-CM

## 2020-06-29 DIAGNOSIS — C34.2 PRIMARY CANCER OF RIGHT MIDDLE LOBE OF LUNG: Primary | ICD-10-CM

## 2020-06-29 DIAGNOSIS — L30.9 DERMATITIS: ICD-10-CM

## 2020-06-29 DIAGNOSIS — Z51.11 ENCOUNTER FOR ANTINEOPLASTIC CHEMOTHERAPY: Primary | ICD-10-CM

## 2020-06-29 DIAGNOSIS — C34.2 PRIMARY CANCER OF RIGHT MIDDLE LOBE OF LUNG: ICD-10-CM

## 2020-06-29 DIAGNOSIS — I10 ESSENTIAL HYPERTENSION: ICD-10-CM

## 2020-06-29 LAB
ALBUMIN SERPL BCP-MCNC: 3.4 G/DL (ref 3.5–5.2)
ALP SERPL-CCNC: 75 U/L (ref 55–135)
ALT SERPL W/O P-5'-P-CCNC: 9 U/L (ref 10–44)
ANION GAP SERPL CALC-SCNC: 8 MMOL/L (ref 8–16)
AST SERPL-CCNC: 13 U/L (ref 10–40)
BILIRUB SERPL-MCNC: 0.5 MG/DL (ref 0.1–1)
BUN SERPL-MCNC: 15 MG/DL (ref 8–23)
CALCIUM SERPL-MCNC: 9.6 MG/DL (ref 8.7–10.5)
CHLORIDE SERPL-SCNC: 107 MMOL/L (ref 95–110)
CO2 SERPL-SCNC: 26 MMOL/L (ref 23–29)
CREAT SERPL-MCNC: 0.8 MG/DL (ref 0.5–1.4)
ERYTHROCYTE [DISTWIDTH] IN BLOOD BY AUTOMATED COUNT: 15.8 % (ref 11.5–14.5)
EST. GFR  (AFRICAN AMERICAN): >60 ML/MIN/1.73 M^2
EST. GFR  (NON AFRICAN AMERICAN): >60 ML/MIN/1.73 M^2
GLUCOSE SERPL-MCNC: 112 MG/DL (ref 70–110)
HCT VFR BLD AUTO: 37.5 % (ref 37–48.5)
HGB BLD-MCNC: 11.6 G/DL (ref 12–16)
IMM GRANULOCYTES # BLD AUTO: 0.02 K/UL (ref 0–0.04)
MCH RBC QN AUTO: 27.1 PG (ref 27–31)
MCHC RBC AUTO-ENTMCNC: 30.9 G/DL (ref 32–36)
MCV RBC AUTO: 88 FL (ref 82–98)
NEUTROPHILS # BLD AUTO: 4 K/UL (ref 1.8–7.7)
PLATELET # BLD AUTO: 248 K/UL (ref 150–350)
PMV BLD AUTO: 9.9 FL (ref 9.2–12.9)
POTASSIUM SERPL-SCNC: 4.2 MMOL/L (ref 3.5–5.1)
PROT SERPL-MCNC: 7.8 G/DL (ref 6–8.4)
RBC # BLD AUTO: 4.28 M/UL (ref 4–5.4)
SODIUM SERPL-SCNC: 141 MMOL/L (ref 136–145)
T4 FREE SERPL-MCNC: 1.13 NG/DL (ref 0.71–1.51)
TSH SERPL DL<=0.005 MIU/L-ACNC: 0.21 UIU/ML (ref 0.4–4)
WBC # BLD AUTO: 6.2 K/UL (ref 3.9–12.7)

## 2020-06-29 PROCEDURE — 71260 CT CHEST WITH CONTRAST: ICD-10-PCS | Mod: 26,,, | Performed by: RADIOLOGY

## 2020-06-29 PROCEDURE — 3008F BODY MASS INDEX DOCD: CPT | Mod: CPTII,S$GLB,, | Performed by: INTERNAL MEDICINE

## 2020-06-29 PROCEDURE — 3008F PR BODY MASS INDEX (BMI) DOCUMENTED: ICD-10-PCS | Mod: CPTII,S$GLB,, | Performed by: INTERNAL MEDICINE

## 2020-06-29 PROCEDURE — 25500020 PHARM REV CODE 255: Performed by: INTERNAL MEDICINE

## 2020-06-29 PROCEDURE — 99213 OFFICE O/P EST LOW 20 MIN: CPT | Mod: S$GLB,,, | Performed by: INTERNAL MEDICINE

## 2020-06-29 PROCEDURE — 71260 CT THORAX DX C+: CPT | Mod: TC

## 2020-06-29 PROCEDURE — 99213 PR OFFICE/OUTPT VISIT, EST, LEVL III, 20-29 MIN: ICD-10-PCS | Mod: S$GLB,,, | Performed by: INTERNAL MEDICINE

## 2020-06-29 PROCEDURE — 1159F MED LIST DOCD IN RCRD: CPT | Mod: S$GLB,,, | Performed by: INTERNAL MEDICINE

## 2020-06-29 PROCEDURE — 36591 DRAW BLOOD OFF VENOUS DEVICE: CPT

## 2020-06-29 PROCEDURE — 84439 ASSAY OF FREE THYROXINE: CPT

## 2020-06-29 PROCEDURE — 84443 ASSAY THYROID STIM HORMONE: CPT

## 2020-06-29 PROCEDURE — 3077F PR MOST RECENT SYSTOLIC BLOOD PRESSURE >= 140 MM HG: ICD-10-PCS | Mod: CPTII,S$GLB,, | Performed by: INTERNAL MEDICINE

## 2020-06-29 PROCEDURE — 80053 COMPREHEN METABOLIC PANEL: CPT

## 2020-06-29 PROCEDURE — A4216 STERILE WATER/SALINE, 10 ML: HCPCS | Performed by: INTERNAL MEDICINE

## 2020-06-29 PROCEDURE — 85027 COMPLETE CBC AUTOMATED: CPT

## 2020-06-29 PROCEDURE — 99999 PR PBB SHADOW E&M-EST. PATIENT-LVL III: ICD-10-PCS | Mod: PBBFAC,,, | Performed by: INTERNAL MEDICINE

## 2020-06-29 PROCEDURE — 63600175 PHARM REV CODE 636 W HCPCS: Performed by: INTERNAL MEDICINE

## 2020-06-29 PROCEDURE — 1101F PT FALLS ASSESS-DOCD LE1/YR: CPT | Mod: CPTII,S$GLB,, | Performed by: INTERNAL MEDICINE

## 2020-06-29 PROCEDURE — 1125F AMNT PAIN NOTED PAIN PRSNT: CPT | Mod: S$GLB,,, | Performed by: INTERNAL MEDICINE

## 2020-06-29 PROCEDURE — 1101F PR PT FALLS ASSESS DOC 0-1 FALLS W/OUT INJ PAST YR: ICD-10-PCS | Mod: CPTII,S$GLB,, | Performed by: INTERNAL MEDICINE

## 2020-06-29 PROCEDURE — 71260 CT THORAX DX C+: CPT | Mod: 26,,, | Performed by: RADIOLOGY

## 2020-06-29 PROCEDURE — 3080F DIAST BP >= 90 MM HG: CPT | Mod: CPTII,S$GLB,, | Performed by: INTERNAL MEDICINE

## 2020-06-29 PROCEDURE — 99999 PR PBB SHADOW E&M-EST. PATIENT-LVL III: CPT | Mod: PBBFAC,,, | Performed by: INTERNAL MEDICINE

## 2020-06-29 PROCEDURE — 3077F SYST BP >= 140 MM HG: CPT | Mod: CPTII,S$GLB,, | Performed by: INTERNAL MEDICINE

## 2020-06-29 PROCEDURE — 1159F PR MEDICATION LIST DOCUMENTED IN MEDICAL RECORD: ICD-10-PCS | Mod: S$GLB,,, | Performed by: INTERNAL MEDICINE

## 2020-06-29 PROCEDURE — 25000003 PHARM REV CODE 250: Performed by: INTERNAL MEDICINE

## 2020-06-29 PROCEDURE — 3080F PR MOST RECENT DIASTOLIC BLOOD PRESSURE >= 90 MM HG: ICD-10-PCS | Mod: CPTII,S$GLB,, | Performed by: INTERNAL MEDICINE

## 2020-06-29 PROCEDURE — 1125F PR PAIN SEVERITY QUANTIFIED, PAIN PRESENT: ICD-10-PCS | Mod: S$GLB,,, | Performed by: INTERNAL MEDICINE

## 2020-06-29 RX ORDER — HEPARIN 100 UNIT/ML
500 SYRINGE INTRAVENOUS
Status: COMPLETED | OUTPATIENT
Start: 2020-06-29 | End: 2020-06-29

## 2020-06-29 RX ORDER — AMLODIPINE BESYLATE 10 MG/1
10 TABLET ORAL DAILY
Qty: 90 TABLET | Refills: 1 | Status: SHIPPED | OUTPATIENT
Start: 2020-06-29 | End: 2021-03-22 | Stop reason: SDUPTHER

## 2020-06-29 RX ORDER — LEVOTHYROXINE SODIUM 88 UG/1
88 TABLET ORAL
Qty: 30 TABLET | Refills: 6 | Status: SHIPPED | OUTPATIENT
Start: 2020-06-29 | End: 2020-07-08 | Stop reason: SDUPTHER

## 2020-06-29 RX ORDER — CLOTRIMAZOLE AND BETAMETHASONE DIPROPIONATE 10; .64 MG/G; MG/G
CREAM TOPICAL 2 TIMES DAILY
Qty: 45 G | Refills: 1 | Status: ON HOLD | OUTPATIENT
Start: 2020-06-29 | End: 2022-01-11 | Stop reason: ALTCHOICE

## 2020-06-29 RX ORDER — SODIUM CHLORIDE 0.9 % (FLUSH) 0.9 %
10 SYRINGE (ML) INJECTION
Status: COMPLETED | OUTPATIENT
Start: 2020-06-29 | End: 2020-06-29

## 2020-06-29 RX ADMIN — IOHEXOL 100 ML: 350 INJECTION, SOLUTION INTRAVENOUS at 12:06

## 2020-06-29 RX ADMIN — Medication 10 ML: at 10:06

## 2020-06-29 RX ADMIN — HEPARIN 500 UNITS: 100 SYRINGE at 10:06

## 2020-06-29 NOTE — TELEPHONE ENCOUNTER
"Misty spoke with Dr Mai about this.       ----- Message from Rupali Peace sent at 6/29/2020  8:55 AM CDT -----  Regarding: Orders Needed  Contact: [facility]  Patient Assist    Name of caller: Misty  Provider name: Sergei CHAN MD   Contact Preference: [id number]  Is this regarding current patient or new patient?: current   What is the nature of the call?    Port access order needed. Pt is there now.   Already consulted with the MD and advised them to contact the RN.     Additional Notes:   "Thank you for all that you do for our patients'"            "

## 2020-06-29 NOTE — TELEPHONE ENCOUNTER
Call made to patient to let her know that her scan was stable and Dr Mai would like her to stay on the same dose of thyroid medication she has previously been taking.    ----- Message from Wyatt Mai MD sent at 6/29/2020  3:19 PM CDT -----  Let her know scan is stable - continue same thyroid

## 2020-06-29 NOTE — PROGRESS NOTES
Subjective:       Patient ID: Fauzia Kirk is a 64 y.o. female.    Chief Complaint: No chief complaint on file.    HPI Ms. Kirk is a very pleasant 63-year-old  female who returned  for F/U of stage III right lung cancer.      She had chemo/XRT then adjuvant therapy with Durvalumab.    That was completed November 2018.      Physically she has been doing well.  She continues to have some itching which now primarily involves rash on her left lower extremity.  Appetite been good.  She has no cough or shortness of breath.       History:  In December 2016 she began to have some blood in her mucus after coughing.In  April she developed a persistent cough and saw her physician on April 18.  Chest x-ray at that time showed a rounded opacity in the right lung overlying the major fissure.   Chest CT in May showed a 4.8 x 4.0 cm, rounded, soft tissue mass in the middle lobe between the medial and lateral segments. There was pre-carinal adenopathy.    Subsequently she underwent bronchoscopy with EBUS on 6/9/17. Needle biopsy of the medistinal nodes was positive for poorly differentiated carcinoma with squamoid features. The cells were positive for CK7, CK5/6, and P63 and are negative for CK20, GCDFP, TTF1, ER, MD, Napsin and YEMI 3.This was felt to be most CW a new squamous lung cancer.    PET CT  demonstrated a hypermetabolic, large mass in the right middle lobe with an SUV max of 20.23. There was hypermetabolic activity extending from this mass tracking along the pleura. There was a hypermetabolic right hilar lymph node demonstrating an SUV max of 20.2. In addition there were 2 hypermetabolic subcarinal lymph nodes with the larger demonstrating an SUV max of 14.2. An additional right paratracheal lymph node was seen with an SUV max of 26.1.    She completed radiation together with weekly chemotherapy with carboplatin and Taxol  for stage IIIA disease.    She completed therapy on September 13 and received 7  chemotherapy treatments.    CT scan from September 29 showed that the right middle lobe mass had decreased to 4.2 x 2.3 cm from 4.8 x 4.3 cm.  Stable 4 mm pulmonary nodule in the left lower lobe.    She then began adjuvant immunotherapy in November 2017.  She completed 26 cycles of Durvalumab on 11/2/18.      Previous cancer history:She had a stage I, ER negative carcinoma of the    left breast in 1990, treated with lumpectomy and radiation therapy.  In      1993, she developed a stage I, ER positive carcinoma of the right breast     treated with lumpectomy and radiation.  In 1995, she developed left breast   cancer recurrence, treated with lumpectomy, brachytherapy and four cycles    of Adriamycin and Cytoxan.  In 1996, she developed a new right breast        cancer T2 N0 poorly differentiated, treated with four cycles of              preoperative Taxol followed by mastectomy.        On October 17, 2016 left mammogram showed increased calcifications in the left breast at 3:00.  On October 27 a biopsy showed DCIS with solid, cribriform, and micropapillary patterns.  Tumor was 95% ER positive at 95% MS positive    On November 7, 2016 she underwent left mastectomy which showed 8 mm of DCIS and negative margins.        Review of Systems   Constitutional: Negative for activity change, appetite change, fatigue, fever and unexpected weight change.   HENT: Negative for postnasal drip and trouble swallowing.    Respiratory: Negative for cough and shortness of breath.    Cardiovascular: Negative for chest pain.   Gastrointestinal: Negative for abdominal pain, constipation, nausea and vomiting.   Musculoskeletal: Negative for back pain.        Chronic right leg pain   Skin: Positive for rash.        pruritis improved   Neurological: Negative for headaches.   Psychiatric/Behavioral: Negative for dysphoric mood. The patient is not nervous/anxious.        Objective:      Physical Exam  Constitutional:       Appearance: She is  well-developed.   HENT:      Mouth/Throat:      Pharynx: No oropharyngeal exudate.   Eyes:      General: No scleral icterus.  Cardiovascular:      Rate and Rhythm: Normal rate and regular rhythm.   Pulmonary:      Effort: Pulmonary effort is normal.      Breath sounds: Normal breath sounds. No wheezing or rales.   Abdominal:      Palpations: Abdomen is soft. There is no mass.      Tenderness: There is no abdominal tenderness.   Lymphadenopathy:      Cervical: No cervical adenopathy.      Upper Body:      Right upper body: No supraclavicular adenopathy.      Left upper body: No supraclavicular adenopathy.   Skin:     Findings: No erythema or rash.   Neurological:      Mental Status: She is alert and oriented to person, place, and time.   Psychiatric:         Behavior: Behavior normal.         Thought Content: Thought content normal.         Assessment:    CT .- stable nothing to suggest recurrence  1. Primary cancer of right middle lobe of lung     4.  Treatment-induced hypothyroidism  Plan:       Return to clinic in 3 months with labs and chest CT.  Continue thyroid replacement.

## 2020-06-29 NOTE — NURSING
1020 labs drawn from PAC, remained accessed for CT scan. Pt voiced no new complaints or concerns at this time. Pt instructed to RTC after CT to deaccess PAC, pt verbalized understanding.     1310 patient arrived for PAC de accessed, tolerated well. NAD noted.

## 2020-07-08 DIAGNOSIS — E03.2 HYPOTHYROIDISM DUE TO MEDICATION: ICD-10-CM

## 2020-07-08 RX ORDER — LEVOTHYROXINE SODIUM 88 UG/1
88 TABLET ORAL
Qty: 30 TABLET | Refills: 6 | Status: SHIPPED | OUTPATIENT
Start: 2020-07-08 | End: 2021-03-22 | Stop reason: SDUPTHER

## 2020-09-21 ENCOUNTER — TELEPHONE (OUTPATIENT)
Dept: FAMILY MEDICINE | Facility: CLINIC | Age: 65
End: 2020-09-21

## 2020-09-21 NOTE — LETTER
September 21, 2020    Fauzia Kirk  [address] [facility], [place] [address]             [address] [address]  Phone: [phone]  Fax: [phone] Dear Ms. Kirk:    Please call to  schedule an office visit and labs.  All lab orders must be completed a week prior to the office visit.          If you have any questions or concerns, please don't hesitate to call.    Sincerely,        Era Urena MD

## 2020-09-29 NOTE — PROGRESS NOTES
Subjective:       Patient ID: Fauzia Kirk is a 65 y.o. female.    Chief Complaint: No chief complaint on file.    HPI Ms. Kirk is a very pleasant 65-year-old  female who returned  for F/U of stage III right lung cancer.      She had chemo/XRT then adjuvant therapy with Durvalumab.    That was completed November 2018.    She reports no new issues, specifically she has no unusual pain or change in cough or breathing.         History:  In December 2016 she began to have some blood in her mucus after coughing.In  April she developed a persistent cough and saw her physician on April 18.  Chest x-ray at that time showed a rounded opacity in the right lung overlying the major fissure.   Chest CT in May showed a 4.8 x 4.0 cm, rounded, soft tissue mass in the middle lobe between the medial and lateral segments. There was pre-carinal adenopathy.    Subsequently she underwent bronchoscopy with EBUS on 6/9/17. Needle biopsy of the medistinal nodes was positive for poorly differentiated carcinoma with squamoid features. The cells were positive for CK7, CK5/6, and P63 and are negative for CK20, GCDFP, TTF1, ER, W[place], Napsin and YEMI 3.This was felt to be most CW a new squamous lung cancer.    PET CT  demonstrated a hypermetabolic, large mass in the right middle lobe with an SUV max of 20.23. There was hypermetabolic activity extending from this mass tracking along the pleura. There was a hypermetabolic right hilar lymph node demonstrating an SUV max of 20.2. In addition there were 2 hypermetabolic subcarinal lymph nodes with the larger demonstrating an SUV max of 14.2. An additional right paratracheal lymph node was seen with an SUV max of 26.1.    She completed radiation together with weekly chemotherapy with carboplatin and Taxol  for stage IIIA disease.    She completed therapy on September 13 and received 7 chemotherapy treatments.    CT scan from September 29 showed that the right middle lobe mass had decreased to  4.2 x 2.3 cm from 4.8 x 4.3 cm.  Stable 4 mm pulmonary nodule in the left lower lobe.    She then began adjuvant immunotherapy in November 2017.  She completed 26 cycles of Durvalumab on 11/2/18.      Previous cancer history:She had a stage I, ER negative carcinoma of the    left breast in 1990, treated with lumpectomy and radiation therapy.  In      1993, she developed a stage I, ER positive carcinoma of the right breast     treated with lumpectomy and radiation.  In 1995, she developed left breast   cancer recurrence, treated with lumpectomy, brachytherapy and four cycles    of Adriamycin and Cytoxan.  In 1996, she developed a new right breast        cancer T2 N0 poorly differentiated, treated with four cycles of              preoperative Taxol followed by mastectomy.        On October 17, 2016 left mammogram showed increased calcifications in the left breast at 3:00.  On October 27 a biopsy showed DCIS with solid, cribriform, and micropapillary patterns.  Tumor was 95% ER positive at 95% WI positive    On November 7, 2016 she underwent left mastectomy which showed 8 mm of DCIS and negative margins.        Review of Systems   Constitutional: Negative for activity change, appetite change, fatigue, fever and unexpected weight change.   HENT: Negative for postnasal drip and trouble swallowing.    Respiratory: Negative for cough and shortness of breath.    Cardiovascular: Negative for chest pain.   Gastrointestinal: Negative for abdominal pain, constipation, nausea and vomiting.   Musculoskeletal: Negative for back pain.        Chronic right leg pain without change   Skin: Positive for rash.   Neurological: Negative for headaches.   Psychiatric/Behavioral: Negative for dysphoric mood. The patient is not nervous/anxious.        Objective:      Physical Exam  Constitutional:       Appearance: She is well-developed.   Eyes:      General: No scleral icterus.  Cardiovascular:      Rate and Rhythm: Normal rate and regular  rhythm.   Pulmonary:      Effort: Pulmonary effort is normal.      Breath sounds: Normal breath sounds. No wheezing or rales.   Chest:       Abdominal:      Palpations: Abdomen is soft. There is no mass.      Tenderness: There is no abdominal tenderness.   Lymphadenopathy:      Cervical: No cervical adenopathy.      Upper Body:      Right upper body: No supraclavicular adenopathy.      Left upper body: No supraclavicular adenopathy.   Skin:     Findings: No erythema or rash.   Neurological:      Mental Status: She is alert and oriented to person, place, and time.   Psychiatric:         Behavior: Behavior normal.         Thought Content: Thought content normal.         Assessment:    CT - no new findings    CBC - HGB 11.3, otherwise normal, CMP is unremarkable, TSH 0.088, T4 - 1.31  1. Primary cancer of right middle lobe of lung     4.  Treatment-induced hypothyroidism  Plan:       Return to clinic in 3 months with labs.Repeat Ct in 6 M.  Continue thyroid replacement - may need to adjust at next visit.

## 2020-09-30 ENCOUNTER — OFFICE VISIT (OUTPATIENT)
Dept: HEMATOLOGY/ONCOLOGY | Facility: CLINIC | Age: 65
End: 2020-09-30
Payer: COMMERCIAL

## 2020-09-30 ENCOUNTER — INFUSION (OUTPATIENT)
Dept: INFUSION THERAPY | Facility: HOSPITAL | Age: 65
End: 2020-09-30
Payer: COMMERCIAL

## 2020-09-30 ENCOUNTER — HOSPITAL ENCOUNTER (OUTPATIENT)
Dept: RADIOLOGY | Facility: HOSPITAL | Age: 65
Discharge: HOME | End: 2020-09-30
Attending: INTERNAL MEDICINE
Payer: COMMERCIAL

## 2020-09-30 VITALS
HEART RATE: 99 BPM | WEIGHT: 205.5 LBS | SYSTOLIC BLOOD PRESSURE: 146 MMHG | BODY MASS INDEX: 33.03 KG/M2 | HEIGHT: 66 IN | DIASTOLIC BLOOD PRESSURE: 80 MMHG | TEMPERATURE: 98 F | RESPIRATION RATE: 18 BRPM | OXYGEN SATURATION: 100 %

## 2020-09-30 DIAGNOSIS — C34.2 PRIMARY CANCER OF RIGHT MIDDLE LOBE OF LUNG: Primary | ICD-10-CM

## 2020-09-30 DIAGNOSIS — C34.2 PRIMARY CANCER OF RIGHT MIDDLE LOBE OF LUNG: ICD-10-CM

## 2020-09-30 DIAGNOSIS — E03.2 HYPOTHYROIDISM DUE TO MEDICATION: ICD-10-CM

## 2020-09-30 LAB
ALBUMIN SERPL BCP-MCNC: 3.4 G/DL (ref 3.5–5.2)
ALP SERPL-CCNC: 81 U/L (ref 55–135)
ALT SERPL W/O P-5'-P-CCNC: 10 U/L (ref 10–44)
ANION GAP SERPL CALC-SCNC: 8 MMOL/L (ref 8–16)
AST SERPL-CCNC: 13 U/L (ref 10–40)
BILIRUB SERPL-MCNC: 0.6 MG/DL (ref 0.1–1)
BUN SERPL-MCNC: 17 MG/DL (ref 8–23)
CALCIUM SERPL-MCNC: 8.9 MG/DL (ref 8.7–10.5)
CHLORIDE SERPL-SCNC: 104 MMOL/L (ref 95–110)
CO2 SERPL-SCNC: 24 MMOL/L (ref 23–29)
CREAT SERPL-MCNC: 0.9 MG/DL (ref 0.5–1.4)
ERYTHROCYTE [DISTWIDTH] IN BLOOD BY AUTOMATED COUNT: 15.1 % (ref 11.5–14.5)
EST. GFR  (AFRICAN AMERICAN): >60 ML/MIN/1.73 M^2
EST. GFR  (NON AFRICAN AMERICAN): >60 ML/MIN/1.73 M^2
GLUCOSE SERPL-MCNC: 133 MG/DL (ref 70–110)
HCT VFR BLD AUTO: 35.7 % (ref 37–48.5)
HGB BLD-MCNC: 11.3 G/DL (ref 12–16)
IMM GRANULOCYTES # BLD AUTO: 0.03 K/UL (ref 0–0.04)
MCH RBC QN AUTO: 27.4 PG (ref 27–31)
MCHC RBC AUTO-ENTMCNC: 31.7 G/DL (ref 32–36)
MCV RBC AUTO: 87 FL (ref 82–98)
NEUTROPHILS # BLD AUTO: 4.8 K/UL (ref 1.8–7.7)
PLATELET # BLD AUTO: 259 K/UL (ref 150–350)
PMV BLD AUTO: 10.1 FL (ref 9.2–12.9)
POTASSIUM SERPL-SCNC: 4 MMOL/L (ref 3.5–5.1)
PROT SERPL-MCNC: 7.7 G/DL (ref 6–8.4)
RBC # BLD AUTO: 4.12 M/UL (ref 4–5.4)
SODIUM SERPL-SCNC: 136 MMOL/L (ref 136–145)
T4 FREE SERPL-MCNC: 1.31 NG/DL (ref 0.71–1.51)
TSH SERPL DL<=0.005 MIU/L-ACNC: 0.09 UIU/ML (ref 0.4–4)
WBC # BLD AUTO: 7.17 K/UL (ref 3.9–12.7)

## 2020-09-30 PROCEDURE — 84443 ASSAY THYROID STIM HORMONE: CPT

## 2020-09-30 PROCEDURE — 99213 OFFICE O/P EST LOW 20 MIN: CPT | Mod: S$GLB,,, | Performed by: INTERNAL MEDICINE

## 2020-09-30 PROCEDURE — 99213 PR OFFICE/OUTPT VISIT, EST, LEVL III, 20-29 MIN: ICD-10-PCS | Mod: S$GLB,,, | Performed by: INTERNAL MEDICINE

## 2020-09-30 PROCEDURE — 3008F PR BODY MASS INDEX (BMI) DOCUMENTED: ICD-10-PCS | Mod: CPTII,S$GLB,, | Performed by: INTERNAL MEDICINE

## 2020-09-30 PROCEDURE — 1101F PR PT FALLS ASSESS DOC 0-1 FALLS W/OUT INJ PAST YR: ICD-10-PCS | Mod: CPTII,S$GLB,, | Performed by: INTERNAL MEDICINE

## 2020-09-30 PROCEDURE — 99999 PR PBB SHADOW E&M-EST. PATIENT-LVL III: CPT | Mod: PBBFAC,,, | Performed by: INTERNAL MEDICINE

## 2020-09-30 PROCEDURE — 1126F PR PAIN SEVERITY QUANTIFIED, NO PAIN PRESENT: ICD-10-PCS | Mod: S$GLB,,, | Performed by: INTERNAL MEDICINE

## 2020-09-30 PROCEDURE — A4216 STERILE WATER/SALINE, 10 ML: HCPCS | Performed by: INTERNAL MEDICINE

## 2020-09-30 PROCEDURE — 3077F PR MOST RECENT SYSTOLIC BLOOD PRESSURE >= 140 MM HG: ICD-10-PCS | Mod: CPTII,S$GLB,, | Performed by: INTERNAL MEDICINE

## 2020-09-30 PROCEDURE — 3079F DIAST BP 80-89 MM HG: CPT | Mod: CPTII,S$GLB,, | Performed by: INTERNAL MEDICINE

## 2020-09-30 PROCEDURE — 1159F MED LIST DOCD IN RCRD: CPT | Mod: S$GLB,,, | Performed by: INTERNAL MEDICINE

## 2020-09-30 PROCEDURE — 25500020 PHARM REV CODE 255: Performed by: INTERNAL MEDICINE

## 2020-09-30 PROCEDURE — 36591 DRAW BLOOD OFF VENOUS DEVICE: CPT

## 2020-09-30 PROCEDURE — 1159F PR MEDICATION LIST DOCUMENTED IN MEDICAL RECORD: ICD-10-PCS | Mod: S$GLB,,, | Performed by: INTERNAL MEDICINE

## 2020-09-30 PROCEDURE — 84439 ASSAY OF FREE THYROXINE: CPT

## 2020-09-30 PROCEDURE — 3008F BODY MASS INDEX DOCD: CPT | Mod: CPTII,S$GLB,, | Performed by: INTERNAL MEDICINE

## 2020-09-30 PROCEDURE — 99999 PR PBB SHADOW E&M-EST. PATIENT-LVL III: ICD-10-PCS | Mod: PBBFAC,,, | Performed by: INTERNAL MEDICINE

## 2020-09-30 PROCEDURE — 80053 COMPREHEN METABOLIC PANEL: CPT

## 2020-09-30 PROCEDURE — 63600175 PHARM REV CODE 636 W HCPCS: Performed by: INTERNAL MEDICINE

## 2020-09-30 PROCEDURE — 71260 CT THORAX DX C+: CPT | Mod: TC

## 2020-09-30 PROCEDURE — 71260 CT CHEST WITH CONTRAST: ICD-10-PCS | Mod: 26,,, | Performed by: RADIOLOGY

## 2020-09-30 PROCEDURE — 25000003 PHARM REV CODE 250: Performed by: INTERNAL MEDICINE

## 2020-09-30 PROCEDURE — 71260 CT THORAX DX C+: CPT | Mod: 26,,, | Performed by: RADIOLOGY

## 2020-09-30 PROCEDURE — 1101F PT FALLS ASSESS-DOCD LE1/YR: CPT | Mod: CPTII,S$GLB,, | Performed by: INTERNAL MEDICINE

## 2020-09-30 PROCEDURE — 3079F PR MOST RECENT DIASTOLIC BLOOD PRESSURE 80-89 MM HG: ICD-10-PCS | Mod: CPTII,S$GLB,, | Performed by: INTERNAL MEDICINE

## 2020-09-30 PROCEDURE — 85027 COMPLETE CBC AUTOMATED: CPT

## 2020-09-30 PROCEDURE — 1126F AMNT PAIN NOTED NONE PRSNT: CPT | Mod: S$GLB,,, | Performed by: INTERNAL MEDICINE

## 2020-09-30 PROCEDURE — 3077F SYST BP >= 140 MM HG: CPT | Mod: CPTII,S$GLB,, | Performed by: INTERNAL MEDICINE

## 2020-09-30 RX ORDER — HEPARIN 100 UNIT/ML
500 SYRINGE INTRAVENOUS
Status: COMPLETED | OUTPATIENT
Start: 2020-09-30 | End: 2020-09-30

## 2020-09-30 RX ORDER — SODIUM CHLORIDE 0.9 % (FLUSH) 0.9 %
10 SYRINGE (ML) INJECTION
Status: COMPLETED | OUTPATIENT
Start: 2020-09-30 | End: 2020-09-30

## 2020-09-30 RX ADMIN — IOHEXOL 75 ML: 350 INJECTION, SOLUTION INTRAVENOUS at 09:09

## 2020-09-30 RX ADMIN — Medication 10 ML: at 08:09

## 2020-09-30 RX ADMIN — HEPARIN 500 UNITS: 100 SYRINGE at 08:09

## 2020-09-30 NOTE — NURSING
Patient here for blood draw and port access for ct scan-left chest port accesses easily with good blood return-blood to lab-line flushed and left accessed for ct scan today.

## 2020-10-05 ENCOUNTER — TELEPHONE (OUTPATIENT)
Dept: HEMATOLOGY/ONCOLOGY | Facility: CLINIC | Age: 65
End: 2020-10-05

## 2020-10-05 NOTE — TELEPHONE ENCOUNTER
----- Message from Marybel Lisa RN sent at 10/5/2020 11:40 AM CDT -----  Regarding: FW: PT  Contact: PT    ----- Message -----  From: Annette Joseph  Sent: 10/5/2020  11:35 AM CDT  To: [facility] Staff  Subject: PT                                               PT has a lab and an appointment on 12/30, She called to see if she could get something later than 8 that morning and she said she doesn't see practitioners, only doctors. Please call back     Callback: [phone]

## 2020-12-21 NOTE — PROGRESS NOTES
Subjective:       Patient ID: Fauzia Kirk is a 65 y.o. female.    Chief Complaint: No chief complaint on file.    HPI Ms. Kirk is a very pleasant 65-year-old  female who returned  for F/U of stage III right lung cancer.      She had chemo/XRT then adjuvant therapy with Durvalumab.    That was completed November 2018.    For the last few days she has felt unwell, dizzy with her head spinning at times some mild shortness of breath and indigestion with nausea.  She is better today.     History:  In December 2016 she began to have some blood in her mucus after coughing.In  April she developed a persistent cough and saw her physician on April 18.  Chest x-ray at that time showed a rounded opacity in the right lung overlying the major fissure.   Chest CT in May showed a 4.8 x 4.0 cm, rounded, soft tissue mass in the middle lobe between the medial and lateral segments. There was pre-carinal adenopathy.    Subsequently she underwent bronchoscopy with EBUS on 6/9/17. Needle biopsy of the medistinal nodes was positive for poorly differentiated carcinoma with squamoid features. The cells were positive for CK7, CK5/6, and P63 and are negative for CK20, GCDFP, TTF1, ER, NC, Napsin and YEMI 3.This was felt to be most CW a new squamous lung cancer.    PET CT  demonstrated a hypermetabolic, large mass in the right middle lobe with an SUV max of 20.23. There was hypermetabolic activity extending from this mass tracking along the pleura. There was a hypermetabolic right hilar lymph node demonstrating an SUV max of 20.2. In addition there were 2 hypermetabolic subcarinal lymph nodes with the larger demonstrating an SUV max of 14.2. An additional right paratracheal lymph node was seen with an SUV max of 26.1.    She completed radiation together with weekly chemotherapy with carboplatin and Taxol  for stage IIIA disease.    She completed therapy on September 13 and received 7 chemotherapy treatments.    CT scan from  September 29 showed that the right middle lobe mass had decreased to 4.2 x 2.3 cm from 4.8 x 4.3 cm.  Stable 4 mm pulmonary nodule in the left lower lobe.    She then began adjuvant immunotherapy in November 2017.  She completed 26 cycles of Durvalumab on 11/2/18.      Previous cancer history:She had a stage I, ER negative carcinoma of the    left breast in 1990, treated with lumpectomy and radiation therapy.  In      1993, she developed a stage I, ER positive carcinoma of the right breast     treated with lumpectomy and radiation.  In 1995, she developed left breast   cancer recurrence, treated with lumpectomy, brachytherapy and four cycles    of Adriamycin and Cytoxan.  In 1996, she developed a new right breast        cancer T2 N0 poorly differentiated, treated with four cycles of              preoperative Taxol followed by mastectomy.        On October 17, 2016 left mammogram showed increased calcifications in the left breast at 3:00.  On October 27 a biopsy showed DCIS with solid, cribriform, and micropapillary patterns.  Tumor was 95% ER positive at 95% NV positive    On November 7, 2016 she underwent left mastectomy which showed 8 mm of DCIS and negative margins.        Review of Systems   Constitutional: Negative for activity change, appetite change, fatigue, fever and unexpected weight change.   HENT: Negative for postnasal drip and trouble swallowing.    Respiratory: Negative for cough and shortness of breath.    Cardiovascular: Negative for chest pain.   Gastrointestinal: Negative for abdominal pain, constipation, nausea and vomiting.   Musculoskeletal: Negative for back pain.        Chronic right leg pain without change   Skin: Positive for rash.   Neurological: Negative for headaches.   Psychiatric/Behavioral: Negative for dysphoric mood. The patient is not nervous/anxious.        Objective:      Physical Exam  Constitutional:       Appearance: She is well-developed.   Eyes:      General: No scleral  icterus.  Cardiovascular:      Rate and Rhythm: Normal rate and regular rhythm.   Pulmonary:      Effort: Pulmonary effort is normal.      Breath sounds: Normal breath sounds. No wheezing or rales.   Abdominal:      Palpations: Abdomen is soft. There is no mass.      Tenderness: There is no abdominal tenderness.   Lymphadenopathy:      Cervical: No cervical adenopathy.      Upper Body:      Right upper body: No supraclavicular or axillary adenopathy.      Left upper body: No supraclavicular or axillary adenopathy.   Skin:     Findings: No erythema or rash.   Neurological:      Mental Status: She is alert and oriented to person, place, and time.   Psychiatric:         Behavior: Behavior normal.         Thought Content: Thought content normal.         Assessment:    CBC -HGB 11.8 otherwise normal, CMP normal hepatic and renal function  1. Primary cancer of right middle lobe of lung     4.  Treatment-induced hypothyroidism  Plan:       Return to clinic in 3 months with lab and repeat CT.  Continue thyroid replacement .

## 2020-12-22 ENCOUNTER — OFFICE VISIT (OUTPATIENT)
Dept: HEMATOLOGY/ONCOLOGY | Facility: CLINIC | Age: 65
End: 2020-12-22
Payer: COMMERCIAL

## 2020-12-22 ENCOUNTER — INFUSION (OUTPATIENT)
Dept: INFUSION THERAPY | Facility: HOSPITAL | Age: 65
End: 2020-12-22
Attending: INTERNAL MEDICINE
Payer: COMMERCIAL

## 2020-12-22 VITALS
OXYGEN SATURATION: 99 % | BODY MASS INDEX: 33.03 KG/M2 | WEIGHT: 205.5 LBS | HEIGHT: 66 IN | TEMPERATURE: 98 F | HEART RATE: 94 BPM | SYSTOLIC BLOOD PRESSURE: 151 MMHG | DIASTOLIC BLOOD PRESSURE: 95 MMHG | RESPIRATION RATE: 16 BRPM

## 2020-12-22 DIAGNOSIS — E03.2 HYPOTHYROIDISM DUE TO MEDICATION: ICD-10-CM

## 2020-12-22 DIAGNOSIS — K21.9 GASTROESOPHAGEAL REFLUX DISEASE WITHOUT ESOPHAGITIS: ICD-10-CM

## 2020-12-22 DIAGNOSIS — C34.2 PRIMARY CANCER OF RIGHT MIDDLE LOBE OF LUNG: Primary | ICD-10-CM

## 2020-12-22 DIAGNOSIS — Z51.11 ENCOUNTER FOR ANTINEOPLASTIC CHEMOTHERAPY: ICD-10-CM

## 2020-12-22 LAB
ALBUMIN SERPL BCP-MCNC: 3.5 G/DL (ref 3.5–5.2)
ALP SERPL-CCNC: 85 U/L (ref 55–135)
ALT SERPL W/O P-5'-P-CCNC: 7 U/L (ref 10–44)
ANION GAP SERPL CALC-SCNC: 8 MMOL/L (ref 8–16)
AST SERPL-CCNC: 14 U/L (ref 10–40)
BILIRUB SERPL-MCNC: 0.5 MG/DL (ref 0.1–1)
BUN SERPL-MCNC: 15 MG/DL (ref 8–23)
CALCIUM SERPL-MCNC: 9.1 MG/DL (ref 8.7–10.5)
CHLORIDE SERPL-SCNC: 104 MMOL/L (ref 95–110)
CO2 SERPL-SCNC: 26 MMOL/L (ref 23–29)
CREAT SERPL-MCNC: 1 MG/DL (ref 0.5–1.4)
ERYTHROCYTE [DISTWIDTH] IN BLOOD BY AUTOMATED COUNT: 15.2 % (ref 11.5–14.5)
EST. GFR  (AFRICAN AMERICAN): >60 ML/MIN/1.73 M^2
EST. GFR  (NON AFRICAN AMERICAN): 58.4 ML/MIN/1.73 M^2
GLUCOSE SERPL-MCNC: 114 MG/DL (ref 70–110)
HCT VFR BLD AUTO: 37.4 % (ref 37–48.5)
HGB BLD-MCNC: 11.6 G/DL (ref 12–16)
IMM GRANULOCYTES # BLD AUTO: 0.01 K/UL (ref 0–0.04)
MCH RBC QN AUTO: 27.5 PG (ref 27–31)
MCHC RBC AUTO-ENTMCNC: 31 G/DL (ref 32–36)
MCV RBC AUTO: 89 FL (ref 82–98)
NEUTROPHILS # BLD AUTO: 3.3 K/UL (ref 1.8–7.7)
PLATELET # BLD AUTO: 260 K/UL (ref 150–350)
PMV BLD AUTO: 9.8 FL (ref 9.2–12.9)
POTASSIUM SERPL-SCNC: 3.9 MMOL/L (ref 3.5–5.1)
PROT SERPL-MCNC: 7.8 G/DL (ref 6–8.4)
RBC # BLD AUTO: 4.22 M/UL (ref 4–5.4)
SODIUM SERPL-SCNC: 138 MMOL/L (ref 136–145)
T4 FREE SERPL-MCNC: 1.2 NG/DL (ref 0.71–1.51)
TSH SERPL DL<=0.005 MIU/L-ACNC: 4.01 UIU/ML (ref 0.4–4)
WBC # BLD AUTO: 5.44 K/UL (ref 3.9–12.7)

## 2020-12-22 PROCEDURE — 3077F SYST BP >= 140 MM HG: CPT | Mod: CPTII,S$GLB,, | Performed by: INTERNAL MEDICINE

## 2020-12-22 PROCEDURE — 3288F PR FALLS RISK ASSESSMENT DOCUMENTED: ICD-10-PCS | Mod: CPTII,S$GLB,, | Performed by: INTERNAL MEDICINE

## 2020-12-22 PROCEDURE — 1101F PT FALLS ASSESS-DOCD LE1/YR: CPT | Mod: CPTII,S$GLB,, | Performed by: INTERNAL MEDICINE

## 2020-12-22 PROCEDURE — 3008F BODY MASS INDEX DOCD: CPT | Mod: CPTII,S$GLB,, | Performed by: INTERNAL MEDICINE

## 2020-12-22 PROCEDURE — 1126F AMNT PAIN NOTED NONE PRSNT: CPT | Mod: S$GLB,,, | Performed by: INTERNAL MEDICINE

## 2020-12-22 PROCEDURE — 63600175 PHARM REV CODE 636 W HCPCS: Performed by: INTERNAL MEDICINE

## 2020-12-22 PROCEDURE — A4216 STERILE WATER/SALINE, 10 ML: HCPCS | Performed by: INTERNAL MEDICINE

## 2020-12-22 PROCEDURE — 84439 ASSAY OF FREE THYROXINE: CPT

## 2020-12-22 PROCEDURE — 80053 COMPREHEN METABOLIC PANEL: CPT

## 2020-12-22 PROCEDURE — 99999 PR PBB SHADOW E&M-EST. PATIENT-LVL IV: CPT | Mod: PBBFAC,,, | Performed by: INTERNAL MEDICINE

## 2020-12-22 PROCEDURE — 1159F PR MEDICATION LIST DOCUMENTED IN MEDICAL RECORD: ICD-10-PCS | Mod: S$GLB,,, | Performed by: INTERNAL MEDICINE

## 2020-12-22 PROCEDURE — 3080F PR MOST RECENT DIASTOLIC BLOOD PRESSURE >= 90 MM HG: ICD-10-PCS | Mod: CPTII,S$GLB,, | Performed by: INTERNAL MEDICINE

## 2020-12-22 PROCEDURE — 85027 COMPLETE CBC AUTOMATED: CPT

## 2020-12-22 PROCEDURE — 3080F DIAST BP >= 90 MM HG: CPT | Mod: CPTII,S$GLB,, | Performed by: INTERNAL MEDICINE

## 2020-12-22 PROCEDURE — 3008F PR BODY MASS INDEX (BMI) DOCUMENTED: ICD-10-PCS | Mod: CPTII,S$GLB,, | Performed by: INTERNAL MEDICINE

## 2020-12-22 PROCEDURE — 99999 PR PBB SHADOW E&M-EST. PATIENT-LVL IV: ICD-10-PCS | Mod: PBBFAC,,, | Performed by: INTERNAL MEDICINE

## 2020-12-22 PROCEDURE — 36591 DRAW BLOOD OFF VENOUS DEVICE: CPT

## 2020-12-22 PROCEDURE — 25000003 PHARM REV CODE 250: Performed by: INTERNAL MEDICINE

## 2020-12-22 PROCEDURE — 99213 PR OFFICE/OUTPT VISIT, EST, LEVL III, 20-29 MIN: ICD-10-PCS | Mod: S$GLB,,, | Performed by: INTERNAL MEDICINE

## 2020-12-22 PROCEDURE — 1126F PR PAIN SEVERITY QUANTIFIED, NO PAIN PRESENT: ICD-10-PCS | Mod: S$GLB,,, | Performed by: INTERNAL MEDICINE

## 2020-12-22 PROCEDURE — 1159F MED LIST DOCD IN RCRD: CPT | Mod: S$GLB,,, | Performed by: INTERNAL MEDICINE

## 2020-12-22 PROCEDURE — 3077F PR MOST RECENT SYSTOLIC BLOOD PRESSURE >= 140 MM HG: ICD-10-PCS | Mod: CPTII,S$GLB,, | Performed by: INTERNAL MEDICINE

## 2020-12-22 PROCEDURE — 84443 ASSAY THYROID STIM HORMONE: CPT

## 2020-12-22 PROCEDURE — 1101F PR PT FALLS ASSESS DOC 0-1 FALLS W/OUT INJ PAST YR: ICD-10-PCS | Mod: CPTII,S$GLB,, | Performed by: INTERNAL MEDICINE

## 2020-12-22 PROCEDURE — 3288F FALL RISK ASSESSMENT DOCD: CPT | Mod: CPTII,S$GLB,, | Performed by: INTERNAL MEDICINE

## 2020-12-22 PROCEDURE — 99213 OFFICE O/P EST LOW 20 MIN: CPT | Mod: S$GLB,,, | Performed by: INTERNAL MEDICINE

## 2020-12-22 RX ORDER — LIDOCAINE AND PRILOCAINE 25; 25 MG/G; MG/G
CREAM TOPICAL
Qty: 5 G | Refills: 3 | Status: SHIPPED | OUTPATIENT
Start: 2020-12-22 | End: 2022-06-14 | Stop reason: ALTCHOICE

## 2020-12-22 RX ORDER — SODIUM CHLORIDE 0.9 % (FLUSH) 0.9 %
10 SYRINGE (ML) INJECTION
Status: COMPLETED | OUTPATIENT
Start: 2020-12-22 | End: 2020-12-22

## 2020-12-22 RX ORDER — HEPARIN 100 UNIT/ML
500 SYRINGE INTRAVENOUS
Status: COMPLETED | OUTPATIENT
Start: 2020-12-22 | End: 2020-12-22

## 2020-12-22 RX ORDER — OMEPRAZOLE 40 MG/1
40 CAPSULE, DELAYED RELEASE ORAL DAILY
Qty: 30 CAPSULE | Refills: 11 | Status: SHIPPED | OUTPATIENT
Start: 2020-12-22 | End: 2021-01-19 | Stop reason: SDUPTHER

## 2020-12-22 RX ADMIN — HEPARIN 500 UNITS: 100 SYRINGE at 09:12

## 2020-12-22 RX ADMIN — Medication 10 ML: at 09:12

## 2020-12-22 NOTE — NURSING
0935-Left chest port accessed using sterile technique and blood specimen collected, pt tolerated well. Blood specimen sent to lab. Port flushed with normal saline and heparin and de-accessed prior to discharge. Site dressed with band-aid. Pt discharged with no distress noted, ambulating independently.

## 2021-01-19 DIAGNOSIS — K21.9 GASTROESOPHAGEAL REFLUX DISEASE WITHOUT ESOPHAGITIS: ICD-10-CM

## 2021-01-19 RX ORDER — OMEPRAZOLE 40 MG/1
40 CAPSULE, DELAYED RELEASE ORAL DAILY
Qty: 30 CAPSULE | Refills: 11 | Status: SHIPPED | OUTPATIENT
Start: 2021-01-19 | End: 2022-06-14 | Stop reason: ALTCHOICE

## 2021-03-12 ENCOUNTER — TELEPHONE (OUTPATIENT)
Dept: HEMATOLOGY/ONCOLOGY | Facility: CLINIC | Age: 66
End: 2021-03-12

## 2021-03-22 ENCOUNTER — HOSPITAL ENCOUNTER (OUTPATIENT)
Dept: RADIOLOGY | Facility: HOSPITAL | Age: 66
Discharge: HOME | End: 2021-03-22
Attending: INTERNAL MEDICINE
Payer: COMMERCIAL

## 2021-03-22 ENCOUNTER — OFFICE VISIT (OUTPATIENT)
Dept: HEMATOLOGY/ONCOLOGY | Facility: CLINIC | Age: 66
End: 2021-03-22
Payer: COMMERCIAL

## 2021-03-22 ENCOUNTER — INFUSION (OUTPATIENT)
Dept: INFUSION THERAPY | Facility: HOSPITAL | Age: 66
End: 2021-03-22
Attending: INTERNAL MEDICINE
Payer: COMMERCIAL

## 2021-03-22 VITALS
SYSTOLIC BLOOD PRESSURE: 139 MMHG | RESPIRATION RATE: 17 BRPM | WEIGHT: 205.94 LBS | HEART RATE: 100 BPM | DIASTOLIC BLOOD PRESSURE: 76 MMHG | OXYGEN SATURATION: 97 % | BODY MASS INDEX: 33.1 KG/M2 | TEMPERATURE: 98 F | HEIGHT: 66 IN

## 2021-03-22 DIAGNOSIS — C34.2 PRIMARY CANCER OF RIGHT MIDDLE LOBE OF LUNG: Primary | ICD-10-CM

## 2021-03-22 DIAGNOSIS — E03.2 HYPOTHYROIDISM DUE TO MEDICATION: ICD-10-CM

## 2021-03-22 DIAGNOSIS — C34.2 PRIMARY CANCER OF RIGHT MIDDLE LOBE OF LUNG: ICD-10-CM

## 2021-03-22 DIAGNOSIS — I10 ESSENTIAL HYPERTENSION: ICD-10-CM

## 2021-03-22 LAB
ALBUMIN SERPL BCP-MCNC: 3.3 G/DL (ref 3.5–5.2)
ALP SERPL-CCNC: 86 U/L (ref 55–135)
ALT SERPL W/O P-5'-P-CCNC: 10 U/L (ref 10–44)
ANION GAP SERPL CALC-SCNC: 4 MMOL/L (ref 8–16)
AST SERPL-CCNC: 13 U/L (ref 10–40)
BILIRUB SERPL-MCNC: 0.4 MG/DL (ref 0.1–1)
BUN SERPL-MCNC: 18 MG/DL (ref 8–23)
CALCIUM SERPL-MCNC: 8.4 MG/DL (ref 8.7–10.5)
CHLORIDE SERPL-SCNC: 109 MMOL/L (ref 95–110)
CO2 SERPL-SCNC: 25 MMOL/L (ref 23–29)
CREAT SERPL-MCNC: 1 MG/DL (ref 0.5–1.4)
ERYTHROCYTE [DISTWIDTH] IN BLOOD BY AUTOMATED COUNT: 14.9 % (ref 11.5–14.5)
EST. GFR  (AFRICAN AMERICAN): >60 ML/MIN/1.73 M^2
EST. GFR  (NON AFRICAN AMERICAN): 58.4 ML/MIN/1.73 M^2
GLUCOSE SERPL-MCNC: 143 MG/DL (ref 70–110)
HCT VFR BLD AUTO: 34.9 % (ref 37–48.5)
HGB BLD-MCNC: 11.2 G/DL (ref 12–16)
IMM GRANULOCYTES # BLD AUTO: 0.04 K/UL (ref 0–0.04)
MCH RBC QN AUTO: 28.3 PG (ref 27–31)
MCHC RBC AUTO-ENTMCNC: 32.1 G/DL (ref 32–36)
MCV RBC AUTO: 88 FL (ref 82–98)
NEUTROPHILS # BLD AUTO: 6.1 K/UL (ref 1.8–7.7)
PLATELET # BLD AUTO: 283 K/UL (ref 150–350)
PMV BLD AUTO: 10.1 FL (ref 9.2–12.9)
POTASSIUM SERPL-SCNC: 3.8 MMOL/L (ref 3.5–5.1)
PROT SERPL-MCNC: 7.6 G/DL (ref 6–8.4)
RBC # BLD AUTO: 3.96 M/UL (ref 4–5.4)
SODIUM SERPL-SCNC: 138 MMOL/L (ref 136–145)
TSH SERPL DL<=0.005 MIU/L-ACNC: 3.01 UIU/ML (ref 0.4–4)
WBC # BLD AUTO: 7.87 K/UL (ref 3.9–12.7)

## 2021-03-22 PROCEDURE — 36591 DRAW BLOOD OFF VENOUS DEVICE: CPT

## 2021-03-22 PROCEDURE — 3075F SYST BP GE 130 - 139MM HG: CPT | Mod: CPTII,S$GLB,, | Performed by: INTERNAL MEDICINE

## 2021-03-22 PROCEDURE — 63600175 PHARM REV CODE 636 W HCPCS: Performed by: INTERNAL MEDICINE

## 2021-03-22 PROCEDURE — 99214 OFFICE O/P EST MOD 30 MIN: CPT | Mod: S$GLB,,, | Performed by: INTERNAL MEDICINE

## 2021-03-22 PROCEDURE — 3078F DIAST BP <80 MM HG: CPT | Mod: CPTII,S$GLB,, | Performed by: INTERNAL MEDICINE

## 2021-03-22 PROCEDURE — 85027 COMPLETE CBC AUTOMATED: CPT | Performed by: INTERNAL MEDICINE

## 2021-03-22 PROCEDURE — 1159F PR MEDICATION LIST DOCUMENTED IN MEDICAL RECORD: ICD-10-PCS | Mod: S$GLB,,, | Performed by: INTERNAL MEDICINE

## 2021-03-22 PROCEDURE — 1101F PR PT FALLS ASSESS DOC 0-1 FALLS W/OUT INJ PAST YR: ICD-10-PCS | Mod: CPTII,S$GLB,, | Performed by: INTERNAL MEDICINE

## 2021-03-22 PROCEDURE — 3008F PR BODY MASS INDEX (BMI) DOCUMENTED: ICD-10-PCS | Mod: CPTII,S$GLB,, | Performed by: INTERNAL MEDICINE

## 2021-03-22 PROCEDURE — 1101F PT FALLS ASSESS-DOCD LE1/YR: CPT | Mod: CPTII,S$GLB,, | Performed by: INTERNAL MEDICINE

## 2021-03-22 PROCEDURE — 3288F FALL RISK ASSESSMENT DOCD: CPT | Mod: CPTII,S$GLB,, | Performed by: INTERNAL MEDICINE

## 2021-03-22 PROCEDURE — 71260 CT THORAX DX C+: CPT | Mod: 26,,, | Performed by: RADIOLOGY

## 2021-03-22 PROCEDURE — 1159F MED LIST DOCD IN RCRD: CPT | Mod: S$GLB,,, | Performed by: INTERNAL MEDICINE

## 2021-03-22 PROCEDURE — 25000003 PHARM REV CODE 250: Performed by: INTERNAL MEDICINE

## 2021-03-22 PROCEDURE — A4216 STERILE WATER/SALINE, 10 ML: HCPCS | Performed by: INTERNAL MEDICINE

## 2021-03-22 PROCEDURE — 1126F AMNT PAIN NOTED NONE PRSNT: CPT | Mod: S$GLB,,, | Performed by: INTERNAL MEDICINE

## 2021-03-22 PROCEDURE — 99214 PR OFFICE/OUTPT VISIT, EST, LEVL IV, 30-39 MIN: ICD-10-PCS | Mod: S$GLB,,, | Performed by: INTERNAL MEDICINE

## 2021-03-22 PROCEDURE — 84443 ASSAY THYROID STIM HORMONE: CPT | Performed by: INTERNAL MEDICINE

## 2021-03-22 PROCEDURE — 3078F PR MOST RECENT DIASTOLIC BLOOD PRESSURE < 80 MM HG: ICD-10-PCS | Mod: CPTII,S$GLB,, | Performed by: INTERNAL MEDICINE

## 2021-03-22 PROCEDURE — 71260 CT THORAX DX C+: CPT | Mod: TC

## 2021-03-22 PROCEDURE — 36415 COLL VENOUS BLD VENIPUNCTURE: CPT | Performed by: INTERNAL MEDICINE

## 2021-03-22 PROCEDURE — 3008F BODY MASS INDEX DOCD: CPT | Mod: CPTII,S$GLB,, | Performed by: INTERNAL MEDICINE

## 2021-03-22 PROCEDURE — 25500020 PHARM REV CODE 255: Performed by: INTERNAL MEDICINE

## 2021-03-22 PROCEDURE — 99999 PR PBB SHADOW E&M-EST. PATIENT-LVL IV: CPT | Mod: PBBFAC,,, | Performed by: INTERNAL MEDICINE

## 2021-03-22 PROCEDURE — 99999 PR PBB SHADOW E&M-EST. PATIENT-LVL IV: ICD-10-PCS | Mod: PBBFAC,,, | Performed by: INTERNAL MEDICINE

## 2021-03-22 PROCEDURE — 71260 CT CHEST WITH CONTRAST: ICD-10-PCS | Mod: 26,,, | Performed by: RADIOLOGY

## 2021-03-22 PROCEDURE — 1126F PR PAIN SEVERITY QUANTIFIED, NO PAIN PRESENT: ICD-10-PCS | Mod: S$GLB,,, | Performed by: INTERNAL MEDICINE

## 2021-03-22 PROCEDURE — 80053 COMPREHEN METABOLIC PANEL: CPT | Performed by: INTERNAL MEDICINE

## 2021-03-22 PROCEDURE — 3288F PR FALLS RISK ASSESSMENT DOCUMENTED: ICD-10-PCS | Mod: CPTII,S$GLB,, | Performed by: INTERNAL MEDICINE

## 2021-03-22 PROCEDURE — 3075F PR MOST RECENT SYSTOLIC BLOOD PRESS GE 130-139MM HG: ICD-10-PCS | Mod: CPTII,S$GLB,, | Performed by: INTERNAL MEDICINE

## 2021-03-22 RX ORDER — AMLODIPINE BESYLATE 10 MG/1
10 TABLET ORAL DAILY
Qty: 90 TABLET | Refills: 1 | Status: SHIPPED | OUTPATIENT
Start: 2021-03-22 | End: 2021-10-05 | Stop reason: SDUPTHER

## 2021-03-22 RX ORDER — LEVOTHYROXINE SODIUM 88 UG/1
88 TABLET ORAL
Qty: 30 TABLET | Refills: 6 | Status: SHIPPED | OUTPATIENT
Start: 2021-03-22 | End: 2021-09-21 | Stop reason: SDUPTHER

## 2021-03-22 RX ORDER — HEPARIN 100 UNIT/ML
500 SYRINGE INTRAVENOUS
Status: COMPLETED | OUTPATIENT
Start: 2021-03-22 | End: 2021-03-22

## 2021-03-22 RX ORDER — SODIUM CHLORIDE 0.9 % (FLUSH) 0.9 %
10 SYRINGE (ML) INJECTION
Status: COMPLETED | OUTPATIENT
Start: 2021-03-22 | End: 2021-03-22

## 2021-03-22 RX ADMIN — HEPARIN 500 UNITS: 100 SYRINGE at 08:03

## 2021-03-22 RX ADMIN — IOHEXOL 100 ML: 350 INJECTION, SOLUTION INTRAVENOUS at 10:03

## 2021-03-22 RX ADMIN — Medication 10 ML: at 08:03

## 2021-05-20 ENCOUNTER — OFFICE VISIT (OUTPATIENT)
Dept: FAMILY MEDICINE | Facility: CLINIC | Age: 66
End: 2021-05-20
Payer: COMMERCIAL

## 2021-05-20 VITALS
HEART RATE: 96 BPM | RESPIRATION RATE: 18 BRPM | SYSTOLIC BLOOD PRESSURE: 139 MMHG | WEIGHT: 203.5 LBS | OXYGEN SATURATION: 98 % | HEIGHT: 66 IN | BODY MASS INDEX: 32.71 KG/M2 | DIASTOLIC BLOOD PRESSURE: 69 MMHG

## 2021-05-20 DIAGNOSIS — E03.2 HYPOTHYROIDISM DUE TO MEDICATION: ICD-10-CM

## 2021-05-20 DIAGNOSIS — J32.9 RHINOSINUSITIS: Primary | ICD-10-CM

## 2021-05-20 DIAGNOSIS — I10 HYPERTENSION, UNSPECIFIED TYPE: ICD-10-CM

## 2021-05-20 DIAGNOSIS — R09.81 NASAL CONGESTION: ICD-10-CM

## 2021-05-20 PROCEDURE — 99214 PR OFFICE/OUTPT VISIT, EST, LEVL IV, 30-39 MIN: ICD-10-PCS | Mod: S$GLB,,, | Performed by: FAMILY MEDICINE

## 2021-05-20 PROCEDURE — 1159F PR MEDICATION LIST DOCUMENTED IN MEDICAL RECORD: ICD-10-PCS | Mod: S$GLB,,, | Performed by: FAMILY MEDICINE

## 2021-05-20 PROCEDURE — 3008F PR BODY MASS INDEX (BMI) DOCUMENTED: ICD-10-PCS | Mod: CPTII,S$GLB,, | Performed by: FAMILY MEDICINE

## 2021-05-20 PROCEDURE — 1101F PT FALLS ASSESS-DOCD LE1/YR: CPT | Mod: CPTII,S$GLB,, | Performed by: FAMILY MEDICINE

## 2021-05-20 PROCEDURE — 3288F FALL RISK ASSESSMENT DOCD: CPT | Mod: CPTII,S$GLB,, | Performed by: FAMILY MEDICINE

## 2021-05-20 PROCEDURE — 99999 PR PBB SHADOW E&M-EST. PATIENT-LVL III: CPT | Mod: PBBFAC,,, | Performed by: FAMILY MEDICINE

## 2021-05-20 PROCEDURE — 99999 PR PBB SHADOW E&M-EST. PATIENT-LVL III: ICD-10-PCS | Mod: PBBFAC,,, | Performed by: FAMILY MEDICINE

## 2021-05-20 PROCEDURE — 3288F PR FALLS RISK ASSESSMENT DOCUMENTED: ICD-10-PCS | Mod: CPTII,S$GLB,, | Performed by: FAMILY MEDICINE

## 2021-05-20 PROCEDURE — 99214 OFFICE O/P EST MOD 30 MIN: CPT | Mod: S$GLB,,, | Performed by: FAMILY MEDICINE

## 2021-05-20 PROCEDURE — 1159F MED LIST DOCD IN RCRD: CPT | Mod: S$GLB,,, | Performed by: FAMILY MEDICINE

## 2021-05-20 PROCEDURE — 1101F PR PT FALLS ASSESS DOC 0-1 FALLS W/OUT INJ PAST YR: ICD-10-PCS | Mod: CPTII,S$GLB,, | Performed by: FAMILY MEDICINE

## 2021-05-20 PROCEDURE — 1126F PR PAIN SEVERITY QUANTIFIED, NO PAIN PRESENT: ICD-10-PCS | Mod: S$GLB,,, | Performed by: FAMILY MEDICINE

## 2021-05-20 PROCEDURE — 3008F BODY MASS INDEX DOCD: CPT | Mod: CPTII,S$GLB,, | Performed by: FAMILY MEDICINE

## 2021-05-20 PROCEDURE — 1126F AMNT PAIN NOTED NONE PRSNT: CPT | Mod: S$GLB,,, | Performed by: FAMILY MEDICINE

## 2021-05-20 RX ORDER — GUAIFENESIN 1200 MG/1
1 TABLET, EXTENDED RELEASE ORAL EVERY 12 HOURS PRN
Qty: 20 TABLET | Refills: 0 | Status: SHIPPED | OUTPATIENT
Start: 2021-05-20 | End: 2021-05-30

## 2021-05-20 RX ORDER — DOXYCYCLINE HYCLATE 100 MG
100 TABLET ORAL EVERY 12 HOURS
Qty: 14 TABLET | Refills: 0 | Status: ON HOLD | OUTPATIENT
Start: 2021-05-20 | End: 2022-01-11 | Stop reason: ALTCHOICE

## 2021-07-19 NOTE — TELEPHONE ENCOUNTER
Patient saw Dr Stevens while you were out and she prescribed methimazole 3 x daily for her thyroid. Patient said since then she's itching so much. she is itching so much that it has become unbearable. She said she usually have some itching from her chemo, but take benadryl and it go away. Thinks its the med Dr Stevens gave her. Please advise   Pt attempted to contact writer last week but writer unable to access voice mail.  Attempted to contact patient this morning, left voice message with writer's contact information.    Will attempt another call within 7 days unless patient calls back.    Traci OLIVER [facility]  [facility]  Office: [phone]  E-Mail [email or web address]

## 2021-08-02 ENCOUNTER — TELEPHONE (OUTPATIENT)
Dept: HEMATOLOGY/ONCOLOGY | Facility: CLINIC | Age: 66
End: 2021-08-02

## 2021-09-21 DIAGNOSIS — E03.2 HYPOTHYROIDISM DUE TO MEDICATION: ICD-10-CM

## 2021-09-21 RX ORDER — LEVOTHYROXINE SODIUM 88 UG/1
88 TABLET ORAL
Qty: 30 TABLET | Refills: 6 | Status: SHIPPED | OUTPATIENT
Start: 2021-09-21 | End: 2021-10-05 | Stop reason: DRUGHIGH

## 2021-10-05 ENCOUNTER — OFFICE VISIT (OUTPATIENT)
Dept: HEMATOLOGY/ONCOLOGY | Facility: CLINIC | Age: 66
End: 2021-10-05
Payer: COMMERCIAL

## 2021-10-05 ENCOUNTER — HOSPITAL ENCOUNTER (OUTPATIENT)
Dept: RADIOLOGY | Facility: HOSPITAL | Age: 66
Discharge: HOME | End: 2021-10-05
Attending: INTERNAL MEDICINE
Payer: COMMERCIAL

## 2021-10-05 ENCOUNTER — INFUSION (OUTPATIENT)
Dept: INFUSION THERAPY | Facility: HOSPITAL | Age: 66
End: 2021-10-05
Attending: INTERNAL MEDICINE
Payer: COMMERCIAL

## 2021-10-05 VITALS
RESPIRATION RATE: 18 BRPM | WEIGHT: 199.94 LBS | HEIGHT: 66 IN | HEART RATE: 111 BPM | BODY MASS INDEX: 32.13 KG/M2 | OXYGEN SATURATION: 99 % | DIASTOLIC BLOOD PRESSURE: 79 MMHG | TEMPERATURE: 98 F | SYSTOLIC BLOOD PRESSURE: 169 MMHG

## 2021-10-05 DIAGNOSIS — C34.2 PRIMARY CANCER OF RIGHT MIDDLE LOBE OF LUNG: Primary | ICD-10-CM

## 2021-10-05 DIAGNOSIS — C34.2 PRIMARY CANCER OF RIGHT MIDDLE LOBE OF LUNG: ICD-10-CM

## 2021-10-05 DIAGNOSIS — I10 ESSENTIAL HYPERTENSION: ICD-10-CM

## 2021-10-05 DIAGNOSIS — R21 RASH AND NONSPECIFIC SKIN ERUPTION: ICD-10-CM

## 2021-10-05 DIAGNOSIS — E03.2 HYPOTHYROIDISM DUE TO MEDICATION: ICD-10-CM

## 2021-10-05 DIAGNOSIS — Z85.3 HISTORY OF LEFT BREAST CANCER: ICD-10-CM

## 2021-10-05 LAB
ALBUMIN SERPL BCP-MCNC: 3.4 G/DL (ref 3.5–5.2)
ALP SERPL-CCNC: 82 U/L (ref 55–135)
ALT SERPL W/O P-5'-P-CCNC: 8 U/L (ref 10–44)
ANION GAP SERPL CALC-SCNC: 10 MMOL/L (ref 8–16)
AST SERPL-CCNC: 13 U/L (ref 10–40)
BILIRUB SERPL-MCNC: 0.4 MG/DL (ref 0.1–1)
BUN SERPL-MCNC: 15 MG/DL (ref 8–23)
CALCIUM SERPL-MCNC: 9.3 MG/DL (ref 8.7–10.5)
CHLORIDE SERPL-SCNC: 105 MMOL/L (ref 95–110)
CO2 SERPL-SCNC: 21 MMOL/L (ref 23–29)
CREAT SERPL-MCNC: 0.9 MG/DL (ref 0.5–1.4)
ERYTHROCYTE [DISTWIDTH] IN BLOOD BY AUTOMATED COUNT: 15.5 % (ref 11.5–14.5)
EST. GFR  (AFRICAN AMERICAN): >60 ML/MIN/1.73 M^2
EST. GFR  (NON AFRICAN AMERICAN): >60 ML/MIN/1.73 M^2
GLUCOSE SERPL-MCNC: 116 MG/DL (ref 70–110)
HCT VFR BLD AUTO: 34.7 % (ref 37–48.5)
HGB BLD-MCNC: 11 G/DL (ref 12–16)
IMM GRANULOCYTES # BLD AUTO: 0.02 K/UL (ref 0–0.04)
MCH RBC QN AUTO: 27.3 PG (ref 27–31)
MCHC RBC AUTO-ENTMCNC: 31.7 G/DL (ref 32–36)
MCV RBC AUTO: 86 FL (ref 82–98)
NEUTROPHILS # BLD AUTO: 4.1 K/UL (ref 1.8–7.7)
PLATELET # BLD AUTO: 297 K/UL (ref 150–450)
PMV BLD AUTO: 9.9 FL (ref 9.2–12.9)
POTASSIUM SERPL-SCNC: 4 MMOL/L (ref 3.5–5.1)
PROT SERPL-MCNC: 7.6 G/DL (ref 6–8.4)
RBC # BLD AUTO: 4.03 M/UL (ref 4–5.4)
SODIUM SERPL-SCNC: 136 MMOL/L (ref 136–145)
T4 FREE SERPL-MCNC: 0.71 NG/DL (ref 0.71–1.51)
TSH SERPL DL<=0.005 MIU/L-ACNC: 10.1 UIU/ML (ref 0.4–4)
WBC # BLD AUTO: 5.91 K/UL (ref 3.9–12.7)

## 2021-10-05 PROCEDURE — 3008F PR BODY MASS INDEX (BMI) DOCUMENTED: ICD-10-PCS | Mod: CPTII,S$GLB,, | Performed by: INTERNAL MEDICINE

## 2021-10-05 PROCEDURE — 36591 DRAW BLOOD OFF VENOUS DEVICE: CPT

## 2021-10-05 PROCEDURE — 99999 PR PBB SHADOW E&M-EST. PATIENT-LVL IV: CPT | Mod: PBBFAC,,, | Performed by: INTERNAL MEDICINE

## 2021-10-05 PROCEDURE — 1159F MED LIST DOCD IN RCRD: CPT | Mod: CPTII,S$GLB,, | Performed by: INTERNAL MEDICINE

## 2021-10-05 PROCEDURE — 1126F AMNT PAIN NOTED NONE PRSNT: CPT | Mod: CPTII,S$GLB,, | Performed by: INTERNAL MEDICINE

## 2021-10-05 PROCEDURE — 3288F FALL RISK ASSESSMENT DOCD: CPT | Mod: CPTII,S$GLB,, | Performed by: INTERNAL MEDICINE

## 2021-10-05 PROCEDURE — 99999 PR PBB SHADOW E&M-EST. PATIENT-LVL IV: ICD-10-PCS | Mod: PBBFAC,,, | Performed by: INTERNAL MEDICINE

## 2021-10-05 PROCEDURE — 80053 COMPREHEN METABOLIC PANEL: CPT | Performed by: INTERNAL MEDICINE

## 2021-10-05 PROCEDURE — 3078F DIAST BP <80 MM HG: CPT | Mod: CPTII,S$GLB,, | Performed by: INTERNAL MEDICINE

## 2021-10-05 PROCEDURE — 74177 CT ABD & PELVIS W/CONTRAST: CPT | Mod: TC

## 2021-10-05 PROCEDURE — 1126F PR PAIN SEVERITY QUANTIFIED, NO PAIN PRESENT: ICD-10-PCS | Mod: CPTII,S$GLB,, | Performed by: INTERNAL MEDICINE

## 2021-10-05 PROCEDURE — A4216 STERILE WATER/SALINE, 10 ML: HCPCS | Performed by: INTERNAL MEDICINE

## 2021-10-05 PROCEDURE — 25500020 PHARM REV CODE 255: Performed by: INTERNAL MEDICINE

## 2021-10-05 PROCEDURE — 84439 ASSAY OF FREE THYROXINE: CPT | Performed by: INTERNAL MEDICINE

## 2021-10-05 PROCEDURE — 3288F PR FALLS RISK ASSESSMENT DOCUMENTED: ICD-10-PCS | Mod: CPTII,S$GLB,, | Performed by: INTERNAL MEDICINE

## 2021-10-05 PROCEDURE — 1101F PR PT FALLS ASSESS DOC 0-1 FALLS W/OUT INJ PAST YR: ICD-10-PCS | Mod: CPTII,S$GLB,, | Performed by: INTERNAL MEDICINE

## 2021-10-05 PROCEDURE — 71260 CT THORAX DX C+: CPT | Mod: TC

## 2021-10-05 PROCEDURE — 3077F SYST BP >= 140 MM HG: CPT | Mod: CPTII,S$GLB,, | Performed by: INTERNAL MEDICINE

## 2021-10-05 PROCEDURE — 85027 COMPLETE CBC AUTOMATED: CPT | Performed by: INTERNAL MEDICINE

## 2021-10-05 PROCEDURE — 1159F PR MEDICATION LIST DOCUMENTED IN MEDICAL RECORD: ICD-10-PCS | Mod: CPTII,S$GLB,, | Performed by: INTERNAL MEDICINE

## 2021-10-05 PROCEDURE — 74177 CT CHEST ABDOMEN PELVIS WITH CONTRAST (XPD): ICD-10-PCS | Mod: 26,,, | Performed by: RADIOLOGY

## 2021-10-05 PROCEDURE — 1101F PT FALLS ASSESS-DOCD LE1/YR: CPT | Mod: CPTII,S$GLB,, | Performed by: INTERNAL MEDICINE

## 2021-10-05 PROCEDURE — 3077F PR MOST RECENT SYSTOLIC BLOOD PRESSURE >= 140 MM HG: ICD-10-PCS | Mod: CPTII,S$GLB,, | Performed by: INTERNAL MEDICINE

## 2021-10-05 PROCEDURE — 71260 CT THORAX DX C+: CPT | Mod: 26,,, | Performed by: RADIOLOGY

## 2021-10-05 PROCEDURE — 25000003 PHARM REV CODE 250: Performed by: INTERNAL MEDICINE

## 2021-10-05 PROCEDURE — 84443 ASSAY THYROID STIM HORMONE: CPT | Performed by: INTERNAL MEDICINE

## 2021-10-05 PROCEDURE — 99214 PR OFFICE/OUTPT VISIT, EST, LEVL IV, 30-39 MIN: ICD-10-PCS | Mod: S$GLB,,, | Performed by: INTERNAL MEDICINE

## 2021-10-05 PROCEDURE — 74177 CT ABD & PELVIS W/CONTRAST: CPT | Mod: 26,,, | Performed by: RADIOLOGY

## 2021-10-05 PROCEDURE — 71260 CT CHEST ABDOMEN PELVIS WITH CONTRAST (XPD): ICD-10-PCS | Mod: 26,,, | Performed by: RADIOLOGY

## 2021-10-05 PROCEDURE — 99214 OFFICE O/P EST MOD 30 MIN: CPT | Mod: S$GLB,,, | Performed by: INTERNAL MEDICINE

## 2021-10-05 PROCEDURE — 3078F PR MOST RECENT DIASTOLIC BLOOD PRESSURE < 80 MM HG: ICD-10-PCS | Mod: CPTII,S$GLB,, | Performed by: INTERNAL MEDICINE

## 2021-10-05 PROCEDURE — 63600175 PHARM REV CODE 636 W HCPCS: Performed by: INTERNAL MEDICINE

## 2021-10-05 PROCEDURE — 3008F BODY MASS INDEX DOCD: CPT | Mod: CPTII,S$GLB,, | Performed by: INTERNAL MEDICINE

## 2021-10-05 RX ORDER — AMLODIPINE BESYLATE 10 MG/1
10 TABLET ORAL DAILY
Qty: 90 TABLET | Refills: 3 | Status: SHIPPED | OUTPATIENT
Start: 2021-10-05 | End: 2022-07-06

## 2021-10-05 RX ORDER — SODIUM CHLORIDE 0.9 % (FLUSH) 0.9 %
10 SYRINGE (ML) INJECTION
Status: COMPLETED | OUTPATIENT
Start: 2021-10-05 | End: 2021-10-05

## 2021-10-05 RX ORDER — HEPARIN 100 UNIT/ML
500 SYRINGE INTRAVENOUS
Status: COMPLETED | OUTPATIENT
Start: 2021-10-05 | End: 2021-10-05

## 2021-10-05 RX ORDER — LEVOTHYROXINE SODIUM 100 UG/1
100 TABLET ORAL DAILY
Qty: 30 TABLET | Refills: 11 | Status: SHIPPED | OUTPATIENT
Start: 2021-10-05 | End: 2022-09-30

## 2021-10-05 RX ADMIN — Medication 10 ML: at 08:10

## 2021-10-05 RX ADMIN — HEPARIN 500 UNITS: 100 SYRINGE at 08:10

## 2021-10-05 RX ADMIN — IOHEXOL 100 ML: 350 INJECTION, SOLUTION INTRAVENOUS at 10:10

## 2021-10-06 LAB
CREAT SERPL-MCNC: 0.9 MG/DL (ref 0.5–1.4)
SAMPLE: NORMAL

## 2021-11-17 ENCOUNTER — TELEPHONE (OUTPATIENT)
Dept: HEMATOLOGY/ONCOLOGY | Facility: CLINIC | Age: 66
End: 2021-11-17
Payer: COMMERCIAL

## 2021-11-29 ENCOUNTER — TELEPHONE (OUTPATIENT)
Dept: HEMATOLOGY/ONCOLOGY | Facility: CLINIC | Age: 66
End: 2021-11-29
Payer: COMMERCIAL

## 2021-11-30 ENCOUNTER — HOSPITAL ENCOUNTER (OUTPATIENT)
Dept: RADIOLOGY | Facility: HOSPITAL | Age: 66
Discharge: HOME | End: 2021-11-30
Attending: INTERNAL MEDICINE
Payer: COMMERCIAL

## 2021-11-30 ENCOUNTER — OFFICE VISIT (OUTPATIENT)
Dept: HEMATOLOGY/ONCOLOGY | Facility: CLINIC | Age: 66
End: 2021-11-30
Payer: COMMERCIAL

## 2021-11-30 VITALS
WEIGHT: 197.75 LBS | OXYGEN SATURATION: 98 % | HEIGHT: 66 IN | HEART RATE: 96 BPM | RESPIRATION RATE: 16 BRPM | SYSTOLIC BLOOD PRESSURE: 127 MMHG | DIASTOLIC BLOOD PRESSURE: 60 MMHG | BODY MASS INDEX: 31.78 KG/M2

## 2021-11-30 DIAGNOSIS — M79.89 LEFT ARM SWELLING: ICD-10-CM

## 2021-11-30 DIAGNOSIS — I82.90 ACUTE DEEP VEIN THROMBOSIS (DVT) OF NON-EXTREMITY VEIN: ICD-10-CM

## 2021-11-30 DIAGNOSIS — Z85.3 HISTORY OF LEFT BREAST CANCER: ICD-10-CM

## 2021-11-30 DIAGNOSIS — C34.2 PRIMARY CANCER OF RIGHT MIDDLE LOBE OF LUNG: Primary | ICD-10-CM

## 2021-11-30 PROCEDURE — 99999 PR PBB SHADOW E&M-EST. PATIENT-LVL III: CPT | Mod: PBBFAC,,, | Performed by: INTERNAL MEDICINE

## 2021-11-30 PROCEDURE — 93970 US UPPER EXTREMITY VEINS LEFT: ICD-10-PCS | Mod: 26,LT,, | Performed by: RADIOLOGY

## 2021-11-30 PROCEDURE — 99999 PR PBB SHADOW E&M-EST. PATIENT-LVL III: ICD-10-PCS | Mod: PBBFAC,,, | Performed by: INTERNAL MEDICINE

## 2021-11-30 PROCEDURE — 93971 EXTREMITY STUDY: CPT | Mod: TC,LT

## 2021-11-30 PROCEDURE — 93971 EXTREMITY STUDY: CPT | Mod: TC,RT

## 2021-11-30 PROCEDURE — 93971 US UPPER EXTREMITY VEINS RIGHT: ICD-10-PCS | Mod: 26,RT,, | Performed by: RADIOLOGY

## 2021-11-30 PROCEDURE — 99213 PR OFFICE/OUTPT VISIT, EST, LEVL III, 20-29 MIN: ICD-10-PCS | Mod: S$GLB,,, | Performed by: INTERNAL MEDICINE

## 2021-11-30 PROCEDURE — 93971 EXTREMITY STUDY: CPT | Mod: 26,RT,, | Performed by: RADIOLOGY

## 2021-11-30 PROCEDURE — 93970 EXTREMITY STUDY: CPT | Mod: 26,LT,, | Performed by: RADIOLOGY

## 2021-11-30 PROCEDURE — 99213 OFFICE O/P EST LOW 20 MIN: CPT | Mod: S$GLB,,, | Performed by: INTERNAL MEDICINE

## 2021-12-01 DIAGNOSIS — I82.90 ACUTE DEEP VEIN THROMBOSIS (DVT) OF NON-EXTREMITY VEIN: Primary | ICD-10-CM

## 2021-12-01 DIAGNOSIS — I82.90 ACUTE DEEP VEIN THROMBOSIS (DVT) OF NON-EXTREMITY VEIN: ICD-10-CM

## 2021-12-20 DIAGNOSIS — I82.90 ACUTE DEEP VEIN THROMBOSIS (DVT) OF NON-EXTREMITY VEIN: ICD-10-CM

## 2022-01-06 ENCOUNTER — HOSPITAL ENCOUNTER (INPATIENT)
Facility: HOSPITAL | Age: 67
LOS: 9 days | Discharge: HOME | DRG: 177 | End: 2022-01-15
Attending: EMERGENCY MEDICINE | Admitting: EMERGENCY MEDICINE
Payer: COMMERCIAL

## 2022-01-06 DIAGNOSIS — U07.1 COVID-19: ICD-10-CM

## 2022-01-06 DIAGNOSIS — R09.02 HYPOXIA: ICD-10-CM

## 2022-01-06 DIAGNOSIS — U07.1 PNEUMONIA DUE TO COVID-19 VIRUS: Primary | ICD-10-CM

## 2022-01-06 DIAGNOSIS — J12.82 PNEUMONIA DUE TO COVID-19 VIRUS: Primary | ICD-10-CM

## 2022-01-06 DIAGNOSIS — I48.91 ATRIAL FIBRILLATION, UNSPECIFIED TYPE: ICD-10-CM

## 2022-01-06 DIAGNOSIS — I48.91 A-FIB: ICD-10-CM

## 2022-01-06 DIAGNOSIS — R00.0 TACHYCARDIA: ICD-10-CM

## 2022-01-06 PROBLEM — Z85.118 HISTORY OF LUNG CANCER: Status: ACTIVE | Noted: 2022-01-06

## 2022-01-06 PROBLEM — Z79.01 CHRONIC ANTICOAGULATION: Status: ACTIVE | Noted: 2022-01-06

## 2022-01-06 PROBLEM — Z86.718 HISTORY OF DVT (DEEP VEIN THROMBOSIS): Status: ACTIVE | Noted: 2022-01-06

## 2022-01-06 PROBLEM — R91.1 LUNG NODULE: Status: ACTIVE | Noted: 2022-01-06

## 2022-01-06 LAB
ALBUMIN SERPL BCP-MCNC: 3.1 G/DL (ref 3.5–5.2)
ALLENS TEST: ABNORMAL
ALP SERPL-CCNC: 66 U/L (ref 55–135)
ALT SERPL W/O P-5'-P-CCNC: 23 U/L (ref 10–44)
ANION GAP SERPL CALC-SCNC: 12 MMOL/L (ref 8–16)
AST SERPL-CCNC: 42 U/L (ref 10–40)
BASOPHILS # BLD AUTO: 0.01 K/UL (ref 0–0.2)
BASOPHILS NFR BLD: 0.1 % (ref 0–1.9)
BILIRUB SERPL-MCNC: 0.6 MG/DL (ref 0.1–1)
BNP SERPL-MCNC: 109 PG/ML (ref 0–99)
BUN SERPL-MCNC: 17 MG/DL (ref 8–23)
CALCIUM SERPL-MCNC: 8.9 MG/DL (ref 8.7–10.5)
CHLORIDE SERPL-SCNC: 104 MMOL/L (ref 95–110)
CK SERPL-CCNC: 147 U/L (ref 20–180)
CO2 SERPL-SCNC: 22 MMOL/L (ref 23–29)
CREAT SERPL-MCNC: 1.1 MG/DL (ref 0.5–1.4)
CRP SERPL-MCNC: 108.4 MG/L (ref 0–8.2)
CTP QC/QA: YES
D DIMER PPP IA.FEU-MCNC: 0.5 MG/L FEU
DELSYS: ABNORMAL
DIFFERENTIAL METHOD: ABNORMAL
EOSINOPHIL # BLD AUTO: 0 K/UL (ref 0–0.5)
EOSINOPHIL NFR BLD: 0 % (ref 0–8)
ERYTHROCYTE [DISTWIDTH] IN BLOOD BY AUTOMATED COUNT: 14.8 % (ref 11.5–14.5)
ERYTHROCYTE [SEDIMENTATION RATE] IN BLOOD BY WESTERGREN METHOD: >120 MM/HR (ref 0–36)
EST. GFR  (AFRICAN AMERICAN): 59.6 ML/MIN/1.73 M^2
EST. GFR  (NON AFRICAN AMERICAN): 51.7 ML/MIN/1.73 M^2
FERRITIN SERPL-MCNC: 1287 NG/ML (ref 20–300)
GLUCOSE SERPL-MCNC: 152 MG/DL (ref 70–110)
HCO3 UR-SCNC: 19 MMOL/L (ref 24–28)
HCT VFR BLD AUTO: 36 % (ref 37–48.5)
HGB BLD-MCNC: 11.5 G/DL (ref 12–16)
IMM GRANULOCYTES # BLD AUTO: 0.03 K/UL (ref 0–0.04)
IMM GRANULOCYTES NFR BLD AUTO: 0.4 % (ref 0–0.5)
LACTATE SERPL-SCNC: 2 MMOL/L (ref 0.5–2.2)
LDH SERPL L TO P-CCNC: 317 U/L (ref 110–260)
LYMPHOCYTES # BLD AUTO: 1.1 K/UL (ref 1–4.8)
LYMPHOCYTES NFR BLD: 16.8 % (ref 18–48)
MCH RBC QN AUTO: 27.3 PG (ref 27–31)
MCHC RBC AUTO-ENTMCNC: 31.9 G/DL (ref 32–36)
MCV RBC AUTO: 86 FL (ref 82–98)
MONOCYTES # BLD AUTO: 0.3 K/UL (ref 0.3–1)
MONOCYTES NFR BLD: 4 % (ref 4–15)
NEUTROPHILS # BLD AUTO: 5.3 K/UL (ref 1.8–7.7)
NEUTROPHILS NFR BLD: 78.7 % (ref 38–73)
NRBC BLD-RTO: 0 /100 WBC
PCO2 BLDA: 24.7 MMHG (ref 35–45)
PH SMN: 7.49 [PH] (ref 7.35–7.45)
PLATELET # BLD AUTO: 216 K/UL (ref 150–450)
PMV BLD AUTO: 10.7 FL (ref 9.2–12.9)
PO2 BLDA: 70 MMHG (ref 80–100)
POC BE: -4 MMOL/L
POC SATURATED O2: 96 % (ref 95–100)
POC TCO2: 20 MMOL/L (ref 23–27)
POTASSIUM SERPL-SCNC: 3.7 MMOL/L (ref 3.5–5.1)
PROCALCITONIN SERPL IA-MCNC: 0.19 NG/ML
PROT SERPL-MCNC: 7.9 G/DL (ref 6–8.4)
RBC # BLD AUTO: 4.21 M/UL (ref 4–5.4)
SAMPLE: ABNORMAL
SARS-COV-2 RDRP RESP QL NAA+PROBE: POSITIVE
SITE: ABNORMAL
SODIUM SERPL-SCNC: 138 MMOL/L (ref 136–145)
TROPONIN I SERPL DL<=0.01 NG/ML-MCNC: 0.01 NG/ML (ref 0–0.03)
WBC # BLD AUTO: 6.68 K/UL (ref 3.9–12.7)

## 2022-01-06 PROCEDURE — 63600175 PHARM REV CODE 636 W HCPCS: Performed by: STUDENT IN AN ORGANIZED HEALTH CARE EDUCATION/TRAINING PROGRAM

## 2022-01-06 PROCEDURE — 84145 PROCALCITONIN (PCT): CPT | Performed by: INTERNAL MEDICINE

## 2022-01-06 PROCEDURE — 85379 FIBRIN DEGRADATION QUANT: CPT | Performed by: INTERNAL MEDICINE

## 2022-01-06 PROCEDURE — 83605 ASSAY OF LACTIC ACID: CPT | Performed by: STUDENT IN AN ORGANIZED HEALTH CARE EDUCATION/TRAINING PROGRAM

## 2022-01-06 PROCEDURE — 99900035 HC TECH TIME PER 15 MIN (STAT)

## 2022-01-06 PROCEDURE — 85652 RBC SED RATE AUTOMATED: CPT | Performed by: INTERNAL MEDICINE

## 2022-01-06 PROCEDURE — 80053 COMPREHEN METABOLIC PANEL: CPT | Performed by: STUDENT IN AN ORGANIZED HEALTH CARE EDUCATION/TRAINING PROGRAM

## 2022-01-06 PROCEDURE — 93010 EKG 12-LEAD: ICD-10-PCS | Mod: ,,, | Performed by: INTERNAL MEDICINE

## 2022-01-06 PROCEDURE — 82728 ASSAY OF FERRITIN: CPT | Performed by: STUDENT IN AN ORGANIZED HEALTH CARE EDUCATION/TRAINING PROGRAM

## 2022-01-06 PROCEDURE — 85025 COMPLETE CBC W/AUTO DIFF WBC: CPT | Performed by: STUDENT IN AN ORGANIZED HEALTH CARE EDUCATION/TRAINING PROGRAM

## 2022-01-06 PROCEDURE — 36600 WITHDRAWAL OF ARTERIAL BLOOD: CPT

## 2022-01-06 PROCEDURE — 25000003 PHARM REV CODE 250: Performed by: INTERNAL MEDICINE

## 2022-01-06 PROCEDURE — 99285 EMERGENCY DEPT VISIT HI MDM: CPT | Mod: 25

## 2022-01-06 PROCEDURE — 94761 N-INVAS EAR/PLS OXIMETRY MLT: CPT

## 2022-01-06 PROCEDURE — U0002 COVID-19 LAB TEST NON-CDC: HCPCS | Performed by: EMERGENCY MEDICINE

## 2022-01-06 PROCEDURE — 93010 ELECTROCARDIOGRAM REPORT: CPT | Mod: ,,, | Performed by: INTERNAL MEDICINE

## 2022-01-06 PROCEDURE — 80047 BASIC METABLC PNL IONIZED CA: CPT

## 2022-01-06 PROCEDURE — 12000002 HC ACUTE/MED SURGE SEMI-PRIVATE ROOM

## 2022-01-06 PROCEDURE — 83880 ASSAY OF NATRIURETIC PEPTIDE: CPT | Performed by: INTERNAL MEDICINE

## 2022-01-06 PROCEDURE — 86140 C-REACTIVE PROTEIN: CPT | Performed by: STUDENT IN AN ORGANIZED HEALTH CARE EDUCATION/TRAINING PROGRAM

## 2022-01-06 PROCEDURE — C9399 UNCLASSIFIED DRUGS OR BIOLOG: HCPCS | Performed by: INTERNAL MEDICINE

## 2022-01-06 PROCEDURE — 63600175 PHARM REV CODE 636 W HCPCS: Performed by: INTERNAL MEDICINE

## 2022-01-06 PROCEDURE — 84484 ASSAY OF TROPONIN QUANT: CPT | Performed by: STUDENT IN AN ORGANIZED HEALTH CARE EDUCATION/TRAINING PROGRAM

## 2022-01-06 PROCEDURE — 25000003 PHARM REV CODE 250: Performed by: STUDENT IN AN ORGANIZED HEALTH CARE EDUCATION/TRAINING PROGRAM

## 2022-01-06 PROCEDURE — 82550 ASSAY OF CK (CPK): CPT | Performed by: STUDENT IN AN ORGANIZED HEALTH CARE EDUCATION/TRAINING PROGRAM

## 2022-01-06 PROCEDURE — 27100171 HC OXYGEN HIGH FLOW UP TO 24 HOURS

## 2022-01-06 PROCEDURE — 93005 ELECTROCARDIOGRAM TRACING: CPT

## 2022-01-06 PROCEDURE — 82803 BLOOD GASES ANY COMBINATION: CPT

## 2022-01-06 PROCEDURE — 99291 CRITICAL CARE FIRST HOUR: CPT | Mod: CS,,, | Performed by: EMERGENCY MEDICINE

## 2022-01-06 PROCEDURE — 83615 LACTATE (LD) (LDH) ENZYME: CPT | Performed by: STUDENT IN AN ORGANIZED HEALTH CARE EDUCATION/TRAINING PROGRAM

## 2022-01-06 PROCEDURE — 99291 PR CRITICAL CARE, E/M 30-74 MINUTES: ICD-10-PCS | Mod: CS,,, | Performed by: EMERGENCY MEDICINE

## 2022-01-06 PROCEDURE — 27000207 HC ISOLATION

## 2022-01-06 PROCEDURE — 87040 BLOOD CULTURE FOR BACTERIA: CPT | Performed by: INTERNAL MEDICINE

## 2022-01-06 PROCEDURE — 96374 THER/PROPH/DIAG INJ IV PUSH: CPT

## 2022-01-06 PROCEDURE — 86803 HEPATITIS C AB TEST: CPT | Performed by: EMERGENCY MEDICINE

## 2022-01-06 RX ORDER — IBUPROFEN 200 MG
16 TABLET ORAL
Status: DISCONTINUED | OUTPATIENT
Start: 2022-01-06 | End: 2022-01-07

## 2022-01-06 RX ORDER — GLUCAGON 1 MG
1 KIT INJECTION
Status: DISCONTINUED | OUTPATIENT
Start: 2022-01-06 | End: 2022-01-07

## 2022-01-06 RX ORDER — PANTOPRAZOLE SODIUM 40 MG/1
40 TABLET, DELAYED RELEASE ORAL DAILY
Refills: 11 | Status: DISCONTINUED | OUTPATIENT
Start: 2022-01-07 | End: 2022-01-15 | Stop reason: HOSPADM

## 2022-01-06 RX ORDER — CHOLECALCIFEROL (VITAMIN D3) 25 MCG
1000 TABLET ORAL DAILY
Status: DISCONTINUED | OUTPATIENT
Start: 2022-01-07 | End: 2022-01-15 | Stop reason: HOSPADM

## 2022-01-06 RX ORDER — ASCORBIC ACID 500 MG
500 TABLET ORAL 2 TIMES DAILY
Status: DISCONTINUED | OUTPATIENT
Start: 2022-01-06 | End: 2022-01-07

## 2022-01-06 RX ORDER — IPRATROPIUM BROMIDE AND ALBUTEROL SULFATE 2.5; .5 MG/3ML; MG/3ML
3 SOLUTION RESPIRATORY (INHALATION) EVERY 4 HOURS PRN
Status: DISCONTINUED | OUTPATIENT
Start: 2022-01-06 | End: 2022-01-15 | Stop reason: HOSPADM

## 2022-01-06 RX ORDER — TALC
6 POWDER (GRAM) TOPICAL NIGHTLY PRN
Status: DISCONTINUED | OUTPATIENT
Start: 2022-01-06 | End: 2022-01-15 | Stop reason: HOSPADM

## 2022-01-06 RX ORDER — FUROSEMIDE 10 MG/ML
80 INJECTION INTRAMUSCULAR; INTRAVENOUS ONCE
Status: COMPLETED | OUTPATIENT
Start: 2022-01-06 | End: 2022-01-06

## 2022-01-06 RX ORDER — SODIUM CHLORIDE 0.9 % (FLUSH) 0.9 %
10 SYRINGE (ML) INJECTION EVERY 6 HOURS PRN
Status: DISCONTINUED | OUTPATIENT
Start: 2022-01-06 | End: 2022-01-15 | Stop reason: HOSPADM

## 2022-01-06 RX ORDER — LOPERAMIDE HYDROCHLORIDE 2 MG/1
2 CAPSULE ORAL EVERY 6 HOURS PRN
Status: DISCONTINUED | OUTPATIENT
Start: 2022-01-06 | End: 2022-01-07

## 2022-01-06 RX ORDER — ENOXAPARIN SODIUM 100 MG/ML
1 INJECTION SUBCUTANEOUS 2 TIMES DAILY
Status: DISCONTINUED | OUTPATIENT
Start: 2022-01-06 | End: 2022-01-10

## 2022-01-06 RX ORDER — BENZONATATE 100 MG/1
100 CAPSULE ORAL 3 TIMES DAILY PRN
Status: DISCONTINUED | OUTPATIENT
Start: 2022-01-06 | End: 2022-01-07

## 2022-01-06 RX ORDER — ACETAMINOPHEN 325 MG/1
650 TABLET ORAL EVERY 4 HOURS PRN
Status: DISCONTINUED | OUTPATIENT
Start: 2022-01-06 | End: 2022-01-06

## 2022-01-06 RX ORDER — SODIUM CHLORIDE 0.9 % (FLUSH) 0.9 %
10 SYRINGE (ML) INJECTION
Status: DISCONTINUED | OUTPATIENT
Start: 2022-01-06 | End: 2022-01-07

## 2022-01-06 RX ORDER — ONDANSETRON 8 MG/1
8 TABLET, ORALLY DISINTEGRATING ORAL EVERY 8 HOURS PRN
Status: DISCONTINUED | OUTPATIENT
Start: 2022-01-06 | End: 2022-01-15 | Stop reason: HOSPADM

## 2022-01-06 RX ORDER — INSULIN ASPART 100 [IU]/ML
0-5 INJECTION, SOLUTION INTRAVENOUS; SUBCUTANEOUS
Status: DISCONTINUED | OUTPATIENT
Start: 2022-01-06 | End: 2022-01-09

## 2022-01-06 RX ORDER — LEVOTHYROXINE SODIUM 100 UG/1
100 TABLET ORAL DAILY
Status: DISCONTINUED | OUTPATIENT
Start: 2022-01-07 | End: 2022-01-15 | Stop reason: HOSPADM

## 2022-01-06 RX ORDER — ALBUTEROL SULFATE 90 UG/1
2 AEROSOL, METERED RESPIRATORY (INHALATION) EVERY 6 HOURS
Status: DISCONTINUED | OUTPATIENT
Start: 2022-01-07 | End: 2022-01-06

## 2022-01-06 RX ORDER — ACETAMINOPHEN 325 MG/1
650 TABLET ORAL EVERY 8 HOURS PRN
Status: DISCONTINUED | OUTPATIENT
Start: 2022-01-06 | End: 2022-01-06

## 2022-01-06 RX ORDER — IBUPROFEN 200 MG
24 TABLET ORAL
Status: DISCONTINUED | OUTPATIENT
Start: 2022-01-06 | End: 2022-01-07

## 2022-01-06 RX ORDER — POLYETHYLENE GLYCOL 3350 17 G/17G
17 POWDER, FOR SOLUTION ORAL DAILY
Status: DISCONTINUED | OUTPATIENT
Start: 2022-01-07 | End: 2022-01-15 | Stop reason: HOSPADM

## 2022-01-06 RX ORDER — ACETAMINOPHEN 500 MG
1000 TABLET ORAL EVERY 8 HOURS PRN
Status: DISCONTINUED | OUTPATIENT
Start: 2022-01-06 | End: 2022-01-15 | Stop reason: HOSPADM

## 2022-01-06 RX ORDER — DEXAMETHASONE SODIUM PHOSPHATE 4 MG/ML
8 INJECTION, SOLUTION INTRA-ARTICULAR; INTRALESIONAL; INTRAMUSCULAR; INTRAVENOUS; SOFT TISSUE
Status: COMPLETED | OUTPATIENT
Start: 2022-01-06 | End: 2022-01-06

## 2022-01-06 RX ORDER — ACETAMINOPHEN 325 MG/1
650 TABLET ORAL
Status: COMPLETED | OUTPATIENT
Start: 2022-01-06 | End: 2022-01-06

## 2022-01-06 RX ADMIN — ACETAMINOPHEN 650 MG: 325 TABLET ORAL at 06:01

## 2022-01-06 RX ADMIN — Medication 500 MG: at 08:01

## 2022-01-06 RX ADMIN — ENOXAPARIN SODIUM 90 MG: 100 INJECTION SUBCUTANEOUS at 08:01

## 2022-01-06 RX ADMIN — CEFTRIAXONE 1 G: 1 INJECTION, POWDER, FOR SOLUTION INTRAMUSCULAR; INTRAVENOUS at 09:01

## 2022-01-06 RX ADMIN — REMDESIVIR 200 MG: 100 INJECTION, POWDER, LYOPHILIZED, FOR SOLUTION INTRAVENOUS at 08:01

## 2022-01-06 RX ADMIN — DEXAMETHASONE SODIUM PHOSPHATE 8 MG: 4 INJECTION INTRA-ARTICULAR; INTRALESIONAL; INTRAMUSCULAR; INTRAVENOUS; SOFT TISSUE at 05:01

## 2022-01-06 RX ADMIN — AZITHROMYCIN MONOHYDRATE 500 MG: 500 INJECTION, POWDER, LYOPHILIZED, FOR SOLUTION INTRAVENOUS at 10:01

## 2022-01-06 RX ADMIN — FUROSEMIDE 80 MG: 10 INJECTION, SOLUTION INTRAMUSCULAR; INTRAVENOUS at 11:01

## 2022-01-06 NOTE — Clinical Note
Is this patient a high probability for COVID-19?: Yes   Diagnosis: COVID-19 [[id number]]   Admitting Provider:: DENNY DOMINGUEZ [[id number]]   Future Attending Provider: JAS WEATHERS [[id number]]   Reason for IP Medical Treatment  (Clinical interventions that can only be accomplished in the IP setting? ) :: hypoxemia   Estimated Length of Stay:: 2 midnights   I certify that Inpatient services for greater than or equal to 2 midnights are medically necessary:: Yes   Plans for Post-Acute care--if anticipated (pick the single best option):: A. No post acute care anticipated at this time

## 2022-01-07 PROBLEM — R73.9 ACUTE HYPERGLYCEMIA: Status: ACTIVE | Noted: 2022-01-07

## 2022-01-07 LAB
ALBUMIN SERPL BCP-MCNC: 3 G/DL (ref 3.5–5.2)
ALLENS TEST: ABNORMAL
ALP SERPL-CCNC: 63 U/L (ref 55–135)
ALT SERPL W/O P-5'-P-CCNC: 31 U/L (ref 10–44)
ANION GAP SERPL CALC-SCNC: 15 MMOL/L (ref 8–16)
AST SERPL-CCNC: 56 U/L (ref 10–40)
BASOPHILS # BLD AUTO: 0.01 K/UL (ref 0–0.2)
BASOPHILS NFR BLD: 0.2 % (ref 0–1.9)
BILIRUB SERPL-MCNC: 0.3 MG/DL (ref 0.1–1)
BUN SERPL-MCNC: 17 MG/DL (ref 8–23)
CALCIUM SERPL-MCNC: 8.2 MG/DL (ref 8.7–10.5)
CHLORIDE SERPL-SCNC: 103 MMOL/L (ref 95–110)
CO2 SERPL-SCNC: 19 MMOL/L (ref 23–29)
CREAT SERPL-MCNC: 1.2 MG/DL (ref 0.5–1.4)
DIFFERENTIAL METHOD: ABNORMAL
EOSINOPHIL # BLD AUTO: 0 K/UL (ref 0–0.5)
EOSINOPHIL NFR BLD: 0 % (ref 0–8)
ERYTHROCYTE [DISTWIDTH] IN BLOOD BY AUTOMATED COUNT: 14.6 % (ref 11.5–14.5)
EST. GFR  (AFRICAN AMERICAN): 53.7 ML/MIN/1.73 M^2
EST. GFR  (NON AFRICAN AMERICAN): 46.5 ML/MIN/1.73 M^2
ESTIMATED AVG GLUCOSE: 154 MG/DL (ref 68–131)
GLUCOSE SERPL-MCNC: 310 MG/DL (ref 70–110)
HBA1C MFR BLD: 7 % (ref 4–5.6)
HCO3 UR-SCNC: 24.2 MMOL/L (ref 24–28)
HCT VFR BLD AUTO: 36.1 % (ref 37–48.5)
HCT VFR BLD CALC: 34 %PCV (ref 36–54)
HCV AB SERPL QL IA: POSITIVE
HGB BLD-MCNC: 11.3 G/DL (ref 12–16)
IMM GRANULOCYTES # BLD AUTO: 0.01 K/UL (ref 0–0.04)
IMM GRANULOCYTES NFR BLD AUTO: 0.2 % (ref 0–0.5)
LYMPHOCYTES # BLD AUTO: 0.9 K/UL (ref 1–4.8)
LYMPHOCYTES NFR BLD: 17.5 % (ref 18–48)
MAGNESIUM SERPL-MCNC: 2 MG/DL (ref 1.6–2.6)
MCH RBC QN AUTO: 27.1 PG (ref 27–31)
MCHC RBC AUTO-ENTMCNC: 31.3 G/DL (ref 32–36)
MCV RBC AUTO: 87 FL (ref 82–98)
MONOCYTES # BLD AUTO: 0.1 K/UL (ref 0.3–1)
MONOCYTES NFR BLD: 2.3 % (ref 4–15)
NEUTROPHILS # BLD AUTO: 4.1 K/UL (ref 1.8–7.7)
NEUTROPHILS NFR BLD: 79.8 % (ref 38–73)
NRBC BLD-RTO: 0 /100 WBC
PCO2 BLDA: 34.7 MMHG (ref 35–45)
PH SMN: 7.45 [PH] (ref 7.35–7.45)
PHOSPHATE SERPL-MCNC: 3.8 MG/DL (ref 2.7–4.5)
PLATELET # BLD AUTO: 199 K/UL (ref 150–450)
PMV BLD AUTO: 10.6 FL (ref 9.2–12.9)
PO2 BLDA: 128 MMHG (ref 80–100)
POC BE: 0 MMOL/L
POC IONIZED CALCIUM: 1.1 MMOL/L (ref 1.06–1.42)
POC SATURATED O2: 99 % (ref 95–100)
POC TCO2: 25 MMOL/L (ref 23–27)
POCT GLUCOSE: 189 MG/DL (ref 70–110)
POCT GLUCOSE: 248 MG/DL (ref 70–110)
POCT GLUCOSE: 290 MG/DL (ref 70–110)
POCT GLUCOSE: 335 MG/DL (ref 70–110)
POTASSIUM BLD-SCNC: 3.6 MMOL/L (ref 3.5–5.1)
POTASSIUM SERPL-SCNC: 4.5 MMOL/L (ref 3.5–5.1)
PROT SERPL-MCNC: 7.5 G/DL (ref 6–8.4)
RBC # BLD AUTO: 4.17 M/UL (ref 4–5.4)
SAMPLE: ABNORMAL
SITE: ABNORMAL
SODIUM BLD-SCNC: 138 MMOL/L (ref 136–145)
SODIUM SERPL-SCNC: 137 MMOL/L (ref 136–145)
WBC # BLD AUTO: 5.19 K/UL (ref 3.9–12.7)

## 2022-01-07 PROCEDURE — 27000207 HC ISOLATION

## 2022-01-07 PROCEDURE — 93010 ELECTROCARDIOGRAM REPORT: CPT | Mod: ,,, | Performed by: INTERNAL MEDICINE

## 2022-01-07 PROCEDURE — 84100 ASSAY OF PHOSPHORUS: CPT | Performed by: STUDENT IN AN ORGANIZED HEALTH CARE EDUCATION/TRAINING PROGRAM

## 2022-01-07 PROCEDURE — 27000221 HC OXYGEN, UP TO 24 HOURS

## 2022-01-07 PROCEDURE — 25000003 PHARM REV CODE 250: Performed by: INTERNAL MEDICINE

## 2022-01-07 PROCEDURE — 82803 BLOOD GASES ANY COMBINATION: CPT

## 2022-01-07 PROCEDURE — 93005 ELECTROCARDIOGRAM TRACING: CPT

## 2022-01-07 PROCEDURE — 27000190 HC CPAP FULL FACE MASK W/VALVE

## 2022-01-07 PROCEDURE — 20000000 HC ICU ROOM

## 2022-01-07 PROCEDURE — 82800 BLOOD PH: CPT

## 2022-01-07 PROCEDURE — C9399 UNCLASSIFIED DRUGS OR BIOLOG: HCPCS | Performed by: INTERNAL MEDICINE

## 2022-01-07 PROCEDURE — 63600175 PHARM REV CODE 636 W HCPCS

## 2022-01-07 PROCEDURE — 93010 EKG 12-LEAD: ICD-10-PCS | Mod: ,,, | Performed by: INTERNAL MEDICINE

## 2022-01-07 PROCEDURE — 99900035 HC TECH TIME PER 15 MIN (STAT)

## 2022-01-07 PROCEDURE — 99291 CRITICAL CARE FIRST HOUR: CPT | Mod: ,,, | Performed by: EMERGENCY MEDICINE

## 2022-01-07 PROCEDURE — 94761 N-INVAS EAR/PLS OXIMETRY MLT: CPT

## 2022-01-07 PROCEDURE — 25000003 PHARM REV CODE 250

## 2022-01-07 PROCEDURE — 85025 COMPLETE CBC W/AUTO DIFF WBC: CPT | Performed by: INTERNAL MEDICINE

## 2022-01-07 PROCEDURE — 94660 CPAP INITIATION&MGMT: CPT

## 2022-01-07 PROCEDURE — 83735 ASSAY OF MAGNESIUM: CPT | Performed by: STUDENT IN AN ORGANIZED HEALTH CARE EDUCATION/TRAINING PROGRAM

## 2022-01-07 PROCEDURE — 84295 ASSAY OF SERUM SODIUM: CPT

## 2022-01-07 PROCEDURE — 82330 ASSAY OF CALCIUM: CPT

## 2022-01-07 PROCEDURE — 83036 HEMOGLOBIN GLYCOSYLATED A1C: CPT | Performed by: INTERNAL MEDICINE

## 2022-01-07 PROCEDURE — 27100171 HC OXYGEN HIGH FLOW UP TO 24 HOURS

## 2022-01-07 PROCEDURE — 87040 BLOOD CULTURE FOR BACTERIA: CPT | Mod: 59 | Performed by: STUDENT IN AN ORGANIZED HEALTH CARE EDUCATION/TRAINING PROGRAM

## 2022-01-07 PROCEDURE — 63600175 PHARM REV CODE 636 W HCPCS: Performed by: INTERNAL MEDICINE

## 2022-01-07 PROCEDURE — 36600 WITHDRAWAL OF ARTERIAL BLOOD: CPT

## 2022-01-07 PROCEDURE — 99291 PR CRITICAL CARE, E/M 30-74 MINUTES: ICD-10-PCS | Mod: ,,, | Performed by: EMERGENCY MEDICINE

## 2022-01-07 PROCEDURE — 80053 COMPREHEN METABOLIC PANEL: CPT | Performed by: STUDENT IN AN ORGANIZED HEALTH CARE EDUCATION/TRAINING PROGRAM

## 2022-01-07 PROCEDURE — 84132 ASSAY OF SERUM POTASSIUM: CPT

## 2022-01-07 RX ORDER — MUPIROCIN 20 MG/G
OINTMENT TOPICAL 2 TIMES DAILY
Status: COMPLETED | OUTPATIENT
Start: 2022-01-08 | End: 2022-01-12

## 2022-01-07 RX ORDER — AMLODIPINE BESYLATE 10 MG/1
10 TABLET ORAL DAILY
Status: DISCONTINUED | OUTPATIENT
Start: 2022-01-07 | End: 2022-01-08

## 2022-01-07 RX ADMIN — AZITHROMYCIN MONOHYDRATE 500 MG: 500 INJECTION, POWDER, LYOPHILIZED, FOR SOLUTION INTRAVENOUS at 10:01

## 2022-01-07 RX ADMIN — ENOXAPARIN SODIUM 90 MG: 100 INJECTION SUBCUTANEOUS at 11:01

## 2022-01-07 RX ADMIN — DEXAMETHASONE 6 MG: 1.5 TABLET ORAL at 09:01

## 2022-01-07 RX ADMIN — Medication 3 MG: at 10:01

## 2022-01-07 RX ADMIN — LEVOTHYROXINE SODIUM 100 MCG: 100 TABLET ORAL at 07:01

## 2022-01-07 RX ADMIN — Medication 1000 UNITS: at 09:01

## 2022-01-07 RX ADMIN — AMLODIPINE BESYLATE 10 MG: 10 TABLET ORAL at 01:01

## 2022-01-07 RX ADMIN — REMDESIVIR 100 MG: 100 INJECTION, POWDER, LYOPHILIZED, FOR SOLUTION INTRAVENOUS at 09:01

## 2022-01-07 RX ADMIN — CEFTRIAXONE 1 G: 1 INJECTION, POWDER, FOR SOLUTION INTRAMUSCULAR; INTRAVENOUS at 09:01

## 2022-01-07 RX ADMIN — INSULIN ASPART 2 UNITS: 100 INJECTION, SOLUTION INTRAVENOUS; SUBCUTANEOUS at 09:01

## 2022-01-07 RX ADMIN — PANTOPRAZOLE SODIUM 40 MG: 40 TABLET, DELAYED RELEASE ORAL at 09:01

## 2022-01-07 RX ADMIN — ENOXAPARIN SODIUM 90 MG: 100 INJECTION SUBCUTANEOUS at 10:01

## 2022-01-07 RX ADMIN — INSULIN DETEMIR 5 UNITS: 100 INJECTION, SOLUTION SUBCUTANEOUS at 01:01

## 2022-01-07 RX ADMIN — INSULIN ASPART 3 UNITS: 100 INJECTION, SOLUTION INTRAVENOUS; SUBCUTANEOUS at 05:01

## 2022-01-07 RX ADMIN — THERA TABS 1 TABLET: TAB at 09:01

## 2022-01-07 RX ADMIN — INSULIN ASPART 2 UNITS: 100 INJECTION, SOLUTION INTRAVENOUS; SUBCUTANEOUS at 10:01

## 2022-01-07 NOTE — HOSPITAL COURSE
In ED, patient denies SOB but became hypoxic to mid 80s requiring high-flow O2 100%FiO2 50L. Patient then moved to bipap transiently followed by transition to CPAP. Patient then weaned to comfort flow. Dex/remdesevir/baricitinib started for COVID treatment. Patient's labs showed glu >300 despite no diabetic history. A1C 7.0. Detemir, Aspart and ISS started. Patient began having increased O2 requirements and new intermittent a-fib with RVR on 1/8/22. PO Metoprolol started.

## 2022-01-07 NOTE — SUBJECTIVE & OBJECTIVE
Interval History/Significant Events: Patient was weaning O2 overnight to 37L at 45% FiO2 this AM. Later in AM, O2 requirements increased. Patient now on 50L 90%. New a-fib with RVR this AM. HR briefly to 150 but generally 90s-120s. Normotensive and asymptomatic.     Review of Systems   Constitutional: Positive for fatigue and fever.   HENT: Negative for congestion and rhinorrhea.    Respiratory: Positive for cough. Negative for shortness of breath and wheezing.    Cardiovascular: Negative for chest pain and palpitations.   Gastrointestinal: Negative for abdominal pain and vomiting.   Genitourinary: Negative for difficulty urinating and dysuria.   Musculoskeletal: Positive for myalgias. Negative for joint swelling.   Skin: Negative for rash and wound.   Neurological: Positive for weakness. Negative for headaches.     Objective:     Vital Signs (Most Recent):  Temp: 97.5 °F (36.4 °C) (01/08/22 1102)  Pulse: 106 (01/08/22 1400)  Resp: (!) 35 (01/08/22 1400)  BP: (!) 140/75 (01/08/22 1502)  SpO2: (!) 91 % (01/08/22 1400) Vital Signs (24h Range):  Temp:  [97.3 °F (36.3 °C)-98.1 °F (36.7 °C)] 97.5 °F (36.4 °C)  Pulse:  [] 106  Resp:  [16-43] 35  SpO2:  [84 %-100 %] 91 %  BP: (111-157)/(58-96) 140/75   Weight: 86 kg (189 lb 9.5 oz)  Body mass index is 30.6 kg/m².      Intake/Output Summary (Last 24 hours) at 1/8/2022 1547  Last data filed at 1/8/2022 1100  Gross per 24 hour   Intake 1072.13 ml   Output 800 ml   Net 272.13 ml       Physical Exam  Vitals and nursing note reviewed.   Constitutional:       General: She is not in acute distress.     Comments: Patient sitting up in bed, comfortably eating on HF O2.    HENT:      Head: Normocephalic and atraumatic.      Nose: No congestion or rhinorrhea.      Mouth/Throat:      Pharynx: Oropharynx is clear.   Eyes:      Extraocular Movements: Extraocular movements intact.      Conjunctiva/sclera: Conjunctivae normal.   Cardiovascular:      Rate and Rhythm: Tachycardia  present. Rhythm irregular.   Pulmonary:      Breath sounds: Rales present. No wheezing.      Comments: On HF 90% 50L  Abdominal:      General: There is no distension.      Palpations: Abdomen is soft.      Tenderness: There is no abdominal tenderness.   Musculoskeletal:         General: No swelling. Normal range of motion.      Right lower leg: No edema.      Left lower leg: No edema.   Skin:     General: Skin is warm and dry.   Neurological:      General: No focal deficit present.      Mental Status: She is alert and oriented to person, place, and time.         Vents:  Oxygen Concentration (%): 90 (01/08/22 1300)  Lines/Drains/Airways     Central Venous Catheter Line            Port A Cath Single Lumen 07/26/17 1130 left subclavian 1627 days          Drain            Female External Urinary Catheter 01/07/22 0031 1 day          Peripheral Intravenous Line                 Peripheral IV - Single Lumen 01/06/22 1800 20 G Right Antecubital 1 day         Peripheral IV - Single Lumen 01/07/22 0305 22 G Posterior;Right Hand 1 day              Significant Labs:    CBC/Anemia Profile:  Recent Labs   Lab 01/06/22 1751 01/06/22 1751 01/07/22  0334 01/07/22  0522 01/08/22  0253   WBC 6.68  --  5.19  --  8.15   HGB 11.5*  --  11.3*  --  10.7*   HCT 36.0*   < > 36.1* 34* 33.4*     --  199  --  243   MCV 86  --  87  --  84   RDW 14.8*  --  14.6*  --  14.6*   FERRITIN 1,287*  --   --   --   --     < > = values in this interval not displayed.        Chemistries:  Recent Labs   Lab 01/06/22 1751 01/07/22  0334 01/08/22  0253    137 133*   K 3.7 4.5 3.8    103 101   CO2 22* 19* 20*   BUN 17 17 20   CREATININE 1.1 1.2 0.9   CALCIUM 8.9 8.2* 8.3*   ALBUMIN 3.1* 3.0* 2.7*   PROT 7.9 7.5 6.5   BILITOT 0.6 0.3 0.2   ALKPHOS 66 63 56   ALT 23 31 23   AST 42* 56* 35   MG  --  2.0 1.8   PHOS  --  3.8 3.6           Significant Imaging:  I have reviewed all pertinent imaging results/findings within the past 24 hours.

## 2022-01-07 NOTE — NURSING
Pt arrived to [address] from ED via stretcher with ED RN and RT, pt sats 91% on 15 NRB for transport, HR 80s NSR, normotensive, AAOx4, moves all extremities PERRLA, pt placed on BiPAP 15/5 fio2 100% intially, per MD Nils, labs drawn and sent, full physical assessment performed, ABG drawn, BiPAP fiO2 titrated down to 80%,pt sats 98%,  pt MRN and vitals loaded to bedside monitor, all skin intact, sheets changed, purewick in place, waffle mattress in place and inflated, pt has no c/o pain or discomfort, bed lowered, SRX2, call light within reach, WCTM

## 2022-01-07 NOTE — PLAN OF CARE
"      SICU PLAN OF CARE NOTE    SHIFT EVENTS:  VSS. Titrated Oxygen. POC reviewed with patient and family; questions and concerns addressed. See flow sheets for full assessment details. Will continue to monitor patient closely.      Dx: Pneumonia due to COVID-19 virus    Vital Signs: BP (!) 171/115   Pulse 93   Temp 98.4 °F (36.9 °C) (Oral)   Resp (!) 31   Ht 5' 6" (1.676 m)   Wt 85 kg (187 lb 6.3 oz)   SpO2 97%   BMI 30.25 kg/m²     Neuro: AAO x4    Respiratory: Comfort Benjamin    Cardiac: NSR; 90s    Diet: Regular Diet    Urine Output: Voids Spontaneously 500 cc/shift    Labs: daily    Accuchecks: ACHS    SKIN NOTE:  Skin: CDI; no skin tears or skin breakdown noted at this time.     Skin precautions maintained including:  Sacrum and heels with foam dressing in place for pressure protection. Frequent weight shift encouraged Q2 hr to prevent further breakdown. Bed plugged in and mattress inflated; Adhesive use limited. Heels elevated off bed. Pressure points protected and positioning supports utilized.  Skin-to-device areas padded. Skin-to-skin areas padded    "

## 2022-01-07 NOTE — NURSING
Per MD Chetan, pt switched from BiPAP to CPAP mode, repeat ABG, fiO2 decreased to 45%, pt sats 100%

## 2022-01-07 NOTE — H&P
Hospital Medicine  History and Physical  [facility]      Patient Name: Fauzia Kirk  MRN:  [id number]  Hospital Medicine Team: [facility]Y Rivera MD  Date of Admission:  2022     Length of Stay:  LOS: 0 days     Principal Problem: <principal problem not specified>      Chief complaint    Chief Complaint   Patient presents with    COVID-19 Concerns    Cough        HPI    Fauzia Kirk is a 66 y.o. female with past medical history of breast cancer (previously treated with immunotherapy, not on active treatment currently), HTN, and OA, hypothyroidism presents with symptoms of persistent cough, SOB, myalgias, fatigue since , she tested positive for COVID today, required 2 L NC02 initially and then escelating level of oxygen to NRB 50L, tachycardic, tachypnea, and patient currently breathless when talking. She only had 1 shot of the vaccine in the past.      Review of Systems    Review of Systems   Constitutional: Negative for chills, fever and weight loss.   HENT: Negative for hearing loss and sore throat.    Eyes: Negative for blurred vision and double vision.   Respiratory: Positive for cough and shortness of breath.    Cardiovascular: Positive for palpitations. Negative for chest pain.   Gastrointestinal: Negative for abdominal pain, nausea and vomiting.   Genitourinary: Negative for dysuria and urgency.   Musculoskeletal: Negative for myalgias and neck pain.   Skin: Negative for itching and rash.   Neurological: Negative for dizziness and headaches.        Past Medical History:   Diagnosis Date    Arthritis     Breast cancer     Hypertension      Past Surgical History:   Procedure Laterality Date    BREAST LUMPECTOMY       SECTION, CLASSIC      LIPOMA RESECTION      MASTECTOMY       Family History   Problem Relation Age of Onset    Lymphoma Mother     Prostate cancer Father     Stomach cancer Sister     Breast cancer Sister      Diabetes Brother     Breast cancer Paternal Aunt     Breast cancer Paternal Grandmother      Social History     Socioeconomic History    Marital status:    Tobacco Use    Smoking status: Never Smoker    Smokeless tobacco: Never Used   Substance and Sexual Activity    Alcohol use: No     Alcohol/week: 0.0 standard drinks    Drug use: No    Sexual activity: Yes     Partners: Male       Medications  No current facility-administered medications on file prior to encounter.     Current Outpatient Medications on File Prior to Encounter   Medication Sig Dispense Refill    amLODIPine (NORVASC) 10 MG tablet Take 1 tablet (10 mg total) by mouth once daily. 90 tablet 3    chlorpheniramine (CHLOR-TRIMETON) 4 mg tablet Take 1-2 tabs at bedtime as needed for drainage. (Patient not taking: Reported on 11/30/2021) 30 tablet 1    clotrimazole-betamethasone 1-0.05% (LOTRISONE) cream Apply topically 2 (two) times daily. (Patient not taking: Reported on 11/30/2021) 45 g 1    doxycycline (VIBRA-TABS) 100 MG tablet Take 1 tablet (100 mg total) by mouth every 12 (twelve) hours. (Patient not taking: Reported on 11/30/2021) 14 tablet 0    fluticasone propionate (FLONASE) 50 mcg/actuation nasal spray 2 sprays (100 mcg total) by Each Nostril route once daily. 1 Bottle 2    levothyroxine (SYNTHROID) 100 MCG tablet Take 1 tablet (100 mcg total) by mouth once daily. 30 tablet 11    lidocaine-prilocaine (EMLA) cream Apply topically as needed. (Patient not taking: Reported on 11/30/2021) 5 g 3    omeprazole (PRILOSEC) 40 MG capsule Take 1 capsule (40 mg total) by mouth once daily. 30 capsule 11    rivaroxaban (XARELTO) 15 mg Tab Take 1 tablet (15 mg total) by mouth 2 (two) times daily with meals. 42 tablet 0    rivaroxaban (XARELTO) 20 mg Tab Take 1 tablet (20 mg total) by mouth daily with dinner or evening meal. Do not start until completed 21 days of 15 mg twice a day with meals 90 tablet 3       Allergies  Patient has  no known allergies.    Physical Examination  Temp:  [100.5 °F (38.1 °C)]   Pulse:  [112-122]   Resp:  [24-42]   BP: (118-127)/(66-77)   SpO2:  [80 %-93 %]     Physical Exam  Constitutional:       General: She is in acute distress.      Appearance: She is ill-appearing and diaphoretic. She is not toxic-appearing.      Comments: Breathless     HENT:      Head: Normocephalic and atraumatic.      Nose: Nose normal. No congestion or rhinorrhea.   Eyes:      Extraocular Movements: Extraocular movements intact.      Conjunctiva/sclera: Conjunctivae normal.   Cardiovascular:      Rate and Rhythm: Normal rate and regular rhythm.   Pulmonary:      Effort: Respiratory distress present.      Breath sounds: No stridor. Rales present. No wheezing.   Chest:      Chest wall: No tenderness.       Abdominal:      General: Abdomen is flat. Bowel sounds are normal. There is no distension.      Palpations: Abdomen is soft.      Tenderness: There is no abdominal tenderness. There is no guarding or rebound.   Musculoskeletal:         General: No swelling.      Cervical back: Normal range of motion and neck supple.   Skin:     General: Skin is warm.      Coloration: Skin is not jaundiced.   Neurological:      Mental Status: She is oriented to person, place, and time. Mental status is at baseline.   Psychiatric:         Mood and Affect: Mood normal.          Laboratory:  Recent Labs   Lab 01/06/22  1751   WBC 6.68   LYMPH 16.8*  1.1   HGB 11.5*   HCT 36.0*        Recent Labs   Lab 01/06/22  1751      K 3.7      CO2 22*   BUN 17   CREATININE 1.1   *   CALCIUM 8.9     Recent Labs   Lab 01/06/22  1751   ALKPHOS 66   ALT 23   AST 42*   ALBUMIN 3.1*   PROT 7.9   BILITOT 0.6      Recent Labs     01/06/22  1751   FERRITIN 1,287*   .4*   *   TROPONINI 0.010   LACTATE 2.0       All labs within the last 24 hours were reviewed.     Microbiology:  Lab Results   Component Value Date    [id number] Positive (A)  01/06/2022       Microbiology Results (last 7 days)     Procedure Component Value Units Date/Time    Culture, Respiratory with Gram Stain [[id number]]     Order Status: No result Specimen: Sputum, Expectorated     Blood culture (site 1) [[id number]]     Order Status: No result Specimen: Blood     Blood culture (site 2) [[id number]]     Order Status: No result Specimen: Blood             Imaging      No results found for this or any previous visit.      X-Ray Chest AP Portable  Narrative: EXAMINATION:  XR CHEST AP PORTABLE    CLINICAL HISTORY:  COVID-19;    TECHNIQUE:  Single frontal view of the chest was performed.    COMPARISON:  12/08/2017    FINDINGS:  Left chest wall port catheter tip projects over the proximal SVC.  The cardiomediastinal silhouette is not enlarged, noting magnification by technique..  There is obscuration of the right costophrenic angle, may reflect effusion noting shallow inspiratory effort limits evaluation..  The trachea is midline.  The lungs are symmetrically expanded bilaterally with prominent coarse interstitial attenuation bilaterally concerning for diffuse edema or other infectious/non infectious pneumonitis.  There is bilateral basilar subsegmental atelectasis.  Developing left lower lung zone consolidation not excluded..  There is no pneumothorax.  The osseous structures are remarkable for degenerative changes.  Surgical change noted of the left and right axilla..  Impression: As above    Electronically signed by: Ousmane Mata MD  Date:    01/06/2022  Time:    18:30      All imaging within the last 24 hours was reviewed.       Assessment and Plan:    There are no hospital problems to display for this patient.        Problem List:  1. Covid pneumonia  2. sepsis  3. Acute hypoxic respiratory failure  4. Hx of breast cancer  5. Hx of hypothyroidism  6. Hx of DVT  7. Hx of HTN    Plan:  1. [facility] medicine protocol   2. Abx, ceftriaxone and azithromycin  3. Lovenox  therapeutic  4. Resume home meds  5. Hold home antihypertensives given hypotension  6. Blood cultures, UA,   7. ICU coming to evaluate patient        VTE High Risk Prophylaxis:   VTE Risk Mitigation (From admission, onward)         Ordered     enoxaparin injection 90 mg  2 times daily         01/06/22 2015                  High Risk Conditions:  Patient has a condition that poses threat to life and bodily function: Severe Respiratory Distress    Advance Care Planning  Current advance care plan has been discussed with patient/family/POA and patient currently wishes Full Code.     Issa Rivera MD  [facility]

## 2022-01-07 NOTE — PLAN OF CARE
[facility] - Surgical Intensive Care  Initial Discharge Assessment       Primary Care Provider: Era Urena MD    Admission Diagnosis: COVID-19 [U07.1]    Admission Date: 1/6/2022  Expected Discharge Date: 1/11/2022    Discharge Barriers Identified: None    Payor: BLUE CROSS BLUE SHIELD / Plan: [employer] [place] / Product Type: Commercial /     Extended Emergency Contact Information  Primary Emergency Contact: SrideviJuanita salguero   [facility]  Home Phone: [phone]  Mobile Phone: [phone]  Relation: Daughter    Discharge Plan A: Home with family  Discharge Plan B: Home with family      [facility] - [place] - [address]  [address]  [place] [address]  Phone: [phone] Fax: [phone]    [facility] [place] - [address]  [address]  [place] [address]  Phone: [phone] Fax: [phone]      Initial Assessment (most recent)     Adult Discharge Assessment - 01/07/22 1018        Discharge Assessment    Assessment Type Discharge Planning Assessment     Confirmed/corrected address, phone number and insurance Yes     Confirmed Demographics Correct on Facesheet     Source of Information family   Juanita Kirk - Daughter    If unable to respond/provide information was family/caregiver contacted? Yes     Contact Name/Number Juanita Kirk [phone]     Reason For Admission Pneumonia due to COVID-19 virus     Lives With child(lorraine), adult     Facility Arrived From: Home     Do you expect to return to your current living situation? Yes     Do you have help at home or someone to help you manage your care at home? Yes     Who are your caregiver(s) and their phone number(s)? Juanita Kirk [phone]     Home Layout Able to live on 1st floor     Readmission within 30 days? No     Patient currently being followed by outpatient case management? No     Do you currently have service(s) that help you manage your care at home? No     Do you take  prescription medications? Yes     Is the patient taking medications as prescribed? yes     Who is going to help you get home at discharge? Juanita Kirk [phone]     How do you get to doctors appointments? car, drives self     Are you on dialysis? No     Do you take coumadin? No     Discharge Plan A Home with family     Discharge Plan B Home with family     DME Needed Upon Discharge  none     Discharge Plan discussed with: Adult children     Discharge Barriers Identified None        Relationship/Environment    Name(s) of Who Lives With Patient Juanita Kirk               The  contacted the patient's daughter (Juanita Kirk) to complete the discharge planning assessment.  Questions answered / contact numbers provided.  Use PREFERRED PHARMACY / BEDSIDE DELIVERY for any necessary medications at time of discharge.  The patient is independent with all ADLs. The patient's family  will be assisting with help upon discharge. The patient's family  will be providing transportation home. Hospital follow up will be scheduled with PCP. Will continue to follow for course of hospitalization.     Lester Sweet LMSW  Case Management [facility]  Ext: [phone]

## 2022-01-07 NOTE — PLAN OF CARE
"      SICU PLAN OF CARE NOTE    Dx: Pneumonia due to COVID-19 virus    Shift Events: Pt admitted @ 0300, no acute events    Goals of Care: CPAP until CXR for better    Neuro:AAOX4, moves all extremities, PERRLA    Vital Signs: BP (!) 149/65 (BP Location: Left leg, Patient Position: Lying)   Pulse 94   Temp 97.5 °F (36.4 °C) (Axillary)   Resp (!) 27   Ht 5' 6" (1.676 m)   Wt 85 kg (187 lb 6.3 oz)   SpO2 100%   BMI 30.25 kg/m²     Respiratory: CPAP 12 45% fio2    Diet: NPO    Gtts: none    Urine Output: 350cc/shift    Drains: none    Labs/Accuchecks: daily/none    Skin: waffle inflated, no breakdown over bony prominences, pt turns independently       "

## 2022-01-07 NOTE — ASSESSMENT & PLAN NOTE
New hyperglycemia in insulin-naive patient no history of DM. Possibly contributed to by dexamethasone. A1C 7.0 on 1/7/22.     - Levemir 5mg daily  - Add aspart 2u with meals  - MDSSI  - Continue to closely monitor

## 2022-01-07 NOTE — ASSESSMENT & PLAN NOTE
Last chemoRT 2018. Lung nodule present on most recent CT chest (3/2021), per read: 1.0 x 0.7 cm subsolid pulmonary nodule with irregular margins in the posterior right lower lobe, that is new from prior. There was a 6mo follow up Chest CT with contrast in October that was never done. She is followed by Dr. Mai in oncology.

## 2022-01-07 NOTE — CARE UPDATE
"RAPID RESPONSE NURSE CHART REVIEW        Chart Reviewed: 01/06/2022, 8:30 PM    MRN: [id number]  Bed: ED 04/04    Dx: <principal problem not specified>    Fauzia Kirk has a past medical history of Arthritis, Breast cancer, and Hypertension.    Last VS: /66 (BP Location: Right arm, Patient Position: Lying)   Pulse (!) 113   Temp (!) 100.5 °F (38.1 °C) (Oral)   Resp (!) 35   Ht 5' 6" (1.676 m)   Wt 88.5 kg (195 lb)   SpO2 (!) 93%   BMI 31.47 kg/m²     24H Vital Sign Range:  Temp:  [100.5 °F (38.1 °C)]   Pulse:  [112-122]   Resp:  [24-42]   BP: (118-127)/(66-77)   SpO2:  [80 %-93 %]     Level of Consciousness (AVPU): alert    Recent Labs     01/06/22  1751   WBC 6.68   HGB 11.5*   HCT 36.0*          Recent Labs     01/06/22  1751      K 3.7      CO2 22*   CREATININE 1.1   *        Recent Labs     01/06/22  1731   PH 7.494*   PCO2 24.7*   PO2 70*   HCO3 19.0*   POCSATURATED 96   BE -4        OXYGEN:  Flow (L/min): 50  Oxygen Concentration (%): 100  O2 Device (Oxygen Therapy): Comfort Flow    MEWS score:      Bedside RNOusmane contacted. No concerns verbalized at this time. Recommend CCM consult s/t high O2 requirements (50L/100% CF). Please call [phone] for further concerns or assistance.    Yudith Blue RN        "

## 2022-01-07 NOTE — H&P
[facility] - Emergency Dept  Critical Care Medicine  History & Physical    Patient Name: Fauzia Kirk  MRN: [id number]  Admission Date: 2022  Hospital Length of Stay: 0 days  Code Status: Full Code  Attending Physician: Hernan Catalan MD   Primary Care Provider: Era Urena MD   Principal Problem: Pneumonia due to COVID-19 virus    Subjective:     HPI:  Fauzia Kirk is a 66yoF with a medical history of breast cancer and lung cancer (previously treated with immunotherapy, chemo, radiation, bilateral mastectomies, last treated with chemoRT in 2018), HTN, OA, hypothyroidism, DVT diagnosed 19 who presented to the ED after she states she hasn't been feeling well for a few days. No reports of shortness of breath, but she has had some cough, myalgias, and fatigue for about 4-5 days and her daughter was positive for COVID about 2 weeks ago. She states she wasn't around her much but she did come in close contact with her. In the ED she was comfortably hypoxic, not complaining of trouble breathing, resting fine, but requiring 50L comfort flow at 100% FiO2 and desaturating with conversation. She was able to talk in full sentences without lightheadeness, but was hypoxic into the mid-to-upper 80s. She does report fainting yesterday while sitting at home and recovered within minutes and without complaint of any prodromes. She lives at home alone and completes her tasks independently. She received 1 dose of mRNA vaccine for COVID but decided not to get her second dose after getting her blood clot in November.       Hospital/ICU Course:  No notes on file     Past Medical History:   Diagnosis Date    Arthritis     Breast cancer     Hypertension        Past Surgical History:   Procedure Laterality Date    BREAST LUMPECTOMY       SECTION, CLASSIC      LIPOMA RESECTION      MASTECTOMY         Review of patient's allergies indicates:  No Known Allergies    Family History     Problem Relation (Age of  Onset)    Breast cancer Sister, Paternal Aunt, Paternal Grandmother    Diabetes Brother    Lymphoma Mother    Prostate cancer Father    Stomach cancer Sister        Tobacco Use    Smoking status: Never Smoker    Smokeless tobacco: Never Used   Substance and Sexual Activity    Alcohol use: No     Alcohol/week: 0.0 standard drinks    Drug use: No    Sexual activity: Yes     Partners: Male      Review of Systems   Constitutional: Positive for fatigue. Negative for appetite change and fever.   HENT: Negative for congestion and sinus pain.    Eyes: Negative for discharge and redness.   Respiratory: Negative for cough and shortness of breath.    Cardiovascular: Negative for chest pain and leg swelling.   Gastrointestinal: Negative for diarrhea and nausea.   Endocrine: Negative for polydipsia and polyuria.   Genitourinary: Negative for dysuria and hematuria.   Musculoskeletal: Negative for arthralgias and myalgias.   Skin: Negative for color change and rash.   Neurological: Positive for syncope. Negative for weakness and numbness.   Psychiatric/Behavioral: Negative for agitation and confusion.     Objective:     Vital Signs (Most Recent):  Temp: (!) 100.5 °F (38.1 °C) (01/06/22 1501)  Pulse: 107 (01/06/22 2000)  Resp: (!) 29 (01/06/22 2000)  BP: 110/61 (01/06/22 2000)  SpO2: 95 % (01/06/22 2000) Vital Signs (24h Range):  Temp:  [100.5 °F (38.1 °C)] 100.5 °F (38.1 °C)  Pulse:  [107-122] 107  Resp:  [24-42] 29  SpO2:  [80 %-95 %] 95 %  BP: (110-127)/(61-77) 110/61   Weight: 88.5 kg (195 lb)  Body mass index is 31.47 kg/m².      Intake/Output Summary (Last 24 hours) at 1/6/2022 2225  Last data filed at 1/6/2022 2156  Gross per 24 hour   Intake 250.12 ml   Output --   Net 250.12 ml       Physical Exam  Constitutional:       General: She is not in acute distress.     Appearance: Normal appearance.   HENT:      Head: Normocephalic and atraumatic.      Mouth/Throat:      Mouth: Mucous membranes are moist.      Pharynx:  Oropharynx is clear.   Eyes:      Conjunctiva/sclera: Conjunctivae normal.      Pupils: Pupils are equal, round, and reactive to light.   Cardiovascular:      Rate and Rhythm: Normal rate and regular rhythm.      Pulses: Normal pulses.      Heart sounds: Normal heart sounds.   Pulmonary:      Effort: Pulmonary effort is normal.      Breath sounds: Rales present.      Comments: 50L comfort flow, 100% FiO2  Abdominal:      General: Abdomen is flat. Bowel sounds are normal.      Palpations: Abdomen is soft.      Tenderness: There is no abdominal tenderness.   Musculoskeletal:         General: No swelling or deformity.      Cervical back: Normal range of motion and neck supple. No tenderness.   Skin:     General: Skin is warm and dry.   Neurological:      General: No focal deficit present.      Mental Status: She is alert and oriented to person, place, and time.   Psychiatric:         Mood and Affect: Mood normal.         Behavior: Behavior normal.         Vents:  Oxygen Concentration (%): 100 (01/06/22 2000)  Lines/Drains/Airways     Peripheral Intravenous Line                 Peripheral IV - Single Lumen 01/06/22 1800 20 G Right Antecubital <1 day              Significant Labs:    CBC/Anemia Profile:  Recent Labs   Lab 01/06/22  1751   WBC 6.68   HGB 11.5*   HCT 36.0*      MCV 86   RDW 14.8*   FERRITIN 1,287*        Chemistries:  Recent Labs   Lab 01/06/22  1751      K 3.7      CO2 22*   BUN 17   CREATININE 1.1   CALCIUM 8.9   ALBUMIN 3.1*   PROT 7.9   BILITOT 0.6   ALKPHOS 66   ALT 23   AST 42*     COVID-19 Positive    All pertinent labs within the past 24 hours have been reviewed.    Significant Imaging: I have reviewed all pertinent imaging results/findings within the past 24 hours.    Assessment/Plan:     Pulmonary  Lung nodule  See hx of lung CA    Cardiac/Vascular  HTN (hypertension)  Home amlodipine held for now with normal to soft blood pressures    Hematology  Chronic anticoagulation  See  DVT    History of DVT (deep vein thrombosis)  Continue therapeutic AC while inpatient. Xarelto at home, will continue Lovenox for now    Oncology  History of lung cancer  Last chemoRT 2018. Lung nodule present on most recent CT chest (3/2021), per read: 1.0 x 0.7 cm subsolid pulmonary nodule with irregular margins in the posterior right lower lobe, that is new from prior. There was a 6mo follow up Chest CT with contrast in October that was never done. She is followed by Dr. Mai in oncology.    History of left breast cancer  Distant hx, s/p bilateral mastectomies, immunotherapy, chemoRT per patient    Endocrine  Hyperthyroidism  Continue home Synthroid    Other  * Pneumonia due to COVID-19 virus  - COVID-19 testing:   - Isolation: Airborne/Droplet. Surgical mask on patient. Notify Infection Control  - Management:    - Monitoring:   - Telemetry & Continuous Pulse Oximetry    - Nutrition:    - Multivitamin PO daily   - Add Boost supplement   - Ascorbic acid 500mg PO bid    - Antibiotics:  Considering high oxygen requirement, poor inspiratory effort on CXR so poor eval of lungs on imaging, will treat for now for CAP. Can re-evaluate after BiPAP overnight, possibly better aeration   - ceftriaxone 1g Q24h x 5 days  - azithromycin 500mg po x1, then 250mg po daily x 4 days   - Anticoagulation:  - VTE prevention with therapeutic dosing with Lovenox 1mg/kg BID for DVT   - Other Treatments:  - Dexamethasone 8mg x1 in ED and 6mg daily to finish 10 day course  - Remdesivir   - Baricitinib if no clinical improvement after 1 day on Dex/Remdesivir   - Not hypercapnic on ABG in ED, will trial CPAP overnight starting at PEEP 10   - Pulmonary toileting with goal of weaning O2   - Normal renal function, with CXR concerning for viral PNA with possible edema (poor inspiratory effort), will attempt lasix challenge x1 dose for now to see how she does from an oxygenation standpoint   - F/u repeat CXR in AM with hopefully better aeration  for visualization of lung fields        Critical Care Daily Checklist:    A: Awake: RASS Goal/Actual Goal:    Actual:     B: Spontaneous Breathing Trial Performed?     C: SAT & SBT Coordinated?  n/a                      D: Delirium: CAM-ICU     E: Early Mobility Performed? n/a   F: Feeding Goal:    Status:     Current Diet Order   Procedures    Diet Adult Regular (IDDSI Level 7)      AS: Analgesia/Sedation none   T: Thromboembolic Prophylaxis Treatment dose AC   H: HOB > 300 Yes   U: Stress Ulcer Prophylaxis (if needed) N/a   G: Glucose Control n/a   B: Bowel Function     I: Indwelling Catheter (Lines & Smith) Necessity PIVs   D: De-escalation of Antimicrobials/Pharmacotherapies CAP covg for now. If low concern for CAP after a better CXR, could discontinue    Plan for the day/ETD Admit to watch, attempt to wean FiO2    Code Status:  Family/Goals of Care: Full Code         Critical secondary to Patient has a condition that poses threat to life and bodily function: Severe Respiratory Distress     Critical care was time spent personally by me on the following activities: development of treatment plan with patient or surrogate and bedside caregivers, discussions with consultants, evaluation of patient's response to treatment, examination of patient, ordering and performing treatments and interventions, ordering and review of laboratory studies, ordering and review of radiographic studies, pulse oximetry, re-evaluation of patient's condition. This critical care time did not overlap with that of any other provider or involve time for any procedures.     Connor M Gillies, MD  Critical Care Medicine  [facility] - Emergency Dept

## 2022-01-07 NOTE — HPI
Fauzia Kirk is a 66yoF with a medical history of breast cancer and lung cancer (previously treated with immunotherapy, chemo, radiation, bilateral mastectomies, last treated with chemoRT in 2018), HTN, OA, hypothyroidism, DVT diagnosed 11/30/21 who presented to the ED after she states she hasn't been feeling well for a few days. No reports of shortness of breath, but she has had some cough, myalgias, and fatigue for about 4-5 days and her daughter was positive for COVID about 2 weeks ago. She states she wasn't around her much but she did come in close contact with her. In the ED she was comfortably hypoxic, not complaining of trouble breathing, resting fine, but requiring 50L comfort flow at 100% FiO2 and desaturating with conversation. She was able to talk in full sentences without lightheadeness, but was hypoxic into the mid-to-upper 80s. She does report fainting yesterday while sitting at home and recovered within minutes and without complaint of any prodromes. She lives at home alone and completes her tasks independently. She received 1 dose of mRNA vaccine for COVID but decided not to get her second dose after getting her blood clot in November.

## 2022-01-07 NOTE — ASSESSMENT & PLAN NOTE
- COVID-19 testing:   - Isolation: Airborne/Droplet. Surgical mask on patient. Notify Infection Control  - Management:    - Monitoring:   - Telemetry & Continuous Pulse Oximetry    - Nutrition:    - Multivitamin PO daily   - Add Boost supplement   - Ascorbic acid 500mg PO bid    - Antibiotics:   - ceftriaxone 1g Q24h x 5 days  - azithromycin 500mg po x1, then 250mg po daily x 4 days   - Anticoagulation:  - VTE prevention with therapeutic dosing with Lovenox 1mg/kg BID for DVT. Transition to home Xarelto if tolerating HF.   - Other Treatments:  - Dexamethasone 8mg x1 in ED and 6mg daily to finish 10 day course  - Remdesivir   - Start Baricitinib   - Pulmonary toileting with goal of weaning O2

## 2022-01-07 NOTE — ASSESSMENT & PLAN NOTE
- COVID-19 testing:   - Isolation: Airborne/Droplet. Surgical mask on patient. Notify Infection Control  - Management:    - Monitoring:   - Telemetry & Continuous Pulse Oximetry    - Nutrition:    - Multivitamin PO daily   - Add Boost supplement   - Ascorbic acid 500mg PO bid    - Antibiotics:  Considering high oxygen requirement, poor inspiratory effort on CXR so poor eval of lungs on imaging, will treat for now for CAP. Can re-evaluate after BiPAP overnight, possibly better aeration   - ceftriaxone 1g Q24h x 5 days  - azithromycin 500mg po x1, then 250mg po daily x 4 days   - Anticoagulation:  - VTE prevention with therapeutic dosing with Lovenox 1mg/kg BID for DVT   - Other Treatments:  - Dexamethasone 8mg x1 in ED and 6mg daily to finish 10 day course  - Remdesivir   - Baricitinib if no clinical improvement after 1 day on Dex/Remdesivir   - Not hypercapnic on ABG in ED, will trial CPAP overnight starting at PEEP 10   - Pulmonary toileting with goal of weaning O2   - Normal renal function, with CXR concerning for viral PNA with possible edema (poor inspiratory effort), will attempt lasix challenge x1 dose for now to see how she does from an oxygenation standpoint   - F/u repeat CXR in AM with hopefully better aeration for visualization of lung fields

## 2022-01-07 NOTE — SUBJECTIVE & OBJECTIVE
Past Medical History:   Diagnosis Date    Arthritis     Breast cancer     Hypertension        Past Surgical History:   Procedure Laterality Date    BREAST LUMPECTOMY       SECTION, CLASSIC      LIPOMA RESECTION      MASTECTOMY         Review of patient's allergies indicates:  No Known Allergies    Family History     Problem Relation (Age of Onset)    Breast cancer Sister, Paternal Aunt, Paternal Grandmother    Diabetes Brother    Lymphoma Mother    Prostate cancer Father    Stomach cancer Sister        Tobacco Use    Smoking status: Never Smoker    Smokeless tobacco: Never Used   Substance and Sexual Activity    Alcohol use: No     Alcohol/week: 0.0 standard drinks    Drug use: No    Sexual activity: Yes     Partners: Male      Review of Systems   Constitutional: Positive for fatigue. Negative for appetite change and fever.   HENT: Negative for congestion and sinus pain.    Eyes: Negative for discharge and redness.   Respiratory: Negative for cough and shortness of breath.    Cardiovascular: Negative for chest pain and leg swelling.   Gastrointestinal: Negative for diarrhea and nausea.   Endocrine: Negative for polydipsia and polyuria.   Genitourinary: Negative for dysuria and hematuria.   Musculoskeletal: Negative for arthralgias and myalgias.   Skin: Negative for color change and rash.   Neurological: Positive for syncope. Negative for weakness and numbness.   Psychiatric/Behavioral: Negative for agitation and confusion.     Objective:     Vital Signs (Most Recent):  Temp: (!) 100.5 °F (38.1 °C) (22 1501)  Pulse: 107 (22)  Resp: (!) 29 (22)  BP: 110/61 (22)  SpO2: 95 % (22) Vital Signs (24h Range):  Temp:  [100.5 °F (38.1 °C)] 100.5 °F (38.1 °C)  Pulse:  [107-122] 107  Resp:  [24-42] 29  SpO2:  [80 %-95 %] 95 %  BP: (110-127)/(61-77) 110/61   Weight: 88.5 kg (195 lb)  Body mass index is 31.47 kg/m².      Intake/Output Summary (Last 24 hours) at  1/6/2022 2225  Last data filed at 1/6/2022 2156  Gross per 24 hour   Intake 250.12 ml   Output --   Net 250.12 ml       Physical Exam  Constitutional:       General: She is not in acute distress.     Appearance: Normal appearance.   HENT:      Head: Normocephalic and atraumatic.      Mouth/Throat:      Mouth: Mucous membranes are moist.      Pharynx: Oropharynx is clear.   Eyes:      Conjunctiva/sclera: Conjunctivae normal.      Pupils: Pupils are equal, round, and reactive to light.   Cardiovascular:      Rate and Rhythm: Normal rate and regular rhythm.      Pulses: Normal pulses.      Heart sounds: Normal heart sounds.   Pulmonary:      Effort: Pulmonary effort is normal.      Breath sounds: Rales present.      Comments: 50L comfort flow, 100% FiO2  Abdominal:      General: Abdomen is flat. Bowel sounds are normal.      Palpations: Abdomen is soft.      Tenderness: There is no abdominal tenderness.   Musculoskeletal:         General: No swelling or deformity.      Cervical back: Normal range of motion and neck supple. No tenderness.   Skin:     General: Skin is warm and dry.   Neurological:      General: No focal deficit present.      Mental Status: She is alert and oriented to person, place, and time.   Psychiatric:         Mood and Affect: Mood normal.         Behavior: Behavior normal.         Vents:  Oxygen Concentration (%): 100 (01/06/22 2000)  Lines/Drains/Airways     Peripheral Intravenous Line                 Peripheral IV - Single Lumen 01/06/22 1800 20 G Right Antecubital <1 day              Significant Labs:    CBC/Anemia Profile:  Recent Labs   Lab 01/06/22  1751   WBC 6.68   HGB 11.5*   HCT 36.0*      MCV 86   RDW 14.8*   FERRITIN 1,287*        Chemistries:  Recent Labs   Lab 01/06/22  1751      K 3.7      CO2 22*   BUN 17   CREATININE 1.1   CALCIUM 8.9   ALBUMIN 3.1*   PROT 7.9   BILITOT 0.6   ALKPHOS 66   ALT 23   AST 42*     COVID-19 Positive    All pertinent labs within the  past 24 hours have been reviewed.    Significant Imaging: I have reviewed all pertinent imaging results/findings within the past 24 hours.

## 2022-01-07 NOTE — ASSESSMENT & PLAN NOTE
Non-occlusive thrombus of R IJ found on 11/30/2021. Continue therapeutic AC while inpatient. Xarelto at home, will continue Lovenox for now.

## 2022-01-08 PROBLEM — I48.91 ATRIAL FIBRILLATION: Status: ACTIVE | Noted: 2022-01-08

## 2022-01-08 LAB
ALBUMIN SERPL BCP-MCNC: 2.7 G/DL (ref 3.5–5.2)
ALLENS TEST: ABNORMAL
ALLENS TEST: ABNORMAL
ALP SERPL-CCNC: 56 U/L (ref 55–135)
ALT SERPL W/O P-5'-P-CCNC: 23 U/L (ref 10–44)
ANION GAP SERPL CALC-SCNC: 12 MMOL/L (ref 8–16)
AST SERPL-CCNC: 35 U/L (ref 10–40)
BASOPHILS # BLD AUTO: 0.01 K/UL (ref 0–0.2)
BASOPHILS NFR BLD: 0.1 % (ref 0–1.9)
BILIRUB SERPL-MCNC: 0.2 MG/DL (ref 0.1–1)
BUN SERPL-MCNC: 20 MG/DL (ref 8–23)
CALCIUM SERPL-MCNC: 8.3 MG/DL (ref 8.7–10.5)
CHLORIDE SERPL-SCNC: 101 MMOL/L (ref 95–110)
CO2 SERPL-SCNC: 20 MMOL/L (ref 23–29)
CREAT SERPL-MCNC: 0.9 MG/DL (ref 0.5–1.4)
CRP SERPL-MCNC: 83.9 MG/L (ref 0–8.2)
DELSYS: ABNORMAL
DELSYS: ABNORMAL
DIFFERENTIAL METHOD: ABNORMAL
EOSINOPHIL # BLD AUTO: 0 K/UL (ref 0–0.5)
EOSINOPHIL NFR BLD: 0 % (ref 0–8)
ERYTHROCYTE [DISTWIDTH] IN BLOOD BY AUTOMATED COUNT: 14.6 % (ref 11.5–14.5)
EST. GFR  (AFRICAN AMERICAN): >60 ML/MIN/1.73 M^2
EST. GFR  (NON AFRICAN AMERICAN): >60 ML/MIN/1.73 M^2
GLUCOSE SERPL-MCNC: 288 MG/DL (ref 70–110)
HCO3 UR-SCNC: 18.8 MMOL/L (ref 24–28)
HCT VFR BLD AUTO: 33.4 % (ref 37–48.5)
HGB BLD-MCNC: 10.7 G/DL (ref 12–16)
IMM GRANULOCYTES # BLD AUTO: 0.04 K/UL (ref 0–0.04)
IMM GRANULOCYTES NFR BLD AUTO: 0.5 % (ref 0–0.5)
LDH SERPL L TO P-CCNC: 3.77 MMOL/L (ref 0.36–1.25)
LYMPHOCYTES # BLD AUTO: 0.9 K/UL (ref 1–4.8)
LYMPHOCYTES NFR BLD: 11.2 % (ref 18–48)
MAGNESIUM SERPL-MCNC: 1.8 MG/DL (ref 1.6–2.6)
MCH RBC QN AUTO: 27 PG (ref 27–31)
MCHC RBC AUTO-ENTMCNC: 32 G/DL (ref 32–36)
MCV RBC AUTO: 84 FL (ref 82–98)
MONOCYTES # BLD AUTO: 0.5 K/UL (ref 0.3–1)
MONOCYTES NFR BLD: 5.5 % (ref 4–15)
NEUTROPHILS # BLD AUTO: 6.7 K/UL (ref 1.8–7.7)
NEUTROPHILS NFR BLD: 82.7 % (ref 38–73)
NRBC BLD-RTO: 0 /100 WBC
PCO2 BLDA: 27.8 MMHG (ref 35–45)
PH SMN: 7.44 [PH] (ref 7.35–7.45)
PHOSPHATE SERPL-MCNC: 3.6 MG/DL (ref 2.7–4.5)
PLATELET # BLD AUTO: 243 K/UL (ref 150–450)
PMV BLD AUTO: 11.1 FL (ref 9.2–12.9)
PO2 BLDA: 66 MMHG (ref 80–100)
POC BE: -5 MMOL/L
POC SATURATED O2: 94 % (ref 95–100)
POC TCO2: 20 MMOL/L (ref 23–27)
POCT GLUCOSE: 173 MG/DL (ref 70–110)
POCT GLUCOSE: 282 MG/DL (ref 70–110)
POCT GLUCOSE: 288 MG/DL (ref 70–110)
POCT GLUCOSE: 297 MG/DL (ref 70–110)
POTASSIUM SERPL-SCNC: 3.8 MMOL/L (ref 3.5–5.1)
PROT SERPL-MCNC: 6.5 G/DL (ref 6–8.4)
RBC # BLD AUTO: 3.96 M/UL (ref 4–5.4)
SAMPLE: ABNORMAL
SAMPLE: ABNORMAL
SITE: ABNORMAL
SITE: ABNORMAL
SODIUM SERPL-SCNC: 133 MMOL/L (ref 136–145)
WBC # BLD AUTO: 8.15 K/UL (ref 3.9–12.7)

## 2022-01-08 PROCEDURE — 84100 ASSAY OF PHOSPHORUS: CPT | Performed by: STUDENT IN AN ORGANIZED HEALTH CARE EDUCATION/TRAINING PROGRAM

## 2022-01-08 PROCEDURE — 63600175 PHARM REV CODE 636 W HCPCS: Performed by: STUDENT IN AN ORGANIZED HEALTH CARE EDUCATION/TRAINING PROGRAM

## 2022-01-08 PROCEDURE — 63600175 PHARM REV CODE 636 W HCPCS: Performed by: INTERNAL MEDICINE

## 2022-01-08 PROCEDURE — 25000003 PHARM REV CODE 250

## 2022-01-08 PROCEDURE — 99291 CRITICAL CARE FIRST HOUR: CPT | Mod: ,,, | Performed by: EMERGENCY MEDICINE

## 2022-01-08 PROCEDURE — 83735 ASSAY OF MAGNESIUM: CPT | Performed by: STUDENT IN AN ORGANIZED HEALTH CARE EDUCATION/TRAINING PROGRAM

## 2022-01-08 PROCEDURE — 25000003 PHARM REV CODE 250: Performed by: INTERNAL MEDICINE

## 2022-01-08 PROCEDURE — 25000003 PHARM REV CODE 250: Performed by: STUDENT IN AN ORGANIZED HEALTH CARE EDUCATION/TRAINING PROGRAM

## 2022-01-08 PROCEDURE — 93010 ELECTROCARDIOGRAM REPORT: CPT | Mod: ,,, | Performed by: INTERNAL MEDICINE

## 2022-01-08 PROCEDURE — 63600175 PHARM REV CODE 636 W HCPCS

## 2022-01-08 PROCEDURE — 94660 CPAP INITIATION&MGMT: CPT

## 2022-01-08 PROCEDURE — 27000207 HC ISOLATION

## 2022-01-08 PROCEDURE — 27100171 HC OXYGEN HIGH FLOW UP TO 24 HOURS

## 2022-01-08 PROCEDURE — 85025 COMPLETE CBC W/AUTO DIFF WBC: CPT | Performed by: INTERNAL MEDICINE

## 2022-01-08 PROCEDURE — 99291 PR CRITICAL CARE, E/M 30-74 MINUTES: ICD-10-PCS | Mod: ,,, | Performed by: EMERGENCY MEDICINE

## 2022-01-08 PROCEDURE — 99900035 HC TECH TIME PER 15 MIN (STAT)

## 2022-01-08 PROCEDURE — 93005 ELECTROCARDIOGRAM TRACING: CPT

## 2022-01-08 PROCEDURE — 25000003 PHARM REV CODE 250: Performed by: EMERGENCY MEDICINE

## 2022-01-08 PROCEDURE — C9399 UNCLASSIFIED DRUGS OR BIOLOG: HCPCS | Performed by: INTERNAL MEDICINE

## 2022-01-08 PROCEDURE — 20000000 HC ICU ROOM

## 2022-01-08 PROCEDURE — 93010 EKG 12-LEAD: ICD-10-PCS | Mod: ,,, | Performed by: INTERNAL MEDICINE

## 2022-01-08 PROCEDURE — 94761 N-INVAS EAR/PLS OXIMETRY MLT: CPT

## 2022-01-08 PROCEDURE — 80053 COMPREHEN METABOLIC PANEL: CPT | Performed by: STUDENT IN AN ORGANIZED HEALTH CARE EDUCATION/TRAINING PROGRAM

## 2022-01-08 PROCEDURE — 86140 C-REACTIVE PROTEIN: CPT | Performed by: STUDENT IN AN ORGANIZED HEALTH CARE EDUCATION/TRAINING PROGRAM

## 2022-01-08 RX ORDER — METOPROLOL TARTRATE 25 MG/1
25 TABLET, FILM COATED ORAL 2 TIMES DAILY
Status: DISCONTINUED | OUTPATIENT
Start: 2022-01-08 | End: 2022-01-15 | Stop reason: HOSPADM

## 2022-01-08 RX ORDER — INSULIN ASPART 100 [IU]/ML
2 INJECTION, SOLUTION INTRAVENOUS; SUBCUTANEOUS
Status: DISCONTINUED | OUTPATIENT
Start: 2022-01-08 | End: 2022-01-09

## 2022-01-08 RX ADMIN — AMLODIPINE BESYLATE 10 MG: 10 TABLET ORAL at 08:01

## 2022-01-08 RX ADMIN — MUPIROCIN: 20 OINTMENT TOPICAL at 09:01

## 2022-01-08 RX ADMIN — DEXAMETHASONE 6 MG: 1.5 TABLET ORAL at 08:01

## 2022-01-08 RX ADMIN — THERA TABS 1 TABLET: TAB at 08:01

## 2022-01-08 RX ADMIN — AZITHROMYCIN MONOHYDRATE 500 MG: 500 INJECTION, POWDER, LYOPHILIZED, FOR SOLUTION INTRAVENOUS at 09:01

## 2022-01-08 RX ADMIN — ENOXAPARIN SODIUM 90 MG: 100 INJECTION SUBCUTANEOUS at 09:01

## 2022-01-08 RX ADMIN — VANCOMYCIN HYDROCHLORIDE 2250 MG: 1.25 INJECTION, POWDER, LYOPHILIZED, FOR SOLUTION INTRAVENOUS at 12:01

## 2022-01-08 RX ADMIN — INSULIN ASPART 3 UNITS: 100 INJECTION, SOLUTION INTRAVENOUS; SUBCUTANEOUS at 10:01

## 2022-01-08 RX ADMIN — Medication 1000 UNITS: at 08:01

## 2022-01-08 RX ADMIN — ENOXAPARIN SODIUM 90 MG: 100 INJECTION SUBCUTANEOUS at 08:01

## 2022-01-08 RX ADMIN — INSULIN DETEMIR 5 UNITS: 100 INJECTION, SOLUTION SUBCUTANEOUS at 08:01

## 2022-01-08 RX ADMIN — INSULIN ASPART 1 UNITS: 100 INJECTION, SOLUTION INTRAVENOUS; SUBCUTANEOUS at 09:01

## 2022-01-08 RX ADMIN — PANTOPRAZOLE SODIUM 40 MG: 40 TABLET, DELAYED RELEASE ORAL at 08:01

## 2022-01-08 RX ADMIN — REMDESIVIR 100 MG: 100 INJECTION, POWDER, LYOPHILIZED, FOR SOLUTION INTRAVENOUS at 08:01

## 2022-01-08 RX ADMIN — INSULIN ASPART 2 UNITS: 100 INJECTION, SOLUTION INTRAVENOUS; SUBCUTANEOUS at 12:01

## 2022-01-08 RX ADMIN — METOPROLOL TARTRATE 25 MG: 25 TABLET, FILM COATED ORAL at 09:01

## 2022-01-08 RX ADMIN — INSULIN ASPART 3 UNITS: 100 INJECTION, SOLUTION INTRAVENOUS; SUBCUTANEOUS at 04:01

## 2022-01-08 RX ADMIN — LEVOTHYROXINE SODIUM 100 MCG: 100 TABLET ORAL at 06:01

## 2022-01-08 NOTE — PROGRESS NOTES
Pharmacokinetic Initial Assessment: IV Vancomycin    Assessment/Plan:    Initiate intravenous vancomycin with loading dose of 2250  mg once followed by a maintenance dose of vancomycin 1250mg IV every 24 hours  Desired empiric serum trough concentration is 15 to 20 mcg/mL  Draw vancomycin trough level 60 min prior to third dose on 1/09 at approximately 2300  Pharmacy will continue to follow and monitor vancomycin.      Please contact pharmacy at extension [phone] with any questions regarding this assessment.     Thank you for the consult,   Wyatt Pizarro       Patient brief summary:  Fauzia Kirk is a 66 y.o. female initiated on antimicrobial therapy with IV Vancomycin for treatment of suspected bacteremia    Drug Allergies:   Review of patient's allergies indicates:  No Known Allergies    Actual Body Weight:   85kg    Renal Function:   Estimated Creatinine Clearance: 49.3 mL/min (based on SCr of 1.2 mg/dL).,     Dialysis Method (if applicable):  N/A    CBC (last 72 hours):  Recent Labs   Lab Result Units 01/06/22 1751 01/07/22  0334 01/07/22  1309   WBC K/uL 6.68 5.19  --    Hemoglobin g/dL 11.5* 11.3*  --    Hemoglobin A1C %  --   --  7.0*   Hematocrit % 36.0* 36.1*  --    Platelets K/uL 216 199  --    Gran % % 78.7* 79.8*  --    Lymph % % 16.8* 17.5*  --    Mono % % 4.0 2.3*  --    Eosinophil % % 0.0 0.0  --    Basophil % % 0.1 0.2  --    Differential Method  Automated Automated  --        Metabolic Panel (last 72 hours):  Recent Labs   Lab Result Units 01/06/22 1751 01/07/22  0334   Sodium mmol/L 138 137   Potassium mmol/L 3.7 4.5   Chloride mmol/L 104 103   CO2 mmol/L 22* 19*   Glucose mg/dL 152* 310*   BUN mg/dL 17 17   Creatinine mg/dL 1.1 1.2   Albumin g/dL 3.1* 3.0*   Total Bilirubin mg/dL 0.6 0.3   Alkaline Phosphatase U/L 66 63   AST U/L 42* 56*   ALT U/L 23 31   Magnesium mg/dL  --  2.0   Phosphorus mg/dL  --  3.8       Drug levels (last 3 results):  No results for input(s): VANCOMYCINRA,  VANCOMYCINPE, VANCOMYCINTR in the last 72 hours.    Microbiologic Results:  Microbiology Results (last 7 days)     Procedure Component Value Units Date/Time    Blood culture [[id number]] Collected: 01/07/22 2350    Order Status: Sent Specimen: Blood from Peripheral, Wrist, Right Updated: 01/07/22 2351    Blood culture [[id number]]     Order Status: Sent Specimen: Blood     Blood culture (site 2) [[id number]] Collected: 01/06/22 2055    Order Status: Completed Specimen: Blood from Peripheral, Antecubital, Left Updated: 01/07/22 2239     Blood Culture, Routine Gram stain jackie bottle: Gram positive cocci in clusters resembling Staph      Results called to and read back by:Elsie Jay RN 01/07/2022  22:38    Narrative:      Site # 2, aerobic only    Blood culture (site 1) [[id number]] Collected: 01/06/22 2055    Order Status: Completed Specimen: Blood from Peripheral, Antecubital, Right Updated: 01/07/22 2212     Blood Culture, Routine No Growth to date      No Growth to date    Narrative:      Site # 1, aerobic and anaerobic    Culture, Respiratory with Gram Stain [[id number]]     Order Status: No result Specimen: Sputum, Expectorated

## 2022-01-08 NOTE — PROGRESS NOTES
Pharmacokinetic Assessment Follow Up: IV Vancomycin    Vancomycin order has been discontinued. Pharmacy will sign off. Please reconsult as needed! Thank you!    Please contact pharmacy for questions regarding this assessment.    Thank you for the consult,   Megan Villagomez

## 2022-01-08 NOTE — PLAN OF CARE
Patient's vital signs stable tat this time. Patient on CPAP overnight, back on airvo flow this morning, 45L, 55%, tolerating well. Blood cultures drawn.3 unmeasured urine voids overnight. Patient able to turn and reposition independently. Team kept updated on patient's status, labs, and O2 settings overnight. Plan of care reviewed with patient. Will continue to monitor closely.

## 2022-01-08 NOTE — PROGRESS NOTES
[facility]  Critical Care Medicine  Progress Note    Patient Name: Fauzia Kirk  MRN: [id number]  Admission Date: 1/6/2022  Hospital Length of Stay: 2 days  Code Status: Full Code  Attending Provider: Hernan Catalan MD  Primary Care Provider: Era Urena MD   Principal Problem: Pneumonia due to COVID-19 virus    Subjective:     HPI:  Fauzia Kirk is a 66yoF with a medical history of breast cancer and lung cancer (previously treated with immunotherapy, chemo, radiation, bilateral mastectomies, last treated with chemoRT in 2018), HTN, OA, hypothyroidism, DVT diagnosed 11/30/21 who presented to the ED after she states she hasn't been feeling well for a few days. No reports of shortness of breath, but she has had some cough, myalgias, and fatigue for about 4-5 days and her daughter was positive for COVID about 2 weeks ago. She states she wasn't around her much but she did come in close contact with her. In the ED she was comfortably hypoxic, not complaining of trouble breathing, resting fine, but requiring 50L comfort flow at 100% FiO2 and desaturating with conversation. She was able to talk in full sentences without lightheadeness, but was hypoxic into the mid-to-upper 80s. She does report fainting yesterday while sitting at home and recovered within minutes and without complaint of any prodromes. She lives at home alone and completes her tasks independently. She received 1 dose of mRNA vaccine for COVID but decided not to get her second dose after getting her blood clot in November.       Hospital/ICU Course:  In ED, patient denies SOB but became hypoxic to mid 80s requiring high-flow O2 100%FiO2 50L. Patient then moved to bipap transiently followed by transition to CPAP. Patient then weaned to comfort flow. Dex/remdesevir started for COVID treatment. Patient's labs showed glu >300 despite no diabetic history. A1C 7.0. Levemir, Aspart and ISS started. Patient began having increased  O2 requirements and new intermittent a-fib with RVR on 1/8/22. PO Metoprolol started.       Interval History/Significant Events: Patient was weaning O2 overnight to 37L at 45% FiO2 this AM. Later in AM, O2 requirements increased. Patient now on 50L 90%. New a-fib with RVR this AM. HR briefly to 150 but generally 90s-120s. Normotensive and asymptomatic.     Review of Systems   Constitutional: Positive for fatigue and fever.   HENT: Negative for congestion and rhinorrhea.    Respiratory: Positive for cough. Negative for shortness of breath and wheezing.    Cardiovascular: Negative for chest pain and palpitations.   Gastrointestinal: Negative for abdominal pain and vomiting.   Genitourinary: Negative for difficulty urinating and dysuria.   Musculoskeletal: Positive for myalgias. Negative for joint swelling.   Skin: Negative for rash and wound.   Neurological: Positive for weakness. Negative for headaches.     Objective:     Vital Signs (Most Recent):  Temp: 97.5 °F (36.4 °C) (01/08/22 1102)  Pulse: 106 (01/08/22 1400)  Resp: (!) 35 (01/08/22 1400)  BP: (!) 140/75 (01/08/22 1502)  SpO2: (!) 91 % (01/08/22 1400) Vital Signs (24h Range):  Temp:  [97.3 °F (36.3 °C)-98.1 °F (36.7 °C)] 97.5 °F (36.4 °C)  Pulse:  [] 106  Resp:  [16-43] 35  SpO2:  [84 %-100 %] 91 %  BP: (111-157)/(58-96) 140/75   Weight: 86 kg (189 lb 9.5 oz)  Body mass index is 30.6 kg/m².      Intake/Output Summary (Last 24 hours) at 1/8/2022 1547  Last data filed at 1/8/2022 1100  Gross per 24 hour   Intake 1072.13 ml   Output 800 ml   Net 272.13 ml       Physical Exam  Vitals and nursing note reviewed.   Constitutional:       General: She is not in acute distress.     Comments: Patient sitting up in bed, comfortably eating on HF O2.    HENT:      Head: Normocephalic and atraumatic.      Nose: No congestion or rhinorrhea.      Mouth/Throat:      Pharynx: Oropharynx is clear.   Eyes:      Extraocular Movements: Extraocular movements intact.       Conjunctiva/sclera: Conjunctivae normal.   Cardiovascular:      Rate and Rhythm: Tachycardia present. Rhythm irregular.   Pulmonary:      Breath sounds: Rales present. No wheezing.      Comments: On HF 90% 50L  Abdominal:      General: There is no distension.      Palpations: Abdomen is soft.      Tenderness: There is no abdominal tenderness.   Musculoskeletal:         General: No swelling. Normal range of motion.      Right lower leg: No edema.      Left lower leg: No edema.   Skin:     General: Skin is warm and dry.   Neurological:      General: No focal deficit present.      Mental Status: She is alert and oriented to person, place, and time.         Vents:  Oxygen Concentration (%): 90 (01/08/22 1300)  Lines/Drains/Airways     Central Venous Catheter Line            Port A Cath Single Lumen 07/26/17 1130 left subclavian 1627 days          Drain            Female External Urinary Catheter 01/07/22 0031 1 day          Peripheral Intravenous Line                 Peripheral IV - Single Lumen 01/06/22 1800 20 G Right Antecubital 1 day         Peripheral IV - Single Lumen 01/07/22 0305 22 G Posterior;Right Hand 1 day              Significant Labs:    CBC/Anemia Profile:  Recent Labs   Lab 01/06/22 1751 01/06/22  1751 01/07/22  0334 01/07/22  0522 01/08/22  0253   WBC 6.68  --  5.19  --  8.15   HGB 11.5*  --  11.3*  --  10.7*   HCT 36.0*   < > 36.1* 34* 33.4*     --  199  --  243   MCV 86  --  87  --  84   RDW 14.8*  --  14.6*  --  14.6*   FERRITIN 1,287*  --   --   --   --     < > = values in this interval not displayed.        Chemistries:  Recent Labs   Lab 01/06/22 1751 01/07/22  0334 01/08/22  0253    137 133*   K 3.7 4.5 3.8    103 101   CO2 22* 19* 20*   BUN 17 17 20   CREATININE 1.1 1.2 0.9   CALCIUM 8.9 8.2* 8.3*   ALBUMIN 3.1* 3.0* 2.7*   PROT 7.9 7.5 6.5   BILITOT 0.6 0.3 0.2   ALKPHOS 66 63 56   ALT 23 31 23   AST 42* 56* 35   MG  --  2.0 1.8   PHOS  --  3.8 3.6           Significant  Imaging:  I have reviewed all pertinent imaging results/findings within the past 24 hours.      ABG  Recent Labs   Lab 01/08/22  1105   PH 7.437   PO2 66*   PCO2 27.8*   HCO3 18.8*   BE -5     Assessment/Plan:     Pulmonary  Lung nodule  See hx of lung CA    Cardiac/Vascular  Atrial fibrillation  New a-fib with RVR transiently on 1/8/22.     - Start Metoprolol 25mg BID  - Stop home amlodipine    HTN (hypertension)  - Hold home amlodipine, start Metoprolol 25mg BID  - See atrial fibrillation    Hematology  Chronic anticoagulation  See DVT    History of DVT (deep vein thrombosis)  Non-occlusive thrombus of R IJ found on 11/30/2021. Continue therapeutic AC while inpatient. Xarelto at home, will continue Lovenox for now.    Oncology  History of lung cancer  Last chemoRT 2018. Lung nodule present on most recent CT chest (3/2021), per read: 1.0 x 0.7 cm subsolid pulmonary nodule with irregular margins in the posterior right lower lobe, that is new from prior. There was a 6mo follow up Chest CT with contrast in October that was never done. She is followed by Dr. Mai in oncology.    History of left breast cancer  Distant hx, s/p bilateral mastectomies, immunotherapy, chemoRT per patient    Endocrine  Acute hyperglycemia  New hyperglycemia in insulin-naive patient no history of DM. Possibly contributed to by dexamethasone. A1C 7.0 on 1/7/22.     - Levemir 5mg daily  - Add aspart 2u with meals  - MDSSI  - Continue to closely monitor    Hyperthyroidism  - Continue home Synthroid    Other  * Pneumonia due to COVID-19 virus  - COVID-19 testing:   - Isolation: Airborne/Droplet. Surgical mask on patient. Notify Infection Control  - Management:    - Monitoring:   - Telemetry & Continuous Pulse Oximetry    - Nutrition:    - Multivitamin PO daily   - Add Boost supplement   - Ascorbic acid 500mg PO bid    - Antibiotics:   - ceftriaxone 1g Q24h x 5 days  - azithromycin 500mg po x1, then 250mg po daily x 4 days   - Anticoagulation:  -  VTE prevention with therapeutic dosing with Lovenox 1mg/kg BID for DVT. Transition to home Xarelto if tolerating HF.   - Other Treatments:  - Dexamethasone 8mg x1 in ED and 6mg daily to finish 10 day course  - Remdesivir   - Start Baricitinib   - Pulmonary toileting with goal of weaning O2     Critical Care Daily Checklist:    A: Awake: RASS Goal/Actual Goal:    Actual: Stone Agitation Sedation Scale (RASS): Alert and calm   B: Spontaneous Breathing Trial Performed?  N/A   C: SAT & SBT Coordinated?  N/A                    D: Delirium: CAM-ICU Overall CAM-ICU: Negative   E: Early Mobility Performed? Yes   F: Feeding Goal:    Status:     Current Diet Order   Procedures    Diet Adult Regular (IDDSI Level 7)      AS: Analgesia/Sedation N/A   T: Thromboembolic Prophylaxis N/A   H: HOB > 300 Yes   U: Stress Ulcer Prophylaxis (if needed) Protonix   G: Glucose Control Yes   B: Bowel Function  Miralax   I: Indwelling Catheter (Lines & Smith) Necessity PIV x2, Port-a-cath   D: De-escalation of Antimicrobials/Pharmacotherapies N/A    Plan for the day/ETD - Wean O2 as tolerated  - Increase glycemic control to   - F/u hepatitis labs  - Start Metoprolol, cardiac monitoring  - Stop home amlodipine    Code Status:  Family/Goals of Care: Full Code         Critical secondary to Patient has a condition that poses threat to life and bodily function: Severe Respiratory Distress      Critical care was time spent personally by me on the following activities: development of treatment plan with patient or surrogate and bedside caregivers, discussions with consultants, evaluation of patient's response to treatment, examination of patient, ordering and performing treatments and interventions, ordering and review of laboratory studies, ordering and review of radiographic studies, pulse oximetry, re-evaluation of patient's condition. This critical care time did not overlap with that of any other provider or involve time for any  procedures.     Jahaira Lagos MD  Critical Care Medicine  [facility]

## 2022-01-08 NOTE — PROGRESS NOTES
Patient tachycardic at begining of shift with HR in the low 100s and irregular, 12 lead EKG done, results showing Sinus tach with PACs. Results received for positive blood  Cultures, gram Positive cocci in cluster resembling staph. Team notified of both results. New order received for blood cultures. One full set done, one partial set done, both peripheral sticks, Dr. Benton notified. New order received for vanc. Patient on CPAP overnight per order, SaO2 currently %, no accessory muscle noted use or shortness reported. HR improved overnight, now in the low 80s-100s.

## 2022-01-09 LAB
ALBUMIN SERPL BCP-MCNC: 2.5 G/DL (ref 3.5–5.2)
ALP SERPL-CCNC: 58 U/L (ref 55–135)
ALT SERPL W/O P-5'-P-CCNC: 25 U/L (ref 10–44)
ANION GAP SERPL CALC-SCNC: 12 MMOL/L (ref 8–16)
AST SERPL-CCNC: 36 U/L (ref 10–40)
BACTERIA BLD CULT: ABNORMAL
BASOPHILS # BLD AUTO: 0 K/UL (ref 0–0.2)
BASOPHILS NFR BLD: 0 % (ref 0–1.9)
BILIRUB SERPL-MCNC: 0.3 MG/DL (ref 0.1–1)
BUN SERPL-MCNC: 27 MG/DL (ref 8–23)
CALCIUM SERPL-MCNC: 8.8 MG/DL (ref 8.7–10.5)
CHLORIDE SERPL-SCNC: 103 MMOL/L (ref 95–110)
CO2 SERPL-SCNC: 18 MMOL/L (ref 23–29)
CREAT SERPL-MCNC: 0.9 MG/DL (ref 0.5–1.4)
DIFFERENTIAL METHOD: ABNORMAL
EOSINOPHIL # BLD AUTO: 0 K/UL (ref 0–0.5)
EOSINOPHIL NFR BLD: 0 % (ref 0–8)
ERYTHROCYTE [DISTWIDTH] IN BLOOD BY AUTOMATED COUNT: 14.5 % (ref 11.5–14.5)
EST. GFR  (AFRICAN AMERICAN): >60 ML/MIN/1.73 M^2
EST. GFR  (NON AFRICAN AMERICAN): >60 ML/MIN/1.73 M^2
GLUCOSE SERPL-MCNC: 243 MG/DL (ref 70–110)
HCT VFR BLD AUTO: 35.1 % (ref 37–48.5)
HGB BLD-MCNC: 10.9 G/DL (ref 12–16)
IMM GRANULOCYTES # BLD AUTO: 0.03 K/UL (ref 0–0.04)
IMM GRANULOCYTES NFR BLD AUTO: 0.3 % (ref 0–0.5)
LYMPHOCYTES # BLD AUTO: 0.9 K/UL (ref 1–4.8)
LYMPHOCYTES NFR BLD: 9.4 % (ref 18–48)
MAGNESIUM SERPL-MCNC: 2.1 MG/DL (ref 1.6–2.6)
MCH RBC QN AUTO: 27.2 PG (ref 27–31)
MCHC RBC AUTO-ENTMCNC: 31.1 G/DL (ref 32–36)
MCV RBC AUTO: 88 FL (ref 82–98)
MONOCYTES # BLD AUTO: 0.4 K/UL (ref 0.3–1)
MONOCYTES NFR BLD: 4.1 % (ref 4–15)
NEUTROPHILS # BLD AUTO: 7.8 K/UL (ref 1.8–7.7)
NEUTROPHILS NFR BLD: 86.2 % (ref 38–73)
NRBC BLD-RTO: 0 /100 WBC
PHOSPHATE SERPL-MCNC: 4.3 MG/DL (ref 2.7–4.5)
PLATELET # BLD AUTO: 255 K/UL (ref 150–450)
PMV BLD AUTO: 10.4 FL (ref 9.2–12.9)
POCT GLUCOSE: 199 MG/DL (ref 70–110)
POCT GLUCOSE: 321 MG/DL (ref 70–110)
POCT GLUCOSE: 410 MG/DL (ref 70–110)
POTASSIUM SERPL-SCNC: 5.2 MMOL/L (ref 3.5–5.1)
PROT SERPL-MCNC: 7.3 G/DL (ref 6–8.4)
RBC # BLD AUTO: 4.01 M/UL (ref 4–5.4)
SODIUM SERPL-SCNC: 133 MMOL/L (ref 136–145)
WBC # BLD AUTO: 9.01 K/UL (ref 3.9–12.7)

## 2022-01-09 PROCEDURE — 63600175 PHARM REV CODE 636 W HCPCS: Performed by: INTERNAL MEDICINE

## 2022-01-09 PROCEDURE — 25000003 PHARM REV CODE 250: Performed by: INTERNAL MEDICINE

## 2022-01-09 PROCEDURE — 25000003 PHARM REV CODE 250

## 2022-01-09 PROCEDURE — 99291 CRITICAL CARE FIRST HOUR: CPT | Mod: ,,, | Performed by: EMERGENCY MEDICINE

## 2022-01-09 PROCEDURE — 27000207 HC ISOLATION

## 2022-01-09 PROCEDURE — C9399 UNCLASSIFIED DRUGS OR BIOLOG: HCPCS | Performed by: STUDENT IN AN ORGANIZED HEALTH CARE EDUCATION/TRAINING PROGRAM

## 2022-01-09 PROCEDURE — 83735 ASSAY OF MAGNESIUM: CPT | Performed by: STUDENT IN AN ORGANIZED HEALTH CARE EDUCATION/TRAINING PROGRAM

## 2022-01-09 PROCEDURE — 99291 PR CRITICAL CARE, E/M 30-74 MINUTES: ICD-10-PCS | Mod: ,,, | Performed by: EMERGENCY MEDICINE

## 2022-01-09 PROCEDURE — 80053 COMPREHEN METABOLIC PANEL: CPT | Performed by: STUDENT IN AN ORGANIZED HEALTH CARE EDUCATION/TRAINING PROGRAM

## 2022-01-09 PROCEDURE — 27000221 HC OXYGEN, UP TO 24 HOURS

## 2022-01-09 PROCEDURE — 85025 COMPLETE CBC W/AUTO DIFF WBC: CPT | Performed by: INTERNAL MEDICINE

## 2022-01-09 PROCEDURE — 94660 CPAP INITIATION&MGMT: CPT

## 2022-01-09 PROCEDURE — 99900035 HC TECH TIME PER 15 MIN (STAT)

## 2022-01-09 PROCEDURE — 20000000 HC ICU ROOM

## 2022-01-09 PROCEDURE — 63600175 PHARM REV CODE 636 W HCPCS: Performed by: STUDENT IN AN ORGANIZED HEALTH CARE EDUCATION/TRAINING PROGRAM

## 2022-01-09 PROCEDURE — 84100 ASSAY OF PHOSPHORUS: CPT | Performed by: STUDENT IN AN ORGANIZED HEALTH CARE EDUCATION/TRAINING PROGRAM

## 2022-01-09 RX ORDER — INSULIN ASPART 100 [IU]/ML
1-10 INJECTION, SOLUTION INTRAVENOUS; SUBCUTANEOUS
Status: DISCONTINUED | OUTPATIENT
Start: 2022-01-09 | End: 2022-01-15 | Stop reason: HOSPADM

## 2022-01-09 RX ADMIN — INSULIN DETEMIR 5 UNITS: 100 INJECTION, SOLUTION SUBCUTANEOUS at 08:01

## 2022-01-09 RX ADMIN — INSULIN ASPART 10 UNITS: 100 INJECTION, SOLUTION INTRAVENOUS; SUBCUTANEOUS at 04:01

## 2022-01-09 RX ADMIN — INSULIN ASPART 3 UNITS: 100 INJECTION, SOLUTION INTRAVENOUS; SUBCUTANEOUS at 09:01

## 2022-01-09 RX ADMIN — ENOXAPARIN SODIUM 90 MG: 100 INJECTION SUBCUTANEOUS at 08:01

## 2022-01-09 RX ADMIN — THERA TABS 1 TABLET: TAB at 08:01

## 2022-01-09 RX ADMIN — MUPIROCIN: 20 OINTMENT TOPICAL at 09:01

## 2022-01-09 RX ADMIN — DEXAMETHASONE 6 MG: 1.5 TABLET ORAL at 08:01

## 2022-01-09 RX ADMIN — INSULIN ASPART 2 UNITS: 100 INJECTION, SOLUTION INTRAVENOUS; SUBCUTANEOUS at 08:01

## 2022-01-09 RX ADMIN — Medication 1000 UNITS: at 08:01

## 2022-01-09 RX ADMIN — METOPROLOL TARTRATE 25 MG: 25 TABLET, FILM COATED ORAL at 08:01

## 2022-01-09 RX ADMIN — ENOXAPARIN SODIUM 90 MG: 100 INJECTION SUBCUTANEOUS at 09:01

## 2022-01-09 RX ADMIN — PANTOPRAZOLE SODIUM 40 MG: 40 TABLET, DELAYED RELEASE ORAL at 08:01

## 2022-01-09 RX ADMIN — MUPIROCIN: 20 OINTMENT TOPICAL at 08:01

## 2022-01-09 RX ADMIN — POLYETHYLENE GLYCOL 3350 17 G: 17 POWDER, FOR SOLUTION ORAL at 08:01

## 2022-01-09 RX ADMIN — INSULIN ASPART 4 UNITS: 100 INJECTION, SOLUTION INTRAVENOUS; SUBCUTANEOUS at 12:01

## 2022-01-09 RX ADMIN — INSULIN ASPART 2 UNITS: 100 INJECTION, SOLUTION INTRAVENOUS; SUBCUTANEOUS at 12:01

## 2022-01-09 RX ADMIN — LEVOTHYROXINE SODIUM 100 MCG: 100 TABLET ORAL at 06:01

## 2022-01-09 RX ADMIN — REMDESIVIR 100 MG: 100 INJECTION, POWDER, LYOPHILIZED, FOR SOLUTION INTRAVENOUS at 08:01

## 2022-01-09 RX ADMIN — BARICITINIB 4 MG: 2 TABLET, FILM COATED ORAL at 08:01

## 2022-01-09 RX ADMIN — INSULIN ASPART 2 UNITS: 100 INJECTION, SOLUTION INTRAVENOUS; SUBCUTANEOUS at 04:01

## 2022-01-09 NOTE — PROGRESS NOTES
Notified CC team of change in HR. EKG  result showed Afib with RVR, . Pt normotensive and asymptomatic. No new orders at this time will continue to monitor.

## 2022-01-09 NOTE — SUBJECTIVE & OBJECTIVE
Interval History/Significant Events: Patient O2 demands increased yesterday. Now tolerating 60L at 92% FiO2. Sinus rhythm overnight.      Review of Systems   Constitutional: Positive for fatigue and fever.   HENT: Negative for congestion and rhinorrhea.    Respiratory: Positive for cough. Negative for shortness of breath and wheezing.    Cardiovascular: Negative for chest pain and palpitations.   Gastrointestinal: Negative for abdominal pain and vomiting.   Genitourinary: Negative for difficulty urinating and dysuria.   Musculoskeletal: Positive for myalgias. Negative for joint swelling.   Skin: Negative for rash and wound.   Neurological: Positive for weakness. Negative for headaches.     Objective:     Vital Signs (Most Recent):  Temp: 97.7 °F (36.5 °C) (01/09/22 1502)  Pulse: 95 (01/09/22 1502)  Resp: (!) 38 (01/09/22 1502)  BP: (!) 120/91 (01/09/22 1502)  SpO2: (!) 91 % (01/09/22 1502) Vital Signs (24h Range):  Temp:  [97.6 °F (36.4 °C)-98.6 °F (37 °C)] 97.7 °F (36.5 °C)  Pulse:  [] 95  Resp:  [18-52] 38  SpO2:  [82 %-98 %] 91 %  BP: ()/(54-91) 120/91   Weight: 86 kg (189 lb 9.5 oz)  Body mass index is 30.6 kg/m².      Intake/Output Summary (Last 24 hours) at 1/9/2022 1552  Last data filed at 1/9/2022 1253  Gross per 24 hour   Intake 629.26 ml   Output --   Net 629.26 ml       Physical Exam  Vitals and nursing note reviewed.   Constitutional:       General: She is not in acute distress.     Comments: Patient sitting up in bed, comfortably eating on HF O2.    HENT:      Head: Normocephalic and atraumatic.      Nose: No congestion or rhinorrhea.      Mouth/Throat:      Pharynx: Oropharynx is clear.   Eyes:      Extraocular Movements: Extraocular movements intact.      Conjunctiva/sclera: Conjunctivae normal.   Cardiovascular:      Rate and Rhythm: Normal rate. Rhythm irregular.   Pulmonary:      Breath sounds: Rales present. No wheezing.      Comments: On HF 90% 50L  Abdominal:      General: There is  no distension.      Palpations: Abdomen is soft.      Tenderness: There is no abdominal tenderness.   Musculoskeletal:         General: No swelling. Normal range of motion.      Right lower leg: No edema.      Left lower leg: No edema.   Skin:     General: Skin is warm and dry.   Neurological:      General: No focal deficit present.      Mental Status: She is alert and oriented to person, place, and time.         Vents:  Oxygen Concentration (%): (P) 80 (01/09/22 1502)  Lines/Drains/Airways     Central Venous Catheter Line            Port A Cath Single Lumen 07/26/17 1130 left subclavian 1628 days          Drain            Female External Urinary Catheter 01/07/22 0031 2 days          Peripheral Intravenous Line                 Peripheral IV - Single Lumen 01/06/22 1800 20 G Right Antecubital 2 days         Peripheral IV - Single Lumen 01/07/22 0305 22 G Posterior;Right Hand 2 days              Significant Labs:    CBC/Anemia Profile:  Recent Labs   Lab 01/08/22  0253 01/09/22  0220   WBC 8.15 9.01   HGB 10.7* 10.9*   HCT 33.4* 35.1*    255   MCV 84 88   RDW 14.6* 14.5        Chemistries:  Recent Labs   Lab 01/08/22  0253 01/09/22  0220   * 133*   K 3.8 5.2*    103   CO2 20* 18*   BUN 20 27*   CREATININE 0.9 0.9   CALCIUM 8.3* 8.8   ALBUMIN 2.7* 2.5*   PROT 6.5 7.3   BILITOT 0.2 0.3   ALKPHOS 56 58   ALT 23 25   AST 35 36   MG 1.8 2.1   PHOS 3.6 4.3           Significant Imaging:  I have reviewed all pertinent imaging results/findings within the past 24 hours.

## 2022-01-09 NOTE — PROGRESS NOTES
[facility]  Critical Care Medicine  Progress Note    Patient Name: Fauzia Kirk  MRN: [id number]  Admission Date: 1/6/2022  Hospital Length of Stay: 3 days  Code Status: Full Code  Attending Provider: Hernan Catalan MD  Primary Care Provider: Era Urena MD   Principal Problem: Pneumonia due to COVID-19 virus    Subjective:     HPI:  Fauzia Kirk is a 66yoF with a medical history of breast cancer and lung cancer (previously treated with immunotherapy, chemo, radiation, bilateral mastectomies, last treated with chemoRT in 2018), HTN, OA, hypothyroidism, DVT diagnosed 11/30/21 who presented to the ED after she states she hasn't been feeling well for a few days. No reports of shortness of breath, but she has had some cough, myalgias, and fatigue for about 4-5 days and her daughter was positive for COVID about 2 weeks ago. She states she wasn't around her much but she did come in close contact with her. In the ED she was comfortably hypoxic, not complaining of trouble breathing, resting fine, but requiring 50L comfort flow at 100% FiO2 and desaturating with conversation. She was able to talk in full sentences without lightheadeness, but was hypoxic into the mid-to-upper 80s. She does report fainting yesterday while sitting at home and recovered within minutes and without complaint of any prodromes. She lives at home alone and completes her tasks independently. She received 1 dose of mRNA vaccine for COVID but decided not to get her second dose after getting her blood clot in November.       Hospital/ICU Course:  In ED, patient denies SOB but became hypoxic to mid 80s requiring high-flow O2 100%FiO2 50L. Patient then moved to bipap transiently followed by transition to CPAP. Patient then weaned to comfort flow. Dex/remdesevir started for COVID treatment. Patient's labs showed glu >300 despite no diabetic history. A1C 7.0. Levemir, Aspart and ISS started. Patient began having increased  O2 requirements and new intermittent a-fib with RVR on 1/8/22. PO Metoprolol started.       Interval History/Significant Events: Patient O2 demands increased yesterday. Now tolerating 60L at 92% FiO2. Sinus rhythm overnight.      Review of Systems   Constitutional: Positive for fatigue and fever.   HENT: Negative for congestion and rhinorrhea.    Respiratory: Positive for cough. Negative for shortness of breath and wheezing.    Cardiovascular: Negative for chest pain and palpitations.   Gastrointestinal: Negative for abdominal pain and vomiting.   Genitourinary: Negative for difficulty urinating and dysuria.   Musculoskeletal: Positive for myalgias. Negative for joint swelling.   Skin: Negative for rash and wound.   Neurological: Positive for weakness. Negative for headaches.     Objective:     Vital Signs (Most Recent):  Temp: 97.7 °F (36.5 °C) (01/09/22 1502)  Pulse: 95 (01/09/22 1502)  Resp: (!) 38 (01/09/22 1502)  BP: (!) 120/91 (01/09/22 1502)  SpO2: (!) 91 % (01/09/22 1502) Vital Signs (24h Range):  Temp:  [97.6 °F (36.4 °C)-98.6 °F (37 °C)] 97.7 °F (36.5 °C)  Pulse:  [] 95  Resp:  [18-52] 38  SpO2:  [82 %-98 %] 91 %  BP: ()/(54-91) 120/91   Weight: 86 kg (189 lb 9.5 oz)  Body mass index is 30.6 kg/m².      Intake/Output Summary (Last 24 hours) at 1/9/2022 1552  Last data filed at 1/9/2022 1253  Gross per 24 hour   Intake 629.26 ml   Output --   Net 629.26 ml       Physical Exam  Vitals and nursing note reviewed.   Constitutional:       General: She is not in acute distress.     Comments: Patient sitting up in bed, comfortably eating on HF O2.    HENT:      Head: Normocephalic and atraumatic.      Nose: No congestion or rhinorrhea.      Mouth/Throat:      Pharynx: Oropharynx is clear.   Eyes:      Extraocular Movements: Extraocular movements intact.      Conjunctiva/sclera: Conjunctivae normal.   Cardiovascular:      Rate and Rhythm: Normal rate. Rhythm irregular.   Pulmonary:      Breath sounds:  Rales present. No wheezing.      Comments: On HF 90% 50L  Abdominal:      General: There is no distension.      Palpations: Abdomen is soft.      Tenderness: There is no abdominal tenderness.   Musculoskeletal:         General: No swelling. Normal range of motion.      Right lower leg: No edema.      Left lower leg: No edema.   Skin:     General: Skin is warm and dry.   Neurological:      General: No focal deficit present.      Mental Status: She is alert and oriented to person, place, and time.         Vents:  Oxygen Concentration (%): (P) 80 (01/09/22 1502)  Lines/Drains/Airways     Central Venous Catheter Line            Port A Cath Single Lumen 07/26/17 1130 left subclavian 1628 days          Drain            Female External Urinary Catheter 01/07/22 0031 2 days          Peripheral Intravenous Line                 Peripheral IV - Single Lumen 01/06/22 1800 20 G Right Antecubital 2 days         Peripheral IV - Single Lumen 01/07/22 0305 22 G Posterior;Right Hand 2 days              Significant Labs:    CBC/Anemia Profile:  Recent Labs   Lab 01/08/22  0253 01/09/22  0220   WBC 8.15 9.01   HGB 10.7* 10.9*   HCT 33.4* 35.1*    255   MCV 84 88   RDW 14.6* 14.5        Chemistries:  Recent Labs   Lab 01/08/22  0253 01/09/22  0220   * 133*   K 3.8 5.2*    103   CO2 20* 18*   BUN 20 27*   CREATININE 0.9 0.9   CALCIUM 8.3* 8.8   ALBUMIN 2.7* 2.5*   PROT 6.5 7.3   BILITOT 0.2 0.3   ALKPHOS 56 58   ALT 23 25   AST 35 36   MG 1.8 2.1   PHOS 3.6 4.3           Significant Imaging:  I have reviewed all pertinent imaging results/findings within the past 24 hours.      ABG  Recent Labs   Lab 01/08/22  1105   PH 7.437   PO2 66*   PCO2 27.8*   HCO3 18.8*   BE -5     Assessment/Plan:     Pulmonary  Lung nodule  See hx of lung CA    Cardiac/Vascular  Atrial fibrillation  New a-fib with RVR transiently on 1/8/22.     - Metoprolol 25mg BID  - Hold home amlodipine    HTN (hypertension)  - Hold home amlodipine  -  Metoprolol 25mg BID  - See atrial fibrillation    Hematology  Chronic anticoagulation  See DVT    History of DVT (deep vein thrombosis)  Non-occlusive thrombus of R IJ found on 11/30/2021. Continue therapeutic AC while inpatient. Xarelto at home, will continue Lovenox for now.    Oncology  History of lung cancer  Last chemoRT 2018. Lung nodule present on most recent CT chest (3/2021), per read: 1.0 x 0.7 cm subsolid pulmonary nodule with irregular margins in the posterior right lower lobe, that is new from prior. There was a 6mo follow up Chest CT with contrast in October that was never done. She is followed by Dr. Mai in oncology.    History of left breast cancer  Distant hx, s/p bilateral mastectomies, immunotherapy, chemoRT per patient    Endocrine  Acute hyperglycemia  New hyperglycemia in insulin-naive patient no history of DM. Possibly contributed to by dexamethasone. A1C 7.0 on 1/7/22.     - Levemir 5mg daily  - Continue aspart 2u with meals  - MDSSI  - Continue to closely monitor    Hyperthyroidism  - Continue home Synthroid    Other  * Pneumonia due to COVID-19 virus  - COVID-19 testing:   - Isolation: Airborne/Droplet. Surgical mask on patient. Notify Infection Control  - Management:    - Monitoring:   - Telemetry & Continuous Pulse Oximetry    - Nutrition:    - Multivitamin PO daily   - Add Boost supplement   - Ascorbic acid 500mg PO bid    - Antibiotics:   - ceftriaxone 1g Q24h x 5 days  - azithromycin 500mg po x1, then 250mg po daily x 4 days   - Anticoagulation:  - VTE prevention with therapeutic dosing with Lovenox 1mg/kg BID for DVT. Transition to home Xarelto if tolerating HF.   - Other Treatments:  - Dexamethasone 8mg x1 in ED and 6mg daily to finish 10 day course  - Remdesivir   - Baricitinib   - Pulmonary toileting with goal of weaning O2     Critical Care Daily Checklist:    A: Awake: RASS Goal/Actual Goal:    Actual: Stone Agitation Sedation Scale (RASS): Alert and calm   B: Spontaneous  Breathing Trial Performed?  N/A   C: SAT & SBT Coordinated?  N/A              D: Delirium: CAM-ICU Overall CAM-ICU: Negative   E: Early Mobility Performed? Yes   F: Feeding Goal:    Status:     Current Diet Order   Procedures    Diet diabetic [facility]; 2000 Calorie; Isolation Tray - Regular [place]     Order Specific Question:   Indicate patient location for additional diet options:     Answer:   [facility]     Order Specific Question:   Total calories:     Answer:   2000 Calorie     Order Specific Question:   Tray type:     Answer:   Isolation Tray - Regular [place]      AS: Analgesia/Sedation N/A   T: Thromboembolic Prophylaxis Therapeutic lovenox   H: HOB > 300 Yes   U: Stress Ulcer Prophylaxis (if needed) Yes   G: Glucose Control Yes   B: Bowel Function     I: Indwelling Catheter (Lines & Smith) Necessity PIV x2, Port-a-cath   D: De-escalation of Antimicrobials/Pharmacotherapies N/A    Plan for the day/ETD - Continue to wean O2 as tolerated    Code Status:  Family/Goals of Care: Full Code         Critical secondary to Patient has a condition that poses threat to life and bodily function: Severe Respiratory Distress      Critical care was time spent personally by me on the following activities: development of treatment plan with patient or surrogate and bedside caregivers, discussions with consultants, evaluation of patient's response to treatment, examination of patient, ordering and performing treatments and interventions, ordering and review of laboratory studies, ordering and review of radiographic studies, pulse oximetry, re-evaluation of patient's condition. This critical care time did not overlap with that of any other provider or involve time for any procedures.     Jahaira Lagos MD  Critical Care Medicine  [facility] - Surgical Intensive Care

## 2022-01-09 NOTE — ASSESSMENT & PLAN NOTE
- COVID-19 testing:   - Isolation: Airborne/Droplet. Surgical mask on patient. Notify Infection Control  - Management:    - Monitoring:   - Telemetry & Continuous Pulse Oximetry    - Nutrition:    - Multivitamin PO daily   - Add Boost supplement   - Ascorbic acid 500mg PO bid    - Antibiotics:   - ceftriaxone 1g Q24h x 5 days  - azithromycin 500mg po x1, then 250mg po daily x 4 days   - Anticoagulation:  - VTE prevention with therapeutic dosing with Lovenox 1mg/kg BID for DVT. Transition to home Xarelto if tolerating HF.   - Other Treatments:  - Dexamethasone 8mg x1 in ED and 6mg daily to finish 10 day course  - Remdesivir   - Baricitinib   - Pulmonary toileting with goal of weaning O2

## 2022-01-09 NOTE — ASSESSMENT & PLAN NOTE
New hyperglycemia in insulin-naive patient no history of DM. Possibly contributed to by dexamethasone. A1C 7.0 on 1/7/22.     - Levemir 5mg daily  - Continue aspart 2u with meals  - MDSSI  - Continue to closely monitor

## 2022-01-09 NOTE — PROGRESS NOTES
Notified CC team of increase on O2 requirements. Patient now on 50L 90%, previously 35L, 50%.   Abg obtained. Results below. Ok to place pt back on CPAP. Will continue to monitor.    Ref. Range 1/8/2022 11:05   POC PH Latest Ref Range: 7.35 - 7.45  7.437   POC PCO2 Latest Ref Range: 35 - 45 mmHg 27.8 (LL)   POC PO2 Latest Ref Range: 80 - 100 mmHg 66 (L)   POC BE Latest Ref Range: -2 to 2 mmol/L -5   POC HCO3 Latest Ref Range: 24 - 28 mmol/L 18.8 (L)   POC SATURATED O2 Latest Ref Range: 95 - 100 % 94 (L)   POC TCO2 Latest Ref Range: 23 - 27 mmol/L 20 (L)   Sample Unknown ARTERIAL   DelSys Unknown Nasal Can   Allens Test Unknown Pass   Site Unknown RR      Osteomyelitis  08/24/2017  Right BKA stump  Active  Katie Hensley

## 2022-01-10 LAB
ALBUMIN SERPL BCP-MCNC: 2.3 G/DL (ref 3.5–5.2)
ALP SERPL-CCNC: 59 U/L (ref 55–135)
ALT SERPL W/O P-5'-P-CCNC: 25 U/L (ref 10–44)
ANION GAP SERPL CALC-SCNC: 9 MMOL/L (ref 8–16)
ASCENDING AORTA: 3.18 CM
AST SERPL-CCNC: 26 U/L (ref 10–40)
AV INDEX (PROSTH): 0.68
AV MEAN GRADIENT: 4 MMHG
AV PEAK GRADIENT: 7 MMHG
AV VALVE AREA: 2.09 CM2
AV VELOCITY RATIO: 0.75
BASOPHILS # BLD AUTO: 0 K/UL (ref 0–0.2)
BASOPHILS NFR BLD: 0 % (ref 0–1.9)
BILIRUB SERPL-MCNC: 0.3 MG/DL (ref 0.1–1)
BSA FOR ECHO PROCEDURE: 2 M2
BUN SERPL-MCNC: 28 MG/DL (ref 8–23)
CALCIUM SERPL-MCNC: 8.6 MG/DL (ref 8.7–10.5)
CHLORIDE SERPL-SCNC: 104 MMOL/L (ref 95–110)
CO2 SERPL-SCNC: 22 MMOL/L (ref 23–29)
CREAT SERPL-MCNC: 0.8 MG/DL (ref 0.5–1.4)
CRP SERPL-MCNC: 23.9 MG/L (ref 0–8.2)
CV ECHO LV RWT: 0.41 CM
DIFFERENTIAL METHOD: ABNORMAL
DOP CALC AO PEAK VEL: 1.34 M/S
DOP CALC AO VTI: 22.53 CM
DOP CALC LVOT AREA: 3.1 CM2
DOP CALC LVOT DIAMETER: 1.98 CM
DOP CALC LVOT PEAK VEL: 1 M/S
DOP CALC LVOT STROKE VOLUME: 47.18 CM3
DOP CALCLVOT PEAK VEL VTI: 15.33 CM
ECHO LV POSTERIOR WALL: 0.81 CM (ref 0.6–1.1)
EJECTION FRACTION: 55 %
EOSINOPHIL # BLD AUTO: 0 K/UL (ref 0–0.5)
EOSINOPHIL NFR BLD: 0 % (ref 0–8)
ERYTHROCYTE [DISTWIDTH] IN BLOOD BY AUTOMATED COUNT: 14.5 % (ref 11.5–14.5)
EST. GFR  (AFRICAN AMERICAN): >60 ML/MIN/1.73 M^2
EST. GFR  (NON AFRICAN AMERICAN): >60 ML/MIN/1.73 M^2
FRACTIONAL SHORTENING: 37 % (ref 28–44)
GLUCOSE SERPL-MCNC: 155 MG/DL (ref 70–110)
HCT VFR BLD AUTO: 33.1 % (ref 37–48.5)
HGB BLD-MCNC: 10.5 G/DL (ref 12–16)
IMM GRANULOCYTES # BLD AUTO: 0.03 K/UL (ref 0–0.04)
IMM GRANULOCYTES NFR BLD AUTO: 0.4 % (ref 0–0.5)
INTERVENTRICULAR SEPTUM: 0.84 CM (ref 0.6–1.1)
LA MAJOR: 4.89 CM
LA MINOR: 5.55 CM
LA WIDTH: 2.85 CM
LEFT ATRIUM SIZE: 2.28 CM
LEFT ATRIUM VOLUME INDEX MOD: 18.2 ML/M2
LEFT ATRIUM VOLUME INDEX: 14.7 ML/M2
LEFT ATRIUM VOLUME MOD: 35.53 CM3
LEFT ATRIUM VOLUME: 28.72 CM3
LEFT INTERNAL DIMENSION IN SYSTOLE: 2.51 CM (ref 2.1–4)
LEFT VENTRICLE DIASTOLIC VOLUME INDEX: 35.38 ML/M2
LEFT VENTRICLE DIASTOLIC VOLUME: 69 ML
LEFT VENTRICLE MASS INDEX: 50 G/M2
LEFT VENTRICLE SYSTOLIC VOLUME INDEX: 11.6 ML/M2
LEFT VENTRICLE SYSTOLIC VOLUME: 22.56 ML
LEFT VENTRICULAR INTERNAL DIMENSION IN DIASTOLE: 3.98 CM (ref 3.5–6)
LEFT VENTRICULAR MASS: 96.62 G
LYMPHOCYTES # BLD AUTO: 1 K/UL (ref 1–4.8)
LYMPHOCYTES NFR BLD: 13.8 % (ref 18–48)
MAGNESIUM SERPL-MCNC: 2.1 MG/DL (ref 1.6–2.6)
MCH RBC QN AUTO: 26.9 PG (ref 27–31)
MCHC RBC AUTO-ENTMCNC: 31.7 G/DL (ref 32–36)
MCV RBC AUTO: 85 FL (ref 82–98)
MONOCYTES # BLD AUTO: 0.5 K/UL (ref 0.3–1)
MONOCYTES NFR BLD: 6.6 % (ref 4–15)
MV PEAK E VEL: 1.14 M/S
NEUTROPHILS # BLD AUTO: 5.9 K/UL (ref 1.8–7.7)
NEUTROPHILS NFR BLD: 79.2 % (ref 38–73)
NRBC BLD-RTO: 0 /100 WBC
PHOSPHATE SERPL-MCNC: 3.9 MG/DL (ref 2.7–4.5)
PISA TR MAX VEL: 2.23 M/S
PLATELET # BLD AUTO: 303 K/UL (ref 150–450)
PMV BLD AUTO: 10.4 FL (ref 9.2–12.9)
POCT GLUCOSE: 147 MG/DL (ref 70–110)
POCT GLUCOSE: 161 MG/DL (ref 70–110)
POCT GLUCOSE: 252 MG/DL (ref 70–110)
POCT GLUCOSE: 263 MG/DL (ref 70–110)
POCT GLUCOSE: 344 MG/DL (ref 70–110)
POCT GLUCOSE: 356 MG/DL (ref 70–110)
POCT GLUCOSE: 397 MG/DL (ref 70–110)
POTASSIUM SERPL-SCNC: 4.3 MMOL/L (ref 3.5–5.1)
PROT SERPL-MCNC: 6.5 G/DL (ref 6–8.4)
RA MAJOR: 3.73 CM
RA PRESSURE: 8 MMHG
RA WIDTH: 2.36 CM
RBC # BLD AUTO: 3.91 M/UL (ref 4–5.4)
RV TISSUE DOPPLER FREE WALL SYSTOLIC VELOCITY 1 (APICAL 4 CHAMBER VIEW): 9.69 CM/S
SINUS: 3.45 CM
SODIUM SERPL-SCNC: 135 MMOL/L (ref 136–145)
STJ: 2.67 CM
TR MAX PG: 20 MMHG
TRICUSPID ANNULAR PLANE SYSTOLIC EXCURSION: 1.56 CM
TV REST PULMONARY ARTERY PRESSURE: 28 MMHG
WBC # BLD AUTO: 7.4 K/UL (ref 3.9–12.7)

## 2022-01-10 PROCEDURE — 27100171 HC OXYGEN HIGH FLOW UP TO 24 HOURS

## 2022-01-10 PROCEDURE — 25000003 PHARM REV CODE 250: Performed by: STUDENT IN AN ORGANIZED HEALTH CARE EDUCATION/TRAINING PROGRAM

## 2022-01-10 PROCEDURE — 94660 CPAP INITIATION&MGMT: CPT

## 2022-01-10 PROCEDURE — 94761 N-INVAS EAR/PLS OXIMETRY MLT: CPT

## 2022-01-10 PROCEDURE — 25000003 PHARM REV CODE 250: Performed by: INTERNAL MEDICINE

## 2022-01-10 PROCEDURE — 27000207 HC ISOLATION

## 2022-01-10 PROCEDURE — 80053 COMPREHEN METABOLIC PANEL: CPT | Performed by: STUDENT IN AN ORGANIZED HEALTH CARE EDUCATION/TRAINING PROGRAM

## 2022-01-10 PROCEDURE — 63600175 PHARM REV CODE 636 W HCPCS: Performed by: INTERNAL MEDICINE

## 2022-01-10 PROCEDURE — 99900035 HC TECH TIME PER 15 MIN (STAT)

## 2022-01-10 PROCEDURE — C9399 UNCLASSIFIED DRUGS OR BIOLOG: HCPCS | Performed by: STUDENT IN AN ORGANIZED HEALTH CARE EDUCATION/TRAINING PROGRAM

## 2022-01-10 PROCEDURE — 86704 HEP B CORE ANTIBODY TOTAL: CPT | Performed by: STUDENT IN AN ORGANIZED HEALTH CARE EDUCATION/TRAINING PROGRAM

## 2022-01-10 PROCEDURE — 84100 ASSAY OF PHOSPHORUS: CPT | Performed by: STUDENT IN AN ORGANIZED HEALTH CARE EDUCATION/TRAINING PROGRAM

## 2022-01-10 PROCEDURE — 99291 PR CRITICAL CARE, E/M 30-74 MINUTES: ICD-10-PCS | Mod: ,,, | Performed by: INTERNAL MEDICINE

## 2022-01-10 PROCEDURE — 87522 HEPATITIS C REVRS TRNSCRPJ: CPT

## 2022-01-10 PROCEDURE — 87340 HEPATITIS B SURFACE AG IA: CPT | Performed by: STUDENT IN AN ORGANIZED HEALTH CARE EDUCATION/TRAINING PROGRAM

## 2022-01-10 PROCEDURE — 25000003 PHARM REV CODE 250

## 2022-01-10 PROCEDURE — 86140 C-REACTIVE PROTEIN: CPT | Performed by: STUDENT IN AN ORGANIZED HEALTH CARE EDUCATION/TRAINING PROGRAM

## 2022-01-10 PROCEDURE — 63600175 PHARM REV CODE 636 W HCPCS: Performed by: STUDENT IN AN ORGANIZED HEALTH CARE EDUCATION/TRAINING PROGRAM

## 2022-01-10 PROCEDURE — 85025 COMPLETE CBC W/AUTO DIFF WBC: CPT | Performed by: INTERNAL MEDICINE

## 2022-01-10 PROCEDURE — 99291 CRITICAL CARE FIRST HOUR: CPT | Mod: ,,, | Performed by: INTERNAL MEDICINE

## 2022-01-10 PROCEDURE — 20000000 HC ICU ROOM

## 2022-01-10 PROCEDURE — 83735 ASSAY OF MAGNESIUM: CPT | Performed by: STUDENT IN AN ORGANIZED HEALTH CARE EDUCATION/TRAINING PROGRAM

## 2022-01-10 PROCEDURE — 86480 TB TEST CELL IMMUN MEASURE: CPT | Performed by: STUDENT IN AN ORGANIZED HEALTH CARE EDUCATION/TRAINING PROGRAM

## 2022-01-10 RX ORDER — INSULIN ASPART 100 [IU]/ML
6 INJECTION, SOLUTION INTRAVENOUS; SUBCUTANEOUS
Status: DISCONTINUED | OUTPATIENT
Start: 2022-01-10 | End: 2022-01-12

## 2022-01-10 RX ADMIN — INSULIN ASPART 6 UNITS: 100 INJECTION, SOLUTION INTRAVENOUS; SUBCUTANEOUS at 04:01

## 2022-01-10 RX ADMIN — RIVAROXABAN 20 MG: 10 TABLET, FILM COATED ORAL at 05:01

## 2022-01-10 RX ADMIN — REMDESIVIR 100 MG: 100 INJECTION, POWDER, LYOPHILIZED, FOR SOLUTION INTRAVENOUS at 08:01

## 2022-01-10 RX ADMIN — POLYETHYLENE GLYCOL 3350 17 G: 17 POWDER, FOR SOLUTION ORAL at 08:01

## 2022-01-10 RX ADMIN — THERA TABS 1 TABLET: TAB at 08:01

## 2022-01-10 RX ADMIN — PANTOPRAZOLE SODIUM 40 MG: 40 TABLET, DELAYED RELEASE ORAL at 08:01

## 2022-01-10 RX ADMIN — INSULIN DETEMIR 5 UNITS: 100 INJECTION, SOLUTION SUBCUTANEOUS at 12:01

## 2022-01-10 RX ADMIN — MUPIROCIN: 20 OINTMENT TOPICAL at 08:01

## 2022-01-10 RX ADMIN — INSULIN ASPART 4 UNITS: 100 INJECTION, SOLUTION INTRAVENOUS; SUBCUTANEOUS at 08:01

## 2022-01-10 RX ADMIN — INSULIN ASPART 10 UNITS: 100 INJECTION, SOLUTION INTRAVENOUS; SUBCUTANEOUS at 05:01

## 2022-01-10 RX ADMIN — BARICITINIB 4 MG: 2 TABLET, FILM COATED ORAL at 08:01

## 2022-01-10 RX ADMIN — Medication 6 MG: at 08:01

## 2022-01-10 RX ADMIN — METOPROLOL TARTRATE 25 MG: 25 TABLET, FILM COATED ORAL at 08:01

## 2022-01-10 RX ADMIN — Medication 1000 UNITS: at 08:01

## 2022-01-10 RX ADMIN — LEVOTHYROXINE SODIUM 100 MCG: 100 TABLET ORAL at 06:01

## 2022-01-10 RX ADMIN — ENOXAPARIN SODIUM 90 MG: 100 INJECTION SUBCUTANEOUS at 08:01

## 2022-01-10 RX ADMIN — DEXAMETHASONE 6 MG: 1.5 TABLET ORAL at 08:01

## 2022-01-10 RX ADMIN — INSULIN ASPART 6 UNITS: 100 INJECTION, SOLUTION INTRAVENOUS; SUBCUTANEOUS at 12:01

## 2022-01-10 NOTE — ASSESSMENT & PLAN NOTE
Non-occlusive thrombus of R IJ found on 11/30/2021. Continue therapeutic AC while inpatient.     - Transition from therapeutic lovenox to home Xarelto

## 2022-01-10 NOTE — SUBJECTIVE & OBJECTIVE
Interval History/Significant Events: Patient O2 demands slowly improving. Did well on CPAP overnight. Now on HF 80% 60L. No new complaints. Continues to tolerate PO.     Review of Systems   Constitutional: Positive for fatigue and fever.   HENT: Negative for congestion and rhinorrhea.    Respiratory: Positive for cough. Negative for shortness of breath and wheezing.    Cardiovascular: Negative for chest pain and palpitations.   Gastrointestinal: Negative for abdominal pain and vomiting.   Genitourinary: Negative for difficulty urinating and dysuria.   Musculoskeletal: Positive for myalgias. Negative for joint swelling.   Skin: Negative for rash and wound.   Neurological: Positive for weakness. Negative for headaches.     Objective:     Vital Signs (Most Recent):  Temp: 97.7 °F (36.5 °C) (01/10/22 0800)  Pulse: 80 (01/10/22 1200)  Resp: (!) 41 (01/10/22 1200)  BP: (!) 89/67 (01/10/22 1200)  SpO2: (!) 89 % (01/10/22 1200) Vital Signs (24h Range):  Temp:  [97.4 °F (36.3 °C)-98 °F (36.7 °C)] 97.7 °F (36.5 °C)  Pulse:  [] 80  Resp:  [20-47] 41  SpO2:  [86 %-100 %] 89 %  BP: ()/(55-92) 89/67   Weight: 86 kg (189 lb 9.5 oz)  Body mass index is 30.6 kg/m².      Intake/Output Summary (Last 24 hours) at 1/10/2022 1248  Last data filed at 1/9/2022 1253  Gross per 24 hour   Intake 239.26 ml   Output --   Net 239.26 ml       Physical Exam  Vitals and nursing note reviewed.   Constitutional:       General: She is not in acute distress.     Comments: Patient sitting up in chair, appears comfortable.    HENT:      Head: Normocephalic and atraumatic.      Nose: No congestion or rhinorrhea.      Mouth/Throat:      Pharynx: Oropharynx is clear.   Eyes:      Extraocular Movements: Extraocular movements intact.      Conjunctiva/sclera: Conjunctivae normal.   Cardiovascular:      Rate and Rhythm: Normal rate. Rhythm irregular.   Pulmonary:      Breath sounds: Rales present. No wheezing.      Comments: On HF 80%  60L  Abdominal:      General: There is no distension.      Palpations: Abdomen is soft.      Tenderness: There is no abdominal tenderness.   Musculoskeletal:         General: No swelling. Normal range of motion.      Right lower leg: No edema.      Left lower leg: No edema.   Skin:     General: Skin is warm and dry.   Neurological:      General: No focal deficit present.      Mental Status: She is alert and oriented to person, place, and time.         Vents:  Oxygen Concentration (%): 85 (01/10/22 1200)  Lines/Drains/Airways     Central Venous Catheter Line            Port A Cath Single Lumen 07/26/17 1130 left subclavian 1629 days          Peripheral Intravenous Line                 Peripheral IV - Single Lumen 01/06/22 1800 20 G Right Antecubital 3 days         Peripheral IV - Single Lumen 01/07/22 0305 22 G Posterior;Right Hand 3 days              Significant Labs:    CBC/Anemia Profile:  Recent Labs   Lab 01/09/22  0220 01/10/22  0323   WBC 9.01 7.40   HGB 10.9* 10.5*   HCT 35.1* 33.1*    303   MCV 88 85   RDW 14.5 14.5        Chemistries:  Recent Labs   Lab 01/09/22  0220 01/10/22  0323   * 135*   K 5.2* 4.3    104   CO2 18* 22*   BUN 27* 28*   CREATININE 0.9 0.8   CALCIUM 8.8 8.6*   ALBUMIN 2.5* 2.3*   PROT 7.3 6.5   BILITOT 0.3 0.3   ALKPHOS 58 59   ALT 25 25   AST 36 26   MG 2.1 2.1   PHOS 4.3 3.9            Significant Imaging:  I have reviewed all pertinent imaging results/findings within the past 24 hours.

## 2022-01-10 NOTE — NURSING
Assuming care, report received from EDWINA Smith on unit. Spoke with him about Insulin drip. Patient started on moderate dose SS insulin today, only received x1 dose. Patient with no central access or tata for frequent blood work. Patient would require q1hr finger sticks. Ok per Dr. Smith to hold off on insulin drip for tonight and see if patient responds to moderate dose SS.

## 2022-01-10 NOTE — PLAN OF CARE
[facility] - [facility] Intensive [facility]  Discharge Reassessment    Primary Care Provider: Era Urena MD    Expected Discharge Date: 1/11/2022    Reassessment (most recent)     Discharge Reassessment - 01/10/22 1222        Discharge Reassessment    Assessment Type Discharge Planning Reassessment     Discharge Plan A Home with family     Discharge Plan B Home Health     DME Needed Upon Discharge  other (see comments)   TBD    Discharge Barriers Identified None     Why the patient remains in the hospital Requires continued medical care        Post-Acute Status    Post-Acute Authorization Placement     Post-Acute Placement Status Pending medical clearance/testing               Per MD's Note, Patient O2 demands increased yesterday. Now tolerating 60L at 92% FiO2. Sinus rhythm overnight.      The patient remains in SICU. The SW will continue to follow-up with the patient to assist with discharge needs.      Lester Sweet LMSW  Case Management [facility]  Ext: [phone]

## 2022-01-10 NOTE — ASSESSMENT & PLAN NOTE
New hyperglycemia in insulin-naive patient no history of DM. Possibly contributed to by dexamethasone. A1C 7.0 on 1/7/22.     - Detemir 5mg daily  - Increase aspart to 6u with meals  - MDSSI  - Continue to closely monitor

## 2022-01-10 NOTE — PROGRESS NOTES
[facility]ensive [facility]  Critical Care Medicine  Progress Note    Patient Name: Fauzia Kirk  MRN: [id number]  Admission Date: 1/6/2022  Hospital Length of Stay: 4 days  Code Status: Full Code  Attending Provider: Keyon Pompa MD  Primary Care Provider: Era Urena MD   Principal Problem: Pneumonia due to COVID-19 virus    Subjective:     HPI:  Fauzia Kirk is a 66yoF with a medical history of breast cancer and lung cancer (previously treated with immunotherapy, chemo, radiation, bilateral mastectomies, last treated with chemoRT in 2018), HTN, OA, hypothyroidism, DVT diagnosed 11/30/21 who presented to the ED after she states she hasn't been feeling well for a few days. No reports of shortness of breath, but she has had some cough, myalgias, and fatigue for about 4-5 days and her daughter was positive for COVID about 2 weeks ago. She states she wasn't around her much but she did come in close contact with her. In the ED she was comfortably hypoxic, not complaining of trouble breathing, resting fine, but requiring 50L comfort flow at 100% FiO2 and desaturating with conversation. She was able to talk in full sentences without lightheadeness, but was hypoxic into the mid-to-upper 80s. She does report fainting yesterday while sitting at home and recovered within minutes and without complaint of any prodromes. She lives at home alone and completes her tasks independently. She received 1 dose of mRNA vaccine for COVID but decided not to get her second dose after getting her blood clot in November.       Hospital/ICU Course:  In ED, patient denies SOB but became hypoxic to mid 80s requiring high-flow O2 100%FiO2 50L. Patient then moved to bipap transiently followed by transition to CPAP. Patient then weaned to comfort flow. Dex/remdesevir/baricitinib started for COVID treatment. Patient's labs showed glu >300 despite no diabetic history. A1C 7.0. Detemir, Aspart and ISS started. Patient began  having increased O2 requirements and new intermittent a-fib with RVR on 1/8/22. PO Metoprolol started.       Interval History/Significant Events: Patient O2 demands slowly improving. Did well on CPAP overnight. Now on HF 80% 60L. No new complaints. Continues to tolerate PO.     Review of Systems   Constitutional: Positive for fatigue and fever.   HENT: Negative for congestion and rhinorrhea.    Respiratory: Positive for cough. Negative for shortness of breath and wheezing.    Cardiovascular: Negative for chest pain and palpitations.   Gastrointestinal: Negative for abdominal pain and vomiting.   Genitourinary: Negative for difficulty urinating and dysuria.   Musculoskeletal: Positive for myalgias. Negative for joint swelling.   Skin: Negative for rash and wound.   Neurological: Positive for weakness. Negative for headaches.     Objective:     Vital Signs (Most Recent):  Temp: 97.7 °F (36.5 °C) (01/10/22 0800)  Pulse: 80 (01/10/22 1200)  Resp: (!) 41 (01/10/22 1200)  BP: (!) 89/67 (01/10/22 1200)  SpO2: (!) 89 % (01/10/22 1200) Vital Signs (24h Range):  Temp:  [97.4 °F (36.3 °C)-98 °F (36.7 °C)] 97.7 °F (36.5 °C)  Pulse:  [] 80  Resp:  [20-47] 41  SpO2:  [86 %-100 %] 89 %  BP: ()/(55-92) 89/67   Weight: 86 kg (189 lb 9.5 oz)  Body mass index is 30.6 kg/m².      Intake/Output Summary (Last 24 hours) at 1/10/2022 1248  Last data filed at 1/9/2022 1253  Gross per 24 hour   Intake 239.26 ml   Output --   Net 239.26 ml       Physical Exam  Vitals and nursing note reviewed.   Constitutional:       General: She is not in acute distress.     Comments: Patient sitting up in chair, appears comfortable.    HENT:      Head: Normocephalic and atraumatic.      Nose: No congestion or rhinorrhea.      Mouth/Throat:      Pharynx: Oropharynx is clear.   Eyes:      Extraocular Movements: Extraocular movements intact.      Conjunctiva/sclera: Conjunctivae normal.   Cardiovascular:      Rate and Rhythm: Normal rate. Rhythm  irregular.   Pulmonary:      Breath sounds: Rales present. No wheezing.      Comments: On HF 80% 60L  Abdominal:      General: There is no distension.      Palpations: Abdomen is soft.      Tenderness: There is no abdominal tenderness.   Musculoskeletal:         General: No swelling. Normal range of motion.      Right lower leg: No edema.      Left lower leg: No edema.   Skin:     General: Skin is warm and dry.   Neurological:      General: No focal deficit present.      Mental Status: She is alert and oriented to person, place, and time.         Vents:  Oxygen Concentration (%): 85 (01/10/22 1200)  Lines/Drains/Airways     Central Venous Catheter Line            Port A Cath Single Lumen 07/26/17 1130 left subclavian 1629 days          Peripheral Intravenous Line                 Peripheral IV - Single Lumen 01/06/22 1800 20 G Right Antecubital 3 days         Peripheral IV - Single Lumen 01/07/22 0305 22 G Posterior;Right Hand 3 days              Significant Labs:    CBC/Anemia Profile:  Recent Labs   Lab 01/09/22  0220 01/10/22  0323   WBC 9.01 7.40   HGB 10.9* 10.5*   HCT 35.1* 33.1*    303   MCV 88 85   RDW 14.5 14.5        Chemistries:  Recent Labs   Lab 01/09/22  0220 01/10/22  0323   * 135*   K 5.2* 4.3    104   CO2 18* 22*   BUN 27* 28*   CREATININE 0.9 0.8   CALCIUM 8.8 8.6*   ALBUMIN 2.5* 2.3*   PROT 7.3 6.5   BILITOT 0.3 0.3   ALKPHOS 58 59   ALT 25 25   AST 36 26   MG 2.1 2.1   PHOS 4.3 3.9            Significant Imaging:  I have reviewed all pertinent imaging results/findings within the past 24 hours.      ABG  Recent Labs   Lab 01/08/22  1105   PH 7.437   PO2 66*   PCO2 27.8*   HCO3 18.8*   BE -5     Assessment/Plan:     Pulmonary  Lung nodule  See hx of lung CA    Cardiac/Vascular  Atrial fibrillation  New a-fib with RVR transiently on 1/8/22.     - Metoprolol 25mg BID  - Hold home amlodipine    HTN (hypertension)  - Hold home amlodipine  - Metoprolol 25mg BID  - See atrial  fibrillation    Hematology  Chronic anticoagulation  See DVT    History of DVT (deep vein thrombosis)  Non-occlusive thrombus of R IJ found on 11/30/2021. Continue therapeutic AC while inpatient.     - Transition from therapeutic lovenox to home Xarelto    Oncology  History of lung cancer  Last chemoRT 2018. Lung nodule present on most recent CT chest (3/2021), per read: 1.0 x 0.7 cm subsolid pulmonary nodule with irregular margins in the posterior right lower lobe, that is new from prior. There was a 6mo follow up Chest CT with contrast in October that was never done. She is followed by Dr. Mai in oncology.    History of left breast cancer  Distant hx, s/p bilateral mastectomies, immunotherapy, chemoRT per patient    Endocrine  Acute hyperglycemia  New hyperglycemia in insulin-naive patient no history of DM. Possibly contributed to by dexamethasone. A1C 7.0 on 1/7/22.     - Detemir 5mg daily  - Increase aspart to 6u with meals  - MDSSI  - Continue to closely monitor    Hyperthyroidism  - Continue home Synthroid    Other  * Pneumonia due to COVID-19 virus  - COVID-19 testing:   - Isolation: Airborne/Droplet. Surgical mask on patient. Notify Infection Control  - Management:    - Monitoring:   - Telemetry & Continuous Pulse Oximetry    - Nutrition:    - Multivitamin PO daily   - Add Boost supplement   - Ascorbic acid 500mg PO bid    - Antibiotics:   - ceftriaxone 1g Q24h x 5 days  - azithromycin 500mg po x1, then 250mg po daily x 4 days   - Anticoagulation:  - VTE prevention with therapeutic dosing with Lovenox 1mg/kg BID for DVT. Transition to home Xarelto if tolerating HF.   - Other Treatments:  - Dexamethasone 8mg x1 in ED and 6mg daily to finish 10 day course  - Remdesivir   - Baricitinib   - Pulmonary toileting with goal of weaning O2     Critical Care Daily Checklist:    A: Awake: RASS Goal/Actual Goal:    Actual: Stone Agitation Sedation Scale (RASS): Alert and calm   B: Spontaneous Breathing Trial  Performed?  N/A   C: SAT & SBT Coordinated?  N/A                    D: Delirium: CAM-ICU Overall CAM-ICU: Negative   E: Early Mobility Performed? YES   F: Feeding Goal:    Status:     Current Diet Order   Procedures    Diet diabetic [facility]; 1500 Calorie     Order Specific Question:   Indicate patient location for additional diet options:     Answer:   [facility]     Order Specific Question:   Total calories:     Answer:   1500 Calorie      AS: Analgesia/Sedation N/A   T: Thromboembolic Prophylaxis Therapeutic Xarelto   H: HOB > 300 YES   U: Stress Ulcer Prophylaxis (if needed) Protonix   G: Glucose Control Aspart, Detemir, MDSSI   B: Bowel Function  N/A   I: Indwelling Catheter (Lines & Smith) Necessity PIV x2, Port-A-cath   D: De-escalation of Antimicrobials/Pharmacotherapies N/A    Plan for the day/ETD - Continue to wean O2  - Increase aspart 3u with meals, closely monitor glu  Transition from SQ heparin to home Xarelto    Code Status:  Family/Goals of Care: Full Code         Critical secondary to Patient has a condition that poses threat to life and bodily function: Severe Respiratory Distress      Critical care was time spent personally by me on the following activities: development of treatment plan with patient or surrogate and bedside caregivers, discussions with consultants, evaluation of patient's response to treatment, examination of patient, ordering and performing treatments and interventions, ordering and review of laboratory studies, ordering and review of radiographic studies, pulse oximetry, re-evaluation of patient's condition. This critical care time did not overlap with that of any other provider or involve time for any procedures.     Jahaira Lagos MD  Critical Care Medicine  [facility] - Surgical Intensive Care

## 2022-01-11 PROBLEM — Z79.01 CHRONIC ANTICOAGULATION: Status: RESOLVED | Noted: 2022-01-06 | Resolved: 2022-01-11

## 2022-01-11 LAB
ALBUMIN SERPL BCP-MCNC: 2.2 G/DL (ref 3.5–5.2)
ALP SERPL-CCNC: 59 U/L (ref 55–135)
ALT SERPL W/O P-5'-P-CCNC: 24 U/L (ref 10–44)
ANION GAP SERPL CALC-SCNC: 9 MMOL/L (ref 8–16)
AST SERPL-CCNC: 19 U/L (ref 10–40)
BACTERIA BLD CULT: NORMAL
BASOPHILS # BLD AUTO: 0.01 K/UL (ref 0–0.2)
BASOPHILS NFR BLD: 0.1 % (ref 0–1.9)
BILIRUB SERPL-MCNC: 0.4 MG/DL (ref 0.1–1)
BUN SERPL-MCNC: 28 MG/DL (ref 8–23)
CALCIUM SERPL-MCNC: 8.2 MG/DL (ref 8.7–10.5)
CHLORIDE SERPL-SCNC: 107 MMOL/L (ref 95–110)
CO2 SERPL-SCNC: 21 MMOL/L (ref 23–29)
CREAT SERPL-MCNC: 0.7 MG/DL (ref 0.5–1.4)
DIFFERENTIAL METHOD: ABNORMAL
EOSINOPHIL # BLD AUTO: 0 K/UL (ref 0–0.5)
EOSINOPHIL NFR BLD: 0 % (ref 0–8)
ERYTHROCYTE [DISTWIDTH] IN BLOOD BY AUTOMATED COUNT: 14.6 % (ref 11.5–14.5)
EST. GFR  (AFRICAN AMERICAN): >60 ML/MIN/1.73 M^2
EST. GFR  (NON AFRICAN AMERICAN): >60 ML/MIN/1.73 M^2
GAMMA INTERFERON BACKGROUND BLD IA-ACNC: 0.02 IU/ML
GLUCOSE SERPL-MCNC: 169 MG/DL (ref 70–110)
HBV CORE AB SERPL QL IA: NEGATIVE
HBV SURFACE AG SERPL QL IA: NEGATIVE
HCT VFR BLD AUTO: 33.4 % (ref 37–48.5)
HGB BLD-MCNC: 10.5 G/DL (ref 12–16)
IMM GRANULOCYTES # BLD AUTO: 0.06 K/UL (ref 0–0.04)
IMM GRANULOCYTES NFR BLD AUTO: 0.8 % (ref 0–0.5)
LYMPHOCYTES # BLD AUTO: 0.9 K/UL (ref 1–4.8)
LYMPHOCYTES NFR BLD: 11.9 % (ref 18–48)
M TB IFN-G CD4+ BCKGRND COR BLD-ACNC: 0 IU/ML
MAGNESIUM SERPL-MCNC: 2 MG/DL (ref 1.6–2.6)
MCH RBC QN AUTO: 27.3 PG (ref 27–31)
MCHC RBC AUTO-ENTMCNC: 31.4 G/DL (ref 32–36)
MCV RBC AUTO: 87 FL (ref 82–98)
MITOGEN IGNF BCKGRD COR BLD-ACNC: 0.01 IU/ML
MONOCYTES # BLD AUTO: 0.4 K/UL (ref 0.3–1)
MONOCYTES NFR BLD: 5.7 % (ref 4–15)
NEUTROPHILS # BLD AUTO: 6.2 K/UL (ref 1.8–7.7)
NEUTROPHILS NFR BLD: 81.5 % (ref 38–73)
NRBC BLD-RTO: 0 /100 WBC
PHOSPHATE SERPL-MCNC: 4.3 MG/DL (ref 2.7–4.5)
PLATELET # BLD AUTO: 308 K/UL (ref 150–450)
PMV BLD AUTO: 10.6 FL (ref 9.2–12.9)
POCT GLUCOSE: 107 MG/DL (ref 70–110)
POCT GLUCOSE: 160 MG/DL (ref 70–110)
POCT GLUCOSE: 261 MG/DL (ref 70–110)
POCT GLUCOSE: 295 MG/DL (ref 70–110)
POTASSIUM SERPL-SCNC: 4.7 MMOL/L (ref 3.5–5.1)
PROT SERPL-MCNC: 5.6 G/DL (ref 6–8.4)
RBC # BLD AUTO: 3.85 M/UL (ref 4–5.4)
SODIUM SERPL-SCNC: 137 MMOL/L (ref 136–145)
TB GOLD PLUS: ABNORMAL
TB2 - NIL: 0 IU/ML
WBC # BLD AUTO: 7.55 K/UL (ref 3.9–12.7)

## 2022-01-11 PROCEDURE — 63600175 PHARM REV CODE 636 W HCPCS: Performed by: STUDENT IN AN ORGANIZED HEALTH CARE EDUCATION/TRAINING PROGRAM

## 2022-01-11 PROCEDURE — 80053 COMPREHEN METABOLIC PANEL: CPT | Performed by: STUDENT IN AN ORGANIZED HEALTH CARE EDUCATION/TRAINING PROGRAM

## 2022-01-11 PROCEDURE — 94761 N-INVAS EAR/PLS OXIMETRY MLT: CPT

## 2022-01-11 PROCEDURE — 27000207 HC ISOLATION

## 2022-01-11 PROCEDURE — 99233 PR SUBSEQUENT HOSPITAL CARE,LEVL III: ICD-10-PCS | Mod: ,,, | Performed by: INTERNAL MEDICINE

## 2022-01-11 PROCEDURE — 27000221 HC OXYGEN, UP TO 24 HOURS

## 2022-01-11 PROCEDURE — 83735 ASSAY OF MAGNESIUM: CPT | Performed by: STUDENT IN AN ORGANIZED HEALTH CARE EDUCATION/TRAINING PROGRAM

## 2022-01-11 PROCEDURE — 85025 COMPLETE CBC W/AUTO DIFF WBC: CPT | Performed by: INTERNAL MEDICINE

## 2022-01-11 PROCEDURE — 99900035 HC TECH TIME PER 15 MIN (STAT)

## 2022-01-11 PROCEDURE — 25000003 PHARM REV CODE 250: Performed by: INTERNAL MEDICINE

## 2022-01-11 PROCEDURE — 20000000 HC ICU ROOM

## 2022-01-11 PROCEDURE — 27100171 HC OXYGEN HIGH FLOW UP TO 24 HOURS

## 2022-01-11 PROCEDURE — 25000003 PHARM REV CODE 250

## 2022-01-11 PROCEDURE — 97161 PT EVAL LOW COMPLEX 20 MIN: CPT

## 2022-01-11 PROCEDURE — C9399 UNCLASSIFIED DRUGS OR BIOLOG: HCPCS | Performed by: STUDENT IN AN ORGANIZED HEALTH CARE EDUCATION/TRAINING PROGRAM

## 2022-01-11 PROCEDURE — 25000003 PHARM REV CODE 250: Performed by: STUDENT IN AN ORGANIZED HEALTH CARE EDUCATION/TRAINING PROGRAM

## 2022-01-11 PROCEDURE — 99233 SBSQ HOSP IP/OBS HIGH 50: CPT | Mod: ,,, | Performed by: INTERNAL MEDICINE

## 2022-01-11 PROCEDURE — 84100 ASSAY OF PHOSPHORUS: CPT | Performed by: STUDENT IN AN ORGANIZED HEALTH CARE EDUCATION/TRAINING PROGRAM

## 2022-01-11 PROCEDURE — 97530 THERAPEUTIC ACTIVITIES: CPT

## 2022-01-11 RX ORDER — AMOXICILLIN 250 MG
2 CAPSULE ORAL DAILY
Status: DISCONTINUED | OUTPATIENT
Start: 2022-01-11 | End: 2022-01-15 | Stop reason: HOSPADM

## 2022-01-11 RX ADMIN — PANTOPRAZOLE SODIUM 40 MG: 40 TABLET, DELAYED RELEASE ORAL at 09:01

## 2022-01-11 RX ADMIN — RIVAROXABAN 20 MG: 10 TABLET, FILM COATED ORAL at 06:01

## 2022-01-11 RX ADMIN — BARICITINIB 4 MG: 2 TABLET, FILM COATED ORAL at 09:01

## 2022-01-11 RX ADMIN — MUPIROCIN: 20 OINTMENT TOPICAL at 09:01

## 2022-01-11 RX ADMIN — INSULIN ASPART 6 UNITS: 100 INJECTION, SOLUTION INTRAVENOUS; SUBCUTANEOUS at 06:01

## 2022-01-11 RX ADMIN — METOPROLOL TARTRATE 25 MG: 25 TABLET, FILM COATED ORAL at 09:01

## 2022-01-11 RX ADMIN — DOCUSATE SODIUM 50 MG AND SENNOSIDES 8.6 MG 2 TABLET: 8.6; 5 TABLET, FILM COATED ORAL at 06:01

## 2022-01-11 RX ADMIN — Medication 1000 UNITS: at 09:01

## 2022-01-11 RX ADMIN — INSULIN ASPART 6 UNITS: 100 INJECTION, SOLUTION INTRAVENOUS; SUBCUTANEOUS at 01:01

## 2022-01-11 RX ADMIN — DEXAMETHASONE 6 MG: 1.5 TABLET ORAL at 09:01

## 2022-01-11 RX ADMIN — INSULIN ASPART 6 UNITS: 100 INJECTION, SOLUTION INTRAVENOUS; SUBCUTANEOUS at 07:01

## 2022-01-11 RX ADMIN — LEVOTHYROXINE SODIUM 100 MCG: 100 TABLET ORAL at 07:01

## 2022-01-11 RX ADMIN — THERA TABS 1 TABLET: TAB at 09:01

## 2022-01-11 RX ADMIN — POLYETHYLENE GLYCOL 3350 17 G: 17 POWDER, FOR SOLUTION ORAL at 09:01

## 2022-01-11 RX ADMIN — INSULIN ASPART 3 UNITS: 100 INJECTION, SOLUTION INTRAVENOUS; SUBCUTANEOUS at 09:01

## 2022-01-11 NOTE — PROGRESS NOTES
[facility] - Surgical Intensive Care  Critical Care Medicine  Progress Note    Patient Name: Fauzia Kirk  MRN: [id number]  Admission Date: 1/6/2022  Hospital Length of Stay: 5 days  Code Status: Full Code  Attending Provider: Keyon Pompa MD  Primary Care Provider: Era Urena MD   Principal Problem: Pneumonia due to COVID-19 virus    Subjective:     HPI:  Fauzia Kirk is a 66yoF with a medical history of breast cancer and lung cancer (previously treated with immunotherapy, chemo, radiation, bilateral mastectomies, last treated with chemoRT in 2018), HTN, OA, hypothyroidism, DVT diagnosed 11/30/21 who presented to the ED after she states she hasn't been feeling well for a few days. No reports of shortness of breath, but she has had some cough, myalgias, and fatigue for about 4-5 days and her daughter was positive for COVID about 2 weeks ago. She states she wasn't around her much but she did come in close contact with her. In the ED she was comfortably hypoxic, not complaining of trouble breathing, resting fine, but requiring 50L comfort flow at 100% FiO2 and desaturating with conversation. She was able to talk in full sentences without lightheadeness, but was hypoxic into the mid-to-upper 80s. She does report fainting yesterday while sitting at home and recovered within minutes and without complaint of any prodromes. She lives at home alone and completes her tasks independently. She received 1 dose of mRNA vaccine for COVID but decided not to get her second dose after getting her blood clot in November.       Hospital/ICU Course:  In ED, patient denies SOB but became hypoxic to mid 80s requiring high-flow O2 100%FiO2 50L. Patient then moved to bipap transiently followed by transition to CPAP. Patient then weaned to comfort flow. Dex/remdesevir/baricitinib started for COVID treatment. Patient's labs showed glu >300 despite no diabetic history. A1C 7.0. Detemir, Aspart and ISS started. Patient began  having increased O2 requirements and new intermittent a-fib with RVR on 1/8/22. PO Metoprolol started.       Interval History/Significant Events: Patient O2 demands slowly improving. Did well on CPAP overnight. Now on HF 70% 50L. No new complaints. Continues to tolerate PO. No BMs in 4 days. Denies feeling bloated or constipated.     Review of Systems   Constitutional: Positive for fatigue. Negative for fever.   HENT: Negative for congestion and rhinorrhea.    Respiratory: Positive for cough. Negative for shortness of breath and wheezing.    Cardiovascular: Negative for chest pain and palpitations.   Gastrointestinal: Negative for abdominal pain and vomiting.   Genitourinary: Negative for difficulty urinating and dysuria.   Musculoskeletal: Negative for joint swelling and myalgias.   Skin: Negative for rash and wound.   Neurological: Negative for weakness and headaches.     Objective:     Vital Signs (Most Recent):  Temp: 99.3 °F (37.4 °C) (01/11/22 0800)  Pulse: 76 (01/11/22 1200)  Resp: (!) 24 (01/11/22 1200)  BP: 129/68 (01/11/22 1200)  SpO2: 97 % (01/11/22 1200) Vital Signs (24h Range):  Temp:  [98.2 °F (36.8 °C)-99.3 °F (37.4 °C)] 99.3 °F (37.4 °C)  Pulse:  [66-99] 76  Resp:  [4-49] 24  SpO2:  [83 %-100 %] 97 %  BP: (112-151)/(53-90) 129/68   Weight: 85.7 kg (189 lb)  Body mass index is 30.51 kg/m².      Intake/Output Summary (Last 24 hours) at 1/11/2022 1422  Last data filed at 1/11/2022 0141  Gross per 24 hour   Intake --   Output 600 ml   Net -600 ml       Physical Exam  Vitals and nursing note reviewed.   Constitutional:       General: She is not in acute distress.     Comments: Patient sitting up in chair watching tv, appears comfortable.    HENT:      Head: Normocephalic and atraumatic.      Nose: No congestion or rhinorrhea.      Mouth/Throat:      Pharynx: Oropharynx is clear.   Eyes:      Extraocular Movements: Extraocular movements intact.      Conjunctiva/sclera: Conjunctivae normal.    Cardiovascular:      Rate and Rhythm: Normal rate. Rhythm irregular.   Pulmonary:      Breath sounds: Rales present. No wheezing.      Comments: On HF 70% 50L  Abdominal:      General: There is no distension.      Palpations: Abdomen is soft.      Tenderness: There is no abdominal tenderness.   Musculoskeletal:         General: No swelling. Normal range of motion.      Right lower leg: No edema.      Left lower leg: No edema.   Skin:     General: Skin is warm and dry.   Neurological:      General: No focal deficit present.      Mental Status: She is alert and oriented to person, place, and time.         Vents:  Oxygen Concentration (%): 50 (01/11/22 1154)  Lines/Drains/Airways     Central Venous Catheter Line            Port A Cath Single Lumen 07/26/17 1130 left subclavian 1630 days          Peripheral Intravenous Line                 Peripheral IV - Single Lumen 01/06/22 1800 20 G Right Antecubital 4 days         Peripheral IV - Single Lumen 01/07/22 0305 22 G Posterior;Right Hand 4 days              Significant Labs:    CBC/Anemia Profile:  Recent Labs   Lab 01/10/22  0323 01/11/22  0445   WBC 7.40 7.55   HGB 10.5* 10.5*   HCT 33.1* 33.4*    308   MCV 85 87   RDW 14.5 14.6*        Chemistries:  Recent Labs   Lab 01/10/22  0323 01/11/22  0445   * 137   K 4.3 4.7    107   CO2 22* 21*   BUN 28* 28*   CREATININE 0.8 0.7   CALCIUM 8.6* 8.2*   ALBUMIN 2.3* 2.2*   PROT 6.5 5.6*   BILITOT 0.3 0.4   ALKPHOS 59 59   ALT 25 24   AST 26 19   MG 2.1 2.0   PHOS 3.9 4.3            Significant Imaging:  I have reviewed all pertinent imaging results/findings within the past 24 hours.      ABG  Recent Labs   Lab 01/08/22  1105   PH 7.437   PO2 66*   PCO2 27.8*   HCO3 18.8*   BE -5     Assessment/Plan:     Pulmonary  Lung nodule  See hx of lung CA    Cardiac/Vascular  Atrial fibrillation  New a-fib with RVR transiently on 1/8/22.     - Metoprolol 25mg BID  - Hold home amlodipine    HTN (hypertension)  - Hold  home amlodipine  - Metoprolol 25mg BID  - See atrial fibrillation    Hematology  History of DVT (deep vein thrombosis)  Non-occlusive thrombus of R IJ found on 11/30/2021. Continue therapeutic AC while inpatient.     - Continue home Xarelto    Oncology  History of lung cancer  Last chemoRT 2018. Lung nodule present on most recent CT chest (3/2021), per read: 1.0 x 0.7 cm subsolid pulmonary nodule with irregular margins in the posterior right lower lobe, that is new from prior. There was a 6mo follow up Chest CT with contrast in October that was never done. She is followed by Dr. Mai in oncology.    History of left breast cancer  Distant hx, s/p bilateral mastectomies, immunotherapy, chemoRT per patient    Endocrine  Acute hyperglycemia  New hyperglycemia in insulin-naive patient no history of DM. Possibly contributed to by dexamethasone. A1C 7.0 on 1/7/22.     - Detemir 5mg daily  - Aspart 6u with meals  - MDSSI  - Continue to closely monitor    Hyperthyroidism  - Continue home Synthroid    Other  * Pneumonia due to COVID-19 virus  - COVID-19 testing:   - Isolation: Airborne/Droplet. Surgical mask on patient. Notify Infection Control  - Management:    - Monitoring:   - Telemetry & Continuous Pulse Oximetry    - Nutrition:    - Multivitamin PO daily   - Add Boost supplement   - Ascorbic acid 500mg PO bid    - Antibiotics:   - ceftriaxone 1g Q24h x 5 days  - azithromycin 500mg po x1, then 250mg po daily x 4 days   - Anticoagulation:  - VTE prevention with therapeutic dosing with Lovenox 1mg/kg BID for DVT. Transition to home Xarelto if tolerating HF.   - Other Treatments:  - Dexamethasone 8mg x1 in ED and 6mg daily to finish 10 day course  - Remdesivir   - Baricitinib   - Pulmonary toileting with goal of weaning O2       Critical Care Daily Checklist:    A: Awake: RASS Goal/Actual Goal:    Actual: Stone Agitation Sedation Scale (RASS): Alert and calm   B: Spontaneous Breathing Trial Performed?     C: SAT & SBT  Coordinated?                   D: Delirium: CAM-ICU Overall CAM-ICU: Negative   E: Early Mobility Performed? Yes   F: Feeding Goal:    Status:     Current Diet Order   Procedures    Diet diabetic [facility]; 1500 Calorie     Order Specific Question:   Indicate patient location for additional diet options:     Answer:   [facility]     Order Specific Question:   Total calories:     Answer:   1500 Calorie      AS: Analgesia/Sedation N/A   T: Thromboembolic Prophylaxis Xarelto   H: HOB > 300 Yes   U: Stress Ulcer Prophylaxis (if needed) Protonix   G: Glucose Control N/A   B: Bowel Function  Miralax, Sennokot   I: Indwelling Catheter (Lines & Smith) Necessity PIV x2, Port-A-Cath   D: De-escalation of Antimicrobials/Pharmacotherapies N/A    Plan for the day/ETD - Continue weaning O2, transition to HFNC as tolerated  - Increase bowel regimen    Code Status:  Family/Goals of Care: Full Code         Critical secondary to Patient has a condition that poses threat to life and bodily function: Severe Respiratory Distress      Critical care was time spent personally by me on the following activities: development of treatment plan with patient or surrogate and bedside caregivers, discussions with consultants, evaluation of patient's response to treatment, examination of patient, ordering and performing treatments and interventions, ordering and review of laboratory studies, ordering and review of radiographic studies, pulse oximetry, re-evaluation of patient's condition. This critical care time did not overlap with that of any other provider or involve time for any procedures.     Jahaira Lagos MD  Critical Care Medicine  [facility] - Surgical Intensive Care

## 2022-01-11 NOTE — PLAN OF CARE
Problem: Physical Therapy Goal  Goal: Physical Therapy Goal  Description: Goals to be met by: 22    Patient will increase functional independence with mobility by performin. Supine to sit with Modified [place] -not met  2. Sit to stand transfer with Modified [place] -not met  3. Gait  x 100 feet with Supervision -not met    Outcome: Ongoing, Progressing   Evaluation completed and goals appropriate 2022

## 2022-01-11 NOTE — ASSESSMENT & PLAN NOTE
New hyperglycemia in insulin-naive patient no history of DM. Possibly contributed to by dexamethasone. A1C 7.0 on 1/7/22.     - Detemir 5mg daily  - Aspart 6u with meals  - MDSSI  - Continue to closely monitor

## 2022-01-11 NOTE — PT/OT/SLP EVAL
Physical Therapy Evaluation and treatment     Patient Name:  Fauzia Kirk   MRN:  [id number]    Recommendations:     Discharge Recommendations:      Discharge Equipment Recommendations: none   Barriers to discharge: None    Assessment:     Fauzia Kirk is a 66 y.o. female admitted with a medical diagnosis of Pneumonia due to COVID-19 virus.  She presents with the following impairments/functional limitations:  impaired endurance,impaired functional mobilty,gait instability pt tolerated treatment well but O2 comfort flow decreased functional mobility. Pt will benefit from skilled PT 2x/wk to progress physically. Pt will be able to discharge home with no needs when medically stable. Pt came to ED with c/o cough and malaise.     Rehab Prognosis: Good; patient would benefit from acute skilled PT services to address these deficits and reach maximum level of function.    Recent Surgery: * No surgery found *      Plan:     During this hospitalization, patient to be seen 2 x/week to address the identified rehab impairments via gait training,therapeutic activities and progress toward the following goals:    · Plan of Care Expires:  02/09/22    Subjective     Chief Complaint: pt had no complaints during treatment.   Patient/Family Comments/goals:  To get better and go home.   Pain/Comfort:  · Pain Rating 1: 0/10  · Pain Rating Post-Intervention 1: 0/10    Patients cultural, spiritual, Taoism conflicts given the current situation: no    Living Environment:  Pt is retired and lives with her retired  in 2 story with B&B upstairs and slab entrance.   Prior to admission, patients level of function was Independent .  Equipment used at home: none (home no needs).  DME owned (not currently used): none.  Upon discharge, patient will have assistance from .    Objective:     Communicated with nurse prior to session.  Patient found up in chair with telemetry,pulse ox (continuous),blood pressure  cuff,oxygen (hep lock IV, comfort flow O2)  upon PT entry to room.    General Precautions: Standard, airborne,droplet,contact,fall   Orthopedic Precautions:    Braces:    Respiratory Status: Comfort flow, flow 40 L/min, concentration 53%    Exams:  · Cognitive Exam:  Patient is oriented to Person, Place, Time and Situation  · RLE ROM: WFL  · RLE Strength: WFL  · LLE ROM: WFL  · LLE Strength: WFL    Functional Mobility:  · Transfers:     · Sit to Stand:  supervision with no AD  ·   · Gait: pt received gait training forward/backward x 2 steps with CGA. Distance pt ambulated limited by comfort flow.   ·   · Balance: pt stood at bedside performing grooming tasks with SBA.     Therapeutic Activities and Exercises:   pt received verbal instructions in role of PT and POC. Pt verbally expressed understanding of such.     AM-PAC 6 CLICK MOBILITY  Total Score:16     Patient left up in chair with all lines intact, call button in reach and RN notified.    GOALS:   Multidisciplinary Problems     Physical Therapy Goals        Problem: Physical Therapy Goal    Goal Priority Disciplines Outcome Goal Variances Interventions   Physical Therapy Goal     PT, PT/OT Ongoing, Progressing     Description: Goals to be met by: 22    Patient will increase functional independence with mobility by performin. Supine to sit with Modified [place] -not met  2. Sit to stand transfer with Modified [place] -not met  3. Gait  x 100 feet with Supervision -not met                     History:     Past Medical History:   Diagnosis Date    Arthritis     Breast cancer     Hypertension        Past Surgical History:   Procedure Laterality Date    BREAST LUMPECTOMY       SECTION, CLASSIC      LIPOMA RESECTION      MASTECTOMY         Time Tracking:     PT Received On: 22  PT Start Time: 1333     PT Stop Time: 1349  PT Total Time (min): 16 min     Billable Minutes: Evaluation 8 min  and Therapeutic Activity 8  min      01/11/2022

## 2022-01-11 NOTE — ASSESSMENT & PLAN NOTE
Non-occlusive thrombus of R IJ found on 11/30/2021. Continue therapeutic AC while inpatient.     - Continue home Xarelto

## 2022-01-11 NOTE — SUBJECTIVE & OBJECTIVE
Interval History/Significant Events: Patient O2 demands slowly improving. Did well on CPAP overnight. Now on HF 70% 50L. No new complaints. Continues to tolerate PO. No BMs in 4 days. Denies feeling bloated or constipated.     Review of Systems   Constitutional: Positive for fatigue. Negative for fever.   HENT: Negative for congestion and rhinorrhea.    Respiratory: Positive for cough. Negative for shortness of breath and wheezing.    Cardiovascular: Negative for chest pain and palpitations.   Gastrointestinal: Negative for abdominal pain and vomiting.   Genitourinary: Negative for difficulty urinating and dysuria.   Musculoskeletal: Negative for joint swelling and myalgias.   Skin: Negative for rash and wound.   Neurological: Negative for weakness and headaches.     Objective:     Vital Signs (Most Recent):  Temp: 99.3 °F (37.4 °C) (01/11/22 0800)  Pulse: 76 (01/11/22 1200)  Resp: (!) 24 (01/11/22 1200)  BP: 129/68 (01/11/22 1200)  SpO2: 97 % (01/11/22 1200) Vital Signs (24h Range):  Temp:  [98.2 °F (36.8 °C)-99.3 °F (37.4 °C)] 99.3 °F (37.4 °C)  Pulse:  [66-99] 76  Resp:  [4-49] 24  SpO2:  [83 %-100 %] 97 %  BP: (112-151)/(53-90) 129/68   Weight: 85.7 kg (189 lb)  Body mass index is 30.51 kg/m².      Intake/Output Summary (Last 24 hours) at 1/11/2022 1422  Last data filed at 1/11/2022 0141  Gross per 24 hour   Intake --   Output 600 ml   Net -600 ml       Physical Exam  Vitals and nursing note reviewed.   Constitutional:       General: She is not in acute distress.     Comments: Patient sitting up in chair watching tv, appears comfortable.    HENT:      Head: Normocephalic and atraumatic.      Nose: No congestion or rhinorrhea.      Mouth/Throat:      Pharynx: Oropharynx is clear.   Eyes:      Extraocular Movements: Extraocular movements intact.      Conjunctiva/sclera: Conjunctivae normal.   Cardiovascular:      Rate and Rhythm: Normal rate. Rhythm irregular.   Pulmonary:      Breath sounds: Rales present. No  wheezing.      Comments: On HF 70% 50L  Abdominal:      General: There is no distension.      Palpations: Abdomen is soft.      Tenderness: There is no abdominal tenderness.   Musculoskeletal:         General: No swelling. Normal range of motion.      Right lower leg: No edema.      Left lower leg: No edema.   Skin:     General: Skin is warm and dry.   Neurological:      General: No focal deficit present.      Mental Status: She is alert and oriented to person, place, and time.         Vents:  Oxygen Concentration (%): 50 (01/11/22 1154)  Lines/Drains/Airways     Central Venous Catheter Line            Port A Cath Single Lumen 07/26/17 1130 left subclavian 1630 days          Peripheral Intravenous Line                 Peripheral IV - Single Lumen 01/06/22 1800 20 G Right Antecubital 4 days         Peripheral IV - Single Lumen 01/07/22 0305 22 G Posterior;Right Hand 4 days              Significant Labs:    CBC/Anemia Profile:  Recent Labs   Lab 01/10/22  0323 01/11/22  0445   WBC 7.40 7.55   HGB 10.5* 10.5*   HCT 33.1* 33.4*    308   MCV 85 87   RDW 14.5 14.6*        Chemistries:  Recent Labs   Lab 01/10/22  0323 01/11/22  0445   * 137   K 4.3 4.7    107   CO2 22* 21*   BUN 28* 28*   CREATININE 0.8 0.7   CALCIUM 8.6* 8.2*   ALBUMIN 2.3* 2.2*   PROT 6.5 5.6*   BILITOT 0.3 0.4   ALKPHOS 59 59   ALT 25 24   AST 26 19   MG 2.1 2.0   PHOS 3.9 4.3            Significant Imaging:  I have reviewed all pertinent imaging results/findings within the past 24 hours.

## 2022-01-12 LAB
CRP SERPL-MCNC: 34.9 MG/L (ref 0–8.2)
HEPATITIS C VIRUS (HCV) RNA DETECTION/QUANTIFICATION RT-PCR: NORMAL IU/ML
POCT GLUCOSE: 134 MG/DL (ref 70–110)
POCT GLUCOSE: 172 MG/DL (ref 70–110)
POCT GLUCOSE: 178 MG/DL (ref 70–110)
POCT GLUCOSE: 22 MG/DL (ref 70–110)
POCT GLUCOSE: 236 MG/DL (ref 70–110)
POTASSIUM SERPL-SCNC: 2.8 MMOL/L (ref 3.5–5.1)
POTASSIUM SERPL-SCNC: 5.4 MMOL/L (ref 3.5–5.1)

## 2022-01-12 PROCEDURE — C9399 UNCLASSIFIED DRUGS OR BIOLOG: HCPCS | Performed by: STUDENT IN AN ORGANIZED HEALTH CARE EDUCATION/TRAINING PROGRAM

## 2022-01-12 PROCEDURE — 94761 N-INVAS EAR/PLS OXIMETRY MLT: CPT

## 2022-01-12 PROCEDURE — 27000207 HC ISOLATION

## 2022-01-12 PROCEDURE — 25000003 PHARM REV CODE 250: Performed by: INTERNAL MEDICINE

## 2022-01-12 PROCEDURE — 63600175 PHARM REV CODE 636 W HCPCS: Performed by: STUDENT IN AN ORGANIZED HEALTH CARE EDUCATION/TRAINING PROGRAM

## 2022-01-12 PROCEDURE — 99233 SBSQ HOSP IP/OBS HIGH 50: CPT | Mod: ,,, | Performed by: INTERNAL MEDICINE

## 2022-01-12 PROCEDURE — 99900035 HC TECH TIME PER 15 MIN (STAT)

## 2022-01-12 PROCEDURE — 84132 ASSAY OF SERUM POTASSIUM: CPT | Mod: 91 | Performed by: STUDENT IN AN ORGANIZED HEALTH CARE EDUCATION/TRAINING PROGRAM

## 2022-01-12 PROCEDURE — 86140 C-REACTIVE PROTEIN: CPT | Performed by: STUDENT IN AN ORGANIZED HEALTH CARE EDUCATION/TRAINING PROGRAM

## 2022-01-12 PROCEDURE — 27000221 HC OXYGEN, UP TO 24 HOURS

## 2022-01-12 PROCEDURE — 25000003 PHARM REV CODE 250

## 2022-01-12 PROCEDURE — 84132 ASSAY OF SERUM POTASSIUM: CPT | Performed by: INTERNAL MEDICINE

## 2022-01-12 PROCEDURE — 25000003 PHARM REV CODE 250: Performed by: STUDENT IN AN ORGANIZED HEALTH CARE EDUCATION/TRAINING PROGRAM

## 2022-01-12 PROCEDURE — 20000000 HC ICU ROOM

## 2022-01-12 PROCEDURE — 99233 PR SUBSEQUENT HOSPITAL CARE,LEVL III: ICD-10-PCS | Mod: ,,, | Performed by: INTERNAL MEDICINE

## 2022-01-12 PROCEDURE — 94799 UNLISTED PULMONARY SVC/PX: CPT

## 2022-01-12 RX ORDER — INSULIN ASPART 100 [IU]/ML
10 INJECTION, SOLUTION INTRAVENOUS; SUBCUTANEOUS
Status: DISCONTINUED | OUTPATIENT
Start: 2022-01-12 | End: 2022-01-15 | Stop reason: HOSPADM

## 2022-01-12 RX ADMIN — BARICITINIB 4 MG: 2 TABLET, FILM COATED ORAL at 08:01

## 2022-01-12 RX ADMIN — Medication 1000 UNITS: at 08:01

## 2022-01-12 RX ADMIN — DEXAMETHASONE 6 MG: 1.5 TABLET ORAL at 08:01

## 2022-01-12 RX ADMIN — INSULIN ASPART 10 UNITS: 100 INJECTION, SOLUTION INTRAVENOUS; SUBCUTANEOUS at 12:01

## 2022-01-12 RX ADMIN — DOCUSATE SODIUM 50 MG AND SENNOSIDES 8.6 MG 2 TABLET: 8.6; 5 TABLET, FILM COATED ORAL at 08:01

## 2022-01-12 RX ADMIN — MUPIROCIN: 20 OINTMENT TOPICAL at 08:01

## 2022-01-12 RX ADMIN — MUPIROCIN: 20 OINTMENT TOPICAL at 09:01

## 2022-01-12 RX ADMIN — INSULIN DETEMIR 8 UNITS: 100 INJECTION, SOLUTION SUBCUTANEOUS at 08:01

## 2022-01-12 RX ADMIN — PANTOPRAZOLE SODIUM 40 MG: 40 TABLET, DELAYED RELEASE ORAL at 08:01

## 2022-01-12 RX ADMIN — THERA TABS 1 TABLET: TAB at 08:01

## 2022-01-12 RX ADMIN — LEVOTHYROXINE SODIUM 100 MCG: 100 TABLET ORAL at 08:01

## 2022-01-12 RX ADMIN — INSULIN ASPART 10 UNITS: 100 INJECTION, SOLUTION INTRAVENOUS; SUBCUTANEOUS at 05:01

## 2022-01-12 RX ADMIN — INSULIN ASPART 2 UNITS: 100 INJECTION, SOLUTION INTRAVENOUS; SUBCUTANEOUS at 09:01

## 2022-01-12 RX ADMIN — METOPROLOL TARTRATE 25 MG: 25 TABLET, FILM COATED ORAL at 08:01

## 2022-01-12 RX ADMIN — METOPROLOL TARTRATE 25 MG: 25 TABLET, FILM COATED ORAL at 09:01

## 2022-01-12 RX ADMIN — RIVAROXABAN 20 MG: 10 TABLET, FILM COATED ORAL at 05:01

## 2022-01-12 RX ADMIN — POLYETHYLENE GLYCOL 3350 17 G: 17 POWDER, FOR SOLUTION ORAL at 08:01

## 2022-01-12 NOTE — PLAN OF CARE
Pt VSS. No acute events occurred during shift. Afebrile. HR/rhythm remains afib. HFNC 6L. Pt oriented x4. Voids spontaneously via bedside commode. Diabetic diet. Accuchecks ACHS. New 22G PIV in rt forearm. Pt ambulates in room independently w/ standby assist. Plan today- stepdown orders. POC reviewed w/ pt @ bedside. Questions encouraged and answered. Emotional support provided.

## 2022-01-12 NOTE — NURSING
Refused insertion of second IV. Pt educated on need for access on ICU floors. Pt currently has 1 22G PIV to right forearm

## 2022-01-12 NOTE — RESIDENT HANDOFF
Handoff     Primary Team: [facility] [place] CRITICAL CARE MEDICINE Room Number: 92902 CVICU/20305 CVICU A     Patient Name: Fauzia Kirk MRN: [id number]     Date of Birth: [id number] Allergies: Patient has no known allergies.     Age: 66 y.o. Admit Date: 1/6/2022     Sex: female  BMI: Body mass index is 30.51 kg/m².     Code Status: Full Code        Illness Level (current clinical status): Watcher - Yes    Reason for Admission: Pneumonia due to COVID-19 virus    Brief HPI (pertinent PMH and diagnosis or differential diagnosis): 67yo F with PMH of breast cancer and lung cancer (previously treated with immunotherapy, chemo, radiation, b/l mastectomies, last treated with chemoRT in 2018), HTN, OA, hypothyroidism, and DVT of R IJ diagnosed 11/30/21 who is here with COVID PNA. Her O2 requirements have been weaned to 6L HFNC. She is being treated with dex/remdesivir/baricit. She developed new paroxysmal a-fib with RVR on 1/8. We held her home amlodipine and have her taking PO metoprolol which has kept her rate-controlled. During this admission, she has been diagnosed with new DM. A1C is 7.0. She is currently on aspart 6u TIDWM, detemir 5u qd.        Procedure Date: N/A    Hospital Course (updated, brief assessment by system or problem, significant events):   Pulmonary  Pneumonia due to COVID-19 virus  - COVID-19 testing:   - Isolation: Airborne/Droplet. Surgical mask on patient. Notify Infection Control  - Management:    - Monitoring:          - Telemetry & Continuous Pulse Oximetry    - Nutrition:           - Multivitamin PO daily          - Add Boost supplement          - Ascorbic acid 500mg PO bid    - Antibiotics:          - ceftriaxone 1g Q24h x 5 days  - azithromycin 500mg po x1, then 250mg po daily x 4 days   - Anticoagulation:  - VTE prevention with therapeutic dosing with Lovenox 1mg/kg BID for DVT. Transition to home Xarelto if tolerating HF.   - Other Treatments:  - Dexamethasone 8mg x1 in ED and 6mg daily to finish 10 day  course  - Remdesivir          - Baricitinib   - Pulmonary toileting with goal of weaning O2     Cardiac/Vascular  Atrial fibrillation  New paroxysmal a-fib with RVR on 1/8/22.   - Metoprolol 25mg BID  - Hold home amlodipine     HTN (hypertension)  - Hold home amlodipine  - Metoprolol 25mg BID  - See atrial fibrillation     Hematology  History of DVT (deep vein thrombosis)  Non-occlusive thrombus of R IJ found on 11/30/2021. Continue therapeutic AC while inpatient.   - Continue home Xarelto     Endocrine  Acute hyperglycemia  New hyperglycemia in insulin-naive patient no history of DM. Possibly contributed to by dexamethasone. A1C 7.0 on 1/7/22.      - Detemir 10mg daily  - Aspart 8u with meals  - MDSSI  - Continue to closely monitor     Hyperthyroidism  - Continue home Synthroid     Tasks (specific, using if-then statements):   - Continue weaning O2  - Continue COVID-19 therapy with baricitinib, dexamethasone and remdesevir  - Continue monitoring blood glucose and adjusting insulin as needed  - Continue metoprolol for new paroxysmal a-fib. Room to increase as needed    Contingency Plan (special circumstances anticipated and plan): N/A    Estimated Discharge Date: 1/14/2022    Discharge Disposition: Home or Self Care    Jahaira Lagos MD  Critical Care

## 2022-01-13 PROBLEM — E11.9 DIABETES: Status: ACTIVE | Noted: 2022-01-07

## 2022-01-13 LAB
ALBUMIN SERPL BCP-MCNC: 2.4 G/DL (ref 3.5–5.2)
ALP SERPL-CCNC: 63 U/L (ref 55–135)
ALT SERPL W/O P-5'-P-CCNC: 23 U/L (ref 10–44)
ANION GAP SERPL CALC-SCNC: 9 MMOL/L (ref 8–16)
AST SERPL-CCNC: 17 U/L (ref 10–40)
BACTERIA BLD CULT: NORMAL
BACTERIA BLD CULT: NORMAL
BASOPHILS # BLD AUTO: 0.03 K/UL (ref 0–0.2)
BASOPHILS NFR BLD: 0.3 % (ref 0–1.9)
BILIRUB SERPL-MCNC: 0.5 MG/DL (ref 0.1–1)
BUN SERPL-MCNC: 30 MG/DL (ref 8–23)
CALCIUM SERPL-MCNC: 9 MG/DL (ref 8.7–10.5)
CHLORIDE SERPL-SCNC: 108 MMOL/L (ref 95–110)
CO2 SERPL-SCNC: 22 MMOL/L (ref 23–29)
CREAT SERPL-MCNC: 1 MG/DL (ref 0.5–1.4)
DIFFERENTIAL METHOD: ABNORMAL
EOSINOPHIL # BLD AUTO: 0.1 K/UL (ref 0–0.5)
EOSINOPHIL NFR BLD: 0.5 % (ref 0–8)
ERYTHROCYTE [DISTWIDTH] IN BLOOD BY AUTOMATED COUNT: 14.7 % (ref 11.5–14.5)
EST. GFR  (AFRICAN AMERICAN): >60 ML/MIN/1.73 M^2
EST. GFR  (NON AFRICAN AMERICAN): 58 ML/MIN/1.73 M^2
GLUCOSE SERPL-MCNC: 102 MG/DL (ref 70–110)
HCT VFR BLD AUTO: 35.7 % (ref 37–48.5)
HGB BLD-MCNC: 11.3 G/DL (ref 12–16)
IMM GRANULOCYTES # BLD AUTO: 0.26 K/UL (ref 0–0.04)
IMM GRANULOCYTES NFR BLD AUTO: 2.4 % (ref 0–0.5)
LYMPHOCYTES # BLD AUTO: 1.7 K/UL (ref 1–4.8)
LYMPHOCYTES NFR BLD: 15.7 % (ref 18–48)
MCH RBC QN AUTO: 27.4 PG (ref 27–31)
MCHC RBC AUTO-ENTMCNC: 31.7 G/DL (ref 32–36)
MCV RBC AUTO: 86 FL (ref 82–98)
MONOCYTES # BLD AUTO: 0.7 K/UL (ref 0.3–1)
MONOCYTES NFR BLD: 6.2 % (ref 4–15)
NEUTROPHILS # BLD AUTO: 8.2 K/UL (ref 1.8–7.7)
NEUTROPHILS NFR BLD: 74.9 % (ref 38–73)
NRBC BLD-RTO: 0 /100 WBC
PLATELET # BLD AUTO: 330 K/UL (ref 150–450)
PMV BLD AUTO: 10.1 FL (ref 9.2–12.9)
POCT GLUCOSE: 100 MG/DL (ref 70–110)
POCT GLUCOSE: 126 MG/DL (ref 70–110)
POCT GLUCOSE: 160 MG/DL (ref 70–110)
POCT GLUCOSE: 166 MG/DL (ref 70–110)
POTASSIUM SERPL-SCNC: 4.6 MMOL/L (ref 3.5–5.1)
PROT SERPL-MCNC: 6.6 G/DL (ref 6–8.4)
RBC # BLD AUTO: 4.13 M/UL (ref 4–5.4)
SODIUM SERPL-SCNC: 139 MMOL/L (ref 136–145)
WBC # BLD AUTO: 10.87 K/UL (ref 3.9–12.7)

## 2022-01-13 PROCEDURE — 80053 COMPREHEN METABOLIC PANEL: CPT | Performed by: STUDENT IN AN ORGANIZED HEALTH CARE EDUCATION/TRAINING PROGRAM

## 2022-01-13 PROCEDURE — C9399 UNCLASSIFIED DRUGS OR BIOLOG: HCPCS | Performed by: STUDENT IN AN ORGANIZED HEALTH CARE EDUCATION/TRAINING PROGRAM

## 2022-01-13 PROCEDURE — 63600175 PHARM REV CODE 636 W HCPCS: Performed by: STUDENT IN AN ORGANIZED HEALTH CARE EDUCATION/TRAINING PROGRAM

## 2022-01-13 PROCEDURE — 25000003 PHARM REV CODE 250: Performed by: STUDENT IN AN ORGANIZED HEALTH CARE EDUCATION/TRAINING PROGRAM

## 2022-01-13 PROCEDURE — 27000207 HC ISOLATION

## 2022-01-13 PROCEDURE — 12000002 HC ACUTE/MED SURGE SEMI-PRIVATE ROOM

## 2022-01-13 PROCEDURE — 27000221 HC OXYGEN, UP TO 24 HOURS

## 2022-01-13 PROCEDURE — 25000003 PHARM REV CODE 250

## 2022-01-13 PROCEDURE — 25000003 PHARM REV CODE 250: Performed by: NURSE PRACTITIONER

## 2022-01-13 PROCEDURE — 25000003 PHARM REV CODE 250: Performed by: INTERNAL MEDICINE

## 2022-01-13 PROCEDURE — 97116 GAIT TRAINING THERAPY: CPT

## 2022-01-13 PROCEDURE — 94761 N-INVAS EAR/PLS OXIMETRY MLT: CPT

## 2022-01-13 PROCEDURE — 97165 OT EVAL LOW COMPLEX 30 MIN: CPT

## 2022-01-13 PROCEDURE — 85025 COMPLETE CBC W/AUTO DIFF WBC: CPT | Performed by: STUDENT IN AN ORGANIZED HEALTH CARE EDUCATION/TRAINING PROGRAM

## 2022-01-13 PROCEDURE — 99900035 HC TECH TIME PER 15 MIN (STAT)

## 2022-01-13 RX ORDER — AMLODIPINE BESYLATE 10 MG/1
10 TABLET ORAL DAILY
Status: DISCONTINUED | OUTPATIENT
Start: 2022-01-13 | End: 2022-01-15 | Stop reason: HOSPADM

## 2022-01-13 RX ADMIN — Medication 1000 UNITS: at 08:01

## 2022-01-13 RX ADMIN — INSULIN DETEMIR 8 UNITS: 100 INJECTION, SOLUTION SUBCUTANEOUS at 08:01

## 2022-01-13 RX ADMIN — INSULIN ASPART 10 UNITS: 100 INJECTION, SOLUTION INTRAVENOUS; SUBCUTANEOUS at 08:01

## 2022-01-13 RX ADMIN — DEXAMETHASONE 6 MG: 1.5 TABLET ORAL at 08:01

## 2022-01-13 RX ADMIN — DOCUSATE SODIUM 50 MG AND SENNOSIDES 8.6 MG 2 TABLET: 8.6; 5 TABLET, FILM COATED ORAL at 08:01

## 2022-01-13 RX ADMIN — THERA TABS 1 TABLET: TAB at 08:01

## 2022-01-13 RX ADMIN — RIVAROXABAN 20 MG: 10 TABLET, FILM COATED ORAL at 05:01

## 2022-01-13 RX ADMIN — POLYETHYLENE GLYCOL 3350 17 G: 17 POWDER, FOR SOLUTION ORAL at 08:01

## 2022-01-13 RX ADMIN — INSULIN ASPART 10 UNITS: 100 INJECTION, SOLUTION INTRAVENOUS; SUBCUTANEOUS at 01:01

## 2022-01-13 RX ADMIN — AMLODIPINE BESYLATE 10 MG: 10 TABLET ORAL at 05:01

## 2022-01-13 RX ADMIN — INSULIN ASPART 2 UNITS: 100 INJECTION, SOLUTION INTRAVENOUS; SUBCUTANEOUS at 01:01

## 2022-01-13 RX ADMIN — METOPROLOL TARTRATE 25 MG: 25 TABLET, FILM COATED ORAL at 07:01

## 2022-01-13 RX ADMIN — PANTOPRAZOLE SODIUM 40 MG: 40 TABLET, DELAYED RELEASE ORAL at 08:01

## 2022-01-13 RX ADMIN — METOPROLOL TARTRATE 25 MG: 25 TABLET, FILM COATED ORAL at 08:01

## 2022-01-13 RX ADMIN — BARICITINIB 4 MG: 2 TABLET, FILM COATED ORAL at 08:01

## 2022-01-13 RX ADMIN — INSULIN ASPART 10 UNITS: 100 INJECTION, SOLUTION INTRAVENOUS; SUBCUTANEOUS at 05:01

## 2022-01-13 RX ADMIN — LEVOTHYROXINE SODIUM 100 MCG: 100 TABLET ORAL at 07:01

## 2022-01-13 RX ADMIN — INSULIN ASPART 2 UNITS: 100 INJECTION, SOLUTION INTRAVENOUS; SUBCUTANEOUS at 05:01

## 2022-01-13 NOTE — ASSESSMENT & PLAN NOTE
New onset Afib during hospitalization. CHADVASC2 - 4    Rate control with metoprolol 25 bid  AC with Xarelto

## 2022-01-13 NOTE — ASSESSMENT & PLAN NOTE
Hemoglobin A1c 6.5  Apparently new diagnosis of diabetes    Diabetic diet  Detemir 8  Aspart 10  SSI

## 2022-01-13 NOTE — PLAN OF CARE
Pt VSS. BP increasing during shift. Team notified. Norvasc started. Afebrile. HR/rhythm in & out of afib. Pt on 6L NC. BG ACHS, no coverage needed @ 0500 spot check. Voids spontaneously via bedside commode. Ambulates independently. Diabetic diet. Transfer orders in as of 01/12/21 am. POC reviewed w/ pt @ bedside. Questions encouraged. Emotional support provided.

## 2022-01-13 NOTE — ASSESSMENT & PLAN NOTE
Patient is identified as Severe COVID-19 based on hypoxemia with O2 saturations <94% on room air or on ambulation   Initiate standard COVID protocols; COVID-19 testing ,Infection Control notification  and isolation- respiratory, contact and droplet per protocol    Diagnostics: (leukopenia, hyponatremia, hyperferritinemia, elevated troponin, elevated d-dimer, age, and comorbidities are significant predictors of poor clinical outcome)  CBC, CMP, Ferritin, CRP and Portable CXR    Management: Initiate targeted therapy with Dexamethasone PO/IV 6mg daily x10 days, Maintain oxygen saturations 92-96% via Nasal Cannula 4 LPM and monitor with continuous/intermittent pulse oximetry.  and Inhaled bronchodilators as needed for shortness of breath.    Advance Care Planning  Current advance care plan has been discussed with patient/family/POA and patient currently wishes Full Code.      Dex end date 1/16  Rudi end date 1/23

## 2022-01-13 NOTE — ASSESSMENT & PLAN NOTE
New hyperglycemia in insulin-naive patient no history of DM. Possibly contributed to by dexamethasone. A1C 7.0 on 1/7/22.     - Detemir 8mg daily  - Aspart 10u with meals  - MDSSI  - Continue to closely monitor

## 2022-01-13 NOTE — PT/OT/SLP PROGRESS
Physical Therapy Treatment    Patient Name:  Fauzia Kirk   MRN:  [id number]  Admit Date: 2022  Admitting Diagnosis:  Pneumonia due to COVID-19 virus   Length of Stay: 7 days  Recent Surgery: * No surgery found *      Recommendations:     Discharge Recommendations:  home   Discharge Equipment Recommendations: none   Barriers to discharge: None    Plan:     During this hospitalization, patient to be seen 3 x/week to address the listed problems via gait training,therapeutic activities,therapeutic exercises,neuromuscular re-education  · Plan of Care Expires:  22   Plan of Care Reviewed with: patient    Assessment:     Fauzia Kirk is a 66 y.o. female admitted with a medical diagnosis of Pneumonia due to COVID-19 virus. Pt progressing towards goals, but not at PLOF. Pt tolerated session well but continues with a dry cough, SOB, and impaired endurance. Pt is improving with therapy evidenced by increased gait distance.. Pt would benefit from continued PT services to improve overall functional mobility. Recommend d/c to Home to maximize functional independence.        Problem List: weakness,impaired endurance,gait instability,impaired functional mobilty,impaired cardiopulmonary response to activity.  Rehab Prognosis: Good     GOALS:   Multidisciplinary Problems     Physical Therapy Goals        Problem: Physical Therapy Goal    Goal Priority Disciplines Outcome Goal Variances Interventions   Physical Therapy Goal     PT, PT/OT Ongoing, Progressing     Description: Goals to be met by: 22    Patient will increase functional independence with mobility by performin. Supine to sit with Modified [place] -not met  2. Sit to stand transfer with Modified [place] -not met  3. Gait  x 100 feet with Supervision -not met                     Subjective   Communicated with RN prior to session.  Patient found HOB elevated upon PT entry to room, agreeable to evaluation. Fauzia Kirk's  "alone during session.    Chief Complaint: none reported   Patient/Family Comments/goals: to get better and return home   Pain/Comfort:  · Pain Rating 1: 0/10  · Pain Rating Post-Intervention 1: 0/10    Objective:   Patient found with: telemetry,pulse ox (continuous),blood pressure cuff,central line,peripheral IV,oxygen   General Precautions: Standard, Cardiac airborne,contact,droplet,fall   Orthopedic Precautions:N/A   Braces: N/A   Oxygen Device: Nasal Cannula   Vitals: BP (!) 105/55 (BP Location: Right arm, Patient Position: Lying)   Pulse 84   Temp 98.1 °F (36.7 °C) (Oral)   Resp 20   Ht 5' 6" (1.676 m)   Wt 85.7 kg (188 lb 15 oz)   SpO2 95%   BMI 30.49 kg/m²     Outcome Measures:  AM-PAC 6 CLICK MOBILITY  Turning over in bed (including adjusting bedclothes, sheets and blankets)?: 3  Sitting down on and standing up from a chair with arms (e.g., wheelchair, bedside commode, etc.): 3  Moving from lying on back to sitting on the side of the bed?: 3  Moving to and from a bed to a chair (including a wheelchair)?: 3  Need to walk in hospital room?: 3  Climbing 3-5 steps with a railing?: 2  Basic Mobility Total Score: 17       Functional Mobility:  Additional staff present: OT - due to pt requiring 2 skilled therapists to safely perform functional mobility, and vital signs, and to accommodate pt's activity tolerance  Bed Mobility:    Supine to Sit: supervision; HOB elevated   Scooting anteriorly to EOB to have both feet planted on floor: supervision    Sitting Balance at Edge of Bed:   Assistance Level Required: Supervision    Transfers:    Sit <> Stand Transfer: stand by assistance with no assistive device from EOB      Gait:  · Patient ambulated: 8ft to chair    · Patient required: contact guard  · Patient used: no assistive device  · Gait Pattern observed: reciprocal gait  · Gait Deviation(s): decreased step length, wide base of support, flexed posture and decreased amirah  · Impairments due to: decreased " strength, decreased endurance and impaired respiratory status  · Comments:   · Verbal cuing for pacing and deep breathing        Therapeutic Activities, Exercises, and Education:   Educated pt on PT role/POC  Educated pt on importance of OOB activity and daily ambulation   Educated pt on the importance of pacing and deep breathing  Pt verbalized understanding      Patient left up in WC (getting ready to transfer to closed [facility] unit) with all lines intact, call button in reach and RN notified..    Time Tracking:     PT Received On: 01/13/22  PT Start Time: 0929     PT Stop Time: 0945  PT Total Time (min): 16 min       Billable Minutes:   · Gait Training 8    Treatment Type: Treatment  PT/PTA: PT

## 2022-01-13 NOTE — PROGRESS NOTES
[facility] - [facility] Intensive Care  Critical Care Medicine  Progress Note    Patient Name: Fauzia Kirk  MRN: [id number]  Admission Date: 1/6/2022  Hospital Length of Stay: 6 days  Code Status: Full Code  Attending Provider: Keyon Pompa MD  Primary Care Provider: Era Urena MD   Principal Problem: Pneumonia due to COVID-19 virus    Subjective:     HPI:  Fauzia Krik is a 66yoF with a medical history of breast cancer and lung cancer (previously treated with immunotherapy, chemo, radiation, bilateral mastectomies, last treated with chemoRT in 2018), HTN, OA, hypothyroidism, DVT diagnosed 11/30/21 who presented to the ED after she states she hasn't been feeling well for a few days. No reports of shortness of breath, but she has had some cough, myalgias, and fatigue for about 4-5 days and her daughter was positive for COVID about 2 weeks ago. She states she wasn't around her much but she did come in close contact with her. In the ED she was comfortably hypoxic, not complaining of trouble breathing, resting fine, but requiring 50L comfort flow at 100% FiO2 and desaturating with conversation. She was able to talk in full sentences without lightheadeness, but was hypoxic into the mid-to-upper 80s. She does report fainting yesterday while sitting at home and recovered within minutes and without complaint of any prodromes. She lives at home alone and completes her tasks independently. She received 1 dose of mRNA vaccine for COVID but decided not to get her second dose after getting her blood clot in November.       Hospital/ICU Course:  In ED, patient denies SOB but became hypoxic to mid 80s requiring high-flow O2 100%FiO2 50L. Patient then moved to bipap transiently followed by transition to CPAP. Patient then weaned to comfort flow. Dex/remdesevir/baricitinib started for COVID treatment. Patient's labs showed glu >300 despite no diabetic history. A1C 7.0. Detemir, Aspart and ISS started. Patient began  having increased O2 requirements and new intermittent a-fib with RVR on 1/8/22. PO Metoprolol started.       Interval History/Significant Events: on 6L HFNC overnight, did well. Will increase insulin today given elevated sugars. Plan to stepdown to medicine today.    Review of Systems   Constitutional: Positive for fatigue. Negative for fever.   HENT: Negative for congestion and rhinorrhea.    Respiratory: Positive for cough. Negative for shortness of breath and wheezing.    Cardiovascular: Negative for chest pain and palpitations.   Gastrointestinal: Negative for abdominal pain and vomiting.   Genitourinary: Negative for difficulty urinating and dysuria.   Musculoskeletal: Negative for joint swelling and myalgias.   Skin: Negative for rash and wound.   Neurological: Negative for weakness and headaches.     Objective:     Vital Signs (Most Recent):  Temp: 97.6 °F (36.4 °C) (01/12/22 0800)  Pulse: 82 (01/12/22 1600)  Resp: (!) 37 (01/12/22 1600)  BP: (!) 162/86 (01/12/22 1600)  SpO2: (!) 91 % (01/12/22 1600) Vital Signs (24h Range):  Temp:  [97.6 °F (36.4 °C)-98 °F (36.7 °C)] 97.6 °F (36.4 °C)  Pulse:  [] 82  Resp:  [5-45] 37  SpO2:  [85 %-100 %] 91 %  BP: (114-184)/() 162/86   Weight: 85.7 kg (189 lb)  Body mass index is 30.51 kg/m².      Intake/Output Summary (Last 24 hours) at 1/12/2022 1839  Last data filed at 1/11/2022 1900  Gross per 24 hour   Intake 100 ml   Output --   Net 100 ml       Physical Exam  Vitals and nursing note reviewed.   Constitutional:       General: She is not in acute distress.     Comments: Patient sitting up in chair watching tv, appears comfortable.    HENT:      Head: Normocephalic and atraumatic.      Nose: No congestion or rhinorrhea.      Mouth/Throat:      Pharynx: Oropharynx is clear.   Eyes:      Extraocular Movements: Extraocular movements intact.      Conjunctiva/sclera: Conjunctivae normal.   Cardiovascular:      Rate and Rhythm: Normal rate. Rhythm irregular.    Pulmonary:      Breath sounds: Rales present. No wheezing.      Comments: On HF 70% 50L  Abdominal:      General: There is no distension.      Palpations: Abdomen is soft.      Tenderness: There is no abdominal tenderness.   Musculoskeletal:         General: No swelling. Normal range of motion.      Right lower leg: No edema.      Left lower leg: No edema.   Skin:     General: Skin is warm and dry.   Neurological:      General: No focal deficit present.      Mental Status: She is alert and oriented to person, place, and time.      Vents:  Oxygen Concentration (%): 50 (01/11/22 1154)  Lines/Drains/Airways     Central Venous Catheter Line            Port A Cath Single Lumen 07/26/17 1130 left subclavian 1631 days          Peripheral Intravenous Line                 Peripheral IV - Single Lumen 01/12/22 0300 22 G Anterior;Right Forearm <1 day              Significant Labs:    CBC/Anemia Profile:  Recent Labs   Lab 01/11/22  0445   WBC 7.55   HGB 10.5*   HCT 33.4*      MCV 87   RDW 14.6*        Chemistries:  Recent Labs   Lab 01/11/22  0445 01/12/22  0314 01/12/22  0533     --   --    K 4.7 2.8* 5.4*     --   --    CO2 21*  --   --    BUN 28*  --   --    CREATININE 0.7  --   --    CALCIUM 8.2*  --   --    ALBUMIN 2.2*  --   --    PROT 5.6*  --   --    BILITOT 0.4  --   --    ALKPHOS 59  --   --    ALT 24  --   --    AST 19  --   --    MG 2.0  --   --    PHOS 4.3  --   --        All pertinent labs within the past 24 hours have been reviewed.    Significant Imaging:  I have reviewed all pertinent imaging results/findings within the past 24 hours.      ABG  Recent Labs   Lab 01/08/22  1105   PH 7.437   PO2 66*   PCO2 27.8*   HCO3 18.8*   BE -5     Assessment/Plan:     Pulmonary  Lung nodule  See hx of lung CA    Cardiac/Vascular  Atrial fibrillation  New a-fib with RVR transiently on 1/8/22.     - Metoprolol 25mg BID  - Hold home amlodipine    HTN (hypertension)  - Hold home amlodipine  - Metoprolol  25mg BID  - See atrial fibrillation    Hematology  History of DVT (deep vein thrombosis)  Non-occlusive thrombus of R IJ found on 11/30/2021. Continue therapeutic AC while inpatient.     - Continue home Xarelto    Oncology  History of lung cancer  Last chemoRT 2018. Lung nodule present on most recent CT chest (3/2021), per read: 1.0 x 0.7 cm subsolid pulmonary nodule with irregular margins in the posterior right lower lobe, that is new from prior. There was a 6mo follow up Chest CT with contrast in October that was never done. She is followed by Dr. Mai in oncology.    History of left breast cancer  Distant hx, s/p bilateral mastectomies, immunotherapy, chemoRT per patient    Endocrine  Acute hyperglycemia  New hyperglycemia in insulin-naive patient no history of DM. Possibly contributed to by dexamethasone. A1C 7.0 on 1/7/22.     - Detemir 8mg daily  - Aspart 10u with meals  - MDSSI  - Continue to closely monitor    Hyperthyroidism  - Continue home Synthroid    Other  * Pneumonia due to COVID-19 virus  - COVID-19 testing:   - Isolation: Airborne/Droplet. Surgical mask on patient. Notify Infection Control  - Management:    - Monitoring:   - Telemetry & Continuous Pulse Oximetry    - Nutrition:    - Multivitamin PO daily   - Add Boost supplement   - Ascorbic acid 500mg PO bid    - Antibiotics:   - ceftriaxone 1g Q24h x 5 days  - azithromycin 500mg po x1, then 250mg po daily x 4 days   - Anticoagulation:  - VTE prevention with therapeutic dosing with Lovenox 1mg/kg BID for DVT. Transition to home Xarelto if tolerating HF.   - Other Treatments:  - Dexamethasone 8mg x1 in ED and 6mg daily to finish 10 day course  - Remdesivir   - Baricitinib   - Pulmonary toileting with goal of weaning O2       Critical Care Daily Checklist:    A: Awake: RASS Goal/Actual Goal:    Actual: Stone Agitation Sedation Scale (RASS): Light sedation   B: Spontaneous Breathing Trial Performed?     C: SAT & SBT Coordinated?  NA                     D: Delirium: CAM-ICU Overall CAM-ICU: Negative   E: Early Mobility Performed? No   F: Feeding Goal:    Status:     Current Diet Order   Procedures    Diet diabetic [facility]; 1500 Calorie     Order Specific Question:   Indicate patient location for additional diet options:     Answer:   [facility]     Order Specific Question:   Total calories:     Answer:   1500 Calorie      AS: Analgesia/Sedation NA   T: Thromboembolic Prophylaxis Xarelto   H: HOB > 300 Yes   U: Stress Ulcer Prophylaxis (if needed) PPI   G: Glucose Control Basal + meals + SSI moderate   B: Bowel Function  Glycolax/senna   I: Indwelling Catheter (Lines & Smith) Necessity PIV x2, port   D: De-escalation of Antimicrobials/Pharmacotherapies NA    Plan for the day/ETD Stepdown    Code Status:  Family/Goals of Care: Full Code         Critical secondary to Patient has a condition that poses threat to life and bodily function: Severe Respiratory Distress      Critical care was time spent personally by me on the following activities: development of treatment plan with patient or surrogate and bedside caregivers, discussions with consultants, evaluation of patient's response to treatment, examination of patient, ordering and performing treatments and interventions, ordering and review of laboratory studies, ordering and review of radiographic studies, pulse oximetry, re-evaluation of patient's condition. This critical care time did not overlap with that of any other provider or involve time for any procedures.     Ha Noriega MD  Critical Care Medicine  [facility] - Surgical Intensive Care

## 2022-01-13 NOTE — PLAN OF CARE
Problem: Adult Inpatient Plan of Care  Goal: Plan of Care Review  Outcome: Ongoing, Progressing  Goal: Patient-Specific Goal (Individualized)  Outcome: Ongoing, Progressing  Goal: Absence of Hospital-Acquired Illness or Injury  Outcome: Ongoing, Progressing  Goal: Optimal Comfort and Wellbeing  Outcome: Ongoing, Progressing  Goal: Readiness for Transition of Care  Outcome: Ongoing, Progressing     Problem: Infection  Goal: Absence of Infection Signs and Symptoms  Outcome: Ongoing, Progressing     Problem: Skin Injury Risk Increased  Goal: Skin Health and Integrity  Outcome: Ongoing, Progressing     Problem: Diabetes Comorbidity  Goal: Blood Glucose Level Within Targeted Range  Outcome: Ongoing, Progressing     Problem: Fall Injury Risk  Goal: Absence of Fall and Fall-Related Injury  Outcome: Ongoing, Progressing

## 2022-01-13 NOTE — NURSING TRANSFER
Nursing Transfer Note      1/13/2022     Reason patient is being transferred: Stepdown    Transfer To: RM [id number]    Transfer via wheelchair    Transfer with  to O2, cardiac monitoring    Transported by RN and PCT    Medicines sent: Detemir, Aspart, Mupirocin, Miralax    Chart send with patient: Yes    Upon arrival to floor: cardiac monitor applied, patient oriented to room, call bell in reach and bed in lowest position

## 2022-01-13 NOTE — PROGRESS NOTES
"[facility] - Int[facility] Care ([facility]-)  Adult Nutrition  Progress Note    SUMMARY   Recommendations  Continue diabetic 1500 calorie diet, recommend change boost plus TID to boost glucose BID, RD following  Goals: PO to meet 85% of assessed needs by next RD follow up  Nutrition Goal Status: new  Communication of RD Recs: reviewed with physician    Assessment and Plan    Excessive carbohydrate intake related to oral nutrition supplement intake as evidenced by diet order exceeds needs in presence of elevated A1c.    New    Plan  Commercial supplement( less carbohydrate and calories) boost glucose BID  Collaboration with other providers  Carbohydrate modified diet- 1500 calories       Malnutrition Assessment 1/13/22                 Orbital Region (Subcutaneous Fat Loss): well nourished  Upper Arm Region (Subcutaneous Fat Loss): well nourished  Thoracic and Lumbar Region: well nourished   [place] Region (Muscle Loss): mild depletion  Clavicle Bone Region (Muscle Loss): mild depletion  Clavicle and Acromion Bone Region (Muscle Loss): mild depletion  Dorsal Hand (Muscle Loss): mild depletion                 Reason for Assessment    Reason For Assessment: length of stay  Diagnosis: infection/sepsis (COVID 19 , Pneumonia)  Relevant Medical History: Anemia, hypothyroid, breast cancer, lung cancer, AFIB, HTN,  Interdisciplinary Rounds: did not attend  General Information Comments: no hx of diabetes with elevated A1c possibly due to dexamethasone, ISOLATION  Nutrition Discharge Planning: DC on diabetic diet,    Nutrition/Diet History    Patient Reported Diet/Restrictions/Preferences: general  Spiritual, Cultural Beliefs, Hindu Practices, Values that Affect Care: no  Food Allergies: NKFA  Factors Affecting Nutritional Intake: None identified at this time    Anthropometrics    Temp: 98.3 °F (36.8 °C)  Height Method: Stated  Height: 5' 6" (167.6 cm)  Height (inches): 66 in  Weight Method: Bed Scale  Weight: 85.7 kg (188 lb " 15 oz)  Weight (lb): 188.94 lb  Ideal Body Weight (IBW), Female: 130 lb  % Ideal Body Weight, Female (lb): 145.34 %  BMI (Calculated): 30.5  BMI Grade: 30 - 34.9- obesity - grade I  Usual Body Weight (UBW), k.4 kg  Weight Change Amount:  (wt loss over ten months, not significant with this BMI)  % Usual Body Weight: 91.95  % Weight Change From Usual Weight: -8.24 %       Lab/Procedures/Meds    Pertinent Labs Reviewed: reviewed  Pertinent Labs Comments: Hg 11.3, BUN 30, HgA1c 7.0, Hct 35.7,  Pertinent Medications Reviewed: reviewed  Pertinent Medications Comments: insulin, senna-docusate, levothyroxine, Vit D, pantoprazole, polyethylene glycol, MVI, dexamethansone,         Estimated/Assessed Needs    Weight Used For Calorie Calculations: 85.7 kg (188 lb 15 oz)  Energy Calorie Requirements (kcal): 1403  Energy Need Method: [place]-St Jeor (x 1.0(PAL) 2/2 obesity)  Protein Requirements: 71-76g  Weight Used For Protein Calculations: 59 kg (130 lb 1.1 oz) (IBW kg 2/2 obesity x 1.2-1.3 g/kg)  Fluid Requirements (mL): 1403 or per MD  Estimated Fluid Requirement Method: RDA Method  RDA Method (mL): 1403  CHO Requirement: 175g      Nutrition Prescription Ordered    Current Diet Order: 1500 diabetic diet  Nutrition Order Comments: PO %    Evaluation of Received Nutrient/Fluid Intake    I/O: +892 to date  Energy Calories Required: exceeds needs  Protein Required: meeting needs  Fluid Required: meeting needs  Comments: LBM   Tolerance: tolerating  % Intake of Estimated Energy Needs: 75 - 100 %  % Meal Intake: 75 - 100 %    Nutrition Risk    Level of Risk/Frequency of Follow-up: low (one time per week)     Monitor and Evaluation    Food and Nutrient Intake: energy intake,food and beverage intake  Food and Nutrient Adminstration: diet order  Anthropometric Measurements: weight change  Biochemical Data, Medical Tests and Procedures: electrolyte and renal panel,gastrointestinal profile,glucose/endocrine  profile  Nutrition-Focused Physical Findings: overall appearance     Nutrition Follow-Up    RD Follow-up?: Yes

## 2022-01-13 NOTE — PT/OT/SLP EVAL
Occupational Therapy   Evaluation    Name: Fauzia Kirk  MRN: [id number]  Admitting Diagnosis:  Pneumonia due to COVID-19 virus  Recent Surgery: * No surgery found *      Recommendations:     Discharge Recommendations: home  Discharge Equipment Recommendations: none  Barriers to discharge: None    Assessment:     Fauzia Kirk is a 66 y.o. female with a medical diagnosis of Pneumonia due to COVID-19 virus. She presents with the following performance deficits affecting function: impaired endurance,impaired self care skills,impaired functional mobilty,gait instability,impaired cardiopulmonary response to activity. Patient agreeable to therapy evaluation. Patient declined needing to perform ADLs at this time; therefore, will follow up during hospital stay to ensure patient able to perform ADLs without difficulty as well as to address strengthening and endurance. It is anticipated that patient will have no therapy needs after discharge.    Rehab Prognosis: Good; patient would benefit from acute skilled OT services to address these deficits and reach maximum level of function.       Plan:     Patient to be seen 2 x/week to address the above listed problems via self-care/home management,therapeutic activities,therapeutic exercises  · Plan of Care Expires: 02/12/22  · Plan of Care Reviewed with: patient     Subjective     Chief Complaint: none stated    Occupational Profile:  Living Environment: Patient lives with  in 2 story home with bedroom and bathroom (tub/shower combo with shower chair and grab bar) on the 2nd floor.  Previous level of function: Independent  Roles and Routines: Drives; does not work  Equipment Used at Home:  shower chair,grab bar  Assistance upon Discharge:  can assist    Pain/Comfort:  · Pain Rating 1: 0/10    Patients cultural, spiritual, Congregation conflicts given the current situation: no    Objective:     Communicated with: RN prior to session.  Patient found HOB  elevated with telemetry,oxygen,pulse ox (continuous),blood pressure cuff upon OT entry to room.    General Precautions: Standard, airborne,contact,droplet,fall   Orthopedic Precautions:N/A   Braces: N/A  Respiratory Status: Nasal cannula, flow 4 L/min    Occupational Performance:    Bed Mobility:    · Patient completed Supine to Sit with supervision with HOB elevated  · Patient completed anterior scooting while sitting EOB to place B feet on floor with supervision    Functional Mobility/Transfers:  · Patient completed Sit <> Stand Transfer with stand by assistance with  no AD  · Functional Mobility: Patient ambulated within room with CGA with no AD    Balance:   Sitting: supervision-SBA   Standing: CGA    Activities of Daily Living:  · Lower Body Dressing: independence donning socks sitting EOB    Cognitive/Visual Perceptual:  Cognitive/Psychosocial Skills:    -       Oriented to: Person, Place, Time and Situation   -       Follows Commands/attention: Follows multistep commands  -       Communication: clear/fluent  -       Memory: No Deficits noted  -       Safety awareness/insight to disability: intact   -       Mood/Affect/Coping skills/emotional control: Appropriate to situation, Cooperative and Pleasant    Physical Exam:  Upper Extremity Range of Motion:     -       Right Upper Extremity: WFL  -       Left Upper Extremity: WFL  Upper Extremity Strength:    -       Right Upper Extremity: WFL  -       Left Upper Extremity: WFL    AMPAC 6 Click ADL:  AMPAC Total Score: 22    Treatment & Education:   Therapist provided facilitation and instruction of proper body mechanics and fall prevention strategies during tasks listed above.   Instructed patient to sit in bedside chair daily to increase OOB/activity tolerance.   Instructed patient to use call light to have nursing staff assist with needs/transfers.   Discussed OT POC and answered all questions within OT scope of practice.   Whiteboard updated    Education:    Patient left in wheelchair with all lines intact, call button in reach and RN notified    GOALS:   Multidisciplinary Problems     Occupational Therapy Goals        Problem: Occupational Therapy Goal    Goal Priority Disciplines Outcome Interventions   Occupational Therapy Goal     OT, PT/OT Ongoing, Progressing    Description: Goals to be met by: 2022     Patient will increase functional independence with ADLs by performing:    Grooming while standing with [place].  Toileting from toilet with [place] for hygiene and clothing management.   Step transfer with [place]  Toilet transfer to toilet with [place].                     History:     Past Medical History:   Diagnosis Date    Arthritis     Breast cancer     Hypertension        Past Surgical History:   Procedure Laterality Date    BREAST LUMPECTOMY       SECTION, CLASSIC      LIPOMA RESECTION      MASTECTOMY         Time Tracking:     OT Date of Treatment: 22  OT Start Time: 930  OT Stop Time: 943  OT Total Time (min): 13 min    Billable Minutes:Evaluation 13    2022

## 2022-01-13 NOTE — HPI
66 year old female with history of breast and lung cancer sp treatment, HTN, OA, hypothyroidism, DVT (11/30/21 on xarelto) presented to [facility] for COVID. She was initially admitted to MICU for high oxygen requirements, however was quickly weaned to 6 liters NC with a proper oxygen saturation wave form. She is not vaccinated. Treated with Rudi/Dex and remdes. Additionally developed afib with RVR on 1/8 for which she was stared on metoprolol.     Stepped down to hospital medicine on 1/13/21 on 4 liters of oxygen.

## 2022-01-13 NOTE — SUBJECTIVE & OBJECTIVE
Interval History/Significant Events: on 6L HFNC overnight, did well. Will increase insulin today given elevated sugars.    Review of Systems   Constitutional: Positive for fatigue. Negative for fever.   HENT: Negative for congestion and rhinorrhea.    Respiratory: Positive for cough. Negative for shortness of breath and wheezing.    Cardiovascular: Negative for chest pain and palpitations.   Gastrointestinal: Negative for abdominal pain and vomiting.   Genitourinary: Negative for difficulty urinating and dysuria.   Musculoskeletal: Negative for joint swelling and myalgias.   Skin: Negative for rash and wound.   Neurological: Negative for weakness and headaches.     Objective:     Vital Signs (Most Recent):  Temp: 97.6 °F (36.4 °C) (01/12/22 0800)  Pulse: 82 (01/12/22 1600)  Resp: (!) 37 (01/12/22 1600)  BP: (!) 162/86 (01/12/22 1600)  SpO2: (!) 91 % (01/12/22 1600) Vital Signs (24h Range):  Temp:  [97.6 °F (36.4 °C)-98 °F (36.7 °C)] 97.6 °F (36.4 °C)  Pulse:  [] 82  Resp:  [5-45] 37  SpO2:  [85 %-100 %] 91 %  BP: (114-184)/() 162/86   Weight: 85.7 kg (189 lb)  Body mass index is 30.51 kg/m².      Intake/Output Summary (Last 24 hours) at 1/12/2022 1839  Last data filed at 1/11/2022 1900  Gross per 24 hour   Intake 100 ml   Output --   Net 100 ml       Physical Exam  Vitals and nursing note reviewed.   Constitutional:       General: She is not in acute distress.     Comments: Patient sitting up in chair watching tv, appears comfortable.    HENT:      Head: Normocephalic and atraumatic.      Nose: No congestion or rhinorrhea.      Mouth/Throat:      Pharynx: Oropharynx is clear.   Eyes:      Extraocular Movements: Extraocular movements intact.      Conjunctiva/sclera: Conjunctivae normal.   Cardiovascular:      Rate and Rhythm: Normal rate. Rhythm irregular.   Pulmonary:      Breath sounds: Rales present. No wheezing.      Comments: On HF 70% 50L  Abdominal:      General: There is no distension.       Palpations: Abdomen is soft.      Tenderness: There is no abdominal tenderness.   Musculoskeletal:         General: No swelling. Normal range of motion.      Right lower leg: No edema.      Left lower leg: No edema.   Skin:     General: Skin is warm and dry.   Neurological:      General: No focal deficit present.      Mental Status: She is alert and oriented to person, place, and time.      Vents:  Oxygen Concentration (%): 50 (01/11/22 1154)  Lines/Drains/Airways     Central Venous Catheter Line            Port A Cath Single Lumen 07/26/17 1130 left subclavian 1631 days          Peripheral Intravenous Line                 Peripheral IV - Single Lumen 01/12/22 0300 22 G Anterior;Right Forearm <1 day              Significant Labs:    CBC/Anemia Profile:  Recent Labs   Lab 01/11/22 0445   WBC 7.55   HGB 10.5*   HCT 33.4*      MCV 87   RDW 14.6*        Chemistries:  Recent Labs   Lab 01/11/22  0445 01/12/22  0314 01/12/22  0533     --   --    K 4.7 2.8* 5.4*     --   --    CO2 21*  --   --    BUN 28*  --   --    CREATININE 0.7  --   --    CALCIUM 8.2*  --   --    ALBUMIN 2.2*  --   --    PROT 5.6*  --   --    BILITOT 0.4  --   --    ALKPHOS 59  --   --    ALT 24  --   --    AST 19  --   --    MG 2.0  --   --    PHOS 4.3  --   --        All pertinent labs within the past 24 hours have been reviewed.    Significant Imaging:  I have reviewed all pertinent imaging results/findings within the past 24 hours.

## 2022-01-13 NOTE — PLAN OF CARE
OT curly completed and POC established 1/13/2022    Problem: Occupational Therapy Goal  Goal: Occupational Therapy Goal  Description: Goals to be met by: 1/27/2022     Patient will increase functional independence with ADLs by performing:    Grooming while standing with [place].  Toileting from toilet with [place] for hygiene and clothing management.   Step transfer with [place]  Toilet transfer to toilet with [place].    Outcome: Ongoing, Progressing

## 2022-01-13 NOTE — HOSPITAL COURSE
66 F with HTN, OA, DVT here for COVID on dex, remdes and amandeep after stepdown from hospital medicine. New dx of afib on metoprolol. Currently on 3 liters NC. Pending 6 minute walk test, discharge with o2 and continued dex.  Dex end date 1/16  Will need to start metformin as her A1c is 7 and needs repeat TSH/T4

## 2022-01-13 NOTE — PROGRESS NOTES
[facility] - Intensive Care ([address] Medicine  Progress Note    Patient Name: Fauzia Kirk  MRN: [id number]  Patient Class: IP- Inpatient   Admission Date: 1/6/2022  Length of Stay: 7 days  Attending Physician: Bryon Mcgill MD  Primary Care Provider: Era Urena MD        Subjective:     Principal Problem:Pneumonia due to COVID-19 virus        HPI:  66 year old female with history of breast and lung cancer sp treatment, HTN, OA, hypothyroidism, DVT (11/30/21 on xarelto) presented to [facility] for COVID. She was initially admitted to MICU for high oxygen requirements, however was quickly weaned to 6 liters NC with a proper oxygen saturation wave form. She is not vaccinated. Treated with Rudi/Dex and remdes. Additionally developed afib with RVR on 1/8 for which she was stared on metoprolol.     Stepped down to hospital medicine on 1/13/21 on 4 liters of oxygen.       Overview/Hospital Course:  66 F with HTN, OA, DVT here for COVID on dex, remdes and rudi after stepdown from hospital medicine. New dx of afib on metoprolol. Currently on 4 liters NC.   Dex end date 1/16  Rudi end date 1/23       No new subjective & objective note has been filed under this hospital service since the last note was generated.      Assessment/Plan:      * Pneumonia due to COVID-19 virus  Patient is identified as Severe COVID-19 based on hypoxemia with O2 saturations <94% on room air or on ambulation   Initiate standard COVID protocols; COVID-19 testing ,Infection Control notification  and isolation- respiratory, contact and droplet per protocol    Diagnostics: (leukopenia, hyponatremia, hyperferritinemia, elevated troponin, elevated d-dimer, age, and comorbidities are significant predictors of poor clinical outcome)  CBC, CMP, Ferritin, CRP and Portable CXR    Management: Initiate targeted therapy with Dexamethasone PO/IV 6mg daily x10 days, Maintain oxygen saturations 92-96% via Nasal Cannula 4 LPM and monitor with  continuous/intermittent pulse oximetry.  and Inhaled bronchodilators as needed for shortness of breath.    Advance Care Planning  Current advance care plan has been discussed with patient/family/POA and patient currently wishes Full Code.     Dex end date 1/16  Rudi end date 1/23        Atrial fibrillation  New onset Afib during hospitalization. CHADVASC2 - 4    Rate control with metoprolol 25 bid  AC with Xarelto    Diabetes  Hemoglobin A1c 6.5  Apparently new diagnosis of diabetes    Diabetic diet  Detemir 8  Aspart 10  SSI        Lung nodule  Outpatient follow up      History of lung cancer  Hx of      History of DVT (deep vein thrombosis)  Diagnosed 11/30/21  On xarelto 20    Continue xarelto      Hyperthyroidism  TSH 10  FT4 0.071  Home regiment synthroid 100    Continue home synthroid      History of left breast cancer  Hx of       HTN (hypertension)  Home regiment norvasc 10    Continue home regiment      VTE Risk Mitigation (From admission, onward)         Ordered     rivaroxaban tablet 20 mg  With dinner         01/10/22 1241                Discharge Planning   DARRYN: 1/14/2022     Code Status: Full Code   Is the patient medically ready for discharge?: No    Reason for patient still in hospital (select all that apply): Patient trending condition  Discharge Plan A: Home with family                  Leonardo Alva MD  Department of Hospital Medicine   [facility] - Intensive Care ([place]-[facility])

## 2022-01-14 PROBLEM — E03.9 HYPOTHYROIDISM: Status: ACTIVE | Noted: 2017-12-11

## 2022-01-14 LAB
ALBUMIN SERPL BCP-MCNC: 2.5 G/DL (ref 3.5–5.2)
ALP SERPL-CCNC: 67 U/L (ref 55–135)
ALT SERPL W/O P-5'-P-CCNC: 24 U/L (ref 10–44)
ANION GAP SERPL CALC-SCNC: 8 MMOL/L (ref 8–16)
AST SERPL-CCNC: 18 U/L (ref 10–40)
BILIRUB SERPL-MCNC: 0.7 MG/DL (ref 0.1–1)
BUN SERPL-MCNC: 28 MG/DL (ref 8–23)
CALCIUM SERPL-MCNC: 9.1 MG/DL (ref 8.7–10.5)
CHLORIDE SERPL-SCNC: 106 MMOL/L (ref 95–110)
CO2 SERPL-SCNC: 24 MMOL/L (ref 23–29)
CREAT SERPL-MCNC: 0.8 MG/DL (ref 0.5–1.4)
CRP SERPL-MCNC: 22.9 MG/L (ref 0–8.2)
EST. GFR  (AFRICAN AMERICAN): >60 ML/MIN/1.73 M^2
EST. GFR  (NON AFRICAN AMERICAN): >60 ML/MIN/1.73 M^2
GLUCOSE SERPL-MCNC: 98 MG/DL (ref 70–110)
POCT GLUCOSE: 123 MG/DL (ref 70–110)
POCT GLUCOSE: 181 MG/DL (ref 70–110)
POCT GLUCOSE: 200 MG/DL (ref 70–110)
POCT GLUCOSE: 234 MG/DL (ref 70–110)
POTASSIUM SERPL-SCNC: 4.3 MMOL/L (ref 3.5–5.1)
PROT SERPL-MCNC: 6.9 G/DL (ref 6–8.4)
SODIUM SERPL-SCNC: 138 MMOL/L (ref 136–145)

## 2022-01-14 PROCEDURE — 63600175 PHARM REV CODE 636 W HCPCS: Performed by: STUDENT IN AN ORGANIZED HEALTH CARE EDUCATION/TRAINING PROGRAM

## 2022-01-14 PROCEDURE — 63600175 PHARM REV CODE 636 W HCPCS: Performed by: INTERNAL MEDICINE

## 2022-01-14 PROCEDURE — C9399 UNCLASSIFIED DRUGS OR BIOLOG: HCPCS | Performed by: STUDENT IN AN ORGANIZED HEALTH CARE EDUCATION/TRAINING PROGRAM

## 2022-01-14 PROCEDURE — 25000003 PHARM REV CODE 250: Performed by: NURSE PRACTITIONER

## 2022-01-14 PROCEDURE — 36415 COLL VENOUS BLD VENIPUNCTURE: CPT | Performed by: STUDENT IN AN ORGANIZED HEALTH CARE EDUCATION/TRAINING PROGRAM

## 2022-01-14 PROCEDURE — 99232 PR SUBSEQUENT HOSPITAL CARE,LEVL II: ICD-10-PCS | Mod: ,,, | Performed by: STUDENT IN AN ORGANIZED HEALTH CARE EDUCATION/TRAINING PROGRAM

## 2022-01-14 PROCEDURE — 80053 COMPREHEN METABOLIC PANEL: CPT | Performed by: STUDENT IN AN ORGANIZED HEALTH CARE EDUCATION/TRAINING PROGRAM

## 2022-01-14 PROCEDURE — 12000002 HC ACUTE/MED SURGE SEMI-PRIVATE ROOM

## 2022-01-14 PROCEDURE — 86140 C-REACTIVE PROTEIN: CPT | Performed by: STUDENT IN AN ORGANIZED HEALTH CARE EDUCATION/TRAINING PROGRAM

## 2022-01-14 PROCEDURE — 99232 SBSQ HOSP IP/OBS MODERATE 35: CPT | Mod: ,,, | Performed by: STUDENT IN AN ORGANIZED HEALTH CARE EDUCATION/TRAINING PROGRAM

## 2022-01-14 PROCEDURE — 25000003 PHARM REV CODE 250: Performed by: INTERNAL MEDICINE

## 2022-01-14 PROCEDURE — 25000003 PHARM REV CODE 250: Performed by: STUDENT IN AN ORGANIZED HEALTH CARE EDUCATION/TRAINING PROGRAM

## 2022-01-14 PROCEDURE — 27000207 HC ISOLATION

## 2022-01-14 PROCEDURE — 25000003 PHARM REV CODE 250

## 2022-01-14 RX ORDER — DEXAMETHASONE 6 MG/1
6 TABLET ORAL DAILY
Qty: 3 TABLET | Refills: 0 | Status: SHIPPED | OUTPATIENT
Start: 2022-01-15 | End: 2022-01-14

## 2022-01-14 RX ORDER — DEXAMETHASONE 6 MG/1
6 TABLET ORAL DAILY
Qty: 3 TABLET | Refills: 0 | Status: SHIPPED | OUTPATIENT
Start: 2022-01-15 | End: 2022-01-18

## 2022-01-14 RX ORDER — METOPROLOL TARTRATE 25 MG/1
25 TABLET, FILM COATED ORAL 2 TIMES DAILY
Qty: 60 TABLET | Refills: 11 | Status: SHIPPED | OUTPATIENT
Start: 2022-01-14 | End: 2022-12-29 | Stop reason: SDUPTHER

## 2022-01-14 RX ADMIN — INSULIN DETEMIR 8 UNITS: 100 INJECTION, SOLUTION SUBCUTANEOUS at 09:01

## 2022-01-14 RX ADMIN — INSULIN ASPART 2 UNITS: 100 INJECTION, SOLUTION INTRAVENOUS; SUBCUTANEOUS at 04:01

## 2022-01-14 RX ADMIN — RIVAROXABAN 20 MG: 10 TABLET, FILM COATED ORAL at 04:01

## 2022-01-14 RX ADMIN — INSULIN ASPART 10 UNITS: 100 INJECTION, SOLUTION INTRAVENOUS; SUBCUTANEOUS at 04:01

## 2022-01-14 RX ADMIN — BARICITINIB 4 MG: 2 TABLET, FILM COATED ORAL at 09:01

## 2022-01-14 RX ADMIN — METOPROLOL TARTRATE 25 MG: 25 TABLET, FILM COATED ORAL at 08:01

## 2022-01-14 RX ADMIN — INSULIN ASPART 10 UNITS: 100 INJECTION, SOLUTION INTRAVENOUS; SUBCUTANEOUS at 11:01

## 2022-01-14 RX ADMIN — Medication 1000 UNITS: at 09:01

## 2022-01-14 RX ADMIN — DEXAMETHASONE 6 MG: 1.5 TABLET ORAL at 09:01

## 2022-01-14 RX ADMIN — INSULIN ASPART 4 UNITS: 100 INJECTION, SOLUTION INTRAVENOUS; SUBCUTANEOUS at 11:01

## 2022-01-14 RX ADMIN — AMLODIPINE BESYLATE 10 MG: 10 TABLET ORAL at 09:01

## 2022-01-14 RX ADMIN — POLYETHYLENE GLYCOL 3350 17 G: 17 POWDER, FOR SOLUTION ORAL at 09:01

## 2022-01-14 RX ADMIN — LEVOTHYROXINE SODIUM 100 MCG: 100 TABLET ORAL at 05:01

## 2022-01-14 RX ADMIN — THERA TABS 1 TABLET: TAB at 09:01

## 2022-01-14 RX ADMIN — INSULIN ASPART 10 UNITS: 100 INJECTION, SOLUTION INTRAVENOUS; SUBCUTANEOUS at 07:01

## 2022-01-14 RX ADMIN — DOCUSATE SODIUM 50 MG AND SENNOSIDES 8.6 MG 2 TABLET: 8.6; 5 TABLET, FILM COATED ORAL at 09:01

## 2022-01-14 RX ADMIN — PANTOPRAZOLE SODIUM 40 MG: 40 TABLET, DELAYED RELEASE ORAL at 09:01

## 2022-01-14 RX ADMIN — METOPROLOL TARTRATE 25 MG: 25 TABLET, FILM COATED ORAL at 09:01

## 2022-01-14 NOTE — NURSING
Home Oxygen Evaluation    Date Performed: 2022    1) Patient's Home O2 Sat on room air, while at rest: 92%        If O2 sats on room air at rest are 88% or below, patient qualifies. No additional testing needed. Document N/A in steps 2 and 3. If 89% or above, complete steps 2.      2) Patient's O2 Sat on room air while exercisin%        If O2 sats on room air while exercising remain 89% or above patient does not qualify, no further testing needed Document N/A in step 3. If O2 sats on room air while exercising are 88% or below, continue to step 3.      3) Patient's O2 Sat while exercising on O2: 91% at 2 LPM         (Must show improvement from #2 for patients to qualify)    If O2 sats improve on oxygen, patient qualifies for portable oxygen. If not, the patient does not qualify.

## 2022-01-14 NOTE — PLAN OF CARE
Continue diabetic 1500 calorie diet, recommend change boost plus TID to boost glucose BID, RD following  Goals: PO to meet 85% of assessed needs by next RD follow up  Nutrition Goal Status: new  Communication of RD Recs: reviewed with physician

## 2022-01-14 NOTE — SUBJECTIVE & OBJECTIVE
Interval History: no acute events overnight, continuing to wean O2  6 minute walk test    Review of Systems   Constitutional: Positive for fatigue. Negative for fever.   HENT: Negative for congestion and rhinorrhea.    Respiratory: Positive for cough. Negative for shortness of breath and wheezing.    Cardiovascular: Negative for chest pain and palpitations.   Gastrointestinal: Negative for abdominal pain and vomiting.   Genitourinary: Negative for difficulty urinating and dysuria.   Musculoskeletal: Negative for joint swelling and myalgias.   Skin: Negative for rash and wound.   Neurological: Negative for weakness and headaches.     Objective:     Vital Signs (Most Recent):  Temp: 98.2 °F (36.8 °C) (01/14/22 0743)  Pulse: 87 (01/14/22 1107)  Resp: 20 (01/14/22 0743)  BP: (!) 147/81 (01/14/22 0743)  SpO2: (!) 91 % (01/14/22 0802) Vital Signs (24h Range):  Temp:  [98.1 °F (36.7 °C)-98.7 °F (37.1 °C)] 98.2 °F (36.8 °C)  Pulse:  [76-93] 87  Resp:  [18-20] 20  SpO2:  [91 %-99 %] 91 %  BP: (105-147)/(55-81) 147/81     Weight: 85.7 kg (188 lb 15 oz)  Body mass index is 30.49 kg/m².    Intake/Output Summary (Last 24 hours) at 1/14/2022 1115  Last data filed at 1/14/2022 0747  Gross per 24 hour   Intake 240 ml   Output 200 ml   Net 40 ml      Physical Exam  Vitals and nursing note reviewed.   Constitutional:       General: She is not in acute distress.     Comments: Patient sitting up in chair watching tv, appears comfortable.    HENT:      Head: Normocephalic and atraumatic.      Nose: No congestion or rhinorrhea.      Mouth/Throat:      Pharynx: Oropharynx is clear.   Eyes:      Extraocular Movements: Extraocular movements intact.      Conjunctiva/sclera: Conjunctivae normal.   Cardiovascular:      Rate and Rhythm: Normal rate. Rhythm irregular.   Pulmonary:      Breath sounds: Rales present. No wheezing.      Comments: On HF 70% 50L  Abdominal:      General: There is no distension.      Palpations: Abdomen is soft.       Tenderness: There is no abdominal tenderness.   Musculoskeletal:         General: No swelling. Normal range of motion.      Right lower leg: No edema.      Left lower leg: No edema.   Skin:     General: Skin is warm and dry.   Neurological:      General: No focal deficit present.      Mental Status: She is alert and oriented to person, place, and time.         Significant Labs: All pertinent labs within the past 24 hours have been reviewed.    Significant Imaging: I have reviewed all pertinent imaging results/findings within the past 24 hours.

## 2022-01-14 NOTE — PLAN OF CARE
Patient resting quietly in bed when CM rounded. No family present. Patient was admitted with pneumonia due to covid & is being followed by PT/OT and nutrition.    Patient lives with her spouse, Garth Recio ([phone]), has equipment to assist with ADLs, & denied the need for assistance with transportation at time of discharge.     Patient requesting assistance getting established with a new PCP. Appointment to establish care with a new PCP scheduled with Dr. Anton Watson (new PCP) on 1/28/2022 at 1330.    Previously scheduled appointment with Dr. Wyatt Mai (hem/onc) on 1/24/2022 at 1015 noted.      Will continue to follow.

## 2022-01-14 NOTE — ASSESSMENT & PLAN NOTE
TSH 10  FT4 0.071  Home regiment synthroid 100    Continue home synthroid  Will need outpatient titration of synthroid to normal TSH.

## 2022-01-14 NOTE — NURSING
Pt. BELKISO x 4 has been calm and cooperative this shift. No issues no complaints  Bed in lowest position, wheels locked,  side rails x 2 call light and personal belongings placed within reach. No further interventions needed at this time.

## 2022-01-14 NOTE — PROGRESS NOTES
[facility] ([address] Medicine  Progress Note    Patient Name: Fauzia Kirk  MRN: [id number]  Patient Class: IP- Inpatient   Admission Date: 1/6/2022  Length of Stay: 8 days  Attending Physician: Bryon Mcgill MD  Primary Care Provider: Era Urena MD        Subjective:     Principal Problem:Pneumonia due to COVID-19 virus        HPI:  66 year old female with history of breast and lung cancer sp treatment, HTN, OA, hypothyroidism, DVT (11/30/21 on xarelto) [place] [facility] [place]. She was initially admitted to MICU for high oxygen requirements, however was quickly weaned to 6 liters NC with a proper oxygen saturation wave form. She is not vaccinated. Treated with Rudi/Dex and remdes. Additionally developed afib with RVR on 1/8 for which she was stared on metoprolol.     Stepped down to hospital medicine on 1/13/21 on 4 liters of oxygen.       Overview/Hospital Course:  66 F with HTN, OA, DVT here for COVID on dex, remdes and rudi after stepdown from hospital medicine. New dx of afib on metoprolol. Currently on 3 liters NC. Pending 6 minute walk test, possibly discharge with o2 and continued dex  Dex end date 1/16  Rudi end date 1/23       Interval History: no acute events overnight, continuing to wean O2  6 minute walk test    Review of Systems   Constitutional: Positive for fatigue. Negative for fever.   HENT: Negative for congestion and rhinorrhea.    Respiratory: Positive for cough. Negative for shortness of breath and wheezing.    Cardiovascular: Negative for chest pain and palpitations.   Gastrointestinal: Negative for abdominal pain and vomiting.   Genitourinary: Negative for difficulty urinating and dysuria.   Musculoskeletal: Negative for joint swelling and myalgias.   Skin: Negative for rash and wound.   Neurological: Negative for weakness and headaches.     Objective:     Vital Signs (Most Recent):  Temp: 98.2 °F (36.8 °C) (01/14/22 0743)  Pulse: 87  (01/14/22 1107)  Resp: 20 (01/14/22 0743)  BP: (!) 147/81 (01/14/22 0743)  SpO2: (!) 91 % (01/14/22 0802) Vital Signs (24h Range):  Temp:  [98.1 °F (36.7 °C)-98.7 °F (37.1 °C)] 98.2 °F (36.8 °C)  Pulse:  [76-93] 87  Resp:  [18-20] 20  SpO2:  [91 %-99 %] 91 %  BP: (105-147)/(55-81) 147/81     Weight: 85.7 kg (188 lb 15 oz)  Body mass index is 30.49 kg/m².    Intake/Output Summary (Last 24 hours) at 1/14/2022 1115  Last data filed at 1/14/2022 0747  Gross per 24 hour   Intake 240 ml   Output 200 ml   Net 40 ml      Physical Exam  Vitals and nursing note reviewed.   Constitutional:       General: She is not in acute distress.     Comments: Patient sitting up in chair watching tv, appears comfortable.    HENT:      Head: Normocephalic and atraumatic.      Nose: No congestion or rhinorrhea.      Mouth/Throat:      Pharynx: Oropharynx is clear.   Eyes:      Extraocular Movements: Extraocular movements intact.      Conjunctiva/sclera: Conjunctivae normal.   Cardiovascular:      Rate and Rhythm: Normal rate. Rhythm irregular.   Pulmonary:      Breath sounds: Rales present. No wheezing.      Comments: On HF 70% 50L  Abdominal:      General: There is no distension.      Palpations: Abdomen is soft.      Tenderness: There is no abdominal tenderness.   Musculoskeletal:         General: No swelling. Normal range of motion.      Right lower leg: No edema.      Left lower leg: No edema.   Skin:     General: Skin is warm and dry.   Neurological:      General: No focal deficit present.      Mental Status: She is alert and oriented to person, place, and time.         Significant Labs: All pertinent labs within the past 24 hours have been reviewed.    Significant Imaging: I have reviewed all pertinent imaging results/findings within the past 24 hours.      Assessment/Plan:      * Pneumonia due to COVID-19 virus  Patient is identified as Severe COVID-19 based on hypoxemia with O2 saturations <94% on room air or on ambulation   Initiate  standard COVID protocols; COVID-19 testing ,Infection Control notification  and isolation- respiratory, contact and droplet per protocol    Diagnostics: (leukopenia, hyponatremia, hyperferritinemia, elevated troponin, elevated d-dimer, age, and comorbidities are significant predictors of poor clinical outcome)  CBC, CMP, Ferritin, CRP and Portable CXR    Management: Initiate targeted therapy with Dexamethasone PO/IV 6mg daily x10 days, Maintain oxygen saturations 92-96% via Nasal Cannula 3 LPM and monitor with continuous/intermittent pulse oximetry.  and Inhaled bronchodilators as needed for shortness of breath.    Advance Care Planning  Current advance care plan has been discussed with patient/family/POA and patient currently wishes Full Code.     Dex end date 1/16  6 minute walk test  Rudi end date 1/23        Atrial fibrillation  New onset Afib during hospitalization. CHADVASC2 - 4    Rate control with metoprolol 25 bid  AC with Xarelto    Diabetes  Hemoglobin A1c 6.5  Apparently new diagnosis of diabetes    Diabetic diet  Detemir 8  Aspart 10  SSI        Lung nodule  Outpatient follow up      History of lung cancer  Hx of      History of DVT (deep vein thrombosis)  Diagnosed 11/30/21  On xarelto 20    Continue xarelto      Hypothyroidism  TSH 10  FT4 0.071  Home regiment synthroid 100    Continue home synthroid  Will need outpatient titration of synthroid to normal TSH.      History of left breast cancer  Hx of       HTN (hypertension)  Home regiment norvasc 10    Continue home regiment      VTE Risk Mitigation (From admission, onward)         Ordered     rivaroxaban tablet 20 mg  With dinner         01/10/22 1241                Discharge Planning   DARRYN: 1/14/2022     Code Status: Full Code   Is the patient medically ready for discharge?: No    Reason for patient still in hospital (select all that apply): Patient trending condition  Discharge Plan A: Home with family                  Leonardo Alva MD  Department  of [facility] Medicine   [facility] ([place]

## 2022-01-14 NOTE — ASSESSMENT & PLAN NOTE
Patient is identified as Severe COVID-19 based on hypoxemia with O2 saturations <94% on room air or on ambulation   Initiate standard COVID protocols; COVID-19 testing ,Infection Control notification  and isolation- respiratory, contact and droplet per protocol    Diagnostics: (leukopenia, hyponatremia, hyperferritinemia, elevated troponin, elevated d-dimer, age, and comorbidities are significant predictors of poor clinical outcome)  CBC, CMP, Ferritin, CRP and Portable CXR    Management: Initiate targeted therapy with Dexamethasone PO/IV 6mg daily x10 days, Maintain oxygen saturations 92-96% via Nasal Cannula 3 LPM and monitor with continuous/intermittent pulse oximetry.  and Inhaled bronchodilators as needed for shortness of breath.    Advance Care Planning  Current advance care plan has been discussed with patient/family/POA and patient currently wishes Full Code.      Dex end date 1/16  6 minute walk test  Rudi end date 1/23

## 2022-01-15 VITALS
DIASTOLIC BLOOD PRESSURE: 70 MMHG | TEMPERATURE: 99 F | WEIGHT: 188.94 LBS | SYSTOLIC BLOOD PRESSURE: 146 MMHG | RESPIRATION RATE: 18 BRPM | BODY MASS INDEX: 30.36 KG/M2 | HEART RATE: 89 BPM | OXYGEN SATURATION: 95 % | HEIGHT: 66 IN

## 2022-01-15 LAB
ALBUMIN SERPL BCP-MCNC: 2.4 G/DL (ref 3.5–5.2)
ALP SERPL-CCNC: 62 U/L (ref 55–135)
ALT SERPL W/O P-5'-P-CCNC: 24 U/L (ref 10–44)
ANION GAP SERPL CALC-SCNC: 8 MMOL/L (ref 8–16)
AST SERPL-CCNC: 18 U/L (ref 10–40)
BILIRUB SERPL-MCNC: 0.4 MG/DL (ref 0.1–1)
BUN SERPL-MCNC: 34 MG/DL (ref 8–23)
CALCIUM SERPL-MCNC: 8.9 MG/DL (ref 8.7–10.5)
CHLORIDE SERPL-SCNC: 104 MMOL/L (ref 95–110)
CO2 SERPL-SCNC: 22 MMOL/L (ref 23–29)
CREAT SERPL-MCNC: 0.8 MG/DL (ref 0.5–1.4)
EST. GFR  (AFRICAN AMERICAN): >60 ML/MIN/1.73 M^2
EST. GFR  (NON AFRICAN AMERICAN): >60 ML/MIN/1.73 M^2
GLUCOSE SERPL-MCNC: 143 MG/DL (ref 70–110)
POCT GLUCOSE: 120 MG/DL (ref 70–110)
POCT GLUCOSE: 152 MG/DL (ref 70–110)
POTASSIUM SERPL-SCNC: 4.5 MMOL/L (ref 3.5–5.1)
PROT SERPL-MCNC: 6.7 G/DL (ref 6–8.4)
SODIUM SERPL-SCNC: 134 MMOL/L (ref 136–145)

## 2022-01-15 PROCEDURE — 63600175 PHARM REV CODE 636 W HCPCS: Performed by: INTERNAL MEDICINE

## 2022-01-15 PROCEDURE — 36415 COLL VENOUS BLD VENIPUNCTURE: CPT | Performed by: STUDENT IN AN ORGANIZED HEALTH CARE EDUCATION/TRAINING PROGRAM

## 2022-01-15 PROCEDURE — 80053 COMPREHEN METABOLIC PANEL: CPT | Performed by: STUDENT IN AN ORGANIZED HEALTH CARE EDUCATION/TRAINING PROGRAM

## 2022-01-15 PROCEDURE — 94761 N-INVAS EAR/PLS OXIMETRY MLT: CPT

## 2022-01-15 PROCEDURE — 0012A HC IMMUNIZ ADMIN, SARS-COV-2 COVID-19 VACC, 100MCG/0.5ML, 2ND DOSE: CPT | Performed by: STUDENT IN AN ORGANIZED HEALTH CARE EDUCATION/TRAINING PROGRAM

## 2022-01-15 PROCEDURE — 25000003 PHARM REV CODE 250

## 2022-01-15 PROCEDURE — 27000221 HC OXYGEN, UP TO 24 HOURS

## 2022-01-15 PROCEDURE — C9399 UNCLASSIFIED DRUGS OR BIOLOG: HCPCS | Performed by: STUDENT IN AN ORGANIZED HEALTH CARE EDUCATION/TRAINING PROGRAM

## 2022-01-15 PROCEDURE — 91301 PHARM REV CODE 636 W HCPCS: CPT | Performed by: STUDENT IN AN ORGANIZED HEALTH CARE EDUCATION/TRAINING PROGRAM

## 2022-01-15 PROCEDURE — 25000003 PHARM REV CODE 250: Performed by: NURSE PRACTITIONER

## 2022-01-15 PROCEDURE — 25000003 PHARM REV CODE 250: Performed by: INTERNAL MEDICINE

## 2022-01-15 PROCEDURE — 99900035 HC TECH TIME PER 15 MIN (STAT)

## 2022-01-15 PROCEDURE — 25000003 PHARM REV CODE 250: Performed by: STUDENT IN AN ORGANIZED HEALTH CARE EDUCATION/TRAINING PROGRAM

## 2022-01-15 PROCEDURE — 63600175 PHARM REV CODE 636 W HCPCS: Performed by: STUDENT IN AN ORGANIZED HEALTH CARE EDUCATION/TRAINING PROGRAM

## 2022-01-15 RX ADMIN — LEVOTHYROXINE SODIUM 100 MCG: 100 TABLET ORAL at 05:01

## 2022-01-15 RX ADMIN — AMLODIPINE BESYLATE 10 MG: 10 TABLET ORAL at 09:01

## 2022-01-15 RX ADMIN — DEXAMETHASONE 6 MG: 1.5 TABLET ORAL at 09:01

## 2022-01-15 RX ADMIN — PANTOPRAZOLE SODIUM 40 MG: 40 TABLET, DELAYED RELEASE ORAL at 09:01

## 2022-01-15 RX ADMIN — CX-024414 0.5 ML: 0.2 INJECTION, SUSPENSION INTRAMUSCULAR at 06:01

## 2022-01-15 RX ADMIN — Medication 1000 UNITS: at 09:01

## 2022-01-15 RX ADMIN — DOCUSATE SODIUM 50 MG AND SENNOSIDES 8.6 MG 2 TABLET: 8.6; 5 TABLET, FILM COATED ORAL at 09:01

## 2022-01-15 RX ADMIN — BARICITINIB 4 MG: 2 TABLET, FILM COATED ORAL at 09:01

## 2022-01-15 RX ADMIN — THERA TABS 1 TABLET: TAB at 09:01

## 2022-01-15 RX ADMIN — POLYETHYLENE GLYCOL 3350 17 G: 17 POWDER, FOR SOLUTION ORAL at 09:01

## 2022-01-15 RX ADMIN — INSULIN ASPART 10 UNITS: 100 INJECTION, SOLUTION INTRAVENOUS; SUBCUTANEOUS at 09:01

## 2022-01-15 RX ADMIN — INSULIN ASPART 2 UNITS: 100 INJECTION, SOLUTION INTRAVENOUS; SUBCUTANEOUS at 12:01

## 2022-01-15 RX ADMIN — METOPROLOL TARTRATE 25 MG: 25 TABLET, FILM COATED ORAL at 09:01

## 2022-01-15 RX ADMIN — INSULIN DETEMIR 8 UNITS: 100 INJECTION, SOLUTION SUBCUTANEOUS at 09:01

## 2022-01-15 RX ADMIN — INSULIN ASPART 10 UNITS: 100 INJECTION, SOLUTION INTRAVENOUS; SUBCUTANEOUS at 12:01

## 2022-01-15 NOTE — ASSESSMENT & PLAN NOTE
Patient is identified as Severe COVID-19 based on hypoxemia with O2 saturations <94% on room air or on ambulation   Initiate standard COVID protocols; COVID-19 testing ,Infection Control notification  and isolation- respiratory, contact and droplet per protocol    Diagnostics: (leukopenia, hyponatremia, hyperferritinemia, elevated troponin, elevated d-dimer, age, and comorbidities are significant predictors of poor clinical outcome)  CBC, CMP, Ferritin, CRP and Portable CXR    Management: Initiate targeted therapy with Dexamethasone PO/IV 6mg daily x10 days, Maintain oxygen saturations 92-96% via Nasal Cannula 3 LPM and monitor with continuous/intermittent pulse oximetry.  and Inhaled bronchodilators as needed for shortness of breath.    Advance Care Planning  Current advance care plan has been discussed with patient/family/POA and patient currently wishes Full Code.      Dex end date 1/16  6 minute walk test - req o2, delivered to bedside

## 2022-01-15 NOTE — DISCHARGE SUMMARY
[facility] ([address] Medicine  Discharge Summary      Patient Name: Fauzia Kirk  MRN: [id number]  Patient Class: IP- Inpatient  Admission Date: 1/6/2022  Hospital Length of Stay: 9 days  Discharge Date and Time:  01/15/2022 12:53 PM  Attending Physician: Claudy Whittaker MD   Discharging Provider: Leonardo Alva MD  Primary Care Provider: Anton Watson MD      HPI:   66 year old female with history of breast and lung cancer sp treatment, HTN, OA, hypothyroidism, DVT (11/30/21 on xarelto) presented to [facility] for COVID. She was initially admitted to MICU for high oxygen requirements, however was quickly weaned to 6 liters NC with a proper oxygen saturation wave form. She is not vaccinated. Treated with Rudi/Dex and remdes. Additionally developed afib with RVR on 1/8 for which she was stared on metoprolol.     Stepped down to hospital medicine on 1/13/21 on 4 liters of oxygen.       * No surgery found *      Hospital Course:   66 F with HTN, OA, DVT here for COVID on dex, remdes and rudi after stepdown from hospital medicine. New dx of afib on metoprolol. Currently on 3 liters NC. Pending 6 minute walk test, discharge with o2 and continued dex.  Dex end date 1/16  Will need to start metformin as her A1c is 7 and needs repeat TSH/T4       Goals of Care Treatment Preferences:  Code Status: Full Code    Physical Exam   Constitutional: She appears obese.  Non-toxic appearance. She does not appear ill. No distress.   HENT:   Nose: No rhinorrhea or nasal congestion.   Mouth/Throat: No oropharyngeal exudate or posterior oropharyngeal erythema.   Eyes: Right eye exhibits no discharge. No scleral icterus.   Cardiovascular: Normal rate.   No murmur heard.  Pulmonary/Chest: She is in respiratory distress (2 liters when ambulating). She has no wheezes.   Abdominal: She exhibits no distension and no mass. There is no abdominal tenderness.   Musculoskeletal:         General: No swelling. Normal range of  motion.      Cervical back: Normal range of motion. No rigidity.      Right lower leg: No edema.   Neurological: She is alert. No cranial nerve deficit.   Skin: Skin is warm. No lesion and no rash noted. No jaundice.     Consults:   Consults (From admission, onward)        Status Ordering Provider     Inpatient consult to Critical Care Medicine  Once        Provider:  (Not yet assigned)    Completed ROBERTH DIGGS          * Pneumonia due to COVID-19 virus  Patient is identified as Severe COVID-19 based on hypoxemia with O2 saturations <94% on room air or on ambulation   Initiate standard COVID protocols; COVID-19 testing ,Infection Control notification  and isolation- respiratory, contact and droplet per protocol    Diagnostics: (leukopenia, hyponatremia, hyperferritinemia, elevated troponin, elevated d-dimer, age, and comorbidities are significant predictors of poor clinical outcome)  CBC, CMP, Ferritin, CRP and Portable CXR    Management: Initiate targeted therapy with Dexamethasone PO/IV 6mg daily x10 days, Maintain oxygen saturations 92-96% via Nasal Cannula 3 LPM and monitor with continuous/intermittent pulse oximetry.  and Inhaled bronchodilators as needed for shortness of breath.    Advance Care Planning  Current advance care plan has been discussed with patient/family/POA and patient currently wishes Full Code.     Dex end date 1/16  6 minute walk test - req o2, delivered to bedside      Atrial fibrillation  New onset Afib during hospitalization. CHADVASC2 - 4    Rate control with metoprolol 25 bid  AC with Xarelto    Diabetes  Hemoglobin A1c 6.5  Apparently new diagnosis of diabetes    Diabetic diet  Detemir 8  Aspart 10  SSI        Lung nodule  Outpatient follow up      History of lung cancer  Hx of      History of DVT (deep vein thrombosis)  Diagnosed 11/30/21  On xarelto 20    Continue xarelto      Hypothyroidism  TSH 10  FT4 0.071  Home regiment synthroid 100    Continue home synthroid  Will need  outpatient titration of synthroid to normal TSH.      History of left breast cancer  Hx of       HTN (hypertension)  Home regiment norvasc 10    Continue home regiment      Final Active Diagnoses:    Diagnosis Date Noted POA    PRINCIPAL PROBLEM:  Pneumonia due to COVID-19 virus [U07.1, J12.82] 01/06/2022 Yes    Atrial fibrillation [I48.91] 01/08/2022 Unknown    Diabetes [E11.9] 01/07/2022 Yes    History of DVT (deep vein thrombosis) [Z86.718] 01/06/2022 Not Applicable    History of lung cancer [Z85.118] 01/06/2022 Not Applicable    Lung nodule [R91.1] 01/06/2022 Yes    Hypothyroidism [E03.9] 12/11/2017 Yes    History of left breast cancer [Z85.3] 10/06/2015 Not Applicable    HTN (hypertension) [I10] 03/18/2015 Yes      Problems Resolved During this Admission:    Diagnosis Date Noted Date Resolved POA    Chronic anticoagulation [Z79.01] 01/06/2022 01/11/2022 Not Applicable       Discharged Condition: stable    Disposition:     Follow Up:   Follow-up Information     Wyatt Mai MD On 1/24/2022.    Specialties: Hematology and Oncology, Hematology  Why: at 10:15 AM; previously scheduled hem/onc appointment with injection at 8:00 AM & CT (chest) at 8:45 AM  Contact information:  [address]  [place] [address]  [phone]             Anton Watson MD On 1/28/2022.    Specialty: Internal Medicine  Why: at 1:30pm; [facility] follow up appointment  Contact information:  [address]  [place] [address]  [phone]                       Patient Instructions:      OXYGEN FOR HOME USE     Order Specific Question Answer Comments   Liter Flow 2    Duration Continuous    Qualifying Test Performed at: Activity    Oxygen saturation at rest 92    Oxygen saturation with activity 89    Oxygen saturation with activity on oxygen 91    Portable mode: continuous    Route nasal cannula    Device: home concentrator with portable tanks    Length of need (in months): 3 mos    Patient condition with  "qualifying saturation Other - List qualifying diagnosis and code    Select a diagnosis & list the code in the comments COVID [[id number]]    Height: 5' 6" (1.676 m)    Weight: 85.7 kg (188 lb 15 oz)    Alternative treatment measures have been tried or considered and deemed clinically ineffective. Yes      Ambulatory referral/consult to Internal Medicine   Standing Status: Future   Referral Priority: Routine Referral Type: Consultation   Referral Reason: Specialty Services Required   Requested Specialty: Internal Medicine   Number of Visits Requested: 1     Ambulatory referral/consult to Cardiology   Standing Status: Future   Referral Priority: Routine Referral Type: Consultation   Referral Reason: Specialty Services Required   Requested Specialty: Cardiology   Number of Visits Requested: 1       Significant Diagnostic Studies: Labs:   CMP   Recent Labs   Lab 01/14/22  0900 01/15/22  0504    134*   K 4.3 4.5    104   CO2 24 22*   GLU 98 143*   BUN 28* 34*   CREATININE 0.8 0.8   CALCIUM 9.1 8.9   PROT 6.9 6.7   ALBUMIN 2.5* 2.4*   BILITOT 0.7 0.4   ALKPHOS 67 62   AST 18 18   ALT 24 24   ANIONGAP 8 8   ESTGFRAFRICA >60.0 >60.0   EGFRNONAA >60.0 >60.0    and CBC No results for input(s): WBC, HGB, HCT, PLT in the last 48 hours.    Pending Diagnostic Studies:     Procedure Component Value Units Date/Time    Legionella antigen, urine [[id number]] Collected: 01/07/22 0242    Order Status: Sent Lab Status: In process Updated: 01/07/22 0242    Specimen: Urine, Clean Catch          Medications:  Reconciled Home Medications:      Medication List      START taking these medications    dexAMETHasone 6 MG tablet  Commonly known as: DECADRON  Take 1 tablet (6 mg total) by mouth once daily. for 3 days     metoprolol tartrate 25 MG tablet  Commonly known as: LOPRESSOR  Take 1 tablet (25 mg total) by mouth 2 (two) times daily.        CONTINUE taking these medications    amLODIPine 10 MG tablet  Commonly known as: " NORVASC  Take 1 tablet (10 mg total) by mouth once daily.     levothyroxine 100 MCG tablet  Commonly known as: SYNTHROID  Take 1 tablet (100 mcg total) by mouth once daily.     LIDOcaine-prilocaine cream  Commonly known as: EMLA  Apply topically as needed.     omeprazole 40 MG capsule  Commonly known as: PRILOSEC  Take 1 capsule (40 mg total) by mouth once daily.     rivaroxaban 20 mg Tab  Commonly known as: XARELTO  Take 1 tablet (20 mg total) by mouth daily with dinner or evening meal. Do not start until completed 21 days of 15 mg twice a day with meals            Indwelling Lines/Drains at time of discharge:   Lines/Drains/Airways     None                 Time spent on the discharge of patient: 30 minutes         Leonardo Alva MD  Department of Hospital Medicine  [facility] - Intensive Care ([place]-[facility])

## 2022-01-15 NOTE — NURSING
1830-- O2 tank delivered to bedside. Pt educated on usage and to call DME once home for equipment delivery. Understanding confirmed. No other needs at this time.

## 2022-01-16 NOTE — NURSING
AAOx4. Home 02 delivered to bedside.  Moderna vaccination administered prior to discharge home. No a/r noted. Educated on discharge, follow up appts, and medications. Medications delivered to bedside.

## 2022-01-17 NOTE — PLAN OF CARE
[facility] ([facility])  Discharge Final Note    Primary Care Provider: Anton Watson MD    Expected Discharge Date: 1/15/2022    Final Discharge Note (most recent)       Final Note - 01/17/22 0755          Final Note    Assessment Type Final Discharge Note (P)      Anticipated Discharge Disposition Home or Self Care (P)         Post-Acute Status    Post-Acute Authorization HME (P)    home oxygen                    Important Message from Medicare             Contact Info       Wyatt Mai MD   Specialty: Hematology and Oncology, Hematology    [address]  [facility] [address]   Phone: [phone]       Next Steps: Follow up on 1/24/2022    Instructions: at 10:15 AM; previously scheduled hem/onc appointment with injection at 8:00 AM & CT (chest) at 8:45 AM    Anton Watson MD   Specialty: Internal Medicine   Relationship: PCP - General    [address]  [facility] [address]   Phone: [phone]       Next Steps: Follow up on 1/28/2022    Instructions: at 1:30pm; new [facility] hospital follow up appointment

## 2022-01-20 ENCOUNTER — TELEPHONE (OUTPATIENT)
Dept: HEMATOLOGY/ONCOLOGY | Facility: CLINIC | Age: 67
End: 2022-01-20
Payer: COMMERCIAL

## 2022-01-20 NOTE — TELEPHONE ENCOUNTER
"Returned pt call. Pt states she was just released from the hospital. Was admitted for Covid.  Asking for upcoming appts on Monday 1/24/22 for labs in the infusion suite, CT scan and Dr. Mai to be rescheduled.  Pt declined having preference of date or time or provider, just said "make it later, like February"   appts rescheduled same day as per previous request.  appt slips printed and mailed to pt.    "

## 2022-01-20 NOTE — TELEPHONE ENCOUNTER
Spoke with Pt. She says she would like to reschedule Appt. With  on 1/24/20. I spoke with the nurse who will call Ms.Fauzia back at a later time today to reschedule Appt.

## 2022-02-18 ENCOUNTER — TELEPHONE (OUTPATIENT)
Dept: HEMATOLOGY/ONCOLOGY | Facility: CLINIC | Age: 67
End: 2022-02-18
Payer: COMMERCIAL

## 2022-02-18 DIAGNOSIS — I82.90 ACUTE DEEP VEIN THROMBOSIS (DVT) OF NON-EXTREMITY VEIN: Primary | ICD-10-CM

## 2022-02-18 DIAGNOSIS — I82.90 ACUTE DEEP VEIN THROMBOSIS (DVT) OF NON-EXTREMITY VEIN: ICD-10-CM

## 2022-02-18 NOTE — TELEPHONE ENCOUNTER
----- Message from Monet Jackson sent at 2/18/2022  9:19 AM CST -----  Regarding: medicatgion  Type:  Patient Returning Call    Who Called:Fauzia Kirk  Who Left Message for Patient:self  Does the patient know what this is regarding?:medication  Would the patient rather a call back or a response via [employer]? Call back  Best Call Back Number:[phone]  Additional Information: Patient would like a call back about medication.

## 2022-02-18 NOTE — TELEPHONE ENCOUNTER
"Nurse returned pt call.  Pt reports question: "how long will I be on Xarlelto" if pt is to continue on med she will need refill.  Pt reports concern: Left arm chronic swelling of lower arm has now expanded to left upper arm. Neck has remained swollen.  Per chart review, pt reported in clinic visit on 11/30 neck swelling. US revealed a clot. Pt was started on Xarelto.  Routing to Karissa Mckenna NP for direction.              ----- Message from Monet Jackson sent at 2/18/2022  9:19 AM CST -----  Regarding: medicatgion  Type:  Patient Returning Call    Who Called:Fauzia Kirk  Who Left Message for Patient:self  Does the patient know what this is regarding?:medication  Would the patient rather a call back or a response via [facility]? Call back  Best Call Back Number:[phone]  Additional Information: Patient would like a call back about medication.         "

## 2022-02-18 NOTE — TELEPHONE ENCOUNTER
----- Message from Monet Jackson sent at 2/18/2022  9:19 AM CST -----  Regarding: medicatgion  Type:  Patient Returning Call    Who Called:Fauzia Kikr  Who Left Message for Patient:self  Does the patient know what this is regarding?:medication  Would the patient rather a call back or a response via [employer]? Call back  Best Call Back Number:[phone]  Additional Information: Patient would like a call back about medication.

## 2022-02-18 NOTE — TELEPHONE ENCOUNTER
"Called pt back to report to pt that per Karissa Mckenna she can expect to remain on Lovenox for 6 months. In light of her arm swelling US of LUE was ordered by provider.  Reported to pt. Per pt she is unable to come in "any time soon" for an ultrasound. Pt reports having been hospitalized in Jan with Covid and was in the ICU for a week. Is on home oxygen  The earliest appt pt agrees to is 2/28/22 at 1100 as she is coming to see PCP on that day and will have exam before visit.  Time spent explaining importance of having this exam for her safety but pt verbalized understanding agrees to contact office or to use emergency services if needed before.   Refill request routed to provider for refill of Xarelto at [facility] in [place] per pt request.    "

## 2022-02-22 ENCOUNTER — TELEPHONE (OUTPATIENT)
Dept: INTERNAL MEDICINE | Facility: CLINIC | Age: 67
End: 2022-02-22
Payer: COMMERCIAL

## 2022-02-28 ENCOUNTER — HOSPITAL ENCOUNTER (OUTPATIENT)
Dept: RADIOLOGY | Facility: HOSPITAL | Age: 67
Discharge: HOME | End: 2022-02-28
Attending: NURSE PRACTITIONER
Payer: COMMERCIAL

## 2022-02-28 DIAGNOSIS — I82.90 ACUTE DEEP VEIN THROMBOSIS (DVT) OF NON-EXTREMITY VEIN: ICD-10-CM

## 2022-02-28 PROCEDURE — 93971 US UPPER EXTREMITY VEINS LEFT: ICD-10-PCS | Mod: 26,LT,, | Performed by: STUDENT IN AN ORGANIZED HEALTH CARE EDUCATION/TRAINING PROGRAM

## 2022-02-28 PROCEDURE — 93971 EXTREMITY STUDY: CPT | Mod: TC,LT

## 2022-02-28 PROCEDURE — 93971 EXTREMITY STUDY: CPT | Mod: 26,LT,, | Performed by: STUDENT IN AN ORGANIZED HEALTH CARE EDUCATION/TRAINING PROGRAM

## 2022-03-14 ENCOUNTER — OFFICE VISIT (OUTPATIENT)
Dept: FAMILY MEDICINE | Facility: CLINIC | Age: 67
End: 2022-03-14
Payer: COMMERCIAL

## 2022-03-14 VITALS
HEIGHT: 66 IN | TEMPERATURE: 99 F | WEIGHT: 185.19 LBS | BODY MASS INDEX: 29.76 KG/M2 | OXYGEN SATURATION: 97 % | DIASTOLIC BLOOD PRESSURE: 68 MMHG | SYSTOLIC BLOOD PRESSURE: 118 MMHG | HEART RATE: 94 BPM

## 2022-03-14 DIAGNOSIS — Z99.81 SUPPLEMENTAL OXYGEN DEPENDENT: ICD-10-CM

## 2022-03-14 DIAGNOSIS — E03.2 HYPOTHYROIDISM DUE TO MEDICATION: ICD-10-CM

## 2022-03-14 DIAGNOSIS — I10 HYPERTENSION, UNSPECIFIED TYPE: Primary | ICD-10-CM

## 2022-03-14 DIAGNOSIS — I82.90 ACUTE DEEP VEIN THROMBOSIS (DVT) OF NON-EXTREMITY VEIN: ICD-10-CM

## 2022-03-14 DIAGNOSIS — R73.03 PREDIABETES: ICD-10-CM

## 2022-03-14 DIAGNOSIS — Z78.0 ASYMPTOMATIC MENOPAUSE: ICD-10-CM

## 2022-03-14 DIAGNOSIS — I48.91 ATRIAL FIBRILLATION, UNSPECIFIED TYPE: ICD-10-CM

## 2022-03-14 PROCEDURE — 1126F PR PAIN SEVERITY QUANTIFIED, NO PAIN PRESENT: ICD-10-PCS | Mod: CPTII,S$GLB,, | Performed by: FAMILY MEDICINE

## 2022-03-14 PROCEDURE — 3051F PR MOST RECENT HEMOGLOBIN A1C LEVEL 7.0 - < 8.0%: ICD-10-PCS | Mod: CPTII,S$GLB,, | Performed by: FAMILY MEDICINE

## 2022-03-14 PROCEDURE — 3008F BODY MASS INDEX DOCD: CPT | Mod: CPTII,S$GLB,, | Performed by: FAMILY MEDICINE

## 2022-03-14 PROCEDURE — 3288F PR FALLS RISK ASSESSMENT DOCUMENTED: ICD-10-PCS | Mod: CPTII,S$GLB,, | Performed by: FAMILY MEDICINE

## 2022-03-14 PROCEDURE — 3288F FALL RISK ASSESSMENT DOCD: CPT | Mod: CPTII,S$GLB,, | Performed by: FAMILY MEDICINE

## 2022-03-14 PROCEDURE — 99999 PR PBB SHADOW E&M-EST. PATIENT-LVL V: CPT | Mod: PBBFAC,,, | Performed by: FAMILY MEDICINE

## 2022-03-14 PROCEDURE — 1160F RVW MEDS BY RX/DR IN RCRD: CPT | Mod: CPTII,S$GLB,, | Performed by: FAMILY MEDICINE

## 2022-03-14 PROCEDURE — 1101F PR PT FALLS ASSESS DOC 0-1 FALLS W/OUT INJ PAST YR: ICD-10-PCS | Mod: CPTII,S$GLB,, | Performed by: FAMILY MEDICINE

## 2022-03-14 PROCEDURE — 99214 PR OFFICE/OUTPT VISIT, EST, LEVL IV, 30-39 MIN: ICD-10-PCS | Mod: S$GLB,,, | Performed by: FAMILY MEDICINE

## 2022-03-14 PROCEDURE — 3078F PR MOST RECENT DIASTOLIC BLOOD PRESSURE < 80 MM HG: ICD-10-PCS | Mod: CPTII,S$GLB,, | Performed by: FAMILY MEDICINE

## 2022-03-14 PROCEDURE — 99999 PR PBB SHADOW E&M-EST. PATIENT-LVL V: ICD-10-PCS | Mod: PBBFAC,,, | Performed by: FAMILY MEDICINE

## 2022-03-14 PROCEDURE — 99214 OFFICE O/P EST MOD 30 MIN: CPT | Mod: S$GLB,,, | Performed by: FAMILY MEDICINE

## 2022-03-14 PROCEDURE — 1101F PT FALLS ASSESS-DOCD LE1/YR: CPT | Mod: CPTII,S$GLB,, | Performed by: FAMILY MEDICINE

## 2022-03-14 PROCEDURE — 3078F DIAST BP <80 MM HG: CPT | Mod: CPTII,S$GLB,, | Performed by: FAMILY MEDICINE

## 2022-03-14 PROCEDURE — 1126F AMNT PAIN NOTED NONE PRSNT: CPT | Mod: CPTII,S$GLB,, | Performed by: FAMILY MEDICINE

## 2022-03-14 PROCEDURE — 3074F PR MOST RECENT SYSTOLIC BLOOD PRESSURE < 130 MM HG: ICD-10-PCS | Mod: CPTII,S$GLB,, | Performed by: FAMILY MEDICINE

## 2022-03-14 PROCEDURE — 1159F MED LIST DOCD IN RCRD: CPT | Mod: CPTII,S$GLB,, | Performed by: FAMILY MEDICINE

## 2022-03-14 PROCEDURE — 3008F PR BODY MASS INDEX (BMI) DOCUMENTED: ICD-10-PCS | Mod: CPTII,S$GLB,, | Performed by: FAMILY MEDICINE

## 2022-03-14 PROCEDURE — 1160F PR REVIEW ALL MEDS BY PRESCRIBER/CLIN PHARMACIST DOCUMENTED: ICD-10-PCS | Mod: CPTII,S$GLB,, | Performed by: FAMILY MEDICINE

## 2022-03-14 PROCEDURE — 3074F SYST BP LT 130 MM HG: CPT | Mod: CPTII,S$GLB,, | Performed by: FAMILY MEDICINE

## 2022-03-14 PROCEDURE — 3051F HG A1C>EQUAL 7.0%<8.0%: CPT | Mod: CPTII,S$GLB,, | Performed by: FAMILY MEDICINE

## 2022-03-14 PROCEDURE — 1159F PR MEDICATION LIST DOCUMENTED IN MEDICAL RECORD: ICD-10-PCS | Mod: CPTII,S$GLB,, | Performed by: FAMILY MEDICINE

## 2022-03-14 NOTE — PROGRESS NOTES
"Routine Office Visit    Fauzia Kirk  1955  [id number]      Subjective     Fauzia is a 66 y.o. female who presents today for:      1. Hospital follow-up - Patient was recently admitted for pneumonia from covid. She also developed afib. She has history of DVT (11/30/21 on xarelto). She was sent home on oxygen. She is currently on 2L oxygen. She was advised she needed to stay on oxygen. She has not followed up with pulmonary.   2. Right leg pain - chronic condition for patient. No recent falls. Patient is unable to stand for long period of time. She does not drive. She is able to take care of some of her ADLs such as getting dressed, feeding herself. She does need some help with cooking, cleaning and transportation.     Objective     Review of Systems   Constitutional: Negative for chills and fever.   HENT: Negative for congestion.    Eyes: Negative for blurred vision.   Respiratory: Negative for cough.    Cardiovascular: Negative for chest pain.   Gastrointestinal: Negative for abdominal pain, constipation, diarrhea, heartburn, nausea and vomiting.   Genitourinary: Negative for dysuria.   Musculoskeletal: Negative for myalgias.   Skin: Negative for itching and rash.   Neurological: Negative for dizziness and headaches.   Psychiatric/Behavioral: Negative for depression.       /68 (BP Location: Right arm, Patient Position: Sitting, BP Method: Large (Manual))   Pulse 94   Temp 98.5 °F (36.9 °C) (Oral)   Ht 5' 6" (1.676 m)   Wt 84 kg (185 lb 3 oz)   SpO2 97% Comment: 2 liters  BMI 29.89 kg/m²   Physical Exam  Constitutional:       Appearance: She is well-developed.   HENT:      Head: Normocephalic and atraumatic.   Eyes:      Conjunctiva/sclera: Conjunctivae normal.      Pupils: Pupils are equal, round, and reactive to light.   Cardiovascular:      Rate and Rhythm: Normal rate and regular rhythm.      Heart sounds: Normal heart sounds. No murmur heard.    No friction rub. No gallop.   Pulmonary:     "  Effort: No respiratory distress.      Breath sounds: Normal breath sounds.   Abdominal:      General: Bowel sounds are normal. There is no distension.      Palpations: Abdomen is soft.      Tenderness: There is no abdominal tenderness.   Musculoskeletal:         General: Normal range of motion.      Cervical back: Normal range of motion and neck supple.   Lymphadenopathy:      Cervical: No cervical adenopathy.   Skin:     General: Skin is warm.   Neurological:      Mental Status: She is alert and oriented to person, place, and time.           Assessment     Problem List Items Addressed This Visit        Cardiac/Vascular    Atrial fibrillation    Relevant Orders    Ambulatory referral/consult to Cardiology    HTN (hypertension) - Primary    Relevant Orders    CBC Auto Differential    Comprehensive Metabolic Panel    Lipid Panel    TSH  The current medical regimen is effective;  continue present plan and medications.         Hematology    Acute deep vein thrombosis (DVT) of non-extremity vein  On xalrelto        Endocrine    Hypothyroidism due to medication  The current medical regimen is effective;  continue present plan and medications.        Other Visit Diagnoses     Prediabetes        Relevant Orders    Hemoglobin A1C    Microalbumin/Creatinine Ratio, Urine    Supplemental oxygen dependent        Relevant Orders    Ambulatory referral/consult to Pulmonology  Patient is unsure how long she needs to be on oxygen.       Asymptomatic menopause        Relevant Orders    DXA Bone Density Spine And Hip          Follow up in about 3 months (around 6/14/2022), or if symptoms worsen or fail to improve.

## 2022-03-21 ENCOUNTER — TELEPHONE (OUTPATIENT)
Dept: FAMILY MEDICINE | Facility: CLINIC | Age: 67
End: 2022-03-21
Payer: COMMERCIAL

## 2022-03-22 ENCOUNTER — PATIENT OUTREACH (OUTPATIENT)
Dept: ADMINISTRATIVE | Facility: OTHER | Age: 67
End: 2022-03-22
Payer: COMMERCIAL

## 2022-03-22 ENCOUNTER — TELEPHONE (OUTPATIENT)
Dept: HEMATOLOGY/ONCOLOGY | Facility: CLINIC | Age: 67
End: 2022-03-22
Payer: COMMERCIAL

## 2022-03-22 DIAGNOSIS — C34.2 PRIMARY CANCER OF RIGHT MIDDLE LOBE OF LUNG: Primary | ICD-10-CM

## 2022-03-22 NOTE — TELEPHONE ENCOUNTER
"Returned call. appts scheduled May 30th.  Pt verbalized understanding and agreement.            ----- Message from Jhonny Daniels sent at 3/22/2022  9:33 AM CDT -----  Regarding: Appt  Contact: Fauzia  Scheduling Request         Patient Status:  Est    Scheduling Appt : Follow up, labs    Contact Preference?:[phone]    Treating Provider: Dr. Mai    Do you feel you need to be seen today? No           Additional Notes:  "Thank you for all that you do for our patients'       "

## 2022-03-24 ENCOUNTER — OFFICE VISIT (OUTPATIENT)
Dept: CARDIOLOGY | Facility: CLINIC | Age: 67
End: 2022-03-24
Payer: COMMERCIAL

## 2022-03-24 VITALS
SYSTOLIC BLOOD PRESSURE: 128 MMHG | BODY MASS INDEX: 30.47 KG/M2 | HEART RATE: 82 BPM | OXYGEN SATURATION: 100 % | HEIGHT: 66 IN | WEIGHT: 189.63 LBS | RESPIRATION RATE: 15 BRPM | DIASTOLIC BLOOD PRESSURE: 60 MMHG

## 2022-03-24 DIAGNOSIS — C77.9 REGIONAL LYMPH NODE METASTASIS PRESENT: ICD-10-CM

## 2022-03-24 DIAGNOSIS — I82.90 ACUTE DEEP VEIN THROMBOSIS (DVT) OF NON-EXTREMITY VEIN: ICD-10-CM

## 2022-03-24 DIAGNOSIS — Z85.3 HISTORY OF LEFT BREAST CANCER: ICD-10-CM

## 2022-03-24 DIAGNOSIS — C34.2 PRIMARY CANCER OF RIGHT MIDDLE LOBE OF LUNG: ICD-10-CM

## 2022-03-24 DIAGNOSIS — D05.12 DUCTAL CARCINOMA IN SITU (DCIS) OF LEFT BREAST: ICD-10-CM

## 2022-03-24 DIAGNOSIS — Z86.718 HISTORY OF DVT (DEEP VEIN THROMBOSIS): ICD-10-CM

## 2022-03-24 DIAGNOSIS — I48.91 ATRIAL FIBRILLATION, UNSPECIFIED TYPE: ICD-10-CM

## 2022-03-24 DIAGNOSIS — R91.1 LUNG NODULE: ICD-10-CM

## 2022-03-24 DIAGNOSIS — I10 PRIMARY HYPERTENSION: Primary | ICD-10-CM

## 2022-03-24 DIAGNOSIS — E03.2 HYPOTHYROIDISM DUE TO MEDICATION: ICD-10-CM

## 2022-03-24 DIAGNOSIS — Z51.11 ENCOUNTER FOR ANTINEOPLASTIC CHEMOTHERAPY: ICD-10-CM

## 2022-03-24 PROCEDURE — 1160F RVW MEDS BY RX/DR IN RCRD: CPT | Mod: CPTII,S$GLB,, | Performed by: INTERNAL MEDICINE

## 2022-03-24 PROCEDURE — 1126F AMNT PAIN NOTED NONE PRSNT: CPT | Mod: CPTII,S$GLB,, | Performed by: INTERNAL MEDICINE

## 2022-03-24 PROCEDURE — 99204 OFFICE O/P NEW MOD 45 MIN: CPT | Mod: S$GLB,,, | Performed by: INTERNAL MEDICINE

## 2022-03-24 PROCEDURE — 1159F MED LIST DOCD IN RCRD: CPT | Mod: CPTII,S$GLB,, | Performed by: INTERNAL MEDICINE

## 2022-03-24 PROCEDURE — 99999 PR PBB SHADOW E&M-EST. PATIENT-LVL IV: CPT | Mod: PBBFAC,,, | Performed by: INTERNAL MEDICINE

## 2022-03-24 PROCEDURE — 3051F PR MOST RECENT HEMOGLOBIN A1C LEVEL 7.0 - < 8.0%: ICD-10-PCS | Mod: CPTII,S$GLB,, | Performed by: INTERNAL MEDICINE

## 2022-03-24 PROCEDURE — 1101F PT FALLS ASSESS-DOCD LE1/YR: CPT | Mod: CPTII,S$GLB,, | Performed by: INTERNAL MEDICINE

## 2022-03-24 PROCEDURE — 3008F BODY MASS INDEX DOCD: CPT | Mod: CPTII,S$GLB,, | Performed by: INTERNAL MEDICINE

## 2022-03-24 PROCEDURE — 1126F PR PAIN SEVERITY QUANTIFIED, NO PAIN PRESENT: ICD-10-PCS | Mod: CPTII,S$GLB,, | Performed by: INTERNAL MEDICINE

## 2022-03-24 PROCEDURE — 3074F SYST BP LT 130 MM HG: CPT | Mod: CPTII,S$GLB,, | Performed by: INTERNAL MEDICINE

## 2022-03-24 PROCEDURE — 99204 PR OFFICE/OUTPT VISIT, NEW, LEVL IV, 45-59 MIN: ICD-10-PCS | Mod: S$GLB,,, | Performed by: INTERNAL MEDICINE

## 2022-03-24 PROCEDURE — 1159F PR MEDICATION LIST DOCUMENTED IN MEDICAL RECORD: ICD-10-PCS | Mod: CPTII,S$GLB,, | Performed by: INTERNAL MEDICINE

## 2022-03-24 PROCEDURE — 3051F HG A1C>EQUAL 7.0%<8.0%: CPT | Mod: CPTII,S$GLB,, | Performed by: INTERNAL MEDICINE

## 2022-03-24 PROCEDURE — 3288F FALL RISK ASSESSMENT DOCD: CPT | Mod: CPTII,S$GLB,, | Performed by: INTERNAL MEDICINE

## 2022-03-24 PROCEDURE — 1160F PR REVIEW ALL MEDS BY PRESCRIBER/CLIN PHARMACIST DOCUMENTED: ICD-10-PCS | Mod: CPTII,S$GLB,, | Performed by: INTERNAL MEDICINE

## 2022-03-24 PROCEDURE — 3074F PR MOST RECENT SYSTOLIC BLOOD PRESSURE < 130 MM HG: ICD-10-PCS | Mod: CPTII,S$GLB,, | Performed by: INTERNAL MEDICINE

## 2022-03-24 PROCEDURE — 3078F DIAST BP <80 MM HG: CPT | Mod: CPTII,S$GLB,, | Performed by: INTERNAL MEDICINE

## 2022-03-24 PROCEDURE — 99999 PR PBB SHADOW E&M-EST. PATIENT-LVL IV: ICD-10-PCS | Mod: PBBFAC,,, | Performed by: INTERNAL MEDICINE

## 2022-03-24 PROCEDURE — 3078F PR MOST RECENT DIASTOLIC BLOOD PRESSURE < 80 MM HG: ICD-10-PCS | Mod: CPTII,S$GLB,, | Performed by: INTERNAL MEDICINE

## 2022-03-24 PROCEDURE — 3288F PR FALLS RISK ASSESSMENT DOCUMENTED: ICD-10-PCS | Mod: CPTII,S$GLB,, | Performed by: INTERNAL MEDICINE

## 2022-03-24 PROCEDURE — 3008F PR BODY MASS INDEX (BMI) DOCUMENTED: ICD-10-PCS | Mod: CPTII,S$GLB,, | Performed by: INTERNAL MEDICINE

## 2022-03-24 PROCEDURE — 1101F PR PT FALLS ASSESS DOC 0-1 FALLS W/OUT INJ PAST YR: ICD-10-PCS | Mod: CPTII,S$GLB,, | Performed by: INTERNAL MEDICINE

## 2022-03-24 NOTE — PROGRESS NOTES
CARDIOVASCULAR CONSULTATION    REASON FOR CONSULT:   Fauzia Kirk is a 66 y.o. female who presents for ***.      HISTORY OF PRESENT ILLNESS:           PAST MEDICAL HISTORY:     Past Medical History:   Diagnosis Date    Arthritis     Breast cancer     Hypertension        PAST SURGICAL HISTORY:     Past Surgical History:   Procedure Laterality Date    BREAST LUMPECTOMY       SECTION, CLASSIC      LIPOMA RESECTION      MASTECTOMY         ALLERGIES AND MEDICATION:   Review of patient's allergies indicates:  No Known Allergies     Medication List          Accurate as of 2022  1:15 PM. If you have any questions, ask your nurse or doctor.            CONTINUE taking these medications    amLODIPine 10 MG tablet  Commonly known as: NORVASC  Take 1 tablet (10 mg total) by mouth once daily.     levothyroxine 100 MCG tablet  Commonly known as: SYNTHROID  Take 1 tablet (100 mcg total) by mouth once daily.     LIDOcaine-prilocaine cream  Commonly known as: EMLA  Apply topically as needed.     metoprolol tartrate 25 MG tablet  Commonly known as: LOPRESSOR  Take 1 tablet (25 mg total) by mouth 2 (two) times daily.     omeprazole 40 MG capsule  Commonly known as: PRILOSEC  Take 1 capsule (40 mg total) by mouth once daily.     rivaroxaban 20 mg Tab  Commonly known as: XARELTO  Take 1 tablet (20 mg total) by mouth daily with dinner or evening meal. Do not start until completed 21 days of 15 mg twice a day with meals            SOCIAL HISTORY:     Social History     Socioeconomic History    Marital status:    Tobacco Use    Smoking status: Never Smoker    Smokeless tobacco: Never Used   Substance and Sexual Activity    Alcohol use: No     Alcohol/week: 0.0 standard drinks    Drug use: No    Sexual activity: Yes     Partners: Male       FAMILY HISTORY:     Family History   Problem Relation Age of Onset    Lymphoma Mother     Prostate cancer Father     Stomach cancer Sister     Breast  "cancer Sister     Diabetes Brother     Breast cancer Paternal Aunt     Breast cancer Paternal Grandmother        REVIEW OF SYSTEMS:   ROS    A 10 point review of systems was performed and all the pertinent positives have been mentioned. Rest of review of systems was negative.        PHYSICAL EXAM:     Vitals:    03/24/22 1259   BP: 128/60   Pulse: 82   Resp: 15    Body mass index is 30.6 kg/m².  Weight: 86 kg (189 lb 9.5 oz)   Height: 5' 6" (167.6 cm)     Physical Exam      DATA:     Laboratory:  CBC:  Recent Labs   Lab 01/10/22  0323 01/11/22  0445 01/13/22  0506   WBC 7.40 7.55 10.87   Hemoglobin 10.5 L 10.5 L 11.3 L   Hematocrit 33.1 L 33.4 L 35.7 L   Platelets 303 308 330       CHEMISTRIES:  Recent Labs   Lab 01/09/22  0220 01/10/22  0323 01/11/22  0445 01/12/22  0314 01/13/22  0506 01/14/22  0900 01/15/22  0504   Glucose 243 H 155 H 169 H  --  102 98 143 H   Sodium 133 L 135 L 137  --  139 138 134 L   Potassium 5.2 H 4.3 4.7   < > 4.6 4.3 4.5   BUN 27 H 28 H 28 H  --  30 H 28 H 34 H   Creatinine 0.9 0.8 0.7  --  1.0 0.8 0.8   eGFR if African American >60.0 >60.0 >60.0  --  >60.0 >60.0 >60.0   eGFR if non African American >60.0 >60.0 >60.0  --  58.0 A >60.0 >60.0   Calcium 8.8 8.6 L 8.2 L  --  9.0 9.1 8.9   Magnesium 2.1 2.1 2.0  --   --   --   --     < > = values in this interval not displayed.       CARDIAC BIOMARKERS:  Recent Labs   Lab 01/06/22  1751      Troponin I 0.010       COAGS:        LIPIDS/LFTS:  Recent Labs   Lab 01/13/22  0506 01/14/22  0900 01/15/22  0504   AST 17 18 18   ALT 23 24 24       Hemoglobin A1C   Date Value Ref Range Status   01/07/2022 7.0 (H) 4.0 - 5.6 % Final     Comment:     ADA Screening Guidelines:  5.7-6.4%  Consistent with prediabetes  >or=6.5%  Consistent with diabetes    High levels of fetal hemoglobin interfere with the HbA1C  assay. Heterozygous hemoglobin variants (HbS, HgC, etc)do  not significantly interfere with this assay.   However, presence of multiple " variants may affect accuracy.     12/21/2015 6.1 4.5 - 6.2 % Final       TSH  Recent Labs   Lab 12/22/20  0931 03/22/21  0824 10/05/21  0817   TSH 4.007 H 3.006 10.096 H       The ASCVD Risk score (Syeda TOPETE Jr., et al., 2013) failed to calculate for the following reasons:    Cannot find a previous HDL lab    Cannot find a previous total cholesterol lab             ASSESSMENT AND PLAN     Patient Active Problem List   Diagnosis    HTN (hypertension)    History of left breast cancer    s/p L mastectomy, SLNBx 11/7    Ductal carcinoma in situ (DCIS) of left breast    Primary cancer of right middle lobe of lung    Regional lymph node metastasis present    Encounter for antineoplastic chemotherapy    Hypothyroidism    Hypothyroidism due to medication    Acute deep vein thrombosis (DVT) of non-extremity vein    Pneumonia due to COVID-19 virus    History of DVT (deep vein thrombosis)    History of lung cancer    Lung nodule    Diabetes    Atrial fibrillation                 Thank you very much for involving me in the care of your patient.  Please do not hesitate to contact me if there are any questions.      Zeinab Giraldo MD, FACC, [facility]  Interventional Cardiologist, [facility].           This note was dictated with the help of speech recognition software.  There might be un-intended errors and/or substitutions.

## 2022-03-24 NOTE — PROGRESS NOTES
CARDIOVASCULAR CONSULTATION    REASON FOR CONSULT:   Fauzia Kirk is a 66 y.o. female who presents for establishing care    HISTORY OF PRESENT ILLNESS:     Patient is a pleasant 66-year-old lady.  Here for establishing care.  Recently had COVID infection in January.  Since then has been oxygen dependent.  Prior to that she could walk around without any issues.  Has history of breast cancer in the past.  Was never a smoker as per the patient.  Patient denies any chest pains at rest on exertion, orthopnea, PND.  EKG done demonstrated atrial fibrillation.  Patient is asymptomatic from his AFib apart from occasional palpitations.      :    · The left ventricle is normal in size with normal systolic function. The estimated ejection fraction is 55%.  · Normal right ventricular size with mildly reduced right ventricular systolic function.  · Mild aortic regurgitation.  · Mild mitral regurgitation.  · Atrial fibrillation observed.  · The estimated PA systolic pressure is 28 mmHg.  · Intermediate central venous pressure (8 mmHg).        PAST MEDICAL HISTORY:     Past Medical History:   Diagnosis Date    Arthritis     Breast cancer     Hypertension        PAST SURGICAL HISTORY:     Past Surgical History:   Procedure Laterality Date    BREAST LUMPECTOMY       SECTION, CLASSIC      LIPOMA RESECTION      MASTECTOMY         ALLERGIES AND MEDICATION:   Review of patient's allergies indicates:  No Known Allergies     Medication List          Accurate as of 2022  1:27 PM. If you have any questions, ask your nurse or doctor.            CONTINUE taking these medications    amLODIPine 10 MG tablet  Commonly known as: NORVASC  Take 1 tablet (10 mg total) by mouth once daily.     levothyroxine 100 MCG tablet  Commonly known as: SYNTHROID  Take 1 tablet (100 mcg total) by mouth once daily.     LIDOcaine-prilocaine cream  Commonly known as: EMLA  Apply topically as needed.     metoprolol tartrate 25  "MG tablet  Commonly known as: LOPRESSOR  Take 1 tablet (25 mg total) by mouth 2 (two) times daily.     omeprazole 40 MG capsule  Commonly known as: PRILOSEC  Take 1 capsule (40 mg total) by mouth once daily.     rivaroxaban 20 mg Tab  Commonly known as: XARELTO  Take 1 tablet (20 mg total) by mouth daily with dinner or evening meal. Do not start until completed 21 days of 15 mg twice a day with meals            SOCIAL HISTORY:     Social History     Socioeconomic History    Marital status:    Tobacco Use    Smoking status: Never Smoker    Smokeless tobacco: Never Used   Substance and Sexual Activity    Alcohol use: No     Alcohol/week: 0.0 standard drinks    Drug use: No    Sexual activity: Yes     Partners: Male       FAMILY HISTORY:     Family History   Problem Relation Age of Onset    Lymphoma Mother     Prostate cancer Father     Stomach cancer Sister     Breast cancer Sister     Diabetes Brother     Breast cancer Paternal Aunt     Breast cancer Paternal Grandmother        REVIEW OF SYSTEMS:   Review of Systems   Constitutional: Negative.   HENT: Negative.    Eyes: Negative.    Cardiovascular: Positive for dyspnea on exertion.   Respiratory: Positive for shortness of breath.    Endocrine: Negative.    Hematologic/Lymphatic: Negative.    Skin: Negative.    Musculoskeletal: Negative.    Gastrointestinal: Negative.    Genitourinary: Negative.    Neurological: Negative.    Psychiatric/Behavioral: Negative.    Allergic/Immunologic: Negative.        A 10 point review of systems was performed and all the pertinent positives have been mentioned. Rest of review of systems was negative.        PHYSICAL EXAM:     Vitals:    03/24/22 1259   BP: 128/60   Pulse: 82   Resp: 15    Body mass index is 30.6 kg/m².  Weight: 86 kg (189 lb 9.5 oz)   Height: 5' 6" (167.6 cm)     Physical Exam  Constitutional:       Appearance: Normal appearance. She is well-developed.   HENT:      Head: Normocephalic.   Eyes:      " Pupils: Pupils are equal, round, and reactive to light.   Cardiovascular:      Rate and Rhythm: Normal rate and regular rhythm.   Pulmonary:      Effort: Pulmonary effort is normal.      Breath sounds: Normal breath sounds.   Abdominal:      General: Bowel sounds are normal.      Palpations: Abdomen is soft.      Tenderness: There is no abdominal tenderness.   Musculoskeletal:         General: Normal range of motion.      Cervical back: Normal range of motion and neck supple.   Skin:     General: Skin is warm.   Neurological:      Mental Status: She is alert and oriented to person, place, and time.           DATA:     Laboratory:  CBC:  Recent Labs   Lab 01/10/22  0323 01/11/22  0445 01/13/22  0506   WBC 7.40 7.55 10.87   Hemoglobin 10.5 L 10.5 L 11.3 L   Hematocrit 33.1 L 33.4 L 35.7 L   Platelets 303 308 330       CHEMISTRIES:  Recent Labs   Lab 01/09/22  0220 01/10/22  0323 01/11/22 0445 01/12/22  0314 01/13/22  0506 01/14/22  0900 01/15/22  0504   Glucose 243 H 155 H 169 H  --  102 98 143 H   Sodium 133 L 135 L 137  --  139 138 134 L   Potassium 5.2 H 4.3 4.7   < > 4.6 4.3 4.5   BUN 27 H 28 H 28 H  --  30 H 28 H 34 H   Creatinine 0.9 0.8 0.7  --  1.0 0.8 0.8   eGFR if African American >60.0 >60.0 >60.0  --  >60.0 >60.0 >60.0   eGFR if non African American >60.0 >60.0 >60.0  --  58.0 A >60.0 >60.0   Calcium 8.8 8.6 L 8.2 L  --  9.0 9.1 8.9   Magnesium 2.1 2.1 2.0  --   --   --   --     < > = values in this interval not displayed.       CARDIAC BIOMARKERS:  Recent Labs   Lab 01/06/22  1751      Troponin I 0.010       COAGS:        LIPIDS/LFTS:  Recent Labs   Lab 01/13/22  0506 01/14/22  0900 01/15/22  0504   AST 17 18 18   ALT 23 24 24       Hemoglobin A1C   Date Value Ref Range Status   01/07/2022 7.0 (H) 4.0 - 5.6 % Final     Comment:     ADA Screening Guidelines:  5.7-6.4%  Consistent with prediabetes  >or=6.5%  Consistent with diabetes    High levels of fetal hemoglobin interfere with the HbA1C  assay.  Heterozygous hemoglobin variants (HbS, HgC, etc)do  not significantly interfere with this assay.   However, presence of multiple variants may affect accuracy.     12/21/2015 6.1 4.5 - 6.2 % Final       TSH  Recent Labs   Lab 12/22/20  0931 03/22/21  0824 10/05/21  0817   TSH 4.007 H 3.006 10.096 H       The ASCVD Risk score (Syedacristobal TOPETE Jr., et al., 2013) failed to calculate for the following reasons:    Cannot find a previous HDL lab    Cannot find a previous total cholesterol lab             ASSESSMENT AND PLAN     Patient Active Problem List   Diagnosis    HTN (hypertension)    History of left breast cancer    s/p L mastectomy, SLNBx 11/7    Ductal carcinoma in situ (DCIS) of left breast    Primary cancer of right middle lobe of lung    Regional lymph node metastasis present    Encounter for antineoplastic chemotherapy    Hypothyroidism    Hypothyroidism due to medication    Acute deep vein thrombosis (DVT) of non-extremity vein    Pneumonia due to COVID-19 virus    History of DVT (deep vein thrombosis)    History of lung cancer    Lung nodule    Diabetes    Atrial fibrillation         Patient with new onset atrial fibrillation. Patient has atrial fibrillation.  I had a detailed discussion with the patient regarding the etiology, the pathophysiology and treatment for atrial fibrillation. We discussed the increased risk of stroke in atrial fibrillation and the reasoning behind anticoagulation. Based on patient's CHADSVASC score there is a benefit of anticoagulation.   She is already on Xarelto.  Continue Xarelto.    Metoprolol as needed    Dyspnea on exertion which started after she had COVID infection.  Further management per pulmonology.    Follow-up in 3 months    Thank you very much for involving me in the care of your patient.  Please do not hesitate to contact me if there are any questions.      Zeinab Giraldo MD, FACC, [facility]  Interventional Cardiologist, [facility].           This note  was dictated with the help of speech recognition software.  There might be un-intended errors and/or substitutions.

## 2022-04-29 ENCOUNTER — HOSPITAL ENCOUNTER (OUTPATIENT)
Dept: RADIOLOGY | Facility: CLINIC | Age: 67
Discharge: HOME | End: 2022-04-29
Attending: FAMILY MEDICINE
Payer: COMMERCIAL

## 2022-04-29 DIAGNOSIS — Z78.0 ASYMPTOMATIC MENOPAUSE: ICD-10-CM

## 2022-04-29 PROCEDURE — 77080 DXA BONE DENSITY AXIAL: CPT | Mod: 26,,, | Performed by: INTERNAL MEDICINE

## 2022-04-29 PROCEDURE — 77080 DXA BONE DENSITY AXIAL: CPT | Mod: TC,PO

## 2022-04-29 PROCEDURE — 77080 DEXA BONE DENSITY SPINE HIP: ICD-10-PCS | Mod: 26,,, | Performed by: INTERNAL MEDICINE

## 2022-05-17 ENCOUNTER — TELEPHONE (OUTPATIENT)
Dept: HEMATOLOGY/ONCOLOGY | Facility: CLINIC | Age: 67
End: 2022-05-17
Payer: COMMERCIAL

## 2022-05-17 NOTE — TELEPHONE ENCOUNTER
"Returned call, no answer, left VM            ----- Message from Chikis Garcia sent at 5/16/2022 10:02 AM CDT -----         Consult/Advisory:      Name Of Caller:Fauzia  Contact Preference?:[phone]      Provider Name: Sergei  Does patient feel the need to be seen today?no      What is the nature of the call?:pt is asking to speak with the nurse regarding her upcoming appts      Additional Notes:  "Thank you for all that you do for our patients'"                             "

## 2022-05-18 DIAGNOSIS — E11.9 TYPE 2 DIABETES MELLITUS WITHOUT COMPLICATION, UNSPECIFIED WHETHER LONG TERM INSULIN USE: ICD-10-CM

## 2022-05-20 ENCOUNTER — TELEPHONE (OUTPATIENT)
Dept: HEMATOLOGY/ONCOLOGY | Facility: CLINIC | Age: 67
End: 2022-05-20
Payer: COMMERCIAL

## 2022-05-20 NOTE — TELEPHONE ENCOUNTER
"Returned pt call. Pt reports receiving word from CT scan that her upcoming CT scans scheduled for 5/30 of chest abd and pelvis are cancelled due to the contrast shortage.  Pt is asking if scans will be rescheduled later, if they can be done with out contrast, if another test can be ordered. Pt is asking if she should come to her appt with Dr. Mai on 5/30 if no scan are done to review.   Routed message to Dr. Mai            ----- Message from Whitney Armando sent at 5/20/2022  8:29 AM CDT -----  Contact Preference: [phone]         Patient returning phone call to: aY       Additional Notes:  "Thank you for all that you do for our patients'       "

## 2022-05-25 ENCOUNTER — TELEPHONE (OUTPATIENT)
Dept: HEMATOLOGY/ONCOLOGY | Facility: CLINIC | Age: 67
End: 2022-05-25
Payer: COMMERCIAL

## 2022-05-25 DIAGNOSIS — J90 PLEURAL EFFUSION, NOT ELSEWHERE CLASSIFIED: ICD-10-CM

## 2022-05-25 NOTE — TELEPHONE ENCOUNTER
"Returned call, CT rescheduled. Pt verbalized understanding and agrees to date time and location.             ----- Message from Evelyn Ramírez sent at 5/25/2022  9:05 AM CDT -----  Regarding: Consult/Advisory:  Name Of Caller:  Fauzia    Contact Preference?:  [phone]      What is the nature of the call?:  Would like to discuss her appt with Ya           Additional Notes:  "Thank you for all that you do for our patients'"       "

## 2022-05-26 ENCOUNTER — OFFICE VISIT (OUTPATIENT)
Dept: PULMONOLOGY | Facility: CLINIC | Age: 67
End: 2022-05-26
Payer: COMMERCIAL

## 2022-05-26 VITALS
WEIGHT: 184.94 LBS | HEART RATE: 87 BPM | BODY MASS INDEX: 29.72 KG/M2 | OXYGEN SATURATION: 99 % | HEIGHT: 66 IN | SYSTOLIC BLOOD PRESSURE: 115 MMHG | DIASTOLIC BLOOD PRESSURE: 67 MMHG

## 2022-05-26 DIAGNOSIS — J98.4 PNEUMONITIS: Primary | ICD-10-CM

## 2022-05-26 DIAGNOSIS — Z99.81 SUPPLEMENTAL OXYGEN DEPENDENT: ICD-10-CM

## 2022-05-26 DIAGNOSIS — C34.2 PRIMARY CANCER OF RIGHT MIDDLE LOBE OF LUNG: ICD-10-CM

## 2022-05-26 PROCEDURE — 99214 OFFICE O/P EST MOD 30 MIN: CPT | Mod: S$GLB,,, | Performed by: INTERNAL MEDICINE

## 2022-05-26 PROCEDURE — 3008F BODY MASS INDEX DOCD: CPT | Mod: CPTII,S$GLB,, | Performed by: INTERNAL MEDICINE

## 2022-05-26 PROCEDURE — 1159F PR MEDICATION LIST DOCUMENTED IN MEDICAL RECORD: ICD-10-PCS | Mod: CPTII,S$GLB,, | Performed by: INTERNAL MEDICINE

## 2022-05-26 PROCEDURE — 1126F PR PAIN SEVERITY QUANTIFIED, NO PAIN PRESENT: ICD-10-PCS | Mod: CPTII,S$GLB,, | Performed by: INTERNAL MEDICINE

## 2022-05-26 PROCEDURE — 3288F PR FALLS RISK ASSESSMENT DOCUMENTED: ICD-10-PCS | Mod: CPTII,S$GLB,, | Performed by: INTERNAL MEDICINE

## 2022-05-26 PROCEDURE — 3078F DIAST BP <80 MM HG: CPT | Mod: CPTII,S$GLB,, | Performed by: INTERNAL MEDICINE

## 2022-05-26 PROCEDURE — 3074F PR MOST RECENT SYSTOLIC BLOOD PRESSURE < 130 MM HG: ICD-10-PCS | Mod: CPTII,S$GLB,, | Performed by: INTERNAL MEDICINE

## 2022-05-26 PROCEDURE — 3288F FALL RISK ASSESSMENT DOCD: CPT | Mod: CPTII,S$GLB,, | Performed by: INTERNAL MEDICINE

## 2022-05-26 PROCEDURE — 99999 PR PBB SHADOW E&M-EST. PATIENT-LVL IV: CPT | Mod: PBBFAC,,, | Performed by: INTERNAL MEDICINE

## 2022-05-26 PROCEDURE — 1126F AMNT PAIN NOTED NONE PRSNT: CPT | Mod: CPTII,S$GLB,, | Performed by: INTERNAL MEDICINE

## 2022-05-26 PROCEDURE — 3051F HG A1C>EQUAL 7.0%<8.0%: CPT | Mod: CPTII,S$GLB,, | Performed by: INTERNAL MEDICINE

## 2022-05-26 PROCEDURE — 3051F PR MOST RECENT HEMOGLOBIN A1C LEVEL 7.0 - < 8.0%: ICD-10-PCS | Mod: CPTII,S$GLB,, | Performed by: INTERNAL MEDICINE

## 2022-05-26 PROCEDURE — 99214 PR OFFICE/OUTPT VISIT, EST, LEVL IV, 30-39 MIN: ICD-10-PCS | Mod: S$GLB,,, | Performed by: INTERNAL MEDICINE

## 2022-05-26 PROCEDURE — 1101F PT FALLS ASSESS-DOCD LE1/YR: CPT | Mod: CPTII,S$GLB,, | Performed by: INTERNAL MEDICINE

## 2022-05-26 PROCEDURE — 1159F MED LIST DOCD IN RCRD: CPT | Mod: CPTII,S$GLB,, | Performed by: INTERNAL MEDICINE

## 2022-05-26 PROCEDURE — 3008F PR BODY MASS INDEX (BMI) DOCUMENTED: ICD-10-PCS | Mod: CPTII,S$GLB,, | Performed by: INTERNAL MEDICINE

## 2022-05-26 PROCEDURE — 99999 PR PBB SHADOW E&M-EST. PATIENT-LVL IV: ICD-10-PCS | Mod: PBBFAC,,, | Performed by: INTERNAL MEDICINE

## 2022-05-26 PROCEDURE — 3078F PR MOST RECENT DIASTOLIC BLOOD PRESSURE < 80 MM HG: ICD-10-PCS | Mod: CPTII,S$GLB,, | Performed by: INTERNAL MEDICINE

## 2022-05-26 PROCEDURE — 1101F PR PT FALLS ASSESS DOC 0-1 FALLS W/OUT INJ PAST YR: ICD-10-PCS | Mod: CPTII,S$GLB,, | Performed by: INTERNAL MEDICINE

## 2022-05-26 PROCEDURE — 3074F SYST BP LT 130 MM HG: CPT | Mod: CPTII,S$GLB,, | Performed by: INTERNAL MEDICINE

## 2022-05-26 NOTE — PROGRESS NOTES
Fauzia Kirk  was seen as a new patient at the request  Era Urena MD for the evaluation of covid pneumonia.    CHIEF COMPLAINT:  HX of Covid      HISTORY OF PRESENT ILLNESS: Fauzia Kirk is a 66 y.o. female  has a past medical history of Arthritis, Breast cancer, lung cancer and Hypertension.  Patient with h/o bilateral breast cancer s/p mastectomy, chemo and brachytherapy.  H/o lung cancer in 2017 s/p chemo and radiation.  Patient is followed by Dr. Mai.  Last ct 10/5/21 with new rll 1 cm.  Repeat ct was delayed due to covid infection 1/2022.  Patient was hospitalized x 8 days.  Did not require vent support.  Patient was discharged with oxygen on 1/15/22.  Off oxygen since 4/22.  Active with adl.  Lifelong non-smoker.  Still active with adl.  Still cooks and clean.  No chest pain.  No coughing.  No wheezing.  No hemoptysis.  No weight loss.      Cancer history:  Breast:  She had a stage I, ER negative carcinoma of the  left breast in 1990, treated with lumpectomy and radiation therapy.    In  1993, she developed a stage I, ER positive carcinoma of the right breast treated with lumpectomy and radiation.    In 1995, she developed left breast cancer recurrence, treated with lumpectomy, brachytherapy and four cycles    of Adriamycin and Cytoxan.  In 1996, she developed a new right breast cancer T2 N0 poorly differentiated, treated with four cycles of              preoperative Taxol followed by right mastectomy.  On October 17 left mammogram showed increased calcifications in the left breast at 3:00.  On October 27 a biopsy showed DCIS with solid, cribriform, and micropapillary patterns.  Tumor was 95% ER positive at 95% DC positive   On November 7, 2016 she underwent left mastectomy which showed 8 mm of DCIS and negative margins.    Lung:    In December 2016 she began to have some blood in her mucus after coughing.In  April she developed a persistent cough and saw her physician on April 18.  Chest x-ray  at that time showed a rounded opacity in the right lung overlying the major fissure. Chest CT in May 2017 showed a 4.8 x 4.0 cm, rounded, soft tissue mass in the middle lobe between the medial and lateral segments. There was pre-carinal adenopathy.     Subsequently she underwent bronchoscopy with EBUS on 6/9/17 by Dr. Maza. Needle biopsy of the mediastinal nodes was positive for poorly differentiated carcinoma with squamoid features.      She completed radiation together with weekly chemotherapy with carboplatin and Taxol  for stage IIIA NSCLC disease.    She completed therapy on September 13 and received 7 chemotherapy treatments.    CT scan from September 29 showed that the right middle lobe mass had decreased to 4.2 x 2.3 cm from 4.8 x 4.3 cm. + rll traction bronchiectasis.      She then began adjuvant immunotherapy starting in November 2017.  She completed 26 cycles of Durvalumab on 11/2/18.          CT chest scheduled for next week.      PAST MEDICAL HISTORY:    Active Ambulatory Problems     Diagnosis Date Noted    HTN (hypertension) 03/18/2015    History of left breast cancer 10/06/2015    s/p L mastectomy, SLNBx 11/7 11/07/2016    Ductal carcinoma in situ (DCIS) of left breast 11/25/2016    Pneumonitis 05/18/2017    Primary cancer of right middle lobe of lung 07/13/2017    Regional lymph node metastasis present 07/13/2017    Encounter for antineoplastic chemotherapy 07/13/2017    Hypothyroidism 12/11/2017    Hypothyroidism due to medication 02/02/2018    Acute deep vein thrombosis (DVT) of non-extremity vein 11/30/2021    Pneumonia due to COVID-19 virus 01/06/2022    History of DVT (deep vein thrombosis) 01/06/2022    History of lung cancer 01/06/2022    Lung nodule 01/06/2022    Diabetes 01/07/2022    Atrial fibrillation 01/08/2022     Resolved Ambulatory Problems     Diagnosis Date Noted    Chronic anticoagulation 01/06/2022     Past Medical History:   Diagnosis Date    Arthritis      Breast cancer     Hypertension                 PAST SURGICAL HISTORY:    Past Surgical History:   Procedure Laterality Date    BREAST LUMPECTOMY       SECTION, CLASSIC      LIPOMA RESECTION      MASTECTOMY           FAMILY HISTORY:                Family History   Problem Relation Age of Onset    Lymphoma Mother     Prostate cancer Father     Stomach cancer Sister     Breast cancer Sister     Diabetes Brother     Breast cancer Paternal Aunt     Breast cancer Paternal Grandmother        SOCIAL HISTORY:          Tobacco:   Social History     Tobacco Use   Smoking Status Never Smoker   Smokeless Tobacco Never Used     alcohol use:    Social History     Substance and Sexual Activity   Alcohol Use No    Alcohol/week: 0.0 standard drinks               Occupation: retire     ALLERGIES:  Review of patient's allergies indicates:  No Known Allergies    CURRENT MEDICATIONS:    Current Outpatient Medications   Medication Sig Dispense Refill    amLODIPine (NORVASC) 10 MG tablet Take 1 tablet (10 mg total) by mouth once daily. 90 tablet 3    levothyroxine (SYNTHROID) 100 MCG tablet Take 1 tablet (100 mcg total) by mouth once daily. 30 tablet 11    lidocaine-prilocaine (EMLA) cream Apply topically as needed. 5 g 3    metoprolol tartrate (LOPRESSOR) 25 MG tablet Take 1 tablet (25 mg total) by mouth 2 (two) times daily. 60 tablet 11    rivaroxaban (XARELTO) 20 mg Tab Take 1 tablet (20 mg total) by mouth daily with dinner or evening meal. Do not start until completed 21 days of 15 mg twice a day with meals 90 tablet 3    omeprazole (PRILOSEC) 40 MG capsule Take 1 capsule (40 mg total) by mouth once daily. 30 capsule 11     No current facility-administered medications for this visit.                  REVIEW OF SYSTEMS:     Pulmonary related symptoms as per HPI.  Gen:  no weight loss, no fever, no night sweat  HEENT:  no visual changes, no sore throat, no hearing loss  CV:  No chest pain, no  "orthopnea, no PND  GI:  no melena, no hematochezia, no diarhea, no constipation.  :  no dysuria, no hematuria, no hesistancy, no dribbling  Neuro:  no syncope, no vertigo, no tinitus  Psych:  No homocide or suicide ideation; no depression.  Endocrine:  No heat or cold intolerance.  Sleep:  No snoring; no witnessed apnea.  Rested upon awake.    Otherwise, a balance of systems reviewed is negative.          PHYSICAL EXAM:  Vitals:    05/26/22 1412   BP: 115/67   Pulse: 87   SpO2: 99%   Weight: 83.9 kg (184 lb 15.5 oz)   Height: 5' 6" (1.676 m)   PainSc: 0-No pain     Body mass index is 29.85 kg/m².     GENERAL:  well develop; no apparent distress  HEENT:  no nasal congestion; no discharge noted; class 3 modified mallampatti.   NECK:  supple; no palpable masses.  CARDIO: regular rate and rhythm  PULM:  clear to auscultation bilaterally; no intercostals retractions; no accessory muscle usage   ABDOMEN:  soft nontender/nondistended.  +bowel sound  EXTREMITIES no cce  NEURO:  CN II-XII intact.  5/5 motor in all extremities.  sensation grossly intact   to light touch.  PSYCH:  normal affect.  Alert and oriented x 4    LABS  Pulmonary Functions Testing Results(personally reviewed):  none  ABG (personally reviewed):  none  CXR (personally reviewed):  1/9/22 blunting of right costophrenic angle.    CT CHEST(personally reviewed):  10/5/22 chronic elevation of right hemidiaphragm along with airspace disease in rml with traction bronciectasis.  New rll 1 cm nodule (2:50)    ASSESSMENT/PLAN  Problem List Items Addressed This Visit     Pneumonitis - Primary    Overview     -rml rll airspace disease c/w radiation pneumonitis.             Relevant Orders    Complete PFT with bronchodilator    Primary cancer of right middle lobe of lung    Overview     -diagnosed in 2016.  S/p chemo, radiation and immunotherapy  -mass reduced to airspace disease c/w radiation pneumonitis  -new 1 cm rll.  Repeat ct pending.               Other Visit " Diagnoses     Supplemental oxygen dependent                Patient will No follow-ups on file. with md/np.    CC: Send copy of this note to Era Urena MD

## 2022-05-27 PROBLEM — J98.4 PNEUMONITIS: Status: ACTIVE | Noted: 2017-05-18

## 2022-05-30 ENCOUNTER — INFUSION (OUTPATIENT)
Dept: INFUSION THERAPY | Facility: HOSPITAL | Age: 67
End: 2022-05-30
Attending: INTERNAL MEDICINE
Payer: COMMERCIAL

## 2022-05-30 ENCOUNTER — OFFICE VISIT (OUTPATIENT)
Dept: HEMATOLOGY/ONCOLOGY | Facility: CLINIC | Age: 67
End: 2022-05-30
Payer: COMMERCIAL

## 2022-05-30 ENCOUNTER — HOSPITAL ENCOUNTER (OUTPATIENT)
Dept: RADIOLOGY | Facility: HOSPITAL | Age: 67
Discharge: HOME | End: 2022-05-30
Attending: INTERNAL MEDICINE
Payer: COMMERCIAL

## 2022-05-30 VITALS
BODY MASS INDEX: 29.69 KG/M2 | SYSTOLIC BLOOD PRESSURE: 138 MMHG | TEMPERATURE: 98 F | OXYGEN SATURATION: 96 % | HEIGHT: 66 IN | DIASTOLIC BLOOD PRESSURE: 63 MMHG | HEART RATE: 76 BPM | WEIGHT: 184.75 LBS | RESPIRATION RATE: 18 BRPM

## 2022-05-30 DIAGNOSIS — C34.2 PRIMARY CANCER OF RIGHT MIDDLE LOBE OF LUNG: Primary | ICD-10-CM

## 2022-05-30 DIAGNOSIS — J90 PLEURAL EFFUSION, NOT ELSEWHERE CLASSIFIED: ICD-10-CM

## 2022-05-30 DIAGNOSIS — Z85.3 HISTORY OF LEFT BREAST CANCER: ICD-10-CM

## 2022-05-30 PROCEDURE — 71250 CT CHEST WITHOUT CONTRAST: ICD-10-PCS | Mod: 26,,, | Performed by: RADIOLOGY

## 2022-05-30 PROCEDURE — 3051F PR MOST RECENT HEMOGLOBIN A1C LEVEL 7.0 - < 8.0%: ICD-10-PCS | Mod: CPTII,S$GLB,, | Performed by: INTERNAL MEDICINE

## 2022-05-30 PROCEDURE — 99214 OFFICE O/P EST MOD 30 MIN: CPT | Mod: S$GLB,,, | Performed by: INTERNAL MEDICINE

## 2022-05-30 PROCEDURE — A4216 STERILE WATER/SALINE, 10 ML: HCPCS | Performed by: INTERNAL MEDICINE

## 2022-05-30 PROCEDURE — 25000003 PHARM REV CODE 250: Performed by: INTERNAL MEDICINE

## 2022-05-30 PROCEDURE — 36593 DECLOT VASCULAR DEVICE: CPT

## 2022-05-30 PROCEDURE — 1101F PR PT FALLS ASSESS DOC 0-1 FALLS W/OUT INJ PAST YR: ICD-10-PCS | Mod: CPTII,S$GLB,, | Performed by: INTERNAL MEDICINE

## 2022-05-30 PROCEDURE — 3051F HG A1C>EQUAL 7.0%<8.0%: CPT | Mod: CPTII,S$GLB,, | Performed by: INTERNAL MEDICINE

## 2022-05-30 PROCEDURE — 3008F PR BODY MASS INDEX (BMI) DOCUMENTED: ICD-10-PCS | Mod: CPTII,S$GLB,, | Performed by: INTERNAL MEDICINE

## 2022-05-30 PROCEDURE — 1125F AMNT PAIN NOTED PAIN PRSNT: CPT | Mod: CPTII,S$GLB,, | Performed by: INTERNAL MEDICINE

## 2022-05-30 PROCEDURE — 3075F PR MOST RECENT SYSTOLIC BLOOD PRESS GE 130-139MM HG: ICD-10-PCS | Mod: CPTII,S$GLB,, | Performed by: INTERNAL MEDICINE

## 2022-05-30 PROCEDURE — 71250 CT THORAX DX C-: CPT | Mod: 26,,, | Performed by: RADIOLOGY

## 2022-05-30 PROCEDURE — 3288F FALL RISK ASSESSMENT DOCD: CPT | Mod: CPTII,S$GLB,, | Performed by: INTERNAL MEDICINE

## 2022-05-30 PROCEDURE — 3078F DIAST BP <80 MM HG: CPT | Mod: CPTII,S$GLB,, | Performed by: INTERNAL MEDICINE

## 2022-05-30 PROCEDURE — 99214 PR OFFICE/OUTPT VISIT, EST, LEVL IV, 30-39 MIN: ICD-10-PCS | Mod: S$GLB,,, | Performed by: INTERNAL MEDICINE

## 2022-05-30 PROCEDURE — 99999 PR PBB SHADOW E&M-EST. PATIENT-LVL IV: CPT | Mod: PBBFAC,,, | Performed by: INTERNAL MEDICINE

## 2022-05-30 PROCEDURE — 63600175 PHARM REV CODE 636 W HCPCS: Mod: JG | Performed by: INTERNAL MEDICINE

## 2022-05-30 PROCEDURE — 3075F SYST BP GE 130 - 139MM HG: CPT | Mod: CPTII,S$GLB,, | Performed by: INTERNAL MEDICINE

## 2022-05-30 PROCEDURE — 99999 PR PBB SHADOW E&M-EST. PATIENT-LVL IV: ICD-10-PCS | Mod: PBBFAC,,, | Performed by: INTERNAL MEDICINE

## 2022-05-30 PROCEDURE — 1159F MED LIST DOCD IN RCRD: CPT | Mod: CPTII,S$GLB,, | Performed by: INTERNAL MEDICINE

## 2022-05-30 PROCEDURE — 1160F PR REVIEW ALL MEDS BY PRESCRIBER/CLIN PHARMACIST DOCUMENTED: ICD-10-PCS | Mod: CPTII,S$GLB,, | Performed by: INTERNAL MEDICINE

## 2022-05-30 PROCEDURE — 1101F PT FALLS ASSESS-DOCD LE1/YR: CPT | Mod: CPTII,S$GLB,, | Performed by: INTERNAL MEDICINE

## 2022-05-30 PROCEDURE — 3008F BODY MASS INDEX DOCD: CPT | Mod: CPTII,S$GLB,, | Performed by: INTERNAL MEDICINE

## 2022-05-30 PROCEDURE — 3288F PR FALLS RISK ASSESSMENT DOCUMENTED: ICD-10-PCS | Mod: CPTII,S$GLB,, | Performed by: INTERNAL MEDICINE

## 2022-05-30 PROCEDURE — 71250 CT THORAX DX C-: CPT | Mod: TC

## 2022-05-30 PROCEDURE — 1159F PR MEDICATION LIST DOCUMENTED IN MEDICAL RECORD: ICD-10-PCS | Mod: CPTII,S$GLB,, | Performed by: INTERNAL MEDICINE

## 2022-05-30 PROCEDURE — 1125F PR PAIN SEVERITY QUANTIFIED, PAIN PRESENT: ICD-10-PCS | Mod: CPTII,S$GLB,, | Performed by: INTERNAL MEDICINE

## 2022-05-30 PROCEDURE — 1160F RVW MEDS BY RX/DR IN RCRD: CPT | Mod: CPTII,S$GLB,, | Performed by: INTERNAL MEDICINE

## 2022-05-30 PROCEDURE — 3078F PR MOST RECENT DIASTOLIC BLOOD PRESSURE < 80 MM HG: ICD-10-PCS | Mod: CPTII,S$GLB,, | Performed by: INTERNAL MEDICINE

## 2022-05-30 RX ORDER — SODIUM CHLORIDE 0.9 % (FLUSH) 0.9 %
10 SYRINGE (ML) INJECTION
Status: COMPLETED | OUTPATIENT
Start: 2022-05-30 | End: 2022-05-30

## 2022-05-30 RX ORDER — HEPARIN 100 UNIT/ML
500 SYRINGE INTRAVENOUS
Status: COMPLETED | OUTPATIENT
Start: 2022-05-30 | End: 2022-05-30

## 2022-05-30 RX ADMIN — Medication 10 ML: at 09:05

## 2022-05-30 RX ADMIN — ALTEPLASE 2 MG: 2.2 INJECTION, POWDER, LYOPHILIZED, FOR SOLUTION INTRAVENOUS at 11:05

## 2022-05-30 RX ADMIN — HEPARIN 500 UNITS: 100 SYRINGE at 09:05

## 2022-05-30 NOTE — NURSING
Pt presented to unit this am for lab draw from port. No blood return from port. Pt refused to have labs drawn peripherally. Explained to pt that placing activase in her port was another option. Pt left unit to go and have her scheduled PET scan done and would return to unit afterwards. Pt left unit with needle still in but no activase instilled. Message sent to Dr. Mai who agreed with placement of activase after pt returned from PET scan. Pt seen per Dr. Mai upon return from pet scan.  Pt returned to unit after seeing Dr. Mai. 2mg of activase instilled in port. Activase allowed to dwell for 50 minutes. After the 50 minute dwell time still no blood return from port. Only able to aspirate 1cc of the 2.1cc's instilled. Notified Dr. Mai who ordered that pt can be deaccessed. Needle removed and pt discharged to home.

## 2022-05-30 NOTE — PROGRESS NOTES
Subjective:       Patient ID: Fauzia Kirk is a 66 y.o. female.    Chief Complaint: No chief complaint on file.    HPI Ms. Kirk is a very pleasant 66-year-old  female who returned  for F/U of stage III right lung cancer.    She had chemo/XRT then adjuvant therapy with Durvalumab.    That was completed November 2018.    She was hospitalized with COVID pneumonia in January this year and was initially oxygen dependent; however, she was able to stop that in April.  She had recent follow-up in pulmonary medicine.  Her breathing is close to normal.  Other than her chronic right leg pain, she has no other pain.  Appetite is good.       Lung History:      In December 2016 she began to have some blood in her mucus after coughing.In  April she developed a persistent cough and saw her physician on April 18.  Chest x-ray at that time showed a rounded opacity in the right lung overlying the major fissure.   Chest CT in May showed a 4.8 x 4.0 cm, rounded, soft tissue mass in the middle lobe between the medial and lateral segments. There was pre-carinal adenopathy.    Subsequently she underwent bronchoscopy with EBUS on 6/9/17. Needle biopsy of the medistinal nodes was positive for poorly differentiated carcinoma with squamoid features. The cells were positive for CK7, CK5/6, and P63 and are negative for CK20, GCDFP, TTF1, ER, SC, Napsin and YEMI 3.This was felt to be most CW a new squamous lung cancer.    PET CT  demonstrated a hypermetabolic, large mass in the right middle lobe with an SUV max of 20.23. There was hypermetabolic activity extending from this mass tracking along the pleura. There was a hypermetabolic right hilar lymph node demonstrating an SUV max of 20.2. In addition there were 2 hypermetabolic subcarinal lymph nodes with the larger demonstrating an SUV max of 14.2. An additional right paratracheal lymph node was seen with an SUV max of 26.1.    She completed radiation together with weekly chemotherapy  with carboplatin and Taxol  for stage IIIA disease.    She completed therapy on September 13, 2017 and received 7 chemotherapy treatments.    CT scan from September 29 showed that the right middle lobe mass had decreased to 4.2 x 2.3 cm from 4.8 x 4.3 cm.  Stable 4 mm pulmonary nodule in the left lower lobe.    She then began adjuvant immunotherapy starting in November 2017.  She completed 26 cycles of Durvalumab on 11/2/18.        Previous cancer history:She had a stage I, ER negative carcinoma of the    left breast in 1990, treated with lumpectomy and radiation therapy.  In      1993, she developed a stage I, ER positive carcinoma of the right breast     treated with lumpectomy and radiation.  In 1995, she developed left breast   cancer recurrence, treated with lumpectomy, brachytherapy and four cycles    of Adriamycin and Cytoxan.  In 1996, she developed a new right breast        cancer T2 N0 poorly differentiated, treated with four cycles of              preoperative Taxol followed by mastectomy.        On October 17, 2016 left mammogram showed increased calcifications in the left breast at 3:00.  On October 27 a biopsy showed DCIS with solid, cribriform, and micropapillary patterns.  Tumor was 95% ER positive at 95% MD positive    On November 7, 2016 she underwent left mastectomy which showed 8 mm of DCIS and negative margins.        Review of Systems   Constitutional: Negative for activity change, appetite change, fatigue, fever and unexpected weight change.   HENT: Negative for postnasal drip and trouble swallowing.    Respiratory: Positive for shortness of breath (mostly resolved). Negative for cough.    Cardiovascular: Negative for chest pain.   Gastrointestinal: Negative for abdominal pain, constipation, nausea and vomiting.   Musculoskeletal: Negative for back pain.        Chronic right leg pain    Skin: Positive for rash.   Neurological: Negative for headaches.   Psychiatric/Behavioral: Negative for  dysphoric mood. The patient is not nervous/anxious.        Objective:      Physical Exam  Constitutional:       Appearance: She is well-developed.   Cardiovascular:      Rate and Rhythm: Normal rate and regular rhythm.   Pulmonary:      Effort: Pulmonary effort is normal.      Breath sounds: Normal breath sounds. No wheezing or rales.   Chest:   Breasts:      Right: No axillary adenopathy or supraclavicular adenopathy.      Left: No axillary adenopathy or supraclavicular adenopathy.         Abdominal:      Palpations: Abdomen is soft. There is no mass.      Tenderness: There is no abdominal tenderness.   Lymphadenopathy:      Cervical: No cervical adenopathy.      Upper Body:      Right upper body: No supraclavicular or axillary adenopathy.      Left upper body: No supraclavicular or axillary adenopathy.   Skin:     Findings: No erythema or rash.   Neurological:      Mental Status: She is alert and oriented to person, place, and time.   Psychiatric:         Mood and Affect: Mood normal.         Behavior: Behavior normal.         Thought Content: Thought content normal.         Judgment: Judgment normal.         Assessment:   CT chest-  1.  A right lower lobe irregular ground-glass nodule is no longer seen.  A subcentimeter left lower lobe nodule appears stable.     2.  Status post bilateral mastectomy with ipsilateral axillary hank dissection.  No specific evidence of recurrent disease in the surgical bed.     3.  Right middle lobe cicatricial atelectasis with adjacent pleural thickening; favor post radiation treatment fibrosis.    1. Primary cancer of right middle lobe of lung    2. History of left breast cancer       Plan:       RTC 6 M with labs and CT chest      Route Chart for Scheduling    Med Onc Chart Routing      Follow up with physician 6 months.   Follow up with [facility]    Labs CBC, CMP and TSH   Lab interval:     Imaging CT chest abdomen pelvis   Non - contast Chest CT   Pharmacy appointment    Other  referrals            Therapy Plan Information  Flushes  heparin, porcine (PF) 100 unit/mL injection flush 500 Units  500 Units, Intravenous, Every visit  sodium chloride 0.9% flush 10 mL  10 mL, Intravenous, Every visit

## 2022-05-31 ENCOUNTER — PATIENT MESSAGE (OUTPATIENT)
Dept: ADMINISTRATIVE | Facility: HOSPITAL | Age: 67
End: 2022-05-31
Payer: COMMERCIAL

## 2022-06-08 ENCOUNTER — LAB VISIT (OUTPATIENT)
Dept: LAB | Facility: HOSPITAL | Age: 67
End: 2022-06-08
Attending: FAMILY MEDICINE
Payer: COMMERCIAL

## 2022-06-08 DIAGNOSIS — I10 HYPERTENSION, UNSPECIFIED TYPE: ICD-10-CM

## 2022-06-08 DIAGNOSIS — R73.03 PREDIABETES: ICD-10-CM

## 2022-06-08 LAB
ALBUMIN SERPL BCP-MCNC: 3.5 G/DL (ref 3.5–5.2)
ALP SERPL-CCNC: 63 U/L (ref 55–135)
ALT SERPL W/O P-5'-P-CCNC: 7 U/L (ref 10–44)
ANION GAP SERPL CALC-SCNC: 11 MMOL/L (ref 8–16)
AST SERPL-CCNC: 13 U/L (ref 10–40)
BASOPHILS # BLD AUTO: 0.04 K/UL (ref 0–0.2)
BASOPHILS NFR BLD: 0.7 % (ref 0–1.9)
BILIRUB SERPL-MCNC: 0.6 MG/DL (ref 0.1–1)
BUN SERPL-MCNC: 20 MG/DL (ref 8–23)
CALCIUM SERPL-MCNC: 9.7 MG/DL (ref 8.7–10.5)
CHLORIDE SERPL-SCNC: 108 MMOL/L (ref 95–110)
CHOLEST SERPL-MCNC: 213 MG/DL (ref 120–199)
CHOLEST/HDLC SERPL: 5.9 {RATIO} (ref 2–5)
CO2 SERPL-SCNC: 21 MMOL/L (ref 23–29)
CREAT SERPL-MCNC: 1 MG/DL (ref 0.5–1.4)
DIFFERENTIAL METHOD: ABNORMAL
EOSINOPHIL # BLD AUTO: 0.1 K/UL (ref 0–0.5)
EOSINOPHIL NFR BLD: 2.5 % (ref 0–8)
ERYTHROCYTE [DISTWIDTH] IN BLOOD BY AUTOMATED COUNT: 15.2 % (ref 11.5–14.5)
EST. GFR  (AFRICAN AMERICAN): >60 ML/MIN/1.73 M^2
EST. GFR  (NON AFRICAN AMERICAN): 58 ML/MIN/1.73 M^2
ESTIMATED AVG GLUCOSE: 123 MG/DL (ref 68–131)
GLUCOSE SERPL-MCNC: 98 MG/DL (ref 70–110)
HBA1C MFR BLD: 5.9 % (ref 4–5.6)
HCT VFR BLD AUTO: 36.5 % (ref 37–48.5)
HDLC SERPL-MCNC: 36 MG/DL (ref 40–75)
HDLC SERPL: 16.9 % (ref 20–50)
HGB BLD-MCNC: 11 G/DL (ref 12–16)
IMM GRANULOCYTES # BLD AUTO: 0.01 K/UL (ref 0–0.04)
IMM GRANULOCYTES NFR BLD AUTO: 0.2 % (ref 0–0.5)
LDLC SERPL CALC-MCNC: 149.8 MG/DL (ref 63–159)
LYMPHOCYTES # BLD AUTO: 1.1 K/UL (ref 1–4.8)
LYMPHOCYTES NFR BLD: 20.2 % (ref 18–48)
MCH RBC QN AUTO: 27 PG (ref 27–31)
MCHC RBC AUTO-ENTMCNC: 30.1 G/DL (ref 32–36)
MCV RBC AUTO: 90 FL (ref 82–98)
MONOCYTES # BLD AUTO: 0.4 K/UL (ref 0.3–1)
MONOCYTES NFR BLD: 7.8 % (ref 4–15)
NEUTROPHILS # BLD AUTO: 3.9 K/UL (ref 1.8–7.7)
NEUTROPHILS NFR BLD: 68.6 % (ref 38–73)
NONHDLC SERPL-MCNC: 177 MG/DL
NRBC BLD-RTO: 0 /100 WBC
PLATELET # BLD AUTO: 263 K/UL (ref 150–450)
PMV BLD AUTO: 10.5 FL (ref 9.2–12.9)
POTASSIUM SERPL-SCNC: 3.9 MMOL/L (ref 3.5–5.1)
PROT SERPL-MCNC: 7.4 G/DL (ref 6–8.4)
RBC # BLD AUTO: 4.08 M/UL (ref 4–5.4)
SODIUM SERPL-SCNC: 140 MMOL/L (ref 136–145)
T4 FREE SERPL-MCNC: 1.31 NG/DL (ref 0.71–1.51)
TRIGL SERPL-MCNC: 136 MG/DL (ref 30–150)
TSH SERPL DL<=0.005 MIU/L-ACNC: 0.01 UIU/ML (ref 0.4–4)
WBC # BLD AUTO: 5.65 K/UL (ref 3.9–12.7)

## 2022-06-08 PROCEDURE — 83036 HEMOGLOBIN GLYCOSYLATED A1C: CPT | Performed by: FAMILY MEDICINE

## 2022-06-08 PROCEDURE — 84443 ASSAY THYROID STIM HORMONE: CPT | Performed by: FAMILY MEDICINE

## 2022-06-08 PROCEDURE — 80061 LIPID PANEL: CPT | Performed by: FAMILY MEDICINE

## 2022-06-08 PROCEDURE — 84439 ASSAY OF FREE THYROXINE: CPT | Performed by: FAMILY MEDICINE

## 2022-06-08 PROCEDURE — 80053 COMPREHEN METABOLIC PANEL: CPT | Performed by: FAMILY MEDICINE

## 2022-06-08 PROCEDURE — 85025 COMPLETE CBC W/AUTO DIFF WBC: CPT | Performed by: FAMILY MEDICINE

## 2022-06-08 PROCEDURE — 36415 COLL VENOUS BLD VENIPUNCTURE: CPT | Mod: PO | Performed by: FAMILY MEDICINE

## 2022-06-14 ENCOUNTER — OFFICE VISIT (OUTPATIENT)
Dept: FAMILY MEDICINE | Facility: CLINIC | Age: 67
End: 2022-06-14
Payer: COMMERCIAL

## 2022-06-14 VITALS
OXYGEN SATURATION: 95 % | DIASTOLIC BLOOD PRESSURE: 70 MMHG | BODY MASS INDEX: 29.41 KG/M2 | SYSTOLIC BLOOD PRESSURE: 120 MMHG | TEMPERATURE: 98 F | HEART RATE: 80 BPM | WEIGHT: 183 LBS | HEIGHT: 66 IN

## 2022-06-14 DIAGNOSIS — E03.9 HYPOTHYROIDISM, UNSPECIFIED TYPE: ICD-10-CM

## 2022-06-14 DIAGNOSIS — Z85.3 HISTORY OF LEFT BREAST CANCER: ICD-10-CM

## 2022-06-14 DIAGNOSIS — I10 HYPERTENSION, UNSPECIFIED TYPE: ICD-10-CM

## 2022-06-14 DIAGNOSIS — R73.03 PREDIABETES: Primary | ICD-10-CM

## 2022-06-14 DIAGNOSIS — H61.22 LEFT EAR IMPACTED CERUMEN: ICD-10-CM

## 2022-06-14 PROCEDURE — 1160F PR REVIEW ALL MEDS BY PRESCRIBER/CLIN PHARMACIST DOCUMENTED: ICD-10-PCS | Mod: CPTII,S$GLB,, | Performed by: FAMILY MEDICINE

## 2022-06-14 PROCEDURE — 3066F NEPHROPATHY DOC TX: CPT | Mod: CPTII,S$GLB,, | Performed by: FAMILY MEDICINE

## 2022-06-14 PROCEDURE — 1159F MED LIST DOCD IN RCRD: CPT | Mod: CPTII,S$GLB,, | Performed by: FAMILY MEDICINE

## 2022-06-14 PROCEDURE — 1159F PR MEDICATION LIST DOCUMENTED IN MEDICAL RECORD: ICD-10-PCS | Mod: CPTII,S$GLB,, | Performed by: FAMILY MEDICINE

## 2022-06-14 PROCEDURE — 3078F PR MOST RECENT DIASTOLIC BLOOD PRESSURE < 80 MM HG: ICD-10-PCS | Mod: CPTII,S$GLB,, | Performed by: FAMILY MEDICINE

## 2022-06-14 PROCEDURE — 3066F PR DOCUMENTATION OF TREATMENT FOR NEPHROPATHY: ICD-10-PCS | Mod: CPTII,S$GLB,, | Performed by: FAMILY MEDICINE

## 2022-06-14 PROCEDURE — 1101F PT FALLS ASSESS-DOCD LE1/YR: CPT | Mod: CPTII,S$GLB,, | Performed by: FAMILY MEDICINE

## 2022-06-14 PROCEDURE — 3074F SYST BP LT 130 MM HG: CPT | Mod: CPTII,S$GLB,, | Performed by: FAMILY MEDICINE

## 2022-06-14 PROCEDURE — 3008F BODY MASS INDEX DOCD: CPT | Mod: CPTII,S$GLB,, | Performed by: FAMILY MEDICINE

## 2022-06-14 PROCEDURE — 99397 PER PM REEVAL EST PAT 65+ YR: CPT | Mod: S$GLB,,, | Performed by: FAMILY MEDICINE

## 2022-06-14 PROCEDURE — 3044F HG A1C LEVEL LT 7.0%: CPT | Mod: CPTII,S$GLB,, | Performed by: FAMILY MEDICINE

## 2022-06-14 PROCEDURE — 3061F NEG MICROALBUMINURIA REV: CPT | Mod: CPTII,S$GLB,, | Performed by: FAMILY MEDICINE

## 2022-06-14 PROCEDURE — 99397 PR PREVENTIVE VISIT,EST,65 & OVER: ICD-10-PCS | Mod: S$GLB,,, | Performed by: FAMILY MEDICINE

## 2022-06-14 PROCEDURE — 3074F PR MOST RECENT SYSTOLIC BLOOD PRESSURE < 130 MM HG: ICD-10-PCS | Mod: CPTII,S$GLB,, | Performed by: FAMILY MEDICINE

## 2022-06-14 PROCEDURE — 99999 PR PBB SHADOW E&M-EST. PATIENT-LVL IV: CPT | Mod: PBBFAC,,, | Performed by: FAMILY MEDICINE

## 2022-06-14 PROCEDURE — 1126F AMNT PAIN NOTED NONE PRSNT: CPT | Mod: CPTII,S$GLB,, | Performed by: FAMILY MEDICINE

## 2022-06-14 PROCEDURE — 3078F DIAST BP <80 MM HG: CPT | Mod: CPTII,S$GLB,, | Performed by: FAMILY MEDICINE

## 2022-06-14 PROCEDURE — 3288F FALL RISK ASSESSMENT DOCD: CPT | Mod: CPTII,S$GLB,, | Performed by: FAMILY MEDICINE

## 2022-06-14 PROCEDURE — 3044F PR MOST RECENT HEMOGLOBIN A1C LEVEL <7.0%: ICD-10-PCS | Mod: CPTII,S$GLB,, | Performed by: FAMILY MEDICINE

## 2022-06-14 PROCEDURE — 1160F RVW MEDS BY RX/DR IN RCRD: CPT | Mod: CPTII,S$GLB,, | Performed by: FAMILY MEDICINE

## 2022-06-14 PROCEDURE — 99999 PR PBB SHADOW E&M-EST. PATIENT-LVL IV: ICD-10-PCS | Mod: PBBFAC,,, | Performed by: FAMILY MEDICINE

## 2022-06-14 PROCEDURE — 1101F PR PT FALLS ASSESS DOC 0-1 FALLS W/OUT INJ PAST YR: ICD-10-PCS | Mod: CPTII,S$GLB,, | Performed by: FAMILY MEDICINE

## 2022-06-14 PROCEDURE — 3061F PR NEG MICROALBUMINURIA RESULT DOCUMENTED/REVIEW: ICD-10-PCS | Mod: CPTII,S$GLB,, | Performed by: FAMILY MEDICINE

## 2022-06-14 PROCEDURE — 1126F PR PAIN SEVERITY QUANTIFIED, NO PAIN PRESENT: ICD-10-PCS | Mod: CPTII,S$GLB,, | Performed by: FAMILY MEDICINE

## 2022-06-14 PROCEDURE — 3288F PR FALLS RISK ASSESSMENT DOCUMENTED: ICD-10-PCS | Mod: CPTII,S$GLB,, | Performed by: FAMILY MEDICINE

## 2022-06-14 PROCEDURE — 3008F PR BODY MASS INDEX (BMI) DOCUMENTED: ICD-10-PCS | Mod: CPTII,S$GLB,, | Performed by: FAMILY MEDICINE

## 2022-06-14 NOTE — PROGRESS NOTES
"Routine Office Visit    Fauzia Kirk  1955  [id number]      Subjective     Fauzia is a 66 y.o. female who presents today for:    1. Annual physical   2. Discuss labs   3. Prediabetes - Patient has been working on her diet. She has improved what she eats and how much she eats. She admits she could be more active.   4. DVT - Patient continues to be on xarelto; she does not have compression stockings on       Objective     Review of Systems   Constitutional: Negative for chills and fever.   HENT: Negative for congestion.    Eyes: Negative for blurred vision.   Respiratory: Negative for cough.    Cardiovascular: Negative for chest pain.   Gastrointestinal: Negative for abdominal pain, constipation, diarrhea, heartburn, nausea and vomiting.   Genitourinary: Negative for dysuria.   Musculoskeletal: Negative for myalgias.   Skin: Negative for itching and rash.   Neurological: Negative for dizziness and headaches.   Psychiatric/Behavioral: Negative for depression.       /70 (BP Location: Right arm, Patient Position: Sitting, BP Method: Large (Manual))   Pulse 80   Temp 97.9 °F (36.6 °C) (Oral)   Ht 5' 6" (1.676 m)   Wt 83 kg (182 lb 15.7 oz)   SpO2 95%   BMI 29.53 kg/m²   Physical Exam  Constitutional:       Appearance: She is well-developed.   HENT:      Head: Normocephalic and atraumatic.      Comments: TM clear after ear wax removal by me   Eyes:      Conjunctiva/sclera: Conjunctivae normal.      Pupils: Pupils are equal, round, and reactive to light.   Cardiovascular:      Rate and Rhythm: Normal rate and regular rhythm.      Heart sounds: Normal heart sounds. No murmur heard.    No friction rub. No gallop.   Pulmonary:      Effort: No respiratory distress.      Breath sounds: Normal breath sounds.   Abdominal:      General: Bowel sounds are normal. There is no distension.      Palpations: Abdomen is soft.      Tenderness: There is no abdominal tenderness.   Musculoskeletal:         General: Normal " range of motion.      Cervical back: Normal range of motion and neck supple.   Lymphadenopathy:      Cervical: No cervical adenopathy.   Skin:     General: Skin is warm.   Neurological:      Mental Status: She is alert and oriented to person, place, and time.           Assessment     Problem List Items Addressed This Visit        Cardiac/Vascular    HTN (hypertension)  The current medical regimen is effective;  continue present plan and medications.         Oncology    History of left breast cancer  Noted in chart  Continue f/u with Dr. Mai          Endocrine    Hypothyroidism  The current medical regimen is effective;  continue present plan and medications.        Other Visit Diagnoses     Prediabetes    -  Primary    Relevant Orders    TSH    Hemoglobin A1C    Comprehensive Metabolic Panel  The patient is asked to make an attempt to improve diet and exercise patterns to aid in medical management of this problem.      Left ear impacted cerumen        Relevant Orders    Ear Cerumen Removal (Completed)          Follow up in about 6 months (around 12/14/2022).

## 2022-06-14 NOTE — PROCEDURES
Ear Cerumen Removal    Date/Time: 6/14/2022 1:20 PM  Performed by: Era Urena MD  Authorized by: Era Urena MD     Consent Done?:  Yes (Verbal)    Local anesthetic:  None  Location details:  Left ear  Cerumen  Removal Results:  Cerumen partially removed  Patient tolerance:  Patient tolerated the procedure well with no immediate complications

## 2022-06-19 NOTE — ED PROVIDER NOTES
"Encounter Date: 2022       History     Chief Complaint   Patient presents with    COVID-19 Concerns    Cough     66F with PMH significant for breast cancer (previously treated with immunotherapy, not on active treatment currently), HTN, and OA presents with cough and malaise since . Covid positive today. She was placed on 2L NC in triage. She does not know of any particular exposure. She only had one dose of the moderna vaccine in 2021 as she states it "caused her problems." Her daughter had covid a few weeks ago but states she was not around her daughter at that time. She is not on home oxygen.         Review of patient's allergies indicates:  No Known Allergies  Past Medical History:   Diagnosis Date    Arthritis     Breast cancer     Hypertension      Past Surgical History:   Procedure Laterality Date    BREAST LUMPECTOMY       SECTION, CLASSIC      LIPOMA RESECTION      MASTECTOMY       Family History   Problem Relation Age of Onset    Lymphoma Mother     Prostate cancer Father     Stomach cancer Sister     Breast cancer Sister     Diabetes Brother     Breast cancer Paternal Aunt     Breast cancer Paternal Grandmother      Social History     Tobacco Use    Smoking status: Never Smoker    Smokeless tobacco: Never Used   Substance Use Topics    Alcohol use: No     Alcohol/week: 0.0 standard drinks    Drug use: No     Review of Systems   Constitutional: Positive for activity change, appetite change, chills and fever.   HENT: Negative for trouble swallowing.    Eyes: Negative for visual disturbance.   Respiratory: Positive for cough and shortness of breath. Negative for wheezing.    Cardiovascular: Negative for chest pain, palpitations and leg swelling.   Gastrointestinal: Negative for abdominal pain, constipation, diarrhea, nausea and vomiting.   Musculoskeletal: Positive for arthralgias. Negative for myalgias.   Neurological: Positive for weakness. Negative for " headaches.       Physical Exam     Initial Vitals [01/06/22 1501]   BP Pulse Resp Temp SpO2   127/77 (!) 122 (!) 24 (!) 100.5 °F (38.1 °C) (!) 92 %      MAP       --         Physical Exam    Nursing note and vitals reviewed.  Constitutional: She appears well-developed and well-nourished. She appears distressed.   HENT:   Head: Normocephalic and atraumatic.   Eyes: EOM are normal.   Neck: Neck supple.   Normal range of motion.  Cardiovascular: Regular rhythm and intact distal pulses.   No murmur heard.  tachycardic   Pulmonary/Chest: Tachypnea noted. She is in respiratory distress.     Coarse breath sounds at bilateral bases   Abdominal: Abdomen is soft. Bowel sounds are normal. She exhibits no distension. There is no abdominal tenderness.   Musculoskeletal:         General: No tenderness or edema.      Cervical back: Normal range of motion and neck supple.     Neurological: She is alert and oriented to person, place, and time.   Skin: Skin is warm and dry.   Psychiatric: She has a normal mood and affect. Thought content normal.         ED Course   Procedures  Labs Reviewed   CBC W/ AUTO DIFFERENTIAL - Abnormal; Notable for the following components:       Result Value    Hemoglobin 11.5 (*)     Hematocrit 36.0 (*)     MCHC 31.9 (*)     RDW 14.8 (*)     Gran % 78.7 (*)     Lymph % 16.8 (*)     All other components within normal limits   COMPREHENSIVE METABOLIC PANEL - Abnormal; Notable for the following components:    CO2 22 (*)     Glucose 152 (*)     Albumin 3.1 (*)     AST 42 (*)     eGFR if  59.6 (*)     eGFR if non  51.7 (*)     All other components within normal limits   C-REACTIVE PROTEIN - Abnormal; Notable for the following components:    .4 (*)     All other components within normal limits   FERRITIN - Abnormal; Notable for the following components:    Ferritin 1,287 (*)     All other components within normal limits   LACTATE DEHYDROGENASE - Abnormal; Notable for the  following components:     (*)     All other components within normal limits   SARS-COV-2 RDRP GENE - Abnormal; Notable for the following components:    POC Rapid COVID Positive (*)     All other components within normal limits   ISTAT PROCEDURE - Abnormal; Notable for the following components:    POC PH 7.494 (*)     POC PCO2 24.7 (*)     POC PO2 70 (*)     POC HCO3 19.0 (*)     POC TCO2 20 (*)     All other components within normal limits   CK   LACTIC ACID, PLASMA   TROPONIN I   HEPATITIS C ANTIBODY          Imaging Results          X-Ray Chest AP Portable (Final result)  Result time 01/06/22 18:30:03    Final result by Ousmane Mata MD (01/06/22 18:30:03)                 Impression:      As above      Electronically signed by: Ousmane Mata MD  Date:    01/06/2022  Time:    18:30             Narrative:    EXAMINATION:  XR CHEST AP PORTABLE    CLINICAL HISTORY:  COVID-19;    TECHNIQUE:  Single frontal view of the chest was performed.    COMPARISON:  12/08/2017    FINDINGS:  Left chest wall port catheter tip projects over the proximal SVC.  The cardiomediastinal silhouette is not enlarged, noting magnification by technique..  There is obscuration of the right costophrenic angle, may reflect effusion noting shallow inspiratory effort limits evaluation..  The trachea is midline.  The lungs are symmetrically expanded bilaterally with prominent coarse interstitial attenuation bilaterally concerning for diffuse edema or other infectious/non infectious pneumonitis.  There is bilateral basilar subsegmental atelectasis.  Developing left lower lung zone consolidation not excluded..  There is no pneumothorax.  The osseous structures are remarkable for degenerative changes.  Surgical change noted of the left and right axilla..                                 Medications   acetaminophen tablet 650 mg (650 mg Oral Given 1/6/22 1801)   dexamethasone injection 8 mg (8 mg Intravenous Given 1/6/22 1758)     Medical  Decision Making:   History:   Old Records Summarized: records from clinic visits.  Initial Assessment:   66F with breast cancer not on active chemo presents with cough and malaise found to be hypoxic and covid positive.  Differential Diagnosis:   Ddx include but not limited to:  - viral pneumonia  - sepsis  - acute hypoxic respiratory failure  Clinical Tests:   Lab Tests: Ordered  Radiological Study: Ordered  Medical Tests: Ordered  ED Management:  Patient appears in acute respiratory distress, tachypnea, conversational dyspnea. She was on 4L NC originally, satting in 70s. Placed on non-rebreather. ABG showed pH 7.4, pCO2 24, pO2 70. Sats increased to low 90s on nonrebreather.  CBC unremarkable. CMP with mild acidosis. Ferritin, LDH, and CRP elevated. Trop negative. LA normal.  CXR shows diffuse infiltrates consistent with viral pneumonitis.  Given tylenol for fever. Given decadron 8mg.   Admitting to hospital medicine to continue treatment of covid pneumonia.                      Clinical Impression:   Final diagnoses:  [U07.1] COVID-19          ED Disposition Condition    Admit               Florina Jeffery DO  Resident  01/06/22 2005     Patient tolerated procedure well. Patient tolerated procedure well.

## 2022-07-20 ENCOUNTER — PATIENT MESSAGE (OUTPATIENT)
Dept: FAMILY MEDICINE | Facility: CLINIC | Age: 67
End: 2022-07-20
Payer: COMMERCIAL

## 2022-07-29 ENCOUNTER — OFFICE VISIT (OUTPATIENT)
Dept: CARDIOLOGY | Facility: CLINIC | Age: 67
End: 2022-07-29
Payer: COMMERCIAL

## 2022-07-29 VITALS
DIASTOLIC BLOOD PRESSURE: 62 MMHG | SYSTOLIC BLOOD PRESSURE: 120 MMHG | RESPIRATION RATE: 18 BRPM | BODY MASS INDEX: 29.6 KG/M2 | HEART RATE: 98 BPM | WEIGHT: 184.19 LBS | OXYGEN SATURATION: 98 % | HEIGHT: 66 IN

## 2022-07-29 DIAGNOSIS — Z86.718 HISTORY OF DVT (DEEP VEIN THROMBOSIS): ICD-10-CM

## 2022-07-29 DIAGNOSIS — Z85.3 HISTORY OF LEFT BREAST CANCER: ICD-10-CM

## 2022-07-29 DIAGNOSIS — C34.2 PRIMARY CANCER OF RIGHT MIDDLE LOBE OF LUNG: ICD-10-CM

## 2022-07-29 DIAGNOSIS — Z85.118 HISTORY OF LUNG CANCER: ICD-10-CM

## 2022-07-29 DIAGNOSIS — C77.9 REGIONAL LYMPH NODE METASTASIS PRESENT: ICD-10-CM

## 2022-07-29 DIAGNOSIS — D05.12 DUCTAL CARCINOMA IN SITU (DCIS) OF LEFT BREAST: ICD-10-CM

## 2022-07-29 DIAGNOSIS — E03.9 HYPOTHYROIDISM, UNSPECIFIED TYPE: ICD-10-CM

## 2022-07-29 DIAGNOSIS — J98.4 PNEUMONITIS: ICD-10-CM

## 2022-07-29 DIAGNOSIS — I10 HYPERTENSION, UNSPECIFIED TYPE: Primary | ICD-10-CM

## 2022-07-29 PROCEDURE — 93000 ELECTROCARDIOGRAM COMPLETE: CPT | Mod: S$GLB,,, | Performed by: INTERNAL MEDICINE

## 2022-07-29 PROCEDURE — 3078F PR MOST RECENT DIASTOLIC BLOOD PRESSURE < 80 MM HG: ICD-10-PCS | Mod: CPTII,S$GLB,, | Performed by: INTERNAL MEDICINE

## 2022-07-29 PROCEDURE — 3008F PR BODY MASS INDEX (BMI) DOCUMENTED: ICD-10-PCS | Mod: CPTII,S$GLB,, | Performed by: INTERNAL MEDICINE

## 2022-07-29 PROCEDURE — 3061F PR NEG MICROALBUMINURIA RESULT DOCUMENTED/REVIEW: ICD-10-PCS | Mod: CPTII,S$GLB,, | Performed by: INTERNAL MEDICINE

## 2022-07-29 PROCEDURE — 3288F FALL RISK ASSESSMENT DOCD: CPT | Mod: CPTII,S$GLB,, | Performed by: INTERNAL MEDICINE

## 2022-07-29 PROCEDURE — 99999 PR PBB SHADOW E&M-EST. PATIENT-LVL III: CPT | Mod: PBBFAC,,, | Performed by: INTERNAL MEDICINE

## 2022-07-29 PROCEDURE — 3066F NEPHROPATHY DOC TX: CPT | Mod: CPTII,S$GLB,, | Performed by: INTERNAL MEDICINE

## 2022-07-29 PROCEDURE — 99214 OFFICE O/P EST MOD 30 MIN: CPT | Mod: S$GLB,,, | Performed by: INTERNAL MEDICINE

## 2022-07-29 PROCEDURE — 3044F PR MOST RECENT HEMOGLOBIN A1C LEVEL <7.0%: ICD-10-PCS | Mod: CPTII,S$GLB,, | Performed by: INTERNAL MEDICINE

## 2022-07-29 PROCEDURE — 99214 PR OFFICE/OUTPT VISIT, EST, LEVL IV, 30-39 MIN: ICD-10-PCS | Mod: S$GLB,,, | Performed by: INTERNAL MEDICINE

## 2022-07-29 PROCEDURE — 3066F PR DOCUMENTATION OF TREATMENT FOR NEPHROPATHY: ICD-10-PCS | Mod: CPTII,S$GLB,, | Performed by: INTERNAL MEDICINE

## 2022-07-29 PROCEDURE — 99999 PR PBB SHADOW E&M-EST. PATIENT-LVL III: ICD-10-PCS | Mod: PBBFAC,,, | Performed by: INTERNAL MEDICINE

## 2022-07-29 PROCEDURE — 3008F BODY MASS INDEX DOCD: CPT | Mod: CPTII,S$GLB,, | Performed by: INTERNAL MEDICINE

## 2022-07-29 PROCEDURE — 3074F SYST BP LT 130 MM HG: CPT | Mod: CPTII,S$GLB,, | Performed by: INTERNAL MEDICINE

## 2022-07-29 PROCEDURE — 1160F RVW MEDS BY RX/DR IN RCRD: CPT | Mod: CPTII,S$GLB,, | Performed by: INTERNAL MEDICINE

## 2022-07-29 PROCEDURE — 1101F PR PT FALLS ASSESS DOC 0-1 FALLS W/OUT INJ PAST YR: ICD-10-PCS | Mod: CPTII,S$GLB,, | Performed by: INTERNAL MEDICINE

## 2022-07-29 PROCEDURE — 1159F PR MEDICATION LIST DOCUMENTED IN MEDICAL RECORD: ICD-10-PCS | Mod: CPTII,S$GLB,, | Performed by: INTERNAL MEDICINE

## 2022-07-29 PROCEDURE — 3061F NEG MICROALBUMINURIA REV: CPT | Mod: CPTII,S$GLB,, | Performed by: INTERNAL MEDICINE

## 2022-07-29 PROCEDURE — 1126F AMNT PAIN NOTED NONE PRSNT: CPT | Mod: CPTII,S$GLB,, | Performed by: INTERNAL MEDICINE

## 2022-07-29 PROCEDURE — 93000 EKG 12-LEAD: ICD-10-PCS | Mod: S$GLB,,, | Performed by: INTERNAL MEDICINE

## 2022-07-29 PROCEDURE — 3288F PR FALLS RISK ASSESSMENT DOCUMENTED: ICD-10-PCS | Mod: CPTII,S$GLB,, | Performed by: INTERNAL MEDICINE

## 2022-07-29 PROCEDURE — 1159F MED LIST DOCD IN RCRD: CPT | Mod: CPTII,S$GLB,, | Performed by: INTERNAL MEDICINE

## 2022-07-29 PROCEDURE — 1101F PT FALLS ASSESS-DOCD LE1/YR: CPT | Mod: CPTII,S$GLB,, | Performed by: INTERNAL MEDICINE

## 2022-07-29 PROCEDURE — 1160F PR REVIEW ALL MEDS BY PRESCRIBER/CLIN PHARMACIST DOCUMENTED: ICD-10-PCS | Mod: CPTII,S$GLB,, | Performed by: INTERNAL MEDICINE

## 2022-07-29 PROCEDURE — 3078F DIAST BP <80 MM HG: CPT | Mod: CPTII,S$GLB,, | Performed by: INTERNAL MEDICINE

## 2022-07-29 PROCEDURE — 3044F HG A1C LEVEL LT 7.0%: CPT | Mod: CPTII,S$GLB,, | Performed by: INTERNAL MEDICINE

## 2022-07-29 PROCEDURE — 3074F PR MOST RECENT SYSTOLIC BLOOD PRESSURE < 130 MM HG: ICD-10-PCS | Mod: CPTII,S$GLB,, | Performed by: INTERNAL MEDICINE

## 2022-07-29 PROCEDURE — 1126F PR PAIN SEVERITY QUANTIFIED, NO PAIN PRESENT: ICD-10-PCS | Mod: CPTII,S$GLB,, | Performed by: INTERNAL MEDICINE

## 2022-07-29 NOTE — PROGRESS NOTES
CARDIOVASCULAR CONSULTATION    REASON FOR CONSULT:   Fauzia Kirk is a 67 y.o. female who presents for establishing care    HISTORY OF PRESENT ILLNESS:     Patient is a pleasant 66-year-old lady.  Here for establishing care.  Recently had COVID infection in January.  Since then has been oxygen dependent.  Prior to that she could walk around without any issues.  Has history of breast cancer in the past.  Was never a smoker as per the patient.  Patient denies any chest pains at rest on exertion, orthopnea, PND.  EKG done demonstrated atrial fibrillation.  Patient is asymptomatic from his AFib apart from occasional palpitations.      :    · The left ventricle is normal in size with normal systolic function. The estimated ejection fraction is 55%.  · Normal right ventricular size with mildly reduced right ventricular systolic function.  · Mild aortic regurgitation.  · Mild mitral regurgitation.  · Atrial fibrillation observed.  · The estimated PA systolic pressure is 28 mmHg.  · Intermediate central venous pressure (8 mmHg).      Notes from 2022:  Patient here for follow-up.  Denies any chest pains at rest on exertion, orthopnea, PND.  EKG done today shows normal sinus rhythm.  Has been doing fine.        PAST MEDICAL HISTORY:     Past Medical History:   Diagnosis Date    Arthritis     Breast cancer     Hypertension        PAST SURGICAL HISTORY:     Past Surgical History:   Procedure Laterality Date    BREAST LUMPECTOMY       SECTION, CLASSIC      LIPOMA RESECTION      MASTECTOMY         ALLERGIES AND MEDICATION:   Review of patient's allergies indicates:  No Known Allergies     Medication List          Accurate as of 2022  3:03 PM. If you have any questions, ask your nurse or doctor.            CONTINUE taking these medications    amLODIPine 10 MG tablet  Commonly known as: NORVASC  Take 1 tablet by mouth once daily     levothyroxine 100 MCG tablet  Commonly known as:  "SYNTHROID  Take 1 tablet (100 mcg total) by mouth once daily.     metoprolol tartrate 25 MG tablet  Commonly known as: LOPRESSOR  Take 1 tablet (25 mg total) by mouth 2 (two) times daily.     rivaroxaban 20 mg Tab  Commonly known as: XARELTO  Take 1 tablet (20 mg total) by mouth daily with dinner or evening meal. Do not start until completed 21 days of 15 mg twice a day with meals            SOCIAL HISTORY:     Social History     Socioeconomic History    Marital status:    Tobacco Use    Smoking status: Never Smoker    Smokeless tobacco: Never Used   Substance and Sexual Activity    Alcohol use: No     Alcohol/week: 0.0 standard drinks    Drug use: No    Sexual activity: Yes     Partners: Male       FAMILY HISTORY:     Family History   Problem Relation Age of Onset    Lymphoma Mother     Prostate cancer Father     Stomach cancer Sister     Breast cancer Sister     Diabetes Brother     Breast cancer Paternal Aunt     Breast cancer Paternal Grandmother        REVIEW OF SYSTEMS:   Review of Systems   Constitutional: Negative.   HENT: Negative.    Eyes: Negative.    Cardiovascular: Positive for dyspnea on exertion.   Respiratory: Positive for shortness of breath.    Endocrine: Negative.    Hematologic/Lymphatic: Negative.    Skin: Negative.    Musculoskeletal: Negative.    Gastrointestinal: Negative.    Genitourinary: Negative.    Neurological: Negative.    Psychiatric/Behavioral: Negative.    Allergic/Immunologic: Negative.        A 10 point review of systems was performed and all the pertinent positives have been mentioned. Rest of review of systems was negative.        PHYSICAL EXAM:     Vitals:    07/29/22 1438   BP: 120/62   Pulse: 98   Resp: 18    Body mass index is 29.73 kg/m².  Weight: 83.6 kg (184 lb 3.1 oz)   Height: 5' 6" (167.6 cm)     Physical Exam  Constitutional:       Appearance: Normal appearance. She is well-developed.   HENT:      Head: Normocephalic.   Eyes:      Pupils: Pupils " are equal, round, and reactive to light.   Cardiovascular:      Rate and Rhythm: Normal rate and regular rhythm.   Pulmonary:      Effort: Pulmonary effort is normal.      Breath sounds: Normal breath sounds.   Abdominal:      General: Bowel sounds are normal.      Palpations: Abdomen is soft.      Tenderness: There is no abdominal tenderness.   Musculoskeletal:         General: Normal range of motion.      Cervical back: Normal range of motion and neck supple.   Skin:     General: Skin is warm.   Neurological:      Mental Status: She is alert and oriented to person, place, and time.           DATA:     Laboratory:  CBC:  Recent Labs   Lab 01/11/22  0445 01/13/22  0506 06/08/22  0803   WBC 7.55 10.87 5.65   Hemoglobin 10.5 L 11.3 L 11.0 L   Hematocrit 33.4 L 35.7 L 36.5 L   Platelets 308 330 263       CHEMISTRIES:  Recent Labs   Lab 01/09/22  0220 01/10/22  0323 01/11/22  0445 01/12/22  0314 01/14/22  0900 01/15/22  0504 06/08/22  0803   Glucose 243 H 155 H 169 H   < > 98 143 H 98   Sodium 133 L 135 L 137   < > 138 134 L 140   Potassium 5.2 H 4.3 4.7   < > 4.3 4.5 3.9   BUN 27 H 28 H 28 H   < > 28 H 34 H 20   Creatinine 0.9 0.8 0.7   < > 0.8 0.8 1.0   eGFR if African American >60.0 >60.0 >60.0   < > >60.0 >60.0 >60.0   eGFR if non African American >60.0 >60.0 >60.0   < > >60.0 >60.0 58.0 A   Calcium 8.8 8.6 L 8.2 L   < > 9.1 8.9 9.7   Magnesium 2.1 2.1 2.0  --   --   --   --     < > = values in this interval not displayed.       CARDIAC BIOMARKERS:  Recent Labs   Lab 01/06/22  1751      Troponin I 0.010       COAGS:        LIPIDS/LFTS:  Recent Labs   Lab 01/14/22  0900 01/15/22  0504 06/08/22  0803   Cholesterol  --   --  213 H   Triglycerides  --   --  136   HDL  --   --  36 L   LDL Cholesterol  --   --  149.8   Non-HDL Cholesterol  --   --  177   AST 18 18 13   ALT 24 24 7 L       Hemoglobin A1C   Date Value Ref Range Status   06/08/2022 5.9 (H) 4.0 - 5.6 % Final     Comment:     ADA Screening  Guidelines:  5.7-6.4%  Consistent with prediabetes  >or=6.5%  Consistent with diabetes    High levels of fetal hemoglobin interfere with the HbA1C  assay. Heterozygous hemoglobin variants (HbS, HgC, etc)do  not significantly interfere with this assay.   However, presence of multiple variants may affect accuracy.     01/07/2022 7.0 (H) 4.0 - 5.6 % Final     Comment:     ADA Screening Guidelines:  5.7-6.4%  Consistent with prediabetes  >or=6.5%  Consistent with diabetes    High levels of fetal hemoglobin interfere with the HbA1C  assay. Heterozygous hemoglobin variants (HbS, HgC, etc)do  not significantly interfere with this assay.   However, presence of multiple variants may affect accuracy.     12/21/2015 6.1 4.5 - 6.2 % Final       TSH  Recent Labs   Lab 03/22/21  0824 10/05/21  0817 06/08/22  0803   TSH 3.006 10.096 H 0.014 L       The 10-year ASCVD risk score (Syeda TOPETE Jr., et al., 2013) is: 23.3%    Values used to calculate the score:      Age: 67 years      Sex: Female      Is Non- : Yes      Diabetic: Yes      Tobacco smoker: No      Systolic Blood Pressure: 120 mmHg      Is BP treated: Yes      HDL Cholesterol: 36 mg/dL      Total Cholesterol: 213 mg/dL             ASSESSMENT AND PLAN     Patient Active Problem List   Diagnosis    HTN (hypertension)    History of left breast cancer    s/p L mastectomy, SLNBx 11/7    Ductal carcinoma in situ (DCIS) of left breast    Pneumonitis    Primary cancer of right middle lobe of lung    Regional lymph node metastasis present    Encounter for antineoplastic chemotherapy    Hypothyroidism    Hypothyroidism due to medication    Acute deep vein thrombosis (DVT) of non-extremity vein    Pneumonia due to COVID-19 virus    History of DVT (deep vein thrombosis)    History of lung cancer    Lung nodule    Diabetes    Atrial fibrillation         Patient with new onset atrial fibrillation. Patient has atrial fibrillation.  She is already on  Xarelto.  Continue Xarelto.    Metoprolol as needed    Dyspnea on exertion which started after she had COVID infection.  Further management per pulmonology.    Follow-up in 4 months    Thank you very much for involving me in the care of your patient.  Please do not hesitate to contact me if there are any questions.      Zeinab Giraldo MD, FACC, [facility]  Interventional Cardiologist, [facility].           This note was dictated with the help of speech recognition software.  There might be un-intended errors and/or substitutions.

## 2022-08-08 NOTE — PATIENT INSTRUCTIONS
Start taking propranolol 60mg daily 5 days before the scan to help decrease your heart rate.     Will get thyroid scan to identify the cause of the hyperthyroidism and depending on the result will decide on starting methimazole.      Will continue to get your thyroid labs every 2-4 weeks along with your chemo labs.    Quality 130: Documentation Of Current Medications In The Medical Record: Current Medications Documented Quality 110: Preventive Care And Screening: Influenza Immunization: Influenza immunization was not ordered or administered, reason not given Quality 431: Preventive Care And Screening: Unhealthy Alcohol Use - Screening: Patient not identified as an unhealthy alcohol user when screened for unhealthy alcohol use using a systematic screening method Quality 47: Advance Care Plan: Advance Care Planning discussed and documented in the medical record; patient did not wish or was not able to name a surrogate decision maker or provide an advance care plan. Detail Level: Detailed Quality 226: Preventive Care And Screening: Tobacco Use: Screening And Cessation Intervention: Patient screened for tobacco use and is an ex/non-smoker Quality 111:Pneumonia Vaccination Status For Older Adults: Documentation of medical reason(s) for not administering pneumococcal vaccine (e.g., adverse reaction to vaccine)

## 2022-09-13 ENCOUNTER — OFFICE VISIT (OUTPATIENT)
Dept: FAMILY MEDICINE | Facility: CLINIC | Age: 67
End: 2022-09-13
Payer: COMMERCIAL

## 2022-09-13 VITALS
TEMPERATURE: 98 F | SYSTOLIC BLOOD PRESSURE: 120 MMHG | HEIGHT: 66 IN | OXYGEN SATURATION: 99 % | DIASTOLIC BLOOD PRESSURE: 68 MMHG | HEART RATE: 83 BPM | WEIGHT: 189.13 LBS | BODY MASS INDEX: 30.4 KG/M2

## 2022-09-13 DIAGNOSIS — M79.604 PAIN IN BOTH LOWER EXTREMITIES: ICD-10-CM

## 2022-09-13 DIAGNOSIS — G25.81 RESTLESS LEG: Primary | ICD-10-CM

## 2022-09-13 DIAGNOSIS — M79.605 PAIN IN BOTH LOWER EXTREMITIES: ICD-10-CM

## 2022-09-13 PROCEDURE — 3066F NEPHROPATHY DOC TX: CPT | Mod: CPTII,S$GLB,, | Performed by: FAMILY MEDICINE

## 2022-09-13 PROCEDURE — 1101F PT FALLS ASSESS-DOCD LE1/YR: CPT | Mod: CPTII,S$GLB,, | Performed by: FAMILY MEDICINE

## 2022-09-13 PROCEDURE — 1160F PR REVIEW ALL MEDS BY PRESCRIBER/CLIN PHARMACIST DOCUMENTED: ICD-10-PCS | Mod: CPTII,S$GLB,, | Performed by: FAMILY MEDICINE

## 2022-09-13 PROCEDURE — 3061F NEG MICROALBUMINURIA REV: CPT | Mod: CPTII,S$GLB,, | Performed by: FAMILY MEDICINE

## 2022-09-13 PROCEDURE — 3288F FALL RISK ASSESSMENT DOCD: CPT | Mod: CPTII,S$GLB,, | Performed by: FAMILY MEDICINE

## 2022-09-13 PROCEDURE — 3288F PR FALLS RISK ASSESSMENT DOCUMENTED: ICD-10-PCS | Mod: CPTII,S$GLB,, | Performed by: FAMILY MEDICINE

## 2022-09-13 PROCEDURE — 99999 PR PBB SHADOW E&M-EST. PATIENT-LVL III: ICD-10-PCS | Mod: PBBFAC,,, | Performed by: FAMILY MEDICINE

## 2022-09-13 PROCEDURE — 1125F PR PAIN SEVERITY QUANTIFIED, PAIN PRESENT: ICD-10-PCS | Mod: CPTII,S$GLB,, | Performed by: FAMILY MEDICINE

## 2022-09-13 PROCEDURE — 1125F AMNT PAIN NOTED PAIN PRSNT: CPT | Mod: CPTII,S$GLB,, | Performed by: FAMILY MEDICINE

## 2022-09-13 PROCEDURE — 1101F PR PT FALLS ASSESS DOC 0-1 FALLS W/OUT INJ PAST YR: ICD-10-PCS | Mod: CPTII,S$GLB,, | Performed by: FAMILY MEDICINE

## 2022-09-13 PROCEDURE — 1159F PR MEDICATION LIST DOCUMENTED IN MEDICAL RECORD: ICD-10-PCS | Mod: CPTII,S$GLB,, | Performed by: FAMILY MEDICINE

## 2022-09-13 PROCEDURE — 99214 OFFICE O/P EST MOD 30 MIN: CPT | Mod: S$GLB,,, | Performed by: FAMILY MEDICINE

## 2022-09-13 PROCEDURE — 1159F MED LIST DOCD IN RCRD: CPT | Mod: CPTII,S$GLB,, | Performed by: FAMILY MEDICINE

## 2022-09-13 PROCEDURE — 3044F PR MOST RECENT HEMOGLOBIN A1C LEVEL <7.0%: ICD-10-PCS | Mod: CPTII,S$GLB,, | Performed by: FAMILY MEDICINE

## 2022-09-13 PROCEDURE — 3078F PR MOST RECENT DIASTOLIC BLOOD PRESSURE < 80 MM HG: ICD-10-PCS | Mod: CPTII,S$GLB,, | Performed by: FAMILY MEDICINE

## 2022-09-13 PROCEDURE — 1160F RVW MEDS BY RX/DR IN RCRD: CPT | Mod: CPTII,S$GLB,, | Performed by: FAMILY MEDICINE

## 2022-09-13 PROCEDURE — 3074F PR MOST RECENT SYSTOLIC BLOOD PRESSURE < 130 MM HG: ICD-10-PCS | Mod: CPTII,S$GLB,, | Performed by: FAMILY MEDICINE

## 2022-09-13 PROCEDURE — 99214 PR OFFICE/OUTPT VISIT, EST, LEVL IV, 30-39 MIN: ICD-10-PCS | Mod: S$GLB,,, | Performed by: FAMILY MEDICINE

## 2022-09-13 PROCEDURE — 3074F SYST BP LT 130 MM HG: CPT | Mod: CPTII,S$GLB,, | Performed by: FAMILY MEDICINE

## 2022-09-13 PROCEDURE — 3066F PR DOCUMENTATION OF TREATMENT FOR NEPHROPATHY: ICD-10-PCS | Mod: CPTII,S$GLB,, | Performed by: FAMILY MEDICINE

## 2022-09-13 PROCEDURE — 3008F BODY MASS INDEX DOCD: CPT | Mod: CPTII,S$GLB,, | Performed by: FAMILY MEDICINE

## 2022-09-13 PROCEDURE — 3061F PR NEG MICROALBUMINURIA RESULT DOCUMENTED/REVIEW: ICD-10-PCS | Mod: CPTII,S$GLB,, | Performed by: FAMILY MEDICINE

## 2022-09-13 PROCEDURE — 99999 PR PBB SHADOW E&M-EST. PATIENT-LVL III: CPT | Mod: PBBFAC,,, | Performed by: FAMILY MEDICINE

## 2022-09-13 PROCEDURE — 3044F HG A1C LEVEL LT 7.0%: CPT | Mod: CPTII,S$GLB,, | Performed by: FAMILY MEDICINE

## 2022-09-13 PROCEDURE — 3078F DIAST BP <80 MM HG: CPT | Mod: CPTII,S$GLB,, | Performed by: FAMILY MEDICINE

## 2022-09-13 PROCEDURE — 3008F PR BODY MASS INDEX (BMI) DOCUMENTED: ICD-10-PCS | Mod: CPTII,S$GLB,, | Performed by: FAMILY MEDICINE

## 2022-09-13 RX ORDER — ROPINIROLE 2 MG/1
TABLET, FILM COATED ORAL
Qty: 60 TABLET | Refills: 1 | Status: SHIPPED | OUTPATIENT
Start: 2022-09-13 | End: 2022-12-15 | Stop reason: ALTCHOICE

## 2022-09-13 NOTE — PROGRESS NOTES
"  Physical Exam  /68   Pulse 83   Temp 98.2 °F (36.8 °C) (Oral)   Ht 5' 6" (1.676 m)   Wt 85.8 kg (189 lb 2.5 oz)   SpO2 99%   BMI 30.53 kg/m²      Office Visit    Patient Name: Fauzia Kirk    : 1955  MRN: [id number]      Assessment/Plan:  Fauzia Kirk is a 67 y.o. female who presents today for :    Restless leg  -     rOPINIRole (REQUIP) 2 MG tablet; Take 1-2 tablets as needed nightly for restless legs  Dispense: 60 tablet; Refill: 1  Pain in both lower extremities  -     US Lower Extremity Veins Bilateral; Future; Expected date: 2022  -pain improving. Discussed trial of Requip for night time Sx, avoid caffeine/alcohol intake as well as vigorous exercise before bedtime. Given exam findings and Hx of DVT, will also check U/S. Continue Xarelto    Follow up for worsening Sx. Urgent care/ED precautions provided.      This note was created by combination of typed  and MModal dictation.  Transcription errors may be present.  If there are any questions, please contact me.      ----------------------------------------------------------------------------------------------------------------------      HPI:  Patient Care Team:  Era Urena MD as PCP - General (Family Medicine)    Fauzia is a 67 y.o. female with      Patient Active Problem List   Diagnosis    HTN (hypertension)    History of left breast cancer    s/p L mastectomy, SLNBx     Ductal carcinoma in situ (DCIS) of left breast    Pneumonitis    Primary cancer of right middle lobe of lung    Regional lymph node metastasis present    Encounter for antineoplastic chemotherapy    Hypothyroidism    Hypothyroidism due to medication    Acute deep vein thrombosis (DVT) of non-extremity vein    Pneumonia due to COVID-19 virus    History of DVT (deep vein thrombosis)    History of lung cancer    Lung nodule    Diabetes    Atrial fibrillation       Patient with PMHx as above presents today for bilateral lower leg cramping " the past 2 days. She denies any injury/trauma to the area recently. She states pain is mostly in her calf muscles, which causes a crampy/tight sensation that is sometimes worse at night, as well as when her legs are still, and is relieved with keeping her legs moving. She has had prior DVT and is on chronic OAC with Xarelto, concerned about possible blood clot. She has been taking Tylenol as needed with mixed relief. She feels that the pain is better today (5/10) as compared to 10/10 from two days ago. No leg swelling/redness/CP/SOB/CARTAGENA.      Additional ROS      CONST: no fever, no activity change  EYES: no vision change.   ENT: no sore throat. No dysphagia.   CV: no CP with exertion  RESP: no SOB  GI: no N/V/diarrhea/constipation  : no urinary concerns  MSK: see HPI  SKIN: no new rashes  NEURO: no focal deficits.   PSYCH: no new issues.   ENDOCRINE: no polyuria.               Patient Active Problem List   Diagnosis    HTN (hypertension)    History of left breast cancer    s/p L mastectomy, SLNBx 11/7    Ductal carcinoma in situ (DCIS) of left breast    Pneumonitis    Primary cancer of right middle lobe of lung    Regional lymph node metastasis present    Encounter for antineoplastic chemotherapy    Hypothyroidism    Hypothyroidism due to medication    Acute deep vein thrombosis (DVT) of non-extremity vein    Pneumonia due to COVID-19 virus    History of DVT (deep vein thrombosis)    History of lung cancer    Lung nodule    Diabetes    Atrial fibrillation       Current Medications  Medications reviewed/updated.     Current Outpatient Medications on File Prior to Visit   Medication Sig Dispense Refill    amLODIPine (NORVASC) 10 MG tablet Take 1 tablet by mouth once daily 90 tablet 3    levothyroxine (SYNTHROID) 100 MCG tablet Take 1 tablet (100 mcg total) by mouth once daily. 30 tablet 11    metoprolol tartrate (LOPRESSOR) 25 MG tablet Take 1 tablet (25 mg total) by mouth 2 (two) times daily. 60 tablet 11     "rivaroxaban (XARELTO) 20 mg Tab Take 1 tablet (20 mg total) by mouth daily with dinner or evening meal. Do not start until completed 21 days of 15 mg twice a day with meals 90 tablet 3     No current facility-administered medications on file prior to visit.           Past Surgical History:   Procedure Laterality Date    BREAST LUMPECTOMY       SECTION, CLASSIC      LIPOMA RESECTION      MASTECTOMY         Family History   Problem Relation Age of Onset    Lymphoma Mother     Prostate cancer Father     Stomach cancer Sister     Breast cancer Sister     Diabetes Brother     Breast cancer Paternal Aunt     Breast cancer Paternal Grandmother        Social History     Socioeconomic History    Marital status:    Tobacco Use    Smoking status: Never    Smokeless tobacco: Never   Substance and Sexual Activity    Alcohol use: No     Alcohol/week: 0.0 standard drinks    Drug use: No    Sexual activity: Yes     Partners: Male             Allergies   Review of patient's allergies indicates:  No Known Allergies          Review of Systems  See HPI      [unfilled]  /68   Pulse 83   Temp 98.2 °F (36.8 °C) (Oral)   Ht 5' 6" (1.676 m)   Wt 85.8 kg (189 lb 2.5 oz)   SpO2 99%   BMI 30.53 kg/m²       GEN: NAD, pleasant  HEENT: NCAT, PERRLA, EOMI, sclera clear, anicteric, O/P clear, MMM with no lesions  NECK: normal, supple with midline trachea, no LAD, no thyromegaly  LUNGS: CTAB, no w/r/r, normal respiratory effort  HEART: RRR, normal S1 and S2, no m/r/g, no palpitations, no edema, no JVD  ABD: s/nt/nd, NABS, no organomegaly  SKIN: warm and dry with normal turgor, no rashes, no other lesions.   PSYCH: AOx3, appropriate mood and affect.   MSK: extremities warm/well perfused, normal ROM in all 4 extremities, no c/c/e.   LowerExtremities: No edema. No palpable cords but moderate calf TTP. No skin breakdown, no varicosity. No warmth/erythema. Capillary refill <2 seconds with 2+ DP/PT pulses. Normal " dorsiflexion/plantar flexion. Negative Eve's sign  NEURO: normal without focal findings, CN II-XII are intact.  Sensation grossly normal, gait and station normal.

## 2022-09-21 ENCOUNTER — PATIENT MESSAGE (OUTPATIENT)
Dept: ADMINISTRATIVE | Facility: HOSPITAL | Age: 67
End: 2022-09-21
Payer: COMMERCIAL

## 2022-09-21 ENCOUNTER — PATIENT OUTREACH (OUTPATIENT)
Dept: ADMINISTRATIVE | Facility: HOSPITAL | Age: 67
End: 2022-09-21
Payer: COMMERCIAL

## 2022-10-06 ENCOUNTER — PATIENT OUTREACH (OUTPATIENT)
Dept: ADMINISTRATIVE | Facility: HOSPITAL | Age: 67
End: 2022-10-06
Payer: COMMERCIAL

## 2022-10-11 ENCOUNTER — PATIENT MESSAGE (OUTPATIENT)
Dept: ADMINISTRATIVE | Facility: HOSPITAL | Age: 67
End: 2022-10-11
Payer: COMMERCIAL

## 2022-10-13 ENCOUNTER — TELEPHONE (OUTPATIENT)
Dept: HEMATOLOGY/ONCOLOGY | Facility: CLINIC | Age: 67
End: 2022-10-13
Payer: COMMERCIAL

## 2022-10-13 NOTE — TELEPHONE ENCOUNTER
"Returned call. Spoke with pt. Pt needed her labs, CT and MD follow up all scheduled.   Scheduled on 12/15. Pt verbalized understanding of date, times and locations.           ----- Message from Tarsha Contreras sent at 10/13/2022 10:26 AM CDT -----  Regarding: speak with office  Contact: riccardo  Consult/Advisory:           Name Of Caller: riccardo    Contact Preference?:[phone] (home)              Does patient feel the need to be seen today? no           What is the nature of the call?: pt calling to speak with office            Additional Notes:  "Thank you for all that you do for our patients'"     "

## 2022-12-06 ENCOUNTER — PATIENT OUTREACH (OUTPATIENT)
Dept: ADMINISTRATIVE | Facility: HOSPITAL | Age: 67
End: 2022-12-06
Payer: COMMERCIAL

## 2022-12-06 NOTE — PROGRESS NOTES
Patient had 2 lab appts for the same day at 2 different locations.  I cancelled one for the afternoon since they do not need to draw the same labs twice in 1 day.   I tried to contact the patient to inform her she only needs the money lab appointment.

## 2022-12-15 ENCOUNTER — OFFICE VISIT (OUTPATIENT)
Dept: HEMATOLOGY/ONCOLOGY | Facility: CLINIC | Age: 67
End: 2022-12-15
Payer: COMMERCIAL

## 2022-12-15 ENCOUNTER — HOSPITAL ENCOUNTER (OUTPATIENT)
Dept: RADIOLOGY | Facility: HOSPITAL | Age: 67
Discharge: HOME | End: 2022-12-15
Attending: INTERNAL MEDICINE
Payer: COMMERCIAL

## 2022-12-15 ENCOUNTER — OFFICE VISIT (OUTPATIENT)
Dept: FAMILY MEDICINE | Facility: CLINIC | Age: 67
End: 2022-12-15
Payer: COMMERCIAL

## 2022-12-15 VITALS
HEIGHT: 66 IN | OXYGEN SATURATION: 97 % | TEMPERATURE: 98 F | RESPIRATION RATE: 18 BRPM | SYSTOLIC BLOOD PRESSURE: 117 MMHG | HEART RATE: 91 BPM | DIASTOLIC BLOOD PRESSURE: 60 MMHG | WEIGHT: 192.38 LBS | BODY MASS INDEX: 30.92 KG/M2

## 2022-12-15 VITALS
WEIGHT: 191.38 LBS | BODY MASS INDEX: 30.76 KG/M2 | TEMPERATURE: 98 F | HEART RATE: 97 BPM | DIASTOLIC BLOOD PRESSURE: 60 MMHG | HEIGHT: 66 IN | SYSTOLIC BLOOD PRESSURE: 100 MMHG | OXYGEN SATURATION: 97 %

## 2022-12-15 DIAGNOSIS — C34.2 PRIMARY CANCER OF RIGHT MIDDLE LOBE OF LUNG: Primary | ICD-10-CM

## 2022-12-15 DIAGNOSIS — Z85.3 HISTORY OF LEFT BREAST CANCER: ICD-10-CM

## 2022-12-15 DIAGNOSIS — I48.91 ATRIAL FIBRILLATION, UNSPECIFIED TYPE: ICD-10-CM

## 2022-12-15 DIAGNOSIS — R39.15 URINARY URGENCY: ICD-10-CM

## 2022-12-15 DIAGNOSIS — G25.81 RESTLESS LEG: ICD-10-CM

## 2022-12-15 DIAGNOSIS — C34.2 PRIMARY CANCER OF RIGHT MIDDLE LOBE OF LUNG: ICD-10-CM

## 2022-12-15 DIAGNOSIS — C34.90 MALIGNANT NEOPLASM OF UNSPECIFIED PART OF UNSPECIFIED BRONCHUS OR LUNG: ICD-10-CM

## 2022-12-15 DIAGNOSIS — I10 HYPERTENSION, UNSPECIFIED TYPE: ICD-10-CM

## 2022-12-15 DIAGNOSIS — R73.03 PREDIABETES: Primary | ICD-10-CM

## 2022-12-15 DIAGNOSIS — Z86.718 HISTORY OF DVT (DEEP VEIN THROMBOSIS): ICD-10-CM

## 2022-12-15 DIAGNOSIS — I82.90 ACUTE DEEP VEIN THROMBOSIS (DVT) OF NON-EXTREMITY VEIN: ICD-10-CM

## 2022-12-15 PROCEDURE — 3044F HG A1C LEVEL LT 7.0%: CPT | Mod: CPTII,S$GLB,, | Performed by: INTERNAL MEDICINE

## 2022-12-15 PROCEDURE — 3074F SYST BP LT 130 MM HG: CPT | Mod: CPTII,S$GLB,, | Performed by: FAMILY MEDICINE

## 2022-12-15 PROCEDURE — 3044F PR MOST RECENT HEMOGLOBIN A1C LEVEL <7.0%: ICD-10-PCS | Mod: CPTII,S$GLB,, | Performed by: INTERNAL MEDICINE

## 2022-12-15 PROCEDURE — 1159F PR MEDICATION LIST DOCUMENTED IN MEDICAL RECORD: ICD-10-PCS | Mod: CPTII,S$GLB,, | Performed by: FAMILY MEDICINE

## 2022-12-15 PROCEDURE — 99213 OFFICE O/P EST LOW 20 MIN: CPT | Mod: S$GLB,,, | Performed by: INTERNAL MEDICINE

## 2022-12-15 PROCEDURE — 71250 CT THORAX DX C-: CPT | Mod: 26,,, | Performed by: RADIOLOGY

## 2022-12-15 PROCEDURE — 1101F PT FALLS ASSESS-DOCD LE1/YR: CPT | Mod: CPTII,S$GLB,, | Performed by: INTERNAL MEDICINE

## 2022-12-15 PROCEDURE — 3066F NEPHROPATHY DOC TX: CPT | Mod: CPTII,S$GLB,, | Performed by: INTERNAL MEDICINE

## 2022-12-15 PROCEDURE — 1101F PT FALLS ASSESS-DOCD LE1/YR: CPT | Mod: CPTII,S$GLB,, | Performed by: FAMILY MEDICINE

## 2022-12-15 PROCEDURE — 3078F DIAST BP <80 MM HG: CPT | Mod: CPTII,S$GLB,, | Performed by: INTERNAL MEDICINE

## 2022-12-15 PROCEDURE — 99999 PR PBB SHADOW E&M-EST. PATIENT-LVL III: CPT | Mod: PBBFAC,,, | Performed by: INTERNAL MEDICINE

## 2022-12-15 PROCEDURE — 3074F SYST BP LT 130 MM HG: CPT | Mod: CPTII,S$GLB,, | Performed by: INTERNAL MEDICINE

## 2022-12-15 PROCEDURE — 99214 OFFICE O/P EST MOD 30 MIN: CPT | Mod: S$GLB,,, | Performed by: FAMILY MEDICINE

## 2022-12-15 PROCEDURE — 3008F BODY MASS INDEX DOCD: CPT | Mod: CPTII,S$GLB,, | Performed by: FAMILY MEDICINE

## 2022-12-15 PROCEDURE — 3288F FALL RISK ASSESSMENT DOCD: CPT | Mod: CPTII,S$GLB,, | Performed by: INTERNAL MEDICINE

## 2022-12-15 PROCEDURE — 99214 PR OFFICE/OUTPT VISIT, EST, LEVL IV, 30-39 MIN: ICD-10-PCS | Mod: S$GLB,,, | Performed by: FAMILY MEDICINE

## 2022-12-15 PROCEDURE — 99999 PR PBB SHADOW E&M-EST. PATIENT-LVL IV: ICD-10-PCS | Mod: PBBFAC,,, | Performed by: FAMILY MEDICINE

## 2022-12-15 PROCEDURE — 99999 PR PBB SHADOW E&M-EST. PATIENT-LVL III: ICD-10-PCS | Mod: PBBFAC,,, | Performed by: INTERNAL MEDICINE

## 2022-12-15 PROCEDURE — 3074F PR MOST RECENT SYSTOLIC BLOOD PRESSURE < 130 MM HG: ICD-10-PCS | Mod: CPTII,S$GLB,, | Performed by: FAMILY MEDICINE

## 2022-12-15 PROCEDURE — 1126F PR PAIN SEVERITY QUANTIFIED, NO PAIN PRESENT: ICD-10-PCS | Mod: CPTII,S$GLB,, | Performed by: FAMILY MEDICINE

## 2022-12-15 PROCEDURE — 3078F PR MOST RECENT DIASTOLIC BLOOD PRESSURE < 80 MM HG: ICD-10-PCS | Mod: CPTII,S$GLB,, | Performed by: FAMILY MEDICINE

## 2022-12-15 PROCEDURE — 3288F PR FALLS RISK ASSESSMENT DOCUMENTED: ICD-10-PCS | Mod: CPTII,S$GLB,, | Performed by: FAMILY MEDICINE

## 2022-12-15 PROCEDURE — 1160F PR REVIEW ALL MEDS BY PRESCRIBER/CLIN PHARMACIST DOCUMENTED: ICD-10-PCS | Mod: CPTII,S$GLB,, | Performed by: FAMILY MEDICINE

## 2022-12-15 PROCEDURE — 3061F PR NEG MICROALBUMINURIA RESULT DOCUMENTED/REVIEW: ICD-10-PCS | Mod: CPTII,S$GLB,, | Performed by: FAMILY MEDICINE

## 2022-12-15 PROCEDURE — 71250 CT THORAX DX C-: CPT | Mod: TC

## 2022-12-15 PROCEDURE — 1159F MED LIST DOCD IN RCRD: CPT | Mod: CPTII,S$GLB,, | Performed by: FAMILY MEDICINE

## 2022-12-15 PROCEDURE — 3061F NEG MICROALBUMINURIA REV: CPT | Mod: CPTII,S$GLB,, | Performed by: FAMILY MEDICINE

## 2022-12-15 PROCEDURE — 3008F BODY MASS INDEX DOCD: CPT | Mod: CPTII,S$GLB,, | Performed by: INTERNAL MEDICINE

## 2022-12-15 PROCEDURE — 99213 PR OFFICE/OUTPT VISIT, EST, LEVL III, 20-29 MIN: ICD-10-PCS | Mod: S$GLB,,, | Performed by: INTERNAL MEDICINE

## 2022-12-15 PROCEDURE — 3288F PR FALLS RISK ASSESSMENT DOCUMENTED: ICD-10-PCS | Mod: CPTII,S$GLB,, | Performed by: INTERNAL MEDICINE

## 2022-12-15 PROCEDURE — 3008F PR BODY MASS INDEX (BMI) DOCUMENTED: ICD-10-PCS | Mod: CPTII,S$GLB,, | Performed by: INTERNAL MEDICINE

## 2022-12-15 PROCEDURE — 3288F FALL RISK ASSESSMENT DOCD: CPT | Mod: CPTII,S$GLB,, | Performed by: FAMILY MEDICINE

## 2022-12-15 PROCEDURE — 3044F PR MOST RECENT HEMOGLOBIN A1C LEVEL <7.0%: ICD-10-PCS | Mod: CPTII,S$GLB,, | Performed by: FAMILY MEDICINE

## 2022-12-15 PROCEDURE — 3066F NEPHROPATHY DOC TX: CPT | Mod: CPTII,S$GLB,, | Performed by: FAMILY MEDICINE

## 2022-12-15 PROCEDURE — 71250 CT CHEST WITHOUT CONTRAST: ICD-10-PCS | Mod: 26,,, | Performed by: RADIOLOGY

## 2022-12-15 PROCEDURE — 3078F PR MOST RECENT DIASTOLIC BLOOD PRESSURE < 80 MM HG: ICD-10-PCS | Mod: CPTII,S$GLB,, | Performed by: INTERNAL MEDICINE

## 2022-12-15 PROCEDURE — 1160F RVW MEDS BY RX/DR IN RCRD: CPT | Mod: CPTII,S$GLB,, | Performed by: FAMILY MEDICINE

## 2022-12-15 PROCEDURE — 3074F PR MOST RECENT SYSTOLIC BLOOD PRESSURE < 130 MM HG: ICD-10-PCS | Mod: CPTII,S$GLB,, | Performed by: INTERNAL MEDICINE

## 2022-12-15 PROCEDURE — 1101F PR PT FALLS ASSESS DOC 0-1 FALLS W/OUT INJ PAST YR: ICD-10-PCS | Mod: CPTII,S$GLB,, | Performed by: INTERNAL MEDICINE

## 2022-12-15 PROCEDURE — 1101F PR PT FALLS ASSESS DOC 0-1 FALLS W/OUT INJ PAST YR: ICD-10-PCS | Mod: CPTII,S$GLB,, | Performed by: FAMILY MEDICINE

## 2022-12-15 PROCEDURE — 1126F PR PAIN SEVERITY QUANTIFIED, NO PAIN PRESENT: ICD-10-PCS | Mod: CPTII,S$GLB,, | Performed by: INTERNAL MEDICINE

## 2022-12-15 PROCEDURE — 1126F AMNT PAIN NOTED NONE PRSNT: CPT | Mod: CPTII,S$GLB,, | Performed by: INTERNAL MEDICINE

## 2022-12-15 PROCEDURE — 3008F PR BODY MASS INDEX (BMI) DOCUMENTED: ICD-10-PCS | Mod: CPTII,S$GLB,, | Performed by: FAMILY MEDICINE

## 2022-12-15 PROCEDURE — 3066F PR DOCUMENTATION OF TREATMENT FOR NEPHROPATHY: ICD-10-PCS | Mod: CPTII,S$GLB,, | Performed by: INTERNAL MEDICINE

## 2022-12-15 PROCEDURE — 99999 PR PBB SHADOW E&M-EST. PATIENT-LVL IV: CPT | Mod: PBBFAC,,, | Performed by: FAMILY MEDICINE

## 2022-12-15 PROCEDURE — 3061F PR NEG MICROALBUMINURIA RESULT DOCUMENTED/REVIEW: ICD-10-PCS | Mod: CPTII,S$GLB,, | Performed by: INTERNAL MEDICINE

## 2022-12-15 PROCEDURE — 3078F DIAST BP <80 MM HG: CPT | Mod: CPTII,S$GLB,, | Performed by: FAMILY MEDICINE

## 2022-12-15 PROCEDURE — 1126F AMNT PAIN NOTED NONE PRSNT: CPT | Mod: CPTII,S$GLB,, | Performed by: FAMILY MEDICINE

## 2022-12-15 PROCEDURE — 3066F PR DOCUMENTATION OF TREATMENT FOR NEPHROPATHY: ICD-10-PCS | Mod: CPTII,S$GLB,, | Performed by: FAMILY MEDICINE

## 2022-12-15 PROCEDURE — 3044F HG A1C LEVEL LT 7.0%: CPT | Mod: CPTII,S$GLB,, | Performed by: FAMILY MEDICINE

## 2022-12-15 PROCEDURE — 3061F NEG MICROALBUMINURIA REV: CPT | Mod: CPTII,S$GLB,, | Performed by: INTERNAL MEDICINE

## 2022-12-15 RX ORDER — GABAPENTIN 100 MG/1
100 CAPSULE ORAL 3 TIMES DAILY
COMMUNITY
Start: 2022-09-21

## 2022-12-15 NOTE — PROGRESS NOTES
Subjective:       Patient ID: Fauzia Kirk is a 67 y.o. female.    Chief Complaint: No chief complaint on file.    HPI Ms. Kirk is a very pleasant 67-year-old  female who returned  for F/U of stage III right lung cancer.    She had chemo/XRT then adjuvant therapy with Durvalumab.    That was completed November 2018.    She has some chronic right leg pain related to L5 disc disease.  She sees Dr. Hercules for orthopedics has a follow-up appointment with him next week.  She has some occasional shortness of breath which has been present since she had COVID.    Appetite and bowel function has been good.       Lung History:      In December 2016 she began to have some blood in her mucus after coughing.In  April she developed a persistent cough and saw her physician on April 18.  Chest x-ray at that time showed a rounded opacity in the right lung overlying the major fissure.   Chest CT in May showed a 4.8 x 4.0 cm, rounded, soft tissue mass in the middle lobe between the medial and lateral segments. There was pre-carinal adenopathy.    Subsequently she underwent bronchoscopy with EBUS on 6/9/17. Needle biopsy of the medistinal nodes was positive for poorly differentiated carcinoma with squamoid features. The cells were positive for CK7, CK5/6, and P63 and are negative for CK20, GCDFP, TTF1, ER, RI, Napsin and YEMI 3.This was felt to be most CW a new squamous lung cancer.    PET CT  demonstrated a hypermetabolic, large mass in the right middle lobe with an SUV max of 20.23. There was hypermetabolic activity extending from this mass tracking along the pleura. There was a hypermetabolic right hilar lymph node demonstrating an SUV max of 20.2. In addition there were 2 hypermetabolic subcarinal lymph nodes with the larger demonstrating an SUV max of 14.2. An additional right paratracheal lymph node was seen with an SUV max of 26.1.    She completed radiation together with weekly chemotherapy with carboplatin and Taxol   for stage IIIA disease.    She completed therapy on September 13, 2017 and received 7 chemotherapy treatments.    CT scan from September 29 showed that the right middle lobe mass had decreased to 4.2 x 2.3 cm from 4.8 x 4.3 cm.  Stable 4 mm pulmonary nodule in the left lower lobe.    She then began adjuvant immunotherapy starting in November 2017.  She completed 26 cycles of Durvalumab on 11/2/18.        Previous cancer history:She had a stage I, ER negative carcinoma of the    left breast in 1990, treated with lumpectomy and radiation therapy.  In      1993, she developed a stage I, ER positive carcinoma of the right breast     treated with lumpectomy and radiation.  In 1995, she developed left breast   cancer recurrence, treated with lumpectomy, brachytherapy and four cycles    of Adriamycin and Cytoxan.  In 1996, she developed a new right breast        cancer T2 N0 poorly differentiated, treated with four cycles of              preoperative Taxol followed by mastectomy.        On October 17, 2016 left mammogram showed increased calcifications in the left breast at 3:00.  On October 27 a biopsy showed DCIS with solid, cribriform, and micropapillary patterns.  Tumor was 95% ER positive at 95% NV positive    On November 7, 2016 she underwent left mastectomy which showed 8 mm of DCIS and negative margins.        Review of Systems   Constitutional:  Negative for activity change, appetite change, fatigue, fever and unexpected weight change.   HENT:  Negative for postnasal drip and trouble swallowing.    Respiratory:  Positive for shortness of breath (mostly resolved). Negative for cough.    Cardiovascular:  Negative for chest pain.   Gastrointestinal:  Negative for abdominal pain, constipation, nausea and vomiting.   Musculoskeletal:  Negative for back pain.        Chronic right leg pain    Neurological:  Negative for headaches.   Psychiatric/Behavioral:  Negative for dysphoric mood. The patient is not nervous/anxious.       Objective:      Physical Exam  Constitutional:       Appearance: She is well-developed.   Cardiovascular:      Rate and Rhythm: Normal rate and regular rhythm.   Pulmonary:      Effort: Pulmonary effort is normal.      Breath sounds: Normal breath sounds. No wheezing or rales.   Chest:       Abdominal:      Palpations: Abdomen is soft. There is no mass.      Tenderness: There is no abdominal tenderness.   Lymphadenopathy:      Cervical: No cervical adenopathy.      Upper Body:      Right upper body: No supraclavicular or axillary adenopathy.      Left upper body: No supraclavicular or axillary adenopathy.   Skin:     Findings: No erythema or rash.   Neurological:      Mental Status: She is alert and oriented to person, place, and time.   Psychiatric:         Mood and Affect: Mood normal.         Behavior: Behavior normal.         Thought Content: Thought content normal.         Judgment: Judgment normal.       Assessment:   CT - Middle lobe cicatricial atelectasis with adjacent pleural thickening; favor post radiation treatment fibrosis, similar to the prior.     Interval decrease in size of anterior mediastinal lymph nodes, as above.     Stable 0.5 cm left lower lobe nodule  CBC shows hemoglobin 11.5, otherwise normal, metabolic profile is normal.  1. Primary cancer of right middle lobe of lung    2. History of left breast cancer       Plan:       Arrange port removal.      RTC 6 M with labs and CT chest    Route Chart for Scheduling    Med Onc Chart Routing      Follow up with physician 6 months.   Follow up with [facility]    Infusion scheduling note    Injection scheduling note    Labs CBC and CMP   Lab interval:     Imaging CT chest abdomen pelvis      Pharmacy appointment    Other referrals          Therapy Plan Information  Flushes  heparin, porcine (PF) 100 unit/mL injection flush 500 Units  500 Units, Intravenous, Every visit  sodium chloride 0.9% flush 10 mL  10 mL, Intravenous, Every  visit

## 2022-12-15 NOTE — PROGRESS NOTES
"Routine Office Visit    Fauzia Kirk  1955  [id number]      Subjective     Fauzia is a 67 y.o. female who presents today for:    History of breast cancer. Lung cancer - Patient has been following up with Dr. Mai   Prediabetes - Patient has been working on her diet. She admits she has been eating more. A1c has increased.  She admits she could be more active.   DVT in her neck - Patient continues to be on xarelto; she does not have compression stockings on   Restlses leg - Patient is doing better on gabapentin. lumbar disc disease - Patient follows up with orthopedics - Dr. Diomedes Jones / Hypertension - cardiologist - Dr. Giraldo.     Objective     Review of Systems   Constitutional:  Negative for chills and fever.   HENT:  Negative for congestion.    Eyes:  Negative for blurred vision.   Respiratory:  Negative for cough.    Cardiovascular:  Negative for chest pain.   Gastrointestinal:  Negative for abdominal pain, constipation, diarrhea, heartburn, nausea and vomiting.   Genitourinary:  Negative for dysuria.   Musculoskeletal:  Negative for myalgias.   Skin:  Negative for itching and rash.   Neurological:  Negative for dizziness and headaches.   Psychiatric/Behavioral:  Negative for depression.      /60 (BP Location: Left arm, Patient Position: Sitting, BP Method: Large (Manual))   Pulse 97   Temp 98.4 °F (36.9 °C) (Oral)   Ht 5' 6" (1.676 m)   Wt 86.8 kg (191 lb 5.8 oz)   SpO2 97%   BMI 30.89 kg/m²   Physical Exam  Constitutional:       Appearance: She is well-developed.   HENT:      Head: Normocephalic and atraumatic.   Eyes:      Conjunctiva/sclera: Conjunctivae normal.      Pupils: Pupils are equal, round, and reactive to light.   Cardiovascular:      Rate and Rhythm: Normal rate and regular rhythm.      Heart sounds: Normal heart sounds. No murmur heard.    No friction rub. No gallop.   Pulmonary:      Effort: No respiratory distress.      Breath sounds: Normal breath sounds.   Abdominal:      " General: Bowel sounds are normal. There is no distension.      Palpations: Abdomen is soft.      Tenderness: There is no abdominal tenderness.   Musculoskeletal:         General: Normal range of motion.      Cervical back: Normal range of motion and neck supple.   Lymphadenopathy:      Cervical: No cervical adenopathy.   Skin:     General: Skin is warm.   Neurological:      Mental Status: She is alert and oriented to person, place, and time.           Assessment     Health Maintenance         Date Due Completion Date    Pneumococcal Vaccines (Age 65+) (1 - PCV) Never done ---    Eye Exam Never done ---    TETANUS VACCINE Never done ---    Shingles Vaccine (1 of 2) Never done ---    Low Dose Statin Never done ---    Colorectal Cancer Screening 09/16/2021 9/16/2011    Influenza Vaccine (1) Never done ---    COVID-19 Vaccine (4 - Booster for Moderna series) 01/16/2023 11/21/2022    Foot Exam 03/14/2023 3/14/2022 (Done)    Override on 3/14/2022: Done    Diabetes Urine Screening 06/08/2023 6/8/2022    Lipid Panel 06/08/2023 6/8/2022    Hemoglobin A1c 06/15/2023 12/15/2022    DEXA Scan 04/29/2024 4/29/2022              Problem List Items Addressed This Visit          Cardiac/Vascular    Atrial fibrillation  The current medical regimen is effective;  continue present plan and medications.      HTN (hypertension)    Relevant Orders    CBC Auto Differential    Comprehensive Metabolic Panel    Lipid Panel    Hemoglobin A1C    TSH       Hematology    History of DVT (deep vein thrombosis)  Noted in chart       Other Visit Diagnoses       Prediabetes    -  Primary    Relevant Orders    CBC Auto Differential    Comprehensive Metabolic Panel    Lipid Panel    Hemoglobin A1C    TSH    Urinary urgency        Relevant Orders    Urinalysis (Completed)    Urine culture    Restless leg        Relevant Medications    gabapentin (NEURONTIN) 100 MG capsule                  Follow up in about 6 months (around 6/15/2023), or if symptoms  worsen or fail to improve, for yearly exam.

## 2022-12-19 ENCOUNTER — TELEPHONE (OUTPATIENT)
Dept: SURGERY | Facility: CLINIC | Age: 67
End: 2022-12-19
Payer: COMMERCIAL

## 2022-12-19 NOTE — TELEPHONE ENCOUNTER
----- Message from Brendan Ruiz MD sent at 12/17/2022  9:39 AM CST -----  Fabulous!  Any one of us can.    Team, please set her up for port removal in minors.  ----- Message -----  From: Wyatt Mai MD  Sent: 12/15/2022   2:49 PM CST  To: Brendan Ruiz MD    Bill,    You put a port in 2017!  She is doing well from a cancer perspective. Are you the one to remove?  Wyatt

## 2022-12-29 RX ORDER — METOPROLOL TARTRATE 25 MG/1
25 TABLET, FILM COATED ORAL 2 TIMES DAILY
Qty: 60 TABLET | Refills: 11 | Status: SHIPPED | OUTPATIENT
Start: 2022-12-29 | End: 2023-12-29

## 2022-12-29 NOTE — TELEPHONE ENCOUNTER
----- Message from Sherley Davenport sent at 12/29/2022 10:15 AM CST -----  Regarding: Refill Request  Who Called: RIVERA STEPHENSON [[id number]]          New Prescription or Refill : Refill      RX Name and Strength:  metoprolol tartrate (LOPRESSOR) 25 MG tablet      30 day or 90 day RX:  90      Preferred Pharmacy:  [address] [place]   Phone:  [phone]  Fax:  [phone]            Would the patient rather a call back or a response via [facility]? Call back        Best Call Back Number:   [phone]        Additional Information:

## 2022-12-29 NOTE — TELEPHONE ENCOUNTER
No new care gaps identified.  [facility] Embedded Care Gaps. Reference number: [id number]. 12/29/2022   10:18:28 AM CST

## 2023-01-04 ENCOUNTER — PATIENT MESSAGE (OUTPATIENT)
Dept: HEMATOLOGY/ONCOLOGY | Facility: CLINIC | Age: 68
End: 2023-01-04
Payer: COMMERCIAL

## 2023-01-09 ENCOUNTER — PATIENT MESSAGE (OUTPATIENT)
Dept: ADMINISTRATIVE | Facility: HOSPITAL | Age: 68
End: 2023-01-09
Payer: COMMERCIAL

## 2023-01-24 ENCOUNTER — OFFICE VISIT (OUTPATIENT)
Dept: OPTOMETRY | Facility: CLINIC | Age: 68
End: 2023-01-24
Payer: COMMERCIAL

## 2023-01-24 DIAGNOSIS — H25.13 NUCLEAR SCLEROSIS, BILATERAL: Primary | ICD-10-CM

## 2023-01-24 DIAGNOSIS — H52.4 MYOPIA OF BOTH EYES WITH ASTIGMATISM AND PRESBYOPIA: ICD-10-CM

## 2023-01-24 DIAGNOSIS — H52.203 MYOPIA OF BOTH EYES WITH ASTIGMATISM AND PRESBYOPIA: ICD-10-CM

## 2023-01-24 DIAGNOSIS — H52.13 MYOPIA OF BOTH EYES WITH ASTIGMATISM AND PRESBYOPIA: ICD-10-CM

## 2023-01-24 PROCEDURE — 92015 PR REFRACTION: ICD-10-PCS | Mod: S$GLB,,, | Performed by: OPTOMETRIST

## 2023-01-24 PROCEDURE — 1159F MED LIST DOCD IN RCRD: CPT | Mod: CPTII,S$GLB,, | Performed by: OPTOMETRIST

## 2023-01-24 PROCEDURE — 1126F PR PAIN SEVERITY QUANTIFIED, NO PAIN PRESENT: ICD-10-PCS | Mod: CPTII,S$GLB,, | Performed by: OPTOMETRIST

## 2023-01-24 PROCEDURE — 92004 COMPRE OPH EXAM NEW PT 1/>: CPT | Mod: S$GLB,,, | Performed by: OPTOMETRIST

## 2023-01-24 PROCEDURE — 1159F PR MEDICATION LIST DOCUMENTED IN MEDICAL RECORD: ICD-10-PCS | Mod: CPTII,S$GLB,, | Performed by: OPTOMETRIST

## 2023-01-24 PROCEDURE — 1101F PR PT FALLS ASSESS DOC 0-1 FALLS W/OUT INJ PAST YR: ICD-10-PCS | Mod: CPTII,S$GLB,, | Performed by: OPTOMETRIST

## 2023-01-24 PROCEDURE — 92004 PR EYE EXAM, NEW PATIENT,COMPREHESV: ICD-10-PCS | Mod: S$GLB,,, | Performed by: OPTOMETRIST

## 2023-01-24 PROCEDURE — 1126F AMNT PAIN NOTED NONE PRSNT: CPT | Mod: CPTII,S$GLB,, | Performed by: OPTOMETRIST

## 2023-01-24 PROCEDURE — 3288F PR FALLS RISK ASSESSMENT DOCUMENTED: ICD-10-PCS | Mod: CPTII,S$GLB,, | Performed by: OPTOMETRIST

## 2023-01-24 PROCEDURE — 1101F PT FALLS ASSESS-DOCD LE1/YR: CPT | Mod: CPTII,S$GLB,, | Performed by: OPTOMETRIST

## 2023-01-24 PROCEDURE — 92015 DETERMINE REFRACTIVE STATE: CPT | Mod: S$GLB,,, | Performed by: OPTOMETRIST

## 2023-01-24 PROCEDURE — 99999 PR PBB SHADOW E&M-EST. PATIENT-LVL III: ICD-10-PCS | Mod: PBBFAC,,, | Performed by: OPTOMETRIST

## 2023-01-24 PROCEDURE — 99999 PR PBB SHADOW E&M-EST. PATIENT-LVL III: CPT | Mod: PBBFAC,,, | Performed by: OPTOMETRIST

## 2023-01-24 PROCEDURE — 3288F FALL RISK ASSESSMENT DOCD: CPT | Mod: CPTII,S$GLB,, | Performed by: OPTOMETRIST

## 2023-01-24 NOTE — PROGRESS NOTES
HPI    66 Y/o female is here for diabetic eye exam pt states she is having   trouble with distance vision pt say's when looking at a distance vision is   blurry   Pt refuses dilation drops     Pt denies pain and discomfort   No f/f    Eye med: no gtt     Last edited by Gilma Cross MA on 1/24/2023 12:37 PM.            Assessment /Plan     For exam results, see Encounter Report.    Nuclear sclerosis, bilateral  -Educated patient on presence of cataracts at today's exam, monitor at annual dilated fundus exam. 5+ years surgical estimate.    Myopia of both eyes with astigmatism and presbyopia  Eyeglass Final Rx       Eyeglass Final Rx         Sphere Cylinder Axis Dist VA Add    Right -1.00 +2.00 005 20/20 +2.50    Left -1.00 +1.75 005 20/20 +2.50      Type: SVL    Expiration Date: 1/24/2024                    Refractive exam today requires DFE to meet DM exam      RTC DFE

## 2023-01-31 ENCOUNTER — TELEPHONE (OUTPATIENT)
Dept: SURGERY | Facility: CLINIC | Age: 68
End: 2023-01-31
Payer: COMMERCIAL

## 2023-01-31 ENCOUNTER — TELEPHONE (OUTPATIENT)
Dept: HEMATOLOGY/ONCOLOGY | Facility: CLINIC | Age: 68
End: 2023-01-31
Payer: COMMERCIAL

## 2023-01-31 NOTE — TELEPHONE ENCOUNTER
----- Message from Socorro Blandon MA sent at 1/31/2023 12:36 PM CST -----  Regarding: MEDICATION RECOMMENDATION  Good afternoon, we are a minor room procedure (Port Removal) in our office on 2/9/23 . Dr. Ruiz is asking when it'll be safe to hold her Xarelto. Thanks in advance.

## 2023-01-31 NOTE — TELEPHONE ENCOUNTER
Will reach out to Dr. Rae's office to see what'll be a safe time to stop her Xarelto before port removal on 2/9/23. Patient is ok with plan.

## 2023-01-31 NOTE — TELEPHONE ENCOUNTER
Returned call, spoke to pt.   She sates that she wants to continue taking the letrozole, does not want to start exemestane.  Refill due in April. Has meds for now.   Will route to provider to inform.         ----- Message from Whitney Armando sent at 1/31/2023  9:48 AM CST -----  Consult/Advisory    Name Of Caller: self      Contact Preference?: [phone]      Nature of Call? Requesting a call back to advise if the letter for clearance was received from Dr. Hercules at [facility]

## 2023-02-09 ENCOUNTER — PROCEDURE VISIT (OUTPATIENT)
Dept: SURGERY | Facility: CLINIC | Age: 68
End: 2023-02-09
Payer: COMMERCIAL

## 2023-02-09 VITALS
HEIGHT: 66 IN | WEIGHT: 192.13 LBS | BODY MASS INDEX: 30.88 KG/M2 | HEART RATE: 90 BPM | DIASTOLIC BLOOD PRESSURE: 74 MMHG | SYSTOLIC BLOOD PRESSURE: 156 MMHG

## 2023-02-09 DIAGNOSIS — C34.2 PRIMARY CANCER OF RIGHT MIDDLE LOBE OF LUNG: Primary | ICD-10-CM

## 2023-02-09 NOTE — PROGRESS NOTES
[facility]  History & Physical    SUBJECTIVE:     History of Present Illness:  Fauzia Kirk is a 67 y.o. female presents for port-a-cath removal. Hx includes Stage 3 right lung cancer. She has completed her therapy with her Oncologist who wanted to arrange for port removal as she is doing well from a cancer perspective and it is no longer needed. Last treatment through port was completed on 18. Port is no longer functioning. Patient stopped her Xarelto since Friday, 5 days ago.   She is ready for port removal.         Review of patient's allergies indicates:  No Known Allergies    Current Outpatient Medications   Medication Sig Dispense Refill    amLODIPine (NORVASC) 10 MG tablet Take 1 tablet by mouth once daily 90 tablet 3    levothyroxine (SYNTHROID) 100 MCG tablet TAKE 1 TABLET BY MOUTH ONCE DAILY 30 tablet 11    metoprolol tartrate (LOPRESSOR) 25 MG tablet Take 1 tablet (25 mg total) by mouth 2 (two) times daily. 60 tablet 11    rivaroxaban (XARELTO) 20 mg Tab Take 1 tablet (20 mg total) by mouth daily with dinner or evening meal. Do not start until completed 21 days of 15 mg twice a day with meals 90 tablet 3    gabapentin (NEURONTIN) 100 MG capsule Take 100 mg by mouth 3 (three) times daily.       No current facility-administered medications for this visit.       Past Medical History:   Diagnosis Date    Arthritis     Breast cancer     Hypertension      Past Surgical History:   Procedure Laterality Date    BREAST LUMPECTOMY       SECTION, CLASSIC      LIPOMA RESECTION      MASTECTOMY       Family History   Problem Relation Age of Onset    Lymphoma Mother     Prostate cancer Father     Stomach cancer Sister     Breast cancer Sister     Diabetes Brother     Breast cancer Paternal Aunt     Breast cancer Paternal Grandmother      Social History     Tobacco Use    Smoking status: Never    Smokeless tobacco: Never   Substance Use Topics    Alcohol use: No      "Alcohol/week: 0.0 standard drinks    Drug use: No        Review of Systems:  Review of Systems   Constitutional:  Negative for chills and fever.   HENT:  Negative for facial swelling.    Respiratory:  Negative for shortness of breath.    Cardiovascular:  Negative for chest pain.   Gastrointestinal:  Negative for abdominal pain, diarrhea, nausea and vomiting.   Genitourinary:  Negative for dysuria.     OBJECTIVE:     Vital Signs (Most Recent)  Pulse: 90 (02/09/23 1259)  BP: (!) 156/74 (02/09/23 1259)  5' 6" (1.676 m)  87.1 kg (192 lb 2.1 oz)     Physical Exam:  Physical Exam    Constitutional: Normal appearance.   HENT:      Head: Normocephalic and atraumatic.      Nose: Nose normal.      Mouth/throat: Mucous membranes are moist  Eyes: Extraocular movements intact. Pupils are equal, round, and reactive to light.   Cardiovascular: Normal rate and regular rhythm. Normal pulses. Normal heart sounds.   Pulmonary: Pulmonary effort is normal. Normal breath sounds.   Abdominal: Bowel sounds are normal. There is no abdominal tenderness. There is no guarding or rebound.   Musculoskeletal:    Normal range of motion.   Cervical: Normal range of motion and neck supple.   Skin: Warm. Port-a-cath to left chest.   Neurological: No focal deficit present.   Mental Status: She is alert and oriented to person, place, and time.   Psychiatric:      Mood and Affect: Mood normal. Behavior: Behavior normal.        ASSESSMENT/PLAN:     Fauzia Kirk is a 67 y.o. female presents for port-a-cath removal. Hx includes Stage 3 right lung cancer. She has completed her therapy with her Oncologist who wanted to arrange for port removal as she is doing well from a cancer perspective and it is no longer needed.   Consent obtained. Site was marked.     PLAN:  Procedure to be done in clinic procedure room.                   ANN Carreon      "

## 2023-02-09 NOTE — PROCEDURES
"Fauzia Kirk is a 67 y.o. female patient.    Pulse: 90 (02/09/23 1259)  BP: (!) 156/74 (02/09/23 1259)  Weight: 87.1 kg (192 lb 2.1 oz) (02/09/23 1259)  Height: 5' 6" (167.6 cm) (02/09/23 1259)       Removal, Portacath    Date/Time: 2/9/2023 1:00 PM  Performed by: Audrey Vanegas MD  Authorized by: Brendan Ruiz MD     Consent Done?:  Yes (Written)  Timeout: prior to procedure the correct patient, procedure, and site was verified    Prep: patient was prepped and draped in usual sterile fashion    Local anesthesia used?: Yes    Anesthesia:  Local infiltration  Local anesthetic:  Lidocaine 1% without epinephrine  Anesthetic total (ml):  5  Assistants?: Yes     I was present for the entire procedure.  Indications:  Port-a-cath  Body area:  Chest  Laterality:  Left  Position:  Supine   Patient was prepped and draped in the normal sterile fashion.  Anesthesia:  Local infiltration  Local anesthetic:  Lidocaine 1% without epinephrine  Scalpel size:  15  Incision type:  Single straight  Specimens?: Yes    Estimated blood loss (cc):  3  Wound closure:  Intermediate layered  Wound repair size (cm):  3  Sutures:  3-0 Vicryl and 4-0 Monocryl  Sterile dressings:  Other (comments)   Needle, instrument, and sponge counts were correct.   Patient tolerated the procedure well with no immediate complications.   Post-operative instructions were provided for the patient.  Comments:      Dermabond on incision after suture closure        Physician: MD Brendan Guerrero MD       Diagnosis: Encounter for Removal of Permacath, lung cancer     Procedure: Permacath removal     Date: 02/09/2023       Location:  Left Chest     Anesthesia: Local. 5mL 1% Lidocaine w/o epinephrine     Procedure:  After informed consent and timeout was performed, the patient was positioned in the bed in a supine position. The field was prepped and draped in a sterile fashion with chlorhexidine. " Following this, local anesthetic was injected on the anterior left chest along the lateral aspect of the catheter tract. Bovie electrocautery was used to extend the incision through the subcutaneous fat until the port was visible.  Stay sutures were cut from the port and removed. Bovie and hemostat was then used to dissect the catheter from the subcutaneous tissue. Tension was applied and the catheter was removed in one piece. Pressure was held at the IJ insertion site for ten minutes. The skin incision was closed with an interrupted 3-0 vicryl sutures and a 4-0 monocryl subcuticular running suture. There was no evidence of bleeding from the skin site or hematoma formation. Dermabond was applied to the skin site. Patient tolerated the procedure well.      Complications/Comments: None     Estimated Blood Loss: None     Disposition: Home   2/9/2023

## 2023-02-23 ENCOUNTER — TELEPHONE (OUTPATIENT)
Dept: HEMATOLOGY/ONCOLOGY | Facility: CLINIC | Age: 68
End: 2023-02-23
Payer: COMMERCIAL

## 2023-02-23 NOTE — TELEPHONE ENCOUNTER
Returned call spoke to pt. Pt states md office has received what they needed.    ----- Message from Adilson Murphy sent at 2/22/2023 10:58 AM CST -----  Regarding: Consult/Advisory      Name Of Caller: Self      Contact Preference?: [phone]      Nature of Call? Is requesting a callback concerning a message that was suppose to be sent to her bone and joint doctor.

## 2023-06-06 ENCOUNTER — PATIENT OUTREACH (OUTPATIENT)
Dept: ADMINISTRATIVE | Facility: HOSPITAL | Age: 68
End: 2023-06-06
Payer: COMMERCIAL

## 2023-06-06 DIAGNOSIS — E11.9 TYPE 2 DIABETES MELLITUS WITHOUT COMPLICATION, UNSPECIFIED WHETHER LONG TERM INSULIN USE: Primary | ICD-10-CM

## 2023-06-06 NOTE — PROGRESS NOTES
Health Maintenance and immunizations reviewed/ updated.  Please consider prescribing an appropriate intensity statin.  Foot Exam Due, please document once completed.  Colonoscopy or fit kit is due, please discuss at upcoming appointment.

## 2023-06-09 ENCOUNTER — LAB VISIT (OUTPATIENT)
Dept: LAB | Facility: HOSPITAL | Age: 68
End: 2023-06-09
Attending: FAMILY MEDICINE
Payer: COMMERCIAL

## 2023-06-09 DIAGNOSIS — R73.03 PREDIABETES: ICD-10-CM

## 2023-06-09 DIAGNOSIS — I10 HYPERTENSION, UNSPECIFIED TYPE: ICD-10-CM

## 2023-06-09 LAB
ALBUMIN SERPL BCP-MCNC: 3.5 G/DL (ref 3.5–5.2)
ALP SERPL-CCNC: 71 U/L (ref 55–135)
ALT SERPL W/O P-5'-P-CCNC: 7 U/L (ref 10–44)
ANION GAP SERPL CALC-SCNC: 14 MMOL/L (ref 8–16)
AST SERPL-CCNC: 14 U/L (ref 10–40)
BASOPHILS # BLD AUTO: 0.04 K/UL (ref 0–0.2)
BASOPHILS NFR BLD: 0.6 % (ref 0–1.9)
BILIRUB SERPL-MCNC: 0.6 MG/DL (ref 0.1–1)
BUN SERPL-MCNC: 15 MG/DL (ref 8–23)
CALCIUM SERPL-MCNC: 9.7 MG/DL (ref 8.7–10.5)
CHLORIDE SERPL-SCNC: 105 MMOL/L (ref 95–110)
CHOLEST SERPL-MCNC: 228 MG/DL (ref 120–199)
CHOLEST/HDLC SERPL: 5.4 {RATIO} (ref 2–5)
CO2 SERPL-SCNC: 25 MMOL/L (ref 23–29)
CREAT SERPL-MCNC: 1 MG/DL (ref 0.5–1.4)
DIFFERENTIAL METHOD: ABNORMAL
EOSINOPHIL # BLD AUTO: 0.1 K/UL (ref 0–0.5)
EOSINOPHIL NFR BLD: 1.6 % (ref 0–8)
ERYTHROCYTE [DISTWIDTH] IN BLOOD BY AUTOMATED COUNT: 15.3 % (ref 11.5–14.5)
EST. GFR  (NO RACE VARIABLE): >60 ML/MIN/1.73 M^2
ESTIMATED AVG GLUCOSE: 131 MG/DL (ref 68–131)
GLUCOSE SERPL-MCNC: 98 MG/DL (ref 70–110)
HBA1C MFR BLD: 6.2 % (ref 4–5.6)
HCT VFR BLD AUTO: 37.9 % (ref 37–48.5)
HDLC SERPL-MCNC: 42 MG/DL (ref 40–75)
HDLC SERPL: 18.4 % (ref 20–50)
HGB BLD-MCNC: 11.9 G/DL (ref 12–16)
IMM GRANULOCYTES # BLD AUTO: 0.03 K/UL (ref 0–0.04)
IMM GRANULOCYTES NFR BLD AUTO: 0.5 % (ref 0–0.5)
LDLC SERPL CALC-MCNC: 167.8 MG/DL (ref 63–159)
LYMPHOCYTES # BLD AUTO: 1.4 K/UL (ref 1–4.8)
LYMPHOCYTES NFR BLD: 22 % (ref 18–48)
MCH RBC QN AUTO: 27.7 PG (ref 27–31)
MCHC RBC AUTO-ENTMCNC: 31.4 G/DL (ref 32–36)
MCV RBC AUTO: 88 FL (ref 82–98)
MONOCYTES # BLD AUTO: 0.5 K/UL (ref 0.3–1)
MONOCYTES NFR BLD: 7.5 % (ref 4–15)
NEUTROPHILS # BLD AUTO: 4.2 K/UL (ref 1.8–7.7)
NEUTROPHILS NFR BLD: 67.8 % (ref 38–73)
NONHDLC SERPL-MCNC: 186 MG/DL
NRBC BLD-RTO: 0 /100 WBC
PLATELET # BLD AUTO: 282 K/UL (ref 150–450)
PMV BLD AUTO: 11.1 FL (ref 9.2–12.9)
POTASSIUM SERPL-SCNC: 5 MMOL/L (ref 3.5–5.1)
PROT SERPL-MCNC: 7.6 G/DL (ref 6–8.4)
RBC # BLD AUTO: 4.3 M/UL (ref 4–5.4)
SODIUM SERPL-SCNC: 144 MMOL/L (ref 136–145)
T4 FREE SERPL-MCNC: 1.07 NG/DL (ref 0.71–1.51)
TRIGL SERPL-MCNC: 91 MG/DL (ref 30–150)
TSH SERPL DL<=0.005 MIU/L-ACNC: 0.1 UIU/ML (ref 0.4–4)
WBC # BLD AUTO: 6.26 K/UL (ref 3.9–12.7)

## 2023-06-09 PROCEDURE — 36415 COLL VENOUS BLD VENIPUNCTURE: CPT | Mod: PO | Performed by: FAMILY MEDICINE

## 2023-06-09 PROCEDURE — 84439 ASSAY OF FREE THYROXINE: CPT | Performed by: FAMILY MEDICINE

## 2023-06-09 PROCEDURE — 85025 COMPLETE CBC W/AUTO DIFF WBC: CPT | Performed by: FAMILY MEDICINE

## 2023-06-09 PROCEDURE — 80061 LIPID PANEL: CPT | Performed by: FAMILY MEDICINE

## 2023-06-09 PROCEDURE — 83036 HEMOGLOBIN GLYCOSYLATED A1C: CPT | Performed by: FAMILY MEDICINE

## 2023-06-09 PROCEDURE — 80053 COMPREHEN METABOLIC PANEL: CPT | Performed by: FAMILY MEDICINE

## 2023-06-09 PROCEDURE — 84443 ASSAY THYROID STIM HORMONE: CPT | Performed by: FAMILY MEDICINE

## 2023-06-15 ENCOUNTER — OFFICE VISIT (OUTPATIENT)
Dept: FAMILY MEDICINE | Facility: CLINIC | Age: 68
End: 2023-06-15
Payer: COMMERCIAL

## 2023-06-15 VITALS
DIASTOLIC BLOOD PRESSURE: 70 MMHG | SYSTOLIC BLOOD PRESSURE: 122 MMHG | HEIGHT: 66 IN | BODY MASS INDEX: 34.58 KG/M2 | HEART RATE: 91 BPM | TEMPERATURE: 98 F | WEIGHT: 215.19 LBS | OXYGEN SATURATION: 97 %

## 2023-06-15 DIAGNOSIS — I48.91 ATRIAL FIBRILLATION, UNSPECIFIED TYPE: ICD-10-CM

## 2023-06-15 DIAGNOSIS — Z00.00 ANNUAL PHYSICAL EXAM: Primary | ICD-10-CM

## 2023-06-15 DIAGNOSIS — E03.2 HYPOTHYROIDISM DUE TO MEDICATION: ICD-10-CM

## 2023-06-15 DIAGNOSIS — R73.03 PREDIABETES: ICD-10-CM

## 2023-06-15 DIAGNOSIS — E78.5 HYPERLIPIDEMIA, UNSPECIFIED HYPERLIPIDEMIA TYPE: ICD-10-CM

## 2023-06-15 DIAGNOSIS — I10 PRIMARY HYPERTENSION: ICD-10-CM

## 2023-06-15 DIAGNOSIS — I70.0 ATHEROSCLEROSIS OF AORTA: ICD-10-CM

## 2023-06-15 PROBLEM — E11.9 DIABETES: Status: RESOLVED | Noted: 2022-01-07 | Resolved: 2023-06-15

## 2023-06-15 PROCEDURE — 3008F PR BODY MASS INDEX (BMI) DOCUMENTED: ICD-10-PCS | Mod: CPTII,S$GLB,, | Performed by: FAMILY MEDICINE

## 2023-06-15 PROCEDURE — 1159F PR MEDICATION LIST DOCUMENTED IN MEDICAL RECORD: ICD-10-PCS | Mod: CPTII,S$GLB,, | Performed by: FAMILY MEDICINE

## 2023-06-15 PROCEDURE — 1160F PR REVIEW ALL MEDS BY PRESCRIBER/CLIN PHARMACIST DOCUMENTED: ICD-10-PCS | Mod: CPTII,S$GLB,, | Performed by: FAMILY MEDICINE

## 2023-06-15 PROCEDURE — 3074F PR MOST RECENT SYSTOLIC BLOOD PRESSURE < 130 MM HG: ICD-10-PCS | Mod: CPTII,S$GLB,, | Performed by: FAMILY MEDICINE

## 2023-06-15 PROCEDURE — 3044F HG A1C LEVEL LT 7.0%: CPT | Mod: CPTII,S$GLB,, | Performed by: FAMILY MEDICINE

## 2023-06-15 PROCEDURE — 1160F RVW MEDS BY RX/DR IN RCRD: CPT | Mod: CPTII,S$GLB,, | Performed by: FAMILY MEDICINE

## 2023-06-15 PROCEDURE — 99397 PER PM REEVAL EST PAT 65+ YR: CPT | Mod: S$GLB,,, | Performed by: FAMILY MEDICINE

## 2023-06-15 PROCEDURE — 1126F PR PAIN SEVERITY QUANTIFIED, NO PAIN PRESENT: ICD-10-PCS | Mod: CPTII,S$GLB,, | Performed by: FAMILY MEDICINE

## 2023-06-15 PROCEDURE — 1101F PR PT FALLS ASSESS DOC 0-1 FALLS W/OUT INJ PAST YR: ICD-10-PCS | Mod: CPTII,S$GLB,, | Performed by: FAMILY MEDICINE

## 2023-06-15 PROCEDURE — 3078F PR MOST RECENT DIASTOLIC BLOOD PRESSURE < 80 MM HG: ICD-10-PCS | Mod: CPTII,S$GLB,, | Performed by: FAMILY MEDICINE

## 2023-06-15 PROCEDURE — 3078F DIAST BP <80 MM HG: CPT | Mod: CPTII,S$GLB,, | Performed by: FAMILY MEDICINE

## 2023-06-15 PROCEDURE — 3288F PR FALLS RISK ASSESSMENT DOCUMENTED: ICD-10-PCS | Mod: CPTII,S$GLB,, | Performed by: FAMILY MEDICINE

## 2023-06-15 PROCEDURE — 3061F PR NEG MICROALBUMINURIA RESULT DOCUMENTED/REVIEW: ICD-10-PCS | Mod: CPTII,S$GLB,, | Performed by: FAMILY MEDICINE

## 2023-06-15 PROCEDURE — 1159F MED LIST DOCD IN RCRD: CPT | Mod: CPTII,S$GLB,, | Performed by: FAMILY MEDICINE

## 2023-06-15 PROCEDURE — 3061F NEG MICROALBUMINURIA REV: CPT | Mod: CPTII,S$GLB,, | Performed by: FAMILY MEDICINE

## 2023-06-15 PROCEDURE — 99999 PR PBB SHADOW E&M-EST. PATIENT-LVL III: CPT | Mod: PBBFAC,,, | Performed by: FAMILY MEDICINE

## 2023-06-15 PROCEDURE — 3066F PR DOCUMENTATION OF TREATMENT FOR NEPHROPATHY: ICD-10-PCS | Mod: CPTII,S$GLB,, | Performed by: FAMILY MEDICINE

## 2023-06-15 PROCEDURE — 99397 PR PREVENTIVE VISIT,EST,65 & OVER: ICD-10-PCS | Mod: S$GLB,,, | Performed by: FAMILY MEDICINE

## 2023-06-15 PROCEDURE — 3044F PR MOST RECENT HEMOGLOBIN A1C LEVEL <7.0%: ICD-10-PCS | Mod: CPTII,S$GLB,, | Performed by: FAMILY MEDICINE

## 2023-06-15 PROCEDURE — 1126F AMNT PAIN NOTED NONE PRSNT: CPT | Mod: CPTII,S$GLB,, | Performed by: FAMILY MEDICINE

## 2023-06-15 PROCEDURE — 3288F FALL RISK ASSESSMENT DOCD: CPT | Mod: CPTII,S$GLB,, | Performed by: FAMILY MEDICINE

## 2023-06-15 PROCEDURE — 1101F PT FALLS ASSESS-DOCD LE1/YR: CPT | Mod: CPTII,S$GLB,, | Performed by: FAMILY MEDICINE

## 2023-06-15 PROCEDURE — 99999 PR PBB SHADOW E&M-EST. PATIENT-LVL III: ICD-10-PCS | Mod: PBBFAC,,, | Performed by: FAMILY MEDICINE

## 2023-06-15 PROCEDURE — 3066F NEPHROPATHY DOC TX: CPT | Mod: CPTII,S$GLB,, | Performed by: FAMILY MEDICINE

## 2023-06-15 PROCEDURE — 3008F BODY MASS INDEX DOCD: CPT | Mod: CPTII,S$GLB,, | Performed by: FAMILY MEDICINE

## 2023-06-15 PROCEDURE — 3074F SYST BP LT 130 MM HG: CPT | Mod: CPTII,S$GLB,, | Performed by: FAMILY MEDICINE

## 2023-06-15 RX ORDER — ATORVASTATIN CALCIUM 10 MG/1
10 TABLET, FILM COATED ORAL DAILY
Qty: 90 TABLET | Refills: 3 | Status: SHIPPED | OUTPATIENT
Start: 2023-06-15 | End: 2024-06-14

## 2023-06-15 RX ORDER — LEVOTHYROXINE SODIUM 88 UG/1
88 TABLET ORAL
Qty: 90 TABLET | Refills: 3 | Status: SHIPPED | OUTPATIENT
Start: 2023-06-15 | End: 2024-06-14

## 2023-06-15 NOTE — PROGRESS NOTES
"Routine Office Visit    Fauzia Kirk  1955  [id number]      Subjective     Fauzia is a 67 y.o. female who presents today for:    Annual physical   History of breast cancer. Lung cancer - Patient has been following up with Dr. Mai   Prediabetes - Patient monitors her diet and avoids fried fatty foods. She does admit to eating pasta, rice and bread. She likes vegetables. She admits she can be more active.   Hx of DVT in her neck / Afib / Hypertension - cardiologist - Dr. Giraldo.  - Patient continues to be on xarelto; stable on blood pressure medication   Restlses leg - Patient is doing better on gabapentin. lumbar disc disease - Patient follows up with orthopedics - Dr. Hercules       Objective     Review of Systems   Constitutional:  Negative for chills and fever.   HENT:  Negative for congestion.    Eyes:  Negative for blurred vision.   Respiratory:  Negative for cough.    Cardiovascular:  Negative for chest pain.   Gastrointestinal:  Negative for abdominal pain, constipation, diarrhea, heartburn, nausea and vomiting.   Genitourinary:  Negative for dysuria.   Musculoskeletal:  Negative for myalgias.   Skin:  Negative for itching and rash.   Neurological:  Negative for dizziness and headaches.   Psychiatric/Behavioral:  Negative for depression.      /70   Pulse 91   Temp 98.1 °F (36.7 °C) (Oral)   Ht 5' 6" (1.676 m)   Wt 97.6 kg (215 lb 2.7 oz)   SpO2 97%   BMI 34.73 kg/m²   Physical Exam  Constitutional:       Appearance: She is well-developed.   HENT:      Head: Normocephalic and atraumatic.   Eyes:      Conjunctiva/sclera: Conjunctivae normal.      Pupils: Pupils are equal, round, and reactive to light.   Cardiovascular:      Rate and Rhythm: Normal rate and regular rhythm.      Heart sounds: Normal heart sounds. No murmur heard.    No friction rub. No gallop.   Pulmonary:      Effort: No respiratory distress.      Breath sounds: Normal breath sounds.   Abdominal:      General: Bowel sounds are " normal. There is no distension.      Palpations: Abdomen is soft.      Tenderness: There is no abdominal tenderness.   Musculoskeletal:         General: Normal range of motion.      Cervical back: Normal range of motion and neck supple.   Lymphadenopathy:      Cervical: No cervical adenopathy.   Skin:     General: Skin is warm.   Neurological:      Mental Status: She is alert and oriented to person, place, and time.           Assessment     Health Maintenance         Date Due Completion Date    Pneumococcal Vaccines (Age 65+) (1 - PCV) Never done ---    TETANUS VACCINE Never done ---    Shingles Vaccine (1 of 2) Never done ---    Colorectal Cancer Screening 09/16/2021 9/16/2011    COVID-19 Vaccine (4 - Moderna series) 01/16/2023 11/21/2022    Foot Exam 03/14/2023 3/14/2022 (Done)    Override on 3/14/2022: Done    Influenza Vaccine (Season Ended) 09/01/2023 ---    Hemoglobin A1c 12/09/2023 6/9/2023    Eye Exam 01/24/2024 1/24/2023    DEXA Scan 04/29/2024 4/29/2022    Diabetes Urine Screening 06/09/2024 6/9/2023    Lipid Panel 06/09/2024 6/9/2023    Low Dose Statin 06/15/2024 6/15/2023              Problem List Items Addressed This Visit          Cardiac/Vascular    Atherosclerosis of aorta (Chronic)    Overview     CT 10/2021 - Aorta: 3 vessel left-sided aortic arch. There is atherosclerosis of the aorta. No aneurysm.    Patient with Atherosclerosis of the Aorta.  Stable/asymptomatic. Currently stable on lipid lowering treatment and b/p monitoring.           Atrial fibrillation  The current medical regimen is effective;  continue present plan and medications.       HTN (hypertension)    Relevant Orders    Lipid Panel    Comprehensive Metabolic Panel    Hyperlipidemia    Relevant Medications    atorvastatin (LIPITOR) 10 MG tablet  Common side effects of this medication were discussed with the patient. Questions regarding medications were discussed during this visit.           Endocrine    Hypothyroidism due to  medication    Relevant Orders    TSH  Decrease levothyroxine from 100mcg to 88mcg       Prediabetes    Relevant Orders    Hemoglobin A1C  The patient is asked to make an attempt to improve diet and exercise patterns to aid in medical management of this problem.        Other Visit Diagnoses       Annual physical exam    -  Primary  I addressed all major concerns as it related to health maintenance.  All were ordered and scheduled based on the patients wishes.  Any additional health maintenance will be readdressed at the next physical if declined or deferred by the patient.                       Follow up in about 1 year (around 6/15/2024), or if symptoms worsen or fail to improve, for yearly exam.

## 2023-06-30 ENCOUNTER — HOSPITAL ENCOUNTER (OUTPATIENT)
Dept: RADIOLOGY | Facility: HOSPITAL | Age: 68
Discharge: HOME | End: 2023-06-30
Attending: INTERNAL MEDICINE
Payer: COMMERCIAL

## 2023-06-30 ENCOUNTER — OFFICE VISIT (OUTPATIENT)
Dept: HEMATOLOGY/ONCOLOGY | Facility: CLINIC | Age: 68
End: 2023-06-30
Payer: COMMERCIAL

## 2023-06-30 VITALS
SYSTOLIC BLOOD PRESSURE: 123 MMHG | WEIGHT: 200.19 LBS | BODY MASS INDEX: 32.17 KG/M2 | HEIGHT: 66 IN | HEART RATE: 95 BPM | OXYGEN SATURATION: 99 % | DIASTOLIC BLOOD PRESSURE: 65 MMHG | TEMPERATURE: 98 F | RESPIRATION RATE: 16 BRPM

## 2023-06-30 DIAGNOSIS — Z86.718 HISTORY OF DVT (DEEP VEIN THROMBOSIS): ICD-10-CM

## 2023-06-30 DIAGNOSIS — Z85.3 HISTORY OF LEFT BREAST CANCER: ICD-10-CM

## 2023-06-30 DIAGNOSIS — C34.2 PRIMARY CANCER OF RIGHT MIDDLE LOBE OF LUNG: Primary | ICD-10-CM

## 2023-06-30 DIAGNOSIS — C34.90 MALIGNANT NEOPLASM OF UNSPECIFIED PART OF UNSPECIFIED BRONCHUS OR LUNG: ICD-10-CM

## 2023-06-30 PROCEDURE — 3008F PR BODY MASS INDEX (BMI) DOCUMENTED: ICD-10-PCS | Mod: CPTII,S$GLB,, | Performed by: INTERNAL MEDICINE

## 2023-06-30 PROCEDURE — 1101F PT FALLS ASSESS-DOCD LE1/YR: CPT | Mod: CPTII,S$GLB,, | Performed by: INTERNAL MEDICINE

## 2023-06-30 PROCEDURE — 99999 PR PBB SHADOW E&M-EST. PATIENT-LVL III: ICD-10-PCS | Mod: PBBFAC,,, | Performed by: INTERNAL MEDICINE

## 2023-06-30 PROCEDURE — 3008F BODY MASS INDEX DOCD: CPT | Mod: CPTII,S$GLB,, | Performed by: INTERNAL MEDICINE

## 2023-06-30 PROCEDURE — 71260 CT THORAX DX C+: CPT | Mod: 26,,, | Performed by: STUDENT IN AN ORGANIZED HEALTH CARE EDUCATION/TRAINING PROGRAM

## 2023-06-30 PROCEDURE — 99214 OFFICE O/P EST MOD 30 MIN: CPT | Mod: S$GLB,,, | Performed by: INTERNAL MEDICINE

## 2023-06-30 PROCEDURE — 3044F PR MOST RECENT HEMOGLOBIN A1C LEVEL <7.0%: ICD-10-PCS | Mod: CPTII,S$GLB,, | Performed by: INTERNAL MEDICINE

## 2023-06-30 PROCEDURE — 3288F FALL RISK ASSESSMENT DOCD: CPT | Mod: CPTII,S$GLB,, | Performed by: INTERNAL MEDICINE

## 2023-06-30 PROCEDURE — 3044F HG A1C LEVEL LT 7.0%: CPT | Mod: CPTII,S$GLB,, | Performed by: INTERNAL MEDICINE

## 2023-06-30 PROCEDURE — 3078F DIAST BP <80 MM HG: CPT | Mod: CPTII,S$GLB,, | Performed by: INTERNAL MEDICINE

## 2023-06-30 PROCEDURE — 1126F AMNT PAIN NOTED NONE PRSNT: CPT | Mod: CPTII,S$GLB,, | Performed by: INTERNAL MEDICINE

## 2023-06-30 PROCEDURE — 71260 CT CHEST WITH CONTRAST: ICD-10-PCS | Mod: 26,,, | Performed by: STUDENT IN AN ORGANIZED HEALTH CARE EDUCATION/TRAINING PROGRAM

## 2023-06-30 PROCEDURE — 3288F PR FALLS RISK ASSESSMENT DOCUMENTED: ICD-10-PCS | Mod: CPTII,S$GLB,, | Performed by: INTERNAL MEDICINE

## 2023-06-30 PROCEDURE — 1101F PR PT FALLS ASSESS DOC 0-1 FALLS W/OUT INJ PAST YR: ICD-10-PCS | Mod: CPTII,S$GLB,, | Performed by: INTERNAL MEDICINE

## 2023-06-30 PROCEDURE — 25500020 PHARM REV CODE 255: Performed by: INTERNAL MEDICINE

## 2023-06-30 PROCEDURE — 3074F SYST BP LT 130 MM HG: CPT | Mod: CPTII,S$GLB,, | Performed by: INTERNAL MEDICINE

## 2023-06-30 PROCEDURE — 3074F PR MOST RECENT SYSTOLIC BLOOD PRESSURE < 130 MM HG: ICD-10-PCS | Mod: CPTII,S$GLB,, | Performed by: INTERNAL MEDICINE

## 2023-06-30 PROCEDURE — 1126F PR PAIN SEVERITY QUANTIFIED, NO PAIN PRESENT: ICD-10-PCS | Mod: CPTII,S$GLB,, | Performed by: INTERNAL MEDICINE

## 2023-06-30 PROCEDURE — 3078F PR MOST RECENT DIASTOLIC BLOOD PRESSURE < 80 MM HG: ICD-10-PCS | Mod: CPTII,S$GLB,, | Performed by: INTERNAL MEDICINE

## 2023-06-30 PROCEDURE — 99214 PR OFFICE/OUTPT VISIT, EST, LEVL IV, 30-39 MIN: ICD-10-PCS | Mod: S$GLB,,, | Performed by: INTERNAL MEDICINE

## 2023-06-30 PROCEDURE — 3066F PR DOCUMENTATION OF TREATMENT FOR NEPHROPATHY: ICD-10-PCS | Mod: CPTII,S$GLB,, | Performed by: INTERNAL MEDICINE

## 2023-06-30 PROCEDURE — 3066F NEPHROPATHY DOC TX: CPT | Mod: CPTII,S$GLB,, | Performed by: INTERNAL MEDICINE

## 2023-06-30 PROCEDURE — 3061F PR NEG MICROALBUMINURIA RESULT DOCUMENTED/REVIEW: ICD-10-PCS | Mod: CPTII,S$GLB,, | Performed by: INTERNAL MEDICINE

## 2023-06-30 PROCEDURE — 99999 PR PBB SHADOW E&M-EST. PATIENT-LVL III: CPT | Mod: PBBFAC,,, | Performed by: INTERNAL MEDICINE

## 2023-06-30 PROCEDURE — 71260 CT THORAX DX C+: CPT | Mod: TC

## 2023-06-30 PROCEDURE — 3061F NEG MICROALBUMINURIA REV: CPT | Mod: CPTII,S$GLB,, | Performed by: INTERNAL MEDICINE

## 2023-06-30 RX ADMIN — IOHEXOL 100 ML: 350 INJECTION, SOLUTION INTRAVENOUS at 10:06

## 2023-06-30 NOTE — PROGRESS NOTES
Subjective:       Patient ID: Fauzia Kirk is a 67 y.o. female.    Chief Complaint: No chief complaint on file.    HPI Ms. Kirk is a very pleasant 67-year-old  female who returned  for F/U of stage III right lung cancer.    She had chemo/XRT then adjuvant therapy with Durvalumab.    That was completed November 2018.      Today she reports that she has been feeling well with no new issues.  Had her synthroid decreased to 0.88 last month, TSH was low.       Lung History:      In December 2016 she began to have some blood in her mucus after coughing.In  April she developed a persistent cough and saw her physician on April 18.  Chest x-ray at that time showed a rounded opacity in the right lung overlying the major fissure.   Chest CT in May showed a 4.8 x 4.0 cm, rounded, soft tissue mass in the middle lobe between the medial and lateral segments. There was pre-carinal adenopathy.    Subsequently she underwent bronchoscopy with EBUS on 6/9/17. Needle biopsy of the medistinal nodes was positive for poorly differentiated carcinoma with squamoid features. The cells were positive for CK7, CK5/6, and P63 and are negative for CK20, GCDFP, TTF1, ER, WI, Napsin and YEMI 3.This was felt to be most CW a new squamous lung cancer.    PET CT  demonstrated a hypermetabolic, large mass in the right middle lobe with an SUV max of 20.23. There was hypermetabolic activity extending from this mass tracking along the pleura. There was a hypermetabolic right hilar lymph node demonstrating an SUV max of 20.2. In addition there were 2 hypermetabolic subcarinal lymph nodes with the larger demonstrating an SUV max of 14.2. An additional right paratracheal lymph node was seen with an SUV max of 26.1.    She completed radiation together with weekly chemotherapy with carboplatin and Taxol  for stage IIIA disease.    She completed therapy on September 13, 2017 and received 7 chemotherapy treatments.    CT scan from September 29 showed  that the right middle lobe mass had decreased to 4.2 x 2.3 cm from 4.8 x 4.3 cm.  Stable 4 mm pulmonary nodule in the left lower lobe.    She then began adjuvant immunotherapy starting in November 2017.  She completed 26 cycles of Durvalumab on 11/2/18.    CT - 12/15/22 - Middle lobe cicatricial atelectasis with adjacent pleural thickening; favor post radiation treatment fibrosis, similar to the prior.                           Interval decrease in size of anterior mediastinal lymph nodes, as above.                           Stable 0.5 cm left lower lobe nodule        Previous cancer history:She had a stage I, ER negative carcinoma of the    left breast in 1990, treated with lumpectomy and radiation therapy.  In      1993, she developed a stage I, ER positive carcinoma of the right breast     treated with lumpectomy and radiation.  In 1995, she developed left breast   cancer recurrence, treated with lumpectomy, brachytherapy and four cycles    of Adriamycin and Cytoxan.  In 1996, she developed a new right breast        cancer T2 N0 poorly differentiated, treated with four cycles of              preoperative Taxol followed by mastectomy.        On October 17, 2016 left mammogram showed increased calcifications in the left breast at 3:00.  On October 27 a biopsy showed DCIS with solid, cribriform, and micropapillary patterns.  Tumor was 95% ER positive at 95% TN positive    On November 7, 2016 she underwent left mastectomy which showed 8 mm of DCIS and negative margins.        Review of Systems   Constitutional:  Negative for activity change, appetite change, fatigue, fever and unexpected weight change.   HENT:  Negative for postnasal drip and trouble swallowing.    Respiratory:  Negative for cough.    Cardiovascular:  Negative for chest pain.   Gastrointestinal:  Negative for abdominal pain, constipation, nausea and vomiting.   Musculoskeletal:  Negative for back pain.        Chronic right leg pain due to lumbar disc  disease -better after epidural   Neurological:  Negative for headaches.   Psychiatric/Behavioral:  Negative for dysphoric mood. The patient is not nervous/anxious.      Objective:      Physical Exam  Constitutional:       Appearance: She is well-developed.   Cardiovascular:      Rate and Rhythm: Normal rate and regular rhythm.   Pulmonary:      Effort: Pulmonary effort is normal.      Breath sounds: Normal breath sounds. No wheezing or rales.   Chest:       Abdominal:      Palpations: Abdomen is soft. There is no mass.      Tenderness: There is no abdominal tenderness.   Lymphadenopathy:      Cervical: No cervical adenopathy.      Upper Body:      Right upper body: No supraclavicular or axillary adenopathy.      Left upper body: No supraclavicular or axillary adenopathy.   Skin:     Findings: No erythema or rash.   Neurological:      Mental Status: She is alert and oriented to person, place, and time.   Psychiatric:         Mood and Affect: Mood normal.         Behavior: Behavior normal.         Thought Content: Thought content normal.         Judgment: Judgment normal.       Assessment:   CBC - HGB 11.7, otherwise normal,CMP unremarkable    CT reviewed with radiology - no new findings, LLL scarring improve, mediastinal nodes stable to smaller  1. Primary cancer of right middle lobe of lung    2. History of left breast cancer    3. History of DVT (deep vein thrombosis)       Plan:       RTC 6 M with labs and CT chest          Route Chart for Scheduling    Med Onc Chart Routing      Follow up with physician 6 months.   Follow up with [facility]    Infusion scheduling note    Injection scheduling note    Labs CMP   Scheduling:  Preferred lab:  Lab interval:     Imaging   CT Chest   Pharmacy appointment    Other referrals

## 2023-07-24 DIAGNOSIS — Z90.10 DEFORMITY OF BREAST AFTER MASTECTOMY: ICD-10-CM

## 2023-07-24 DIAGNOSIS — N64.89 DEFORMITY OF BREAST AFTER MASTECTOMY: ICD-10-CM

## 2023-08-08 ENCOUNTER — TELEPHONE (OUTPATIENT)
Dept: HEMATOLOGY/ONCOLOGY | Facility: CLINIC | Age: 68
End: 2023-08-08
Payer: COMMERCIAL

## 2023-08-08 NOTE — TELEPHONE ENCOUNTER
Called pt. Requesting Dr. Mai to sign a form allowing pt to stop taking xarelto for 5 days for in order to have back injection.     Will pass along information to provider.

## 2023-08-08 NOTE — TELEPHONE ENCOUNTER
"----- Message from Maida Sage sent at 8/8/2023  9:33 AM CDT -----  Regarding: Pt advice  Contact: Pt     Pt requesting call back in regards to form that's needs to be filled out  Please call and adv @       Confirmed contact below:   Contact Name: Fauzia Kirk  Phone Number: [phone]               Additional Notes:  "Thank you for all that you do for our patients"                                           "

## 2023-08-08 NOTE — TELEPHONE ENCOUNTER
"----- Message from Maida Sage sent at 8/8/2023  9:33 AM CDT -----  Regarding: Pt advice  Contact: Pt     Pt requesting call back in regards to form that's needs to be filled out  Please call and adv @       Confirmed contact below:   Contact Name: Fauzia Kirk  Phone Number: [phone]               Additional Notes:  "Thank you for all that you do for our patients"                                           "

## 2023-08-17 ENCOUNTER — PATIENT OUTREACH (OUTPATIENT)
Dept: ADMINISTRATIVE | Facility: HOSPITAL | Age: 68
End: 2023-08-17
Payer: COMMERCIAL

## 2023-09-20 ENCOUNTER — HOSPITAL ENCOUNTER (OUTPATIENT)
Dept: RADIOLOGY | Facility: HOSPITAL | Age: 68
Discharge: HOME | End: 2023-09-20
Attending: PHYSICIAN ASSISTANT
Payer: COMMERCIAL

## 2023-09-20 ENCOUNTER — OFFICE VISIT (OUTPATIENT)
Dept: FAMILY MEDICINE | Facility: CLINIC | Age: 68
End: 2023-09-20
Payer: COMMERCIAL

## 2023-09-20 VITALS
BODY MASS INDEX: 32.06 KG/M2 | WEIGHT: 199.5 LBS | HEART RATE: 100 BPM | OXYGEN SATURATION: 97 % | RESPIRATION RATE: 18 BRPM | DIASTOLIC BLOOD PRESSURE: 62 MMHG | SYSTOLIC BLOOD PRESSURE: 130 MMHG | TEMPERATURE: 99 F | HEIGHT: 66 IN

## 2023-09-20 DIAGNOSIS — H66.90 OTITIS MEDIA, UNSPECIFIED LATERALITY, UNSPECIFIED OTITIS MEDIA TYPE: ICD-10-CM

## 2023-09-20 DIAGNOSIS — R53.83 FATIGUE, UNSPECIFIED TYPE: ICD-10-CM

## 2023-09-20 DIAGNOSIS — J10.1 INFLUENZA A: Primary | ICD-10-CM

## 2023-09-20 LAB
CTP QC/QA: YES
POC MOLECULAR INFLUENZA A AGN: POSITIVE
POC MOLECULAR INFLUENZA B AGN: NEGATIVE

## 2023-09-20 PROCEDURE — 71046 X-RAY EXAM CHEST 2 VIEWS: CPT | Mod: 26,,, | Performed by: RADIOLOGY

## 2023-09-20 PROCEDURE — 87502 INFLUENZA DNA AMP PROBE: CPT | Mod: QW,S$GLB,, | Performed by: PHYSICIAN ASSISTANT

## 2023-09-20 PROCEDURE — 99999 PR PBB SHADOW E&M-EST. PATIENT-LVL IV: ICD-10-PCS | Mod: PBBFAC,,, | Performed by: PHYSICIAN ASSISTANT

## 2023-09-20 PROCEDURE — 1159F PR MEDICATION LIST DOCUMENTED IN MEDICAL RECORD: ICD-10-PCS | Mod: CPTII,S$GLB,, | Performed by: PHYSICIAN ASSISTANT

## 2023-09-20 PROCEDURE — 1101F PR PT FALLS ASSESS DOC 0-1 FALLS W/OUT INJ PAST YR: ICD-10-PCS | Mod: CPTII,S$GLB,, | Performed by: PHYSICIAN ASSISTANT

## 2023-09-20 PROCEDURE — 3075F PR MOST RECENT SYSTOLIC BLOOD PRESS GE 130-139MM HG: ICD-10-PCS | Mod: CPTII,S$GLB,, | Performed by: PHYSICIAN ASSISTANT

## 2023-09-20 PROCEDURE — 87502 POCT INFLUENZA A/B MOLECULAR: ICD-10-PCS | Mod: QW,S$GLB,, | Performed by: PHYSICIAN ASSISTANT

## 2023-09-20 PROCEDURE — 87635 SARS-COV-2 COVID-19 AMP PRB: CPT | Performed by: PHYSICIAN ASSISTANT

## 2023-09-20 PROCEDURE — 3066F PR DOCUMENTATION OF TREATMENT FOR NEPHROPATHY: ICD-10-PCS | Mod: CPTII,S$GLB,, | Performed by: PHYSICIAN ASSISTANT

## 2023-09-20 PROCEDURE — 3061F NEG MICROALBUMINURIA REV: CPT | Mod: CPTII,S$GLB,, | Performed by: PHYSICIAN ASSISTANT

## 2023-09-20 PROCEDURE — 1126F AMNT PAIN NOTED NONE PRSNT: CPT | Mod: CPTII,S$GLB,, | Performed by: PHYSICIAN ASSISTANT

## 2023-09-20 PROCEDURE — 3008F BODY MASS INDEX DOCD: CPT | Mod: CPTII,S$GLB,, | Performed by: PHYSICIAN ASSISTANT

## 2023-09-20 PROCEDURE — 3288F FALL RISK ASSESSMENT DOCD: CPT | Mod: CPTII,S$GLB,, | Performed by: PHYSICIAN ASSISTANT

## 2023-09-20 PROCEDURE — 3044F HG A1C LEVEL LT 7.0%: CPT | Mod: CPTII,S$GLB,, | Performed by: PHYSICIAN ASSISTANT

## 2023-09-20 PROCEDURE — 3008F PR BODY MASS INDEX (BMI) DOCUMENTED: ICD-10-PCS | Mod: CPTII,S$GLB,, | Performed by: PHYSICIAN ASSISTANT

## 2023-09-20 PROCEDURE — 1101F PT FALLS ASSESS-DOCD LE1/YR: CPT | Mod: CPTII,S$GLB,, | Performed by: PHYSICIAN ASSISTANT

## 2023-09-20 PROCEDURE — 1159F MED LIST DOCD IN RCRD: CPT | Mod: CPTII,S$GLB,, | Performed by: PHYSICIAN ASSISTANT

## 2023-09-20 PROCEDURE — 3078F PR MOST RECENT DIASTOLIC BLOOD PRESSURE < 80 MM HG: ICD-10-PCS | Mod: CPTII,S$GLB,, | Performed by: PHYSICIAN ASSISTANT

## 2023-09-20 PROCEDURE — 71046 XR CHEST PA AND LATERAL: ICD-10-PCS | Mod: 26,,, | Performed by: RADIOLOGY

## 2023-09-20 PROCEDURE — 1126F PR PAIN SEVERITY QUANTIFIED, NO PAIN PRESENT: ICD-10-PCS | Mod: CPTII,S$GLB,, | Performed by: PHYSICIAN ASSISTANT

## 2023-09-20 PROCEDURE — 3075F SYST BP GE 130 - 139MM HG: CPT | Mod: CPTII,S$GLB,, | Performed by: PHYSICIAN ASSISTANT

## 2023-09-20 PROCEDURE — 71046 X-RAY EXAM CHEST 2 VIEWS: CPT | Mod: TC,FY,PO

## 2023-09-20 PROCEDURE — 1160F PR REVIEW ALL MEDS BY PRESCRIBER/CLIN PHARMACIST DOCUMENTED: ICD-10-PCS | Mod: CPTII,S$GLB,, | Performed by: PHYSICIAN ASSISTANT

## 2023-09-20 PROCEDURE — 3288F PR FALLS RISK ASSESSMENT DOCUMENTED: ICD-10-PCS | Mod: CPTII,S$GLB,, | Performed by: PHYSICIAN ASSISTANT

## 2023-09-20 PROCEDURE — 3044F PR MOST RECENT HEMOGLOBIN A1C LEVEL <7.0%: ICD-10-PCS | Mod: CPTII,S$GLB,, | Performed by: PHYSICIAN ASSISTANT

## 2023-09-20 PROCEDURE — 3066F NEPHROPATHY DOC TX: CPT | Mod: CPTII,S$GLB,, | Performed by: PHYSICIAN ASSISTANT

## 2023-09-20 PROCEDURE — 3061F PR NEG MICROALBUMINURIA RESULT DOCUMENTED/REVIEW: ICD-10-PCS | Mod: CPTII,S$GLB,, | Performed by: PHYSICIAN ASSISTANT

## 2023-09-20 PROCEDURE — 1160F RVW MEDS BY RX/DR IN RCRD: CPT | Mod: CPTII,S$GLB,, | Performed by: PHYSICIAN ASSISTANT

## 2023-09-20 PROCEDURE — 3078F DIAST BP <80 MM HG: CPT | Mod: CPTII,S$GLB,, | Performed by: PHYSICIAN ASSISTANT

## 2023-09-20 PROCEDURE — 99214 PR OFFICE/OUTPT VISIT, EST, LEVL IV, 30-39 MIN: ICD-10-PCS | Mod: S$GLB,,, | Performed by: PHYSICIAN ASSISTANT

## 2023-09-20 PROCEDURE — 99999 PR PBB SHADOW E&M-EST. PATIENT-LVL IV: CPT | Mod: PBBFAC,,, | Performed by: PHYSICIAN ASSISTANT

## 2023-09-20 PROCEDURE — 99214 OFFICE O/P EST MOD 30 MIN: CPT | Mod: S$GLB,,, | Performed by: PHYSICIAN ASSISTANT

## 2023-09-20 RX ORDER — AMOXICILLIN AND CLAVULANATE POTASSIUM 875; 125 MG/1; MG/1
1 TABLET, FILM COATED ORAL 2 TIMES DAILY
Qty: 10 TABLET | Refills: 0 | Status: SHIPPED | OUTPATIENT
Start: 2023-09-20 | End: 2023-09-25

## 2023-09-20 RX ORDER — ALBUTEROL SULFATE 90 UG/1
2 AEROSOL, METERED RESPIRATORY (INHALATION) EVERY 6 HOURS PRN
Qty: 8 G | Refills: 0 | Status: SHIPPED | OUTPATIENT
Start: 2023-09-20

## 2023-09-20 RX ORDER — PROMETHAZINE HYDROCHLORIDE AND DEXTROMETHORPHAN HYDROBROMIDE 6.25; 15 MG/5ML; MG/5ML
5 SYRUP ORAL NIGHTLY PRN
Qty: 180 ML | Refills: 0 | Status: SHIPPED | OUTPATIENT
Start: 2023-09-20 | End: 2023-09-30

## 2023-09-20 NOTE — PROGRESS NOTES
Subjective     Patient ID: Fauzia Kirk is a 68 y.o. female.    Chief Complaint: Cough (Mucinex and zyrtec not helping )    Cough  This is a new problem. The current episode started in the past 7 days (6 days). The problem has been unchanged. The cough is Non-productive. Associated symptoms include ear pain (left), rhinorrhea and shortness of breath (mild). Pertinent negatives include no chest pain, ear congestion, headaches, hemoptysis, myalgias, nasal congestion, sore throat or wheezing. Treatments tried: mucinex, zyrtec, delsym, robitussin. There is no history of asthma, bronchiectasis, bronchitis or COPD. + h/o lung cancer   She return back from cruise on Saturday evening. Feeling exhausted. Symptoms started while on the cruise. States she was in her bathroom and felt really bad. She woke up and was on the floor. She thinks it was due to exhaustion. No head injury. No further syncopal episodes.     Review of Systems   Constitutional:  Positive for diaphoresis and fatigue.   HENT:  Positive for ear pain (left) and rhinorrhea. Negative for sore throat.    Respiratory:  Positive for cough and shortness of breath (mild). Negative for hemoptysis and wheezing.    Cardiovascular:  Negative for chest pain.   Musculoskeletal:  Negative for myalgias.   Neurological:  Negative for headaches.          Objective     Physical Exam  Constitutional:       General: She is not in acute distress.     Appearance: Normal appearance. She is not ill-appearing.   HENT:      Head: Normocephalic and atraumatic.      Right Ear: Tympanic membrane is erythematous.      Left Ear: Tympanic membrane is erythematous and bulging.   Cardiovascular:      Rate and Rhythm: Normal rate and regular rhythm.   Pulmonary:      Effort: Pulmonary effort is normal. No respiratory distress.      Breath sounds: Wheezing (mid to lower lung fields) and rales (mid to lower lung fields) present.   Neurological:      General: No focal deficit present.       Mental Status: She is alert and oriented to person, place, and time.   Psychiatric:         Mood and Affect: Mood normal.         Behavior: Behavior normal.            Assessment and Plan     1. Influenza A  -     POCT Influenza A/B Molecular  -     COVID-19 Routine Screening; Future; Expected date: 09/20/2023  -     X-Ray Chest PA And Lateral; Future; Expected date: 09/20/2023  -     promethazine-dextromethorphan (PROMETHAZINE-DM) 6.25-15 mg/5 mL Syrp; Take 5 mLs by mouth nightly as needed (cough).  Dispense: 180 mL; Refill: 0  -     albuterol (VENTOLIN HFA) 90 mcg/actuation inhaler; Inhale 2 puffs into the lungs every 6 (six) hours as needed for Wheezing or Shortness of Breath. Rescue  Dispense: 8 g; Refill: 0  -     hydrate. Monitor closely. Advised ER if pt feeling worse, SOB, increased weakness or fatigue occurs.    2. Fatigue, unspecified type  -     CBC Auto Differential; Future; Expected date: 09/20/2023  -     Basic Metabolic Panel; Future; Expected date: 09/20/2023    3. Otitis media, unspecified laterality, unspecified otitis media type  -     amoxicillin-clavulanate 875-125mg (AUGMENTIN) 875-125 mg per tablet; Take 1 tablet by mouth 2 (two) times daily. for 5 days  Dispense: 10 tablet; Refill: 0                   No follow-ups on file.

## 2023-09-21 ENCOUNTER — TELEPHONE (OUTPATIENT)
Dept: FAMILY MEDICINE | Facility: CLINIC | Age: 68
End: 2023-09-21
Payer: COMMERCIAL

## 2023-09-21 LAB — SARS-COV-2 RNA RESP QL NAA+PROBE: NOT DETECTED

## 2023-09-21 NOTE — TELEPHONE ENCOUNTER
Pt informed covid negative and drop in kidney function advised to hydrate well. Repeat in 2-4 weeks  Pt states she feels a little better today

## 2023-10-03 DIAGNOSIS — I10 ESSENTIAL HYPERTENSION: ICD-10-CM

## 2023-10-03 RX ORDER — AMLODIPINE BESYLATE 10 MG/1
10 TABLET ORAL DAILY
Qty: 90 TABLET | Refills: 3 | Status: SHIPPED | OUTPATIENT
Start: 2023-10-03

## 2023-10-03 NOTE — TELEPHONE ENCOUNTER
----- Message from Jonathan Valdivia sent at 10/3/2023  8:41 AM CDT -----  Type: RX Refill Request    Who Called: Self     Have you contacted your pharmacy:Yes     Refill or New Rx:refill     RX Name and Strength:amLODIPine (NORVASC) 10 MG tablet 90 tablet     Preferred Pharmacy with phone number:[facility] [address] [place], [place] - [address]   Phone:  [phone]  Fax:  [phone]    Local or Mail Order:Local     Would the patient rather a call back or a response via [facility]? CALL     Best Call Back Number: [phone] (home)

## 2023-10-03 NOTE — TELEPHONE ENCOUNTER
No care due was identified.  [facility] Embedded Care Due Messages. Reference number: [id number].   10/03/2023 10:10:55 AM CDT

## 2023-10-12 ENCOUNTER — PATIENT OUTREACH (OUTPATIENT)
Dept: ADMINISTRATIVE | Facility: HOSPITAL | Age: 68
End: 2023-10-12
Payer: COMMERCIAL

## 2023-11-07 ENCOUNTER — TELEPHONE (OUTPATIENT)
Dept: HEMATOLOGY/ONCOLOGY | Facility: CLINIC | Age: 68
End: 2023-11-07
Payer: COMMERCIAL

## 2023-11-07 NOTE — TELEPHONE ENCOUNTER
"----- Message from Maida Sage sent at 11/7/2023 10:25 AM CST -----  Regarding: Pt advice  Contact: Pt     Pt requesting call back in regards to a test.   Please call and adv @       Confirmed contact below:   Contact Name: Fauzia Kirk  Phone Number: [phone]               Additional Notes:  "Thank you for all that you do for our patients"                                           "

## 2023-11-08 ENCOUNTER — OFFICE VISIT (OUTPATIENT)
Dept: HEMATOLOGY/ONCOLOGY | Facility: CLINIC | Age: 68
End: 2023-11-08
Payer: COMMERCIAL

## 2023-11-08 VITALS
HEIGHT: 66 IN | WEIGHT: 201.94 LBS | SYSTOLIC BLOOD PRESSURE: 122 MMHG | RESPIRATION RATE: 20 BRPM | BODY MASS INDEX: 32.45 KG/M2 | OXYGEN SATURATION: 99 % | TEMPERATURE: 99 F | DIASTOLIC BLOOD PRESSURE: 66 MMHG | HEART RATE: 103 BPM

## 2023-11-08 DIAGNOSIS — Z85.3 HISTORY OF LEFT BREAST CANCER: Primary | ICD-10-CM

## 2023-11-08 PROCEDURE — 3008F PR BODY MASS INDEX (BMI) DOCUMENTED: ICD-10-PCS | Mod: CPTII,S$GLB,, | Performed by: INTERNAL MEDICINE

## 2023-11-08 PROCEDURE — 3288F FALL RISK ASSESSMENT DOCD: CPT | Mod: CPTII,S$GLB,, | Performed by: INTERNAL MEDICINE

## 2023-11-08 PROCEDURE — 1101F PR PT FALLS ASSESS DOC 0-1 FALLS W/OUT INJ PAST YR: ICD-10-PCS | Mod: CPTII,S$GLB,, | Performed by: INTERNAL MEDICINE

## 2023-11-08 PROCEDURE — 3061F NEG MICROALBUMINURIA REV: CPT | Mod: CPTII,S$GLB,, | Performed by: INTERNAL MEDICINE

## 2023-11-08 PROCEDURE — 1126F AMNT PAIN NOTED NONE PRSNT: CPT | Mod: CPTII,S$GLB,, | Performed by: INTERNAL MEDICINE

## 2023-11-08 PROCEDURE — 1101F PT FALLS ASSESS-DOCD LE1/YR: CPT | Mod: CPTII,S$GLB,, | Performed by: INTERNAL MEDICINE

## 2023-11-08 PROCEDURE — 3074F PR MOST RECENT SYSTOLIC BLOOD PRESSURE < 130 MM HG: ICD-10-PCS | Mod: CPTII,S$GLB,, | Performed by: INTERNAL MEDICINE

## 2023-11-08 PROCEDURE — 3288F PR FALLS RISK ASSESSMENT DOCUMENTED: ICD-10-PCS | Mod: CPTII,S$GLB,, | Performed by: INTERNAL MEDICINE

## 2023-11-08 PROCEDURE — 99213 PR OFFICE/OUTPT VISIT, EST, LEVL III, 20-29 MIN: ICD-10-PCS | Mod: S$GLB,,, | Performed by: INTERNAL MEDICINE

## 2023-11-08 PROCEDURE — 3078F PR MOST RECENT DIASTOLIC BLOOD PRESSURE < 80 MM HG: ICD-10-PCS | Mod: CPTII,S$GLB,, | Performed by: INTERNAL MEDICINE

## 2023-11-08 PROCEDURE — 3008F BODY MASS INDEX DOCD: CPT | Mod: CPTII,S$GLB,, | Performed by: INTERNAL MEDICINE

## 2023-11-08 PROCEDURE — 99999 PR PBB SHADOW E&M-EST. PATIENT-LVL IV: CPT | Mod: PBBFAC,,, | Performed by: INTERNAL MEDICINE

## 2023-11-08 PROCEDURE — 3066F NEPHROPATHY DOC TX: CPT | Mod: CPTII,S$GLB,, | Performed by: INTERNAL MEDICINE

## 2023-11-08 PROCEDURE — 3061F PR NEG MICROALBUMINURIA RESULT DOCUMENTED/REVIEW: ICD-10-PCS | Mod: CPTII,S$GLB,, | Performed by: INTERNAL MEDICINE

## 2023-11-08 PROCEDURE — 3078F DIAST BP <80 MM HG: CPT | Mod: CPTII,S$GLB,, | Performed by: INTERNAL MEDICINE

## 2023-11-08 PROCEDURE — 1126F PR PAIN SEVERITY QUANTIFIED, NO PAIN PRESENT: ICD-10-PCS | Mod: CPTII,S$GLB,, | Performed by: INTERNAL MEDICINE

## 2023-11-08 PROCEDURE — 3044F HG A1C LEVEL LT 7.0%: CPT | Mod: CPTII,S$GLB,, | Performed by: INTERNAL MEDICINE

## 2023-11-08 PROCEDURE — 3074F SYST BP LT 130 MM HG: CPT | Mod: CPTII,S$GLB,, | Performed by: INTERNAL MEDICINE

## 2023-11-08 PROCEDURE — 99213 OFFICE O/P EST LOW 20 MIN: CPT | Mod: S$GLB,,, | Performed by: INTERNAL MEDICINE

## 2023-11-08 PROCEDURE — 99999 PR PBB SHADOW E&M-EST. PATIENT-LVL IV: ICD-10-PCS | Mod: PBBFAC,,, | Performed by: INTERNAL MEDICINE

## 2023-11-08 PROCEDURE — 3044F PR MOST RECENT HEMOGLOBIN A1C LEVEL <7.0%: ICD-10-PCS | Mod: CPTII,S$GLB,, | Performed by: INTERNAL MEDICINE

## 2023-11-08 PROCEDURE — 1159F MED LIST DOCD IN RCRD: CPT | Mod: CPTII,S$GLB,, | Performed by: INTERNAL MEDICINE

## 2023-11-08 PROCEDURE — 1159F PR MEDICATION LIST DOCUMENTED IN MEDICAL RECORD: ICD-10-PCS | Mod: CPTII,S$GLB,, | Performed by: INTERNAL MEDICINE

## 2023-11-08 PROCEDURE — 3066F PR DOCUMENTATION OF TREATMENT FOR NEPHROPATHY: ICD-10-PCS | Mod: CPTII,S$GLB,, | Performed by: INTERNAL MEDICINE

## 2023-11-08 NOTE — PROGRESS NOTES
Subjective:       Patient ID: aFuzia Kirk is a 68 y.o. female.    Chief Complaint: No chief complaint on file.    HPI Ms. Kirk is a very pleasant 68-year-old  female who comes to clinic for evaluation of a new chest wall mass.            Previous Breast cancer history:She had a stage I, ER negative carcinoma of the    left breast in 1990, treated with lumpectomy and radiation therapy.  In      1993, she developed a stage I, ER positive carcinoma of the right breast     treated with lumpectomy and radiation.  In 1995, she developed left breast   cancer recurrence, treated with lumpectomy, brachytherapy and four cycles    of Adriamycin and Cytoxan.  In 1996, she developed a new right breast        cancer T2 N0 poorly differentiated, treated with four cycles of              preoperative Taxol followed by mastectomy.        On October 17, 2016 left mammogram showed increased calcifications in the left breast at 3:00.  On October 27 a biopsy showed DCIS with solid, cribriform, and micropapillary patterns.  Tumor was 95% ER positive at 95% HI positive    On November 7, 2016 she underwent left mastectomy which showed 8 mm of DCIS and negative margins.     Lung Cancer History:   In December 2016 she began to have some blood in her mucus after coughing.In  April she developed a persistent cough and saw her physician on April 18.  Chest x-ray at that time showed a rounded opacity in the right lung overlying the major fissure.   Chest CT in May showed a 4.8 x 4.0 cm, rounded, soft tissue mass in the middle lobe between the medial and lateral segments. There was pre-carinal adenopathy.    Subsequently she underwent bronchoscopy with EBUS on 6/9/17. Needle biopsy of the medistinal nodes was positive for poorly differentiated carcinoma with squamoid features. The cells were positive for CK7, CK5/6, and P63 and are negative for CK20, GCDFP, TTF1, ER, HI, Napsin and YEMI 3.This was felt to be most CW a new squamous  lung cancer.    PET CT  demonstrated a hypermetabolic, large mass in the right middle lobe with an SUV max of 20.23. There was hypermetabolic activity extending from this mass tracking along the pleura. There was a hypermetabolic right hilar lymph node demonstrating an SUV max of 20.2. In addition there were 2 hypermetabolic subcarinal lymph nodes with the larger demonstrating an SUV max of 14.2. An additional right paratracheal lymph node was seen with an SUV max of 26.1.    She completed radiation together with weekly chemotherapy with carboplatin and Taxol  for stage IIIA disease.    She completed therapy on September 13, 2017 and received 7 chemotherapy treatments.    CT scan from September 29 showed that the right middle lobe mass had decreased to 4.2 x 2.3 cm from 4.8 x 4.3 cm.  Stable 4 mm pulmonary nodule in the left lower lobe.    She then began adjuvant immunotherapy starting in November 2017.  She completed 26 cycles of Durvalumab on 11/2/18.    CT - 12/15/22 - Middle lobe cicatricial atelectasis with adjacent pleural thickening; favor post radiation treatment fibrosis, similar to the prior.                           Interval decrease in size of anterior mediastinal lymph nodes, as above.                           Stable 0.5 cm left lower lobe nodule              Review of Systems   Constitutional:  Negative for activity change, appetite change, fatigue, fever and unexpected weight change.   HENT:  Negative for postnasal drip and trouble swallowing.    Respiratory:  Negative for cough.    Cardiovascular:  Negative for chest pain.   Gastrointestinal:  Negative for abdominal pain, constipation, nausea and vomiting.   Musculoskeletal:  Negative for back pain.   Neurological:  Negative for headaches.   Psychiatric/Behavioral:  Negative for dysphoric mood. The patient is not nervous/anxious.        Objective:      Physical Exam  Constitutional:       Appearance: She is well-developed.   Cardiovascular:       Rate and Rhythm: Normal rate and regular rhythm.   Pulmonary:      Effort: Pulmonary effort is normal.      Breath sounds: Normal breath sounds. No wheezing or rales.   Chest:       Abdominal:      Palpations: Abdomen is soft. There is no mass.      Tenderness: There is no abdominal tenderness.   Lymphadenopathy:      Cervical: No cervical adenopathy.      Upper Body:      Right upper body: No supraclavicular or axillary adenopathy.      Left upper body: No supraclavicular or axillary adenopathy.   Skin:     Findings: No erythema or rash.   Neurological:      Mental Status: She is alert and oriented to person, place, and time.   Psychiatric:         Mood and Affect: Mood normal.         Behavior: Behavior normal.         Thought Content: Thought content normal.         Judgment: Judgment normal.         Assessment:     1. History of left breast cancer       Plan:     Ultrasound right chest wall - biopsy if appropriate  RTC as planned.          Route Chart for Scheduling  Med Onc Route Chart for Scheduling

## 2023-11-14 ENCOUNTER — HOSPITAL ENCOUNTER (OUTPATIENT)
Dept: RADIOLOGY | Facility: HOSPITAL | Age: 68
Discharge: HOME | End: 2023-11-14
Attending: INTERNAL MEDICINE
Payer: COMMERCIAL

## 2023-11-14 DIAGNOSIS — Z85.3 HISTORY OF LEFT BREAST CANCER: ICD-10-CM

## 2023-11-14 PROCEDURE — 76641 ULTRASOUND BREAST COMPLETE: CPT | Mod: 26,RT,, | Performed by: RADIOLOGY

## 2023-11-14 PROCEDURE — 76641 ULTRASOUND BREAST COMPLETE: CPT | Mod: TC,RT

## 2023-11-14 PROCEDURE — 76641 US BREAST RIGHT COMPLETE: ICD-10-PCS | Mod: 26,RT,, | Performed by: RADIOLOGY

## 2023-12-18 ENCOUNTER — HOSPITAL ENCOUNTER (OUTPATIENT)
Dept: RADIOLOGY | Facility: HOSPITAL | Age: 68
Discharge: HOME | End: 2023-12-18
Attending: INTERNAL MEDICINE
Payer: COMMERCIAL

## 2023-12-18 ENCOUNTER — OFFICE VISIT (OUTPATIENT)
Dept: HEMATOLOGY/ONCOLOGY | Facility: CLINIC | Age: 68
End: 2023-12-18
Payer: COMMERCIAL

## 2023-12-18 VITALS
HEART RATE: 98 BPM | HEIGHT: 66 IN | DIASTOLIC BLOOD PRESSURE: 83 MMHG | SYSTOLIC BLOOD PRESSURE: 145 MMHG | OXYGEN SATURATION: 97 % | BODY MASS INDEX: 32.31 KG/M2 | RESPIRATION RATE: 18 BRPM | WEIGHT: 201.06 LBS

## 2023-12-18 DIAGNOSIS — C34.90 MALIGNANT NEOPLASM OF UNSPECIFIED PART OF UNSPECIFIED BRONCHUS OR LUNG: ICD-10-CM

## 2023-12-18 DIAGNOSIS — Z85.3 HISTORY OF LEFT BREAST CANCER: ICD-10-CM

## 2023-12-18 DIAGNOSIS — Z85.118 HISTORY OF LUNG CANCER: Primary | ICD-10-CM

## 2023-12-18 PROBLEM — Z51.11 ENCOUNTER FOR ANTINEOPLASTIC CHEMOTHERAPY: Status: RESOLVED | Noted: 2017-07-13 | Resolved: 2023-12-18

## 2023-12-18 PROBLEM — C77.9 REGIONAL LYMPH NODE METASTASIS PRESENT: Status: RESOLVED | Noted: 2017-07-13 | Resolved: 2023-12-18

## 2023-12-18 PROBLEM — C34.2 PRIMARY CANCER OF RIGHT MIDDLE LOBE OF LUNG: Status: RESOLVED | Noted: 2017-07-13 | Resolved: 2023-12-18

## 2023-12-18 LAB
CREAT SERPL-MCNC: 1 MG/DL (ref 0.5–1.4)
SAMPLE: NORMAL

## 2023-12-18 PROCEDURE — 3077F PR MOST RECENT SYSTOLIC BLOOD PRESSURE >= 140 MM HG: ICD-10-PCS | Mod: CPTII,S$GLB,, | Performed by: INTERNAL MEDICINE

## 2023-12-18 PROCEDURE — 3044F PR MOST RECENT HEMOGLOBIN A1C LEVEL <7.0%: ICD-10-PCS | Mod: CPTII,S$GLB,, | Performed by: INTERNAL MEDICINE

## 2023-12-18 PROCEDURE — 3061F NEG MICROALBUMINURIA REV: CPT | Mod: CPTII,S$GLB,, | Performed by: INTERNAL MEDICINE

## 2023-12-18 PROCEDURE — 3008F PR BODY MASS INDEX (BMI) DOCUMENTED: ICD-10-PCS | Mod: CPTII,S$GLB,, | Performed by: INTERNAL MEDICINE

## 2023-12-18 PROCEDURE — 3061F PR NEG MICROALBUMINURIA RESULT DOCUMENTED/REVIEW: ICD-10-PCS | Mod: CPTII,S$GLB,, | Performed by: INTERNAL MEDICINE

## 2023-12-18 PROCEDURE — 71260 CT THORAX DX C+: CPT | Mod: 26,,, | Performed by: RADIOLOGY

## 2023-12-18 PROCEDURE — 3077F SYST BP >= 140 MM HG: CPT | Mod: CPTII,S$GLB,, | Performed by: INTERNAL MEDICINE

## 2023-12-18 PROCEDURE — 1126F AMNT PAIN NOTED NONE PRSNT: CPT | Mod: CPTII,S$GLB,, | Performed by: INTERNAL MEDICINE

## 2023-12-18 PROCEDURE — 99214 PR OFFICE/OUTPT VISIT, EST, LEVL IV, 30-39 MIN: ICD-10-PCS | Mod: S$GLB,,, | Performed by: INTERNAL MEDICINE

## 2023-12-18 PROCEDURE — 99214 OFFICE O/P EST MOD 30 MIN: CPT | Mod: S$GLB,,, | Performed by: INTERNAL MEDICINE

## 2023-12-18 PROCEDURE — 25500020 PHARM REV CODE 255: Performed by: INTERNAL MEDICINE

## 2023-12-18 PROCEDURE — 71260 CT THORAX DX C+: CPT | Mod: TC

## 2023-12-18 PROCEDURE — 3079F PR MOST RECENT DIASTOLIC BLOOD PRESSURE 80-89 MM HG: ICD-10-PCS | Mod: CPTII,S$GLB,, | Performed by: INTERNAL MEDICINE

## 2023-12-18 PROCEDURE — 99999 PR PBB SHADOW E&M-EST. PATIENT-LVL III: ICD-10-PCS | Mod: PBBFAC,,, | Performed by: INTERNAL MEDICINE

## 2023-12-18 PROCEDURE — 99999 PR PBB SHADOW E&M-EST. PATIENT-LVL III: CPT | Mod: PBBFAC,,, | Performed by: INTERNAL MEDICINE

## 2023-12-18 PROCEDURE — 3066F NEPHROPATHY DOC TX: CPT | Mod: CPTII,S$GLB,, | Performed by: INTERNAL MEDICINE

## 2023-12-18 PROCEDURE — 71260 CT CHEST WITH CONTRAST: ICD-10-PCS | Mod: 26,,, | Performed by: RADIOLOGY

## 2023-12-18 PROCEDURE — 3066F PR DOCUMENTATION OF TREATMENT FOR NEPHROPATHY: ICD-10-PCS | Mod: CPTII,S$GLB,, | Performed by: INTERNAL MEDICINE

## 2023-12-18 PROCEDURE — 1126F PR PAIN SEVERITY QUANTIFIED, NO PAIN PRESENT: ICD-10-PCS | Mod: CPTII,S$GLB,, | Performed by: INTERNAL MEDICINE

## 2023-12-18 PROCEDURE — 3079F DIAST BP 80-89 MM HG: CPT | Mod: CPTII,S$GLB,, | Performed by: INTERNAL MEDICINE

## 2023-12-18 PROCEDURE — 3044F HG A1C LEVEL LT 7.0%: CPT | Mod: CPTII,S$GLB,, | Performed by: INTERNAL MEDICINE

## 2023-12-18 PROCEDURE — 3008F BODY MASS INDEX DOCD: CPT | Mod: CPTII,S$GLB,, | Performed by: INTERNAL MEDICINE

## 2023-12-18 RX ADMIN — IOHEXOL 75 ML: 350 INJECTION, SOLUTION INTRAVENOUS at 09:12

## 2023-12-18 NOTE — PROGRESS NOTES
Subjective:       Patient ID: Fauzia Kirk is a 68 y.o. female.    Chief Complaint: No chief complaint on file.    HPI Ms. Kirk is a very pleasant 68-year-old  female who returned  for F/U of stage III right lung cancer.    She had chemo/XRT then adjuvant therapy with Durvalumab.    That was completed November 2018.    Her major complaint is some chronic leg pain.    She has some occasional shortness of breath at night but her activities are not limited.    Appetite is good and bowel function is normal.       Lung Cancer  History:   In December 2016 she began to have some blood in her mucus after coughing.In  April 2017 she developed a persistent cough and saw her physician on April 18.  Chest x-ray at that time showed a rounded opacity in the right lung overlying the major fissure.   Chest CT in May showed a 4.8 x 4.0 cm, rounded, soft tissue mass in the middle lobe between the medial and lateral segments. There was pre-carinal adenopathy.    Subsequently she underwent bronchoscopy with EBUS on 6/9/17. Needle biopsy of the medistinal nodes was positive for poorly differentiated carcinoma with squamoid features. The cells were positive for CK7, CK5/6, and P63 and are negative for CK20, GCDFP, TTF1, ER, IN, Napsin and YEMI 3.This was felt to be most CW a new squamous lung cancer.    PET CT  demonstrated a hypermetabolic, large mass in the right middle lobe with an SUV max of 20.23. There was hypermetabolic activity extending from this mass tracking along the pleura. There was a hypermetabolic right hilar lymph node demonstrating an SUV max of 20.2. In addition there were 2 hypermetabolic subcarinal lymph nodes with the larger demonstrating an SUV max of 14.2. An additional right paratracheal lymph node was seen with an SUV max of 26.1.    She completed radiation together with weekly chemotherapy with carboplatin and Taxol  for stage IIIA disease.    She completed therapy on September 13, 2017 and received  7 chemotherapy treatments.    CT scan from September 29 showed that the right middle lobe mass had decreased to 4.2 x 2.3 cm from 4.8 x 4.3 cm.  Stable 4 mm pulmonary nodule in the left lower lobe.    She then began adjuvant immunotherapy starting in November 2017.  She completed 26 cycles of Durvalumab on 11/2/18.    CT - 12/15/22 - Middle lobe cicatricial atelectasis with adjacent pleural thickening; favor post radiation treatment fibrosis, similar to the prior.                           Interval decrease in size of anterior mediastinal lymph nodes, as above.                           Stable 0.5 cm left lower lobe nodule        Previous cancer history:She had a stage I, ER negative carcinoma of the    left breast in 1990, treated with lumpectomy and radiation therapy.  In      1993, she developed a stage I, ER positive carcinoma of the right breast     treated with lumpectomy and radiation.  In 1995, she developed left breast   cancer recurrence, treated with lumpectomy, brachytherapy and four cycles    of Adriamycin and Cytoxan.  In 1996, she developed a new right breast        cancer T2 N0 poorly differentiated, treated with four cycles of              preoperative Taxol followed by mastectomy.        On October 17, 2016 left mammogram showed increased calcifications in the left breast at 3:00.  On October 27 a biopsy showed DCIS with solid, cribriform, and micropapillary patterns.  Tumor was 95% ER positive at 95% KS positive    On November 7, 2016 she underwent left mastectomy which showed 8 mm of DCIS and negative margins.        Review of Systems   Constitutional:  Negative for activity change, appetite change, fatigue, fever and unexpected weight change.   HENT:  Negative for postnasal drip and trouble swallowing.    Respiratory:  Negative for cough.    Cardiovascular:  Negative for chest pain.   Gastrointestinal:  Negative for abdominal pain, constipation, nausea and vomiting.   Musculoskeletal:  Negative  for back pain.        Chronic right leg pain due to lumbar disc disease    Neurological:  Negative for headaches.   Psychiatric/Behavioral:  Negative for dysphoric mood. The patient is not nervous/anxious.        Objective:      Physical Exam  Constitutional:       Appearance: She is well-developed.   Cardiovascular:      Rate and Rhythm: Normal rate and regular rhythm.   Pulmonary:      Effort: Pulmonary effort is normal.      Breath sounds: Normal breath sounds. No wheezing or rales.   Abdominal:      Palpations: Abdomen is soft. There is no mass.      Tenderness: There is no abdominal tenderness.   Lymphadenopathy:      Cervical: No cervical adenopathy.      Upper Body:      Right upper body: No supraclavicular or axillary adenopathy.      Left upper body: No supraclavicular or axillary adenopathy.   Skin:     Findings: No erythema or rash.   Neurological:      Mental Status: She is alert and oriented to person, place, and time.   Psychiatric:         Mood and Affect: Mood normal.         Behavior: Behavior normal.         Thought Content: Thought content normal.         Judgment: Judgment normal.         Assessment:   Labs:CMP unremarkable, TSH .304, T4 1.22    CT - Stable postoperative changes of the right middle lobe.  No evidence of recurrent disease.     Unchanged left lower lobe nodule measuring 4 mm  1. History of lung cancer    2. History of left breast cancer       Plan:       RTC 12 M with labs and CT chest          Route Chart for Scheduling    Med Onc Chart Routing      Follow up with physician 1 year.   Follow up with TERESSA    Infusion scheduling note    Injection scheduling note    Labs CBC, CMP and TSH   Scheduling:  Preferred lab:  Lab interval:     Imaging CT chest abdomen pelvis   CT chest - non-contrast   Pharmacy appointment No pharmacy appointment needed      Other referrals no referral to Oncology Primary Care needed -  no Massage appointment needed    No additional referrals needed

## 2023-12-21 ENCOUNTER — TELEPHONE (OUTPATIENT)
Dept: HEMATOLOGY/ONCOLOGY | Facility: CLINIC | Age: 68
End: 2023-12-21
Payer: COMMERCIAL

## 2023-12-21 DIAGNOSIS — I82.90 ACUTE DEEP VEIN THROMBOSIS (DVT) OF NON-EXTREMITY VEIN: ICD-10-CM

## 2023-12-21 NOTE — TELEPHONE ENCOUNTER
"----- Message from Chandu Portilloon sent at 12/21/2023  9:10 AM CST -----  RX Name and Strength:  rivaroxaban (XARELTO) 20 mg Tab         How is the patient currently taking it?  Take 1 tablet (20 mg total) by mouth daily with dinner or evening meal. Do not start until completed 21 days of 15 mg twice a day with meals - Oral        Is this a 30 day or 90 day Rx?  90 tablet        Preferred Pharmacy with phone number:    [facility]  [place] - [address] [place] [address]  Phone: [phone] Fax: [phone]          Local or Mail Order: Local        Ordering Provider: Sergei        Contact Preference: [phone]         Additional Information:  "Thank you for all that you do for our patients"      "

## 2024-01-04 ENCOUNTER — PATIENT MESSAGE (OUTPATIENT)
Dept: ADMINISTRATIVE | Facility: HOSPITAL | Age: 69
End: 2024-01-04
Payer: COMMERCIAL

## 2024-01-25 ENCOUNTER — PATIENT MESSAGE (OUTPATIENT)
Dept: ADMINISTRATIVE | Facility: HOSPITAL | Age: 69
End: 2024-01-25
Payer: COMMERCIAL

## 2024-02-07 NOTE — PLAN OF CARE
Problem: Patient Care Overview  Goal: Plan of Care Review  Outcome: Ongoing (interventions implemented as appropriate)  Infusion completed, pt tolerated well; pt instructed to remain well hydrated, to contact MD for any needs or concerns; printed AVS given to and reviewed with pt, pt verbalized understanding of all discussed and when to report next       normal/clear to auscultation bilaterally/no wheezes/no rales/no rhonchi

## 2024-02-14 ENCOUNTER — LAB VISIT (OUTPATIENT)
Dept: LAB | Facility: HOSPITAL | Age: 69
End: 2024-02-14
Attending: FAMILY MEDICINE
Payer: COMMERCIAL

## 2024-02-14 ENCOUNTER — OFFICE VISIT (OUTPATIENT)
Dept: FAMILY MEDICINE | Facility: CLINIC | Age: 69
End: 2024-02-14
Payer: COMMERCIAL

## 2024-02-14 VITALS
HEART RATE: 91 BPM | DIASTOLIC BLOOD PRESSURE: 78 MMHG | OXYGEN SATURATION: 96 % | SYSTOLIC BLOOD PRESSURE: 120 MMHG | BODY MASS INDEX: 31.99 KG/M2 | TEMPERATURE: 99 F | WEIGHT: 199.06 LBS | HEIGHT: 66 IN

## 2024-02-14 DIAGNOSIS — E03.2 HYPOTHYROIDISM DUE TO MEDICATION: ICD-10-CM

## 2024-02-14 DIAGNOSIS — G25.81 RESTLESS LEG: ICD-10-CM

## 2024-02-14 DIAGNOSIS — I70.0 ATHEROSCLEROSIS OF AORTA: ICD-10-CM

## 2024-02-14 DIAGNOSIS — I10 PRIMARY HYPERTENSION: Primary | ICD-10-CM

## 2024-02-14 DIAGNOSIS — E78.5 HYPERLIPIDEMIA, UNSPECIFIED HYPERLIPIDEMIA TYPE: ICD-10-CM

## 2024-02-14 DIAGNOSIS — M79.10 MYALGIA DUE TO STATIN: ICD-10-CM

## 2024-02-14 DIAGNOSIS — R73.03 PREDIABETES: ICD-10-CM

## 2024-02-14 DIAGNOSIS — Z12.11 COLON CANCER SCREENING: ICD-10-CM

## 2024-02-14 DIAGNOSIS — T46.6X5A MYALGIA DUE TO STATIN: ICD-10-CM

## 2024-02-14 DIAGNOSIS — I10 PRIMARY HYPERTENSION: ICD-10-CM

## 2024-02-14 DIAGNOSIS — Z86.718 HISTORY OF DVT (DEEP VEIN THROMBOSIS): ICD-10-CM

## 2024-02-14 DIAGNOSIS — R39.15 URINARY URGENCY: ICD-10-CM

## 2024-02-14 DIAGNOSIS — I10 HYPERTENSION, UNSPECIFIED TYPE: ICD-10-CM

## 2024-02-14 LAB
ALBUMIN SERPL BCP-MCNC: 3.4 G/DL (ref 3.5–5.2)
ALP SERPL-CCNC: 67 U/L (ref 55–135)
ALT SERPL W/O P-5'-P-CCNC: 8 U/L (ref 10–44)
ANION GAP SERPL CALC-SCNC: 9 MMOL/L (ref 8–16)
AST SERPL-CCNC: 13 U/L (ref 10–40)
BILIRUB SERPL-MCNC: 0.5 MG/DL (ref 0.1–1)
BUN SERPL-MCNC: 16 MG/DL (ref 8–23)
CALCIUM SERPL-MCNC: 9.8 MG/DL (ref 8.7–10.5)
CHLORIDE SERPL-SCNC: 106 MMOL/L (ref 95–110)
CHOLEST SERPL-MCNC: 207 MG/DL (ref 120–199)
CHOLEST/HDLC SERPL: 6.3 {RATIO} (ref 2–5)
CO2 SERPL-SCNC: 24 MMOL/L (ref 23–29)
CREAT SERPL-MCNC: 0.9 MG/DL (ref 0.5–1.4)
EST. GFR  (NO RACE VARIABLE): >60 ML/MIN/1.73 M^2
ESTIMATED AVG GLUCOSE: 143 MG/DL (ref 68–131)
GLUCOSE SERPL-MCNC: 87 MG/DL (ref 70–110)
HBA1C MFR BLD: 6.6 % (ref 4–5.6)
HDLC SERPL-MCNC: 33 MG/DL (ref 40–75)
HDLC SERPL: 15.9 % (ref 20–50)
LDLC SERPL CALC-MCNC: 147.4 MG/DL (ref 63–159)
NONHDLC SERPL-MCNC: 174 MG/DL
POTASSIUM SERPL-SCNC: 4.6 MMOL/L (ref 3.5–5.1)
PROT SERPL-MCNC: 7.5 G/DL (ref 6–8.4)
SODIUM SERPL-SCNC: 139 MMOL/L (ref 136–145)
T4 FREE SERPL-MCNC: 1.31 NG/DL (ref 0.71–1.51)
TRIGL SERPL-MCNC: 133 MG/DL (ref 30–150)
TSH SERPL DL<=0.005 MIU/L-ACNC: 0.06 UIU/ML (ref 0.4–4)

## 2024-02-14 PROCEDURE — 1101F PT FALLS ASSESS-DOCD LE1/YR: CPT | Mod: CPTII,S$GLB,,

## 2024-02-14 PROCEDURE — 80061 LIPID PANEL: CPT | Performed by: FAMILY MEDICINE

## 2024-02-14 PROCEDURE — 99999 PR PBB SHADOW E&M-EST. PATIENT-LVL V: CPT | Mod: PBBFAC,,,

## 2024-02-14 PROCEDURE — 99214 OFFICE O/P EST MOD 30 MIN: CPT | Mod: S$GLB,,,

## 2024-02-14 PROCEDURE — 83036 HEMOGLOBIN GLYCOSYLATED A1C: CPT | Performed by: FAMILY MEDICINE

## 2024-02-14 PROCEDURE — 36415 COLL VENOUS BLD VENIPUNCTURE: CPT | Mod: PO | Performed by: FAMILY MEDICINE

## 2024-02-14 PROCEDURE — 84443 ASSAY THYROID STIM HORMONE: CPT | Performed by: FAMILY MEDICINE

## 2024-02-14 PROCEDURE — 3074F SYST BP LT 130 MM HG: CPT | Mod: CPTII,S$GLB,,

## 2024-02-14 PROCEDURE — 84439 ASSAY OF FREE THYROXINE: CPT | Performed by: FAMILY MEDICINE

## 2024-02-14 PROCEDURE — 3008F BODY MASS INDEX DOCD: CPT | Mod: CPTII,S$GLB,,

## 2024-02-14 PROCEDURE — 3288F FALL RISK ASSESSMENT DOCD: CPT | Mod: CPTII,S$GLB,,

## 2024-02-14 PROCEDURE — 80053 COMPREHEN METABOLIC PANEL: CPT | Performed by: FAMILY MEDICINE

## 2024-02-14 PROCEDURE — 1159F MED LIST DOCD IN RCRD: CPT | Mod: CPTII,S$GLB,,

## 2024-02-14 PROCEDURE — 3078F DIAST BP <80 MM HG: CPT | Mod: CPTII,S$GLB,,

## 2024-02-14 PROCEDURE — 1126F AMNT PAIN NOTED NONE PRSNT: CPT | Mod: CPTII,S$GLB,,

## 2024-02-14 NOTE — PATIENT INSTRUCTIONS
A Mediterranean diet is typically high in fruits, vegetables, whole grains, beans, nuts, and seeds. A Mediterranean diet includes olive oil as an important source of monounsaturated fat and allows low to moderate wine consumption. It generally includes low to moderate amounts of fish, poultry, and dairy products, and it includes little red meat. Reduces the risk of stroke and CV events. Also decreases incidence of Parkinson disease, Alzheimer disease, and cancers, including colorectal, prostate, aerodigestive, oropharyngeal, and breast cancers.     Guidelines: Heart Healthy Diet (AHA Guidelines)    -Aerobic Exercise for at least 30 minutes on most days should accompany dietary changes  -Eat smaller portions   -Eat at home.   -Increase Omega-3 Fatty Acid intake  -Eat fish twice weekly or Supplement with Omega-3 Fatty Acids  -Increase fruit and vegetable intake to drop CAD risk  -Fruits and Vegetables with greatest risk reduction  -Green leafy vegetables  -Cruciferous vegetables (broccoli, cauliflower)  -Fruits and vegetables high in Vitamin C  -Increase whole grains and high Dietary Fiber foods    LIMIT:   -saturated fat and trans-Fatty Acid intake (replace with olive oil, canola oil, nuts)  -Alcohol intake (2 drinks per day for men, and 1 drink per day for women)  -sugar intake  -salt intake (<1.5 to 2 grams per day is optimal)

## 2024-02-14 NOTE — PROGRESS NOTES
HPI     Chief Complaint:  Chief Complaint   Patient presents with    Follow-up       Fauzia Kirk is a 68 y.o. female with multiple medical diagnoses as listed in the medical history and problem list that presents for 6 month f/u.  Pt is new to me .      HPI      6 month f/u, last annual 6/2023 with Dr. Urena.   Hx breast CA, lung CA. Followed by hem-onc  Prediabtes  Cardiology - Only seen by Dr. Giraldo for hx of DVT, Afib, HTN. Xarelto managed by hem/onc (Dr. Mai).   RLS - gabapentin prn  HLD - was prescribed lipitor but was causing more muscle pain so stopped. Did change diet, increased takign omega 3. Would like to wait on results before she switches to another statin but could consider trial for another one (crestor).   Other concerns below    Assessment & Plan       Problem List Items Addressed This Visit          Cardiac/Vascular    Atherosclerosis of aorta (Chronic)  Stable, asymptomatic chronic condition.  Will continue to maximize risk factor reduction and adjust medication as needed    Pt could benefit from statin therapy but declines at this time.     Overview     CT 10/2021 - Aorta: 3 vessel left-sided aortic arch. There is atherosclerosis of the aorta. No aneurysm.    Patient with Atherosclerosis of the Aorta.  Stable/asymptomatic. Currently stable on lipid lowering treatment and b/p monitoring.           HTN (hypertension) - Primary  The 10-year ASCVD risk score (Claudia DK, et al., 2019) is: 11.7%    Values used to calculate the score:      Age: 68 years      Sex: Female      Is Non- : Yes      Diabetic: No      Tobacco smoker: No      Systolic Blood Pressure: 120 mmHg      Is BP treated: Yes      HDL Cholesterol: 42 mg/dL      Total Cholesterol: 228 mg/dL     TLC: Therapeutic Lifestyle Changes    Weight reduction: maintain a normal body weight (BMI 19-25)  DASH diet: Consume diet rich in fruits, vegetables, and low-fat dairy products with a reduced content of  saturated fat and total fat.   Low-sodium diet: consume <2400mg of sodium per day  Increase physical activity: regular aerobic physical activity (150 min per week)  Limit alcohol consumption: Less than 2 drinks/day in men and less than 1 drink/day in women.   QUIT smoking    BP controlled in clinic. Amlodipine 10mg daily.     Hyperlipidemia  Discussed risks of elevated cholesterol and pt's history. VU, would prefer to mange with diet/exercise for now pending recent cholesterol. Lipitor only increased pain. Could consider trial of another statin but pt would like to wait.     Heart healthy diet plus daily omega 3.        Hematology    History of DVT (deep vein thrombosis)  Xarelto. Stable, asymptomatic chronic condition.  Will continue to maximize risk factor reduction and adjust medication as needed         Endocrine    Hypothyroidism due to medication  Synthroid 88mcg. Pt admits feeling well. TSH pending    Prediabetes  Lab Results   Component Value Date    HGBA1C 6.2 (H) 06/09/2023     Diet/exercise.      Other Visit Diagnoses       Colon cancer screening        Relevant Orders    Ambulatory referral/consult to Endo Procedure     Urinary urgency      Pt reports urinary frequency at night only. No acute changes. Continue to monitor.     Restless leg      Gabapentin prn. The current medical regimen is effective;  continue present plan and medications.              --------------------------------------------      Health Maintenance:  Health Maintenance         Date Due Completion Date    Pneumococcal Vaccines (Age 65+) (1 of 2 - PCV) Never done ---    TETANUS VACCINE Never done ---    Shingles Vaccine (1 of 2) Never done ---    RSV Vaccine (Age 60+ and Pregnant patients) (1 - 1-dose 60+ series) Never done ---    Colorectal Cancer Screening 09/16/2021 9/16/2011    Influenza Vaccine (1) Never done ---    COVID-19 Vaccine (6 - 2023-24 season) 10/09/2023 8/14/2023    DEXA Scan 04/29/2024 4/29/2022    Hemoglobin  A1c (Prediabetes) 06/09/2024 6/9/2023    High Dose Statin 11/08/2024 11/8/2023    Lipid Panel 06/09/2028 6/9/2023            Discussed the importance of overdue vaccines which were offered during this encounter. Patient declined overdue vaccines at this time  Declines all vaccine.     Follow Up:  Follow up in about 4 months (around 6/14/2024) for appt with PCP for annual.    Exam     Review of Systems:  (as noted above)  Review of Systems   Constitutional:  Negative for chills.   Eyes:  Negative for visual disturbance.   Respiratory:  Negative for cough, chest tightness, shortness of breath and wheezing.    Cardiovascular:  Negative for chest pain and palpitations.   Gastrointestinal:  Negative for blood in stool and constipation.   Endocrine: Negative for polydipsia and polyphagia.   Genitourinary:  Negative for decreased urine volume, flank pain and hematuria.   Hematological:  Negative for adenopathy. Does not bruise/bleed easily.   Psychiatric/Behavioral:  Negative for confusion and decreased concentration.        Physical Exam:   Physical Exam  Constitutional:       General: She is not in acute distress.     Appearance: Normal appearance. She is obese. She is not toxic-appearing.   Cardiovascular:      Rate and Rhythm: Normal rate and regular rhythm.      Pulses: Normal pulses.      Heart sounds: Normal heart sounds. No murmur heard.  Pulmonary:      Effort: Pulmonary effort is normal.      Breath sounds: Normal breath sounds.   Musculoskeletal:         General: Normal range of motion.      Cervical back: Normal range of motion and neck supple. No rigidity.   Lymphadenopathy:      Cervical: No cervical adenopathy.   Skin:     General: Skin is warm.      Capillary Refill: Capillary refill takes less than 2 seconds.   Neurological:      General: No focal deficit present.      Mental Status: She is alert and oriented to person, place, and time.   Psychiatric:         Mood and Affect: Mood normal.       Vitals:     "24 1031   BP: 120/78   Pulse: 91   Temp: 98.5 °F (36.9 °C)   TempSrc: Oral   SpO2: 96%   Weight: 90.3 kg (199 lb 1.2 oz)   Height: 5' 6" (1.676 m)      Body mass index is 32.13 kg/m².        History     Past Medical History:  Past Medical History:   Diagnosis Date    Arthritis     Breast cancer     Hypertension     Primary cancer of right middle lobe of lung 2017    -diagnosed in .  S/p chemo, radiation and immunotherapy  -mass reduced to airspace disease c/w radiation pneumonitis  -new 1 cm rll.  Repeat ct pending.      Regional lymph node metastasis present 2017       Past Surgical History:  Past Surgical History:   Procedure Laterality Date    BREAST LUMPECTOMY       SECTION, CLASSIC      LIPOMA RESECTION      MASTECTOMY         Social History:  Social History     Socioeconomic History    Marital status:    Tobacco Use    Smoking status: Never    Smokeless tobacco: Never   Substance and Sexual Activity    Alcohol use: No     Alcohol/week: 0.0 standard drinks of alcohol    Drug use: No    Sexual activity: Yes     Partners: Male       Family History:  Family History   Problem Relation Age of Onset    Lymphoma Mother     Prostate cancer Father     Stomach cancer Sister     Breast cancer Sister     Diabetes Brother     Breast cancer Paternal Aunt     Breast cancer Paternal Grandmother        Allergies and Medications: (updated and reviewed)  Review of patient's allergies indicates:  No Known Allergies  Current Outpatient Medications   Medication Sig Dispense Refill    amLODIPine (NORVASC) 10 MG tablet Take 1 tablet (10 mg total) by mouth once daily. 90 tablet 3    gabapentin (NEURONTIN) 100 MG capsule Take 100 mg by mouth 3 (three) times daily.      levothyroxine (SYNTHROID) 88 MCG tablet Take 1 tablet (88 mcg total) by mouth before breakfast. 90 tablet 3    rivaroxaban (XARELTO) 20 mg Tab Take 1 tablet (20 mg total) by mouth daily with dinner or evening meal. Do not start until " completed 21 days of 15 mg twice a day with meals 90 tablet 3    albuterol (VENTOLIN HFA) 90 mcg/actuation inhaler Inhale 2 puffs into the lungs every 6 (six) hours as needed for Wheezing or Shortness of Breath. Rescue (Patient not taking: Reported on 2/14/2024) 8 g 0    atorvastatin (LIPITOR) 10 MG tablet Take 1 tablet (10 mg total) by mouth once daily. (Patient not taking: Reported on 2/14/2024) 90 tablet 3    metoprolol tartrate (LOPRESSOR) 25 MG tablet Take 1 tablet (25 mg total) by mouth 2 (two) times daily. 60 tablet 11     No current facility-administered medications for this visit.       Patient Care Team:  Era Urena MD as PCP - General (Family Medicine)  Linda Johnson MA as Care Coordinator         - The patient is given an After Visit Summary that lists all medications with directions, allergies, education, orders placed during this encounter and follow-up instructions.      - I have reviewed the patient's medical information including past medical, family, and social history sections including the medications and allergies.      - We discussed the patient's current medications.     This note was created by combination of typed  and MModal dictation.  Transcription errors may be present.  If there are any questions, please contact me.

## 2024-02-20 ENCOUNTER — TELEPHONE (OUTPATIENT)
Dept: ENDOSCOPY | Facility: HOSPITAL | Age: 69
End: 2024-02-20

## 2024-04-16 ENCOUNTER — PATIENT MESSAGE (OUTPATIENT)
Dept: ADMINISTRATIVE | Facility: HOSPITAL | Age: 69
End: 2024-04-16
Payer: COMMERCIAL

## 2024-05-06 ENCOUNTER — PATIENT OUTREACH (OUTPATIENT)
Dept: ADMINISTRATIVE | Facility: HOSPITAL | Age: 69
End: 2024-05-06
Payer: COMMERCIAL

## 2024-06-05 DIAGNOSIS — Z78.0 MENOPAUSE: ICD-10-CM

## 2024-06-14 ENCOUNTER — TELEPHONE (OUTPATIENT)
Dept: FAMILY MEDICINE | Facility: CLINIC | Age: 69
End: 2024-06-14

## 2024-06-14 ENCOUNTER — OFFICE VISIT (OUTPATIENT)
Dept: FAMILY MEDICINE | Facility: CLINIC | Age: 69
End: 2024-06-14
Payer: COMMERCIAL

## 2024-06-14 VITALS
WEIGHT: 196 LBS | SYSTOLIC BLOOD PRESSURE: 124 MMHG | HEART RATE: 100 BPM | BODY MASS INDEX: 31.5 KG/M2 | TEMPERATURE: 98 F | HEIGHT: 66 IN | OXYGEN SATURATION: 99 % | DIASTOLIC BLOOD PRESSURE: 64 MMHG

## 2024-06-14 DIAGNOSIS — Z12.11 COLON CANCER SCREENING: ICD-10-CM

## 2024-06-14 DIAGNOSIS — I48.91 ATRIAL FIBRILLATION, UNSPECIFIED TYPE: ICD-10-CM

## 2024-06-14 DIAGNOSIS — K21.9 GASTROESOPHAGEAL REFLUX DISEASE, UNSPECIFIED WHETHER ESOPHAGITIS PRESENT: ICD-10-CM

## 2024-06-14 DIAGNOSIS — I10 PRIMARY HYPERTENSION: ICD-10-CM

## 2024-06-14 DIAGNOSIS — I70.0 ATHEROSCLEROSIS OF AORTA: Chronic | ICD-10-CM

## 2024-06-14 DIAGNOSIS — E03.2 HYPOTHYROIDISM DUE TO MEDICATION: ICD-10-CM

## 2024-06-14 DIAGNOSIS — Z00.00 ANNUAL PHYSICAL EXAM: Primary | ICD-10-CM

## 2024-06-14 DIAGNOSIS — M62.838 MUSCLE SPASM: ICD-10-CM

## 2024-06-14 DIAGNOSIS — R06.2 WHEEZING: ICD-10-CM

## 2024-06-14 DIAGNOSIS — E78.5 HYPERLIPIDEMIA, UNSPECIFIED HYPERLIPIDEMIA TYPE: ICD-10-CM

## 2024-06-14 PROCEDURE — 3288F FALL RISK ASSESSMENT DOCD: CPT | Mod: CPTII,S$GLB,, | Performed by: FAMILY MEDICINE

## 2024-06-14 PROCEDURE — 3044F HG A1C LEVEL LT 7.0%: CPT | Mod: CPTII,S$GLB,, | Performed by: FAMILY MEDICINE

## 2024-06-14 PROCEDURE — 1101F PT FALLS ASSESS-DOCD LE1/YR: CPT | Mod: CPTII,S$GLB,, | Performed by: FAMILY MEDICINE

## 2024-06-14 PROCEDURE — 1159F MED LIST DOCD IN RCRD: CPT | Mod: CPTII,S$GLB,, | Performed by: FAMILY MEDICINE

## 2024-06-14 PROCEDURE — 99999 PR PBB SHADOW E&M-EST. PATIENT-LVL IV: CPT | Mod: PBBFAC,,, | Performed by: FAMILY MEDICINE

## 2024-06-14 PROCEDURE — 99397 PER PM REEVAL EST PAT 65+ YR: CPT | Mod: S$GLB,,, | Performed by: FAMILY MEDICINE

## 2024-06-14 PROCEDURE — 1126F AMNT PAIN NOTED NONE PRSNT: CPT | Mod: CPTII,S$GLB,, | Performed by: FAMILY MEDICINE

## 2024-06-14 PROCEDURE — 3078F DIAST BP <80 MM HG: CPT | Mod: CPTII,S$GLB,, | Performed by: FAMILY MEDICINE

## 2024-06-14 PROCEDURE — 99213 OFFICE O/P EST LOW 20 MIN: CPT | Mod: 25,S$GLB,, | Performed by: FAMILY MEDICINE

## 2024-06-14 PROCEDURE — 3074F SYST BP LT 130 MM HG: CPT | Mod: CPTII,S$GLB,, | Performed by: FAMILY MEDICINE

## 2024-06-14 PROCEDURE — 3008F BODY MASS INDEX DOCD: CPT | Mod: CPTII,S$GLB,, | Performed by: FAMILY MEDICINE

## 2024-06-14 PROCEDURE — 1160F RVW MEDS BY RX/DR IN RCRD: CPT | Mod: CPTII,S$GLB,, | Performed by: FAMILY MEDICINE

## 2024-06-14 RX ORDER — ALBUTEROL SULFATE 90 UG/1
2 AEROSOL, METERED RESPIRATORY (INHALATION) EVERY 6 HOURS PRN
Qty: 8 G | Refills: 3 | Status: SHIPPED | OUTPATIENT
Start: 2024-06-14

## 2024-06-14 RX ORDER — METOPROLOL TARTRATE 25 MG/1
25 TABLET, FILM COATED ORAL 2 TIMES DAILY
Qty: 180 TABLET | Refills: 3 | Status: SHIPPED | OUTPATIENT
Start: 2024-06-14 | End: 2025-06-14

## 2024-06-14 RX ORDER — OMEPRAZOLE 20 MG/1
20 CAPSULE, DELAYED RELEASE ORAL DAILY
Qty: 90 CAPSULE | Refills: 3 | Status: SHIPPED | OUTPATIENT
Start: 2024-06-14 | End: 2025-06-14

## 2024-06-14 RX ORDER — TIZANIDINE 4 MG/1
4 TABLET ORAL EVERY 6 HOURS PRN
Qty: 60 TABLET | Refills: 0 | Status: SHIPPED | OUTPATIENT
Start: 2024-06-14

## 2024-06-14 RX ORDER — LEVOTHYROXINE SODIUM 88 UG/1
88 TABLET ORAL
Qty: 90 TABLET | Refills: 3 | Status: SHIPPED | OUTPATIENT
Start: 2024-06-14 | End: 2025-06-14

## 2024-06-14 NOTE — PROGRESS NOTES
"Routine Office Visit    Fauzia Kirk  1955  [id number]      Subjective     Fauzia is a 68 y.o. female who presents today for:    Annual physical   Modified visit - topics discussed outside of annual physical    History of breast cancer. Lung cancer treatment completed. Patient has been following up with Dr. Mai   Diabetes - diet controlled - Patient monitors her diet and avoids fried fatty foods. She does admit to eating pasta, rice and bread. She likes vegetables. She admits she can be more active.   Hx of DVT in her neck / Afib / Hypertension - cardiologist - Dr. Giraldo.  - Patient continues to be on xarelto; stable on blood pressure medication   Restless leg - Patient is doing better on gabapentin.  GERD - Patient c/o flare up. She is requesting refill on omeprazole   Neck spasm - started a few weeks ago. Patient reports having stiffness in her neck after trying to pick something up. Symptoms are improving but still present.       Objective     Review of Systems   Constitutional:  Negative for chills and fever.   HENT:  Negative for congestion.    Eyes:  Negative for blurred vision.   Respiratory:  Negative for cough.    Cardiovascular:  Negative for chest pain.   Gastrointestinal:  Negative for abdominal pain, constipation, diarrhea, heartburn, nausea and vomiting.   Genitourinary:  Negative for dysuria.   Musculoskeletal:  Negative for myalgias.   Skin:  Negative for itching and rash.   Neurological:  Negative for dizziness and headaches.   Psychiatric/Behavioral:  Negative for depression.      /64   Pulse 100   Temp 98.2 °F (36.8 °C) (Oral)   Ht 5' 6" (1.676 m)   Wt 88.9 kg (195 lb 15.8 oz)   SpO2 99%   BMI 31.63 kg/m²   Physical Exam  Constitutional:       Appearance: She is well-developed.   HENT:      Head: Normocephalic and atraumatic.   Eyes:      Conjunctiva/sclera: Conjunctivae normal.      Pupils: Pupils are equal, round, and reactive to light.   Cardiovascular:      Rate and " Rhythm: Normal rate and regular rhythm.      Heart sounds: Normal heart sounds. No murmur heard.     No friction rub. No gallop.   Pulmonary:      Effort: No respiratory distress.      Breath sounds: Normal breath sounds.   Abdominal:      General: Bowel sounds are normal. There is no distension.      Palpations: Abdomen is soft.      Tenderness: There is no abdominal tenderness.   Musculoskeletal:         General: Normal range of motion.      Cervical back: Normal range of motion and neck supple.   Lymphadenopathy:      Cervical: No cervical adenopathy.   Skin:     General: Skin is warm.   Neurological:      Mental Status: She is alert and oriented to person, place, and time.             Assessment     Health Maintenance         Date Due Completion Date    Pneumococcal Vaccines (Age 65+) (1 of 2 - PCV) Never done ---    TETANUS VACCINE Never done ---    Shingles Vaccine (1 of 2) Never done ---    RSV Vaccine (Age 60+ and Pregnant patients) (1 - 1-dose 60+ series) Never done ---    Colorectal Cancer Screening 09/16/2021 9/16/2011    COVID-19 Vaccine (5 - 2023-24 season) 10/09/2023 8/14/2023    DEXA Scan 04/29/2024 4/29/2022    Influenza Vaccine (Season Ended) 09/01/2024 ---    Hemoglobin A1c (Prediabetes) 02/14/2025 2/14/2024    Lipid Panel 02/14/2029 2/14/2024              Problem List Items Addressed This Visit          Cardiac/Vascular    Atherosclerosis of aorta (Chronic)    Overview     CT 10/2021 - Aorta: 3 vessel left-sided aortic arch. There is atherosclerosis of the aorta. No aneurysm.    Patient with Atherosclerosis of the Aorta.  Stable/asymptomatic. Currently stable on lipid lowering treatment and b/p monitoring.           Atrial fibrillation  The current medical regimen is effective;  continue present plan and medications.       HTN (hypertension)    Relevant Medications    metoprolol tartrate (LOPRESSOR) 25 MG tablet  The current medical regimen is effective;  continue present plan and medications.        Hyperlipidemia    Relevant Orders    Comprehensive Metabolic Panel    Lipid Panel    TSH  The current medical regimen is effective;  continue present plan and medications.          Endocrine    Hypothyroidism due to medication    Relevant Medications    levothyroxine (SYNTHROID) 88 MCG tablet  The current medical regimen is effective;  continue present plan and medications.        Other Visit Diagnoses      Modified visit - topics discussed outside of annual physical    ROS: neck pain, GERD  PE: as above      Colon cancer screening    -  Primary    Relevant Orders    Ambulatory referral/consult to Endo Procedure     Muscle spasm        Relevant Medications    tiZANidine (ZANAFLEX) 4 MG tablet    Gastroesophageal reflux disease, unspecified whether esophagitis present        Relevant Medications    omeprazole (PRILOSEC) 20 MG capsule    Wheezing        Relevant Medications    albuterol (VENTOLIN HFA) 90 mcg/actuation inhaler          Annual physical   I addressed all major concerns as it related to health maintenance.  All were ordered and scheduled based on the patients wishes.  Any additional health maintenance will be readdressed at the next physical if declined or deferred by the patient.           No follow-ups on file.

## 2024-08-22 ENCOUNTER — CLINICAL SUPPORT (OUTPATIENT)
Dept: ENDOSCOPY | Facility: HOSPITAL | Age: 69
End: 2024-08-22
Attending: FAMILY MEDICINE
Payer: COMMERCIAL

## 2024-08-22 DIAGNOSIS — Z12.11 COLON CANCER SCREENING: ICD-10-CM

## 2024-09-09 ENCOUNTER — HOSPITAL ENCOUNTER (OUTPATIENT)
Dept: RADIOLOGY | Facility: CLINIC | Age: 69
Discharge: HOME | End: 2024-09-09
Attending: FAMILY MEDICINE
Payer: COMMERCIAL

## 2024-09-09 DIAGNOSIS — Z78.0 MENOPAUSE: ICD-10-CM

## 2024-09-09 PROCEDURE — 77080 DXA BONE DENSITY AXIAL: CPT | Mod: TC,PO

## 2024-09-09 PROCEDURE — 77080 DXA BONE DENSITY AXIAL: CPT | Mod: 26,,, | Performed by: INTERNAL MEDICINE

## 2024-09-18 ENCOUNTER — TELEPHONE (OUTPATIENT)
Dept: CARDIOLOGY | Facility: CLINIC | Age: 69
End: 2024-09-18
Payer: COMMERCIAL

## 2024-09-18 DIAGNOSIS — Z01.818 PRE-OP EVALUATION: Primary | ICD-10-CM

## 2024-09-18 DIAGNOSIS — Z12.11 SCREEN FOR COLON CANCER: Primary | ICD-10-CM

## 2024-09-30 DIAGNOSIS — I10 ESSENTIAL HYPERTENSION: ICD-10-CM

## 2024-09-30 NOTE — TELEPHONE ENCOUNTER
No care due was identified.  [facility] Embedded Care Due Messages. Reference number: [id number].   9/30/2024 3:41:06 PM CDT

## 2024-09-30 NOTE — TELEPHONE ENCOUNTER
----- Message from [place]o sent at 9/30/2024  2:00 PM CDT -----  Who Called:RIVERA STEPHENSON [[id number]]                New Prescription of Refill:  Refill         RX Name and Strength:amLODIPine (NORVASC) 10 MG tablet                      30 day or 90 day RX:  30          Local or Mail Order:local             Preferred Pharmacy:[address] [facility]                Would the patient rather a call back or a response via [facility]?          Best call back number:          Additional Information:

## 2024-10-01 RX ORDER — AMLODIPINE BESYLATE 10 MG/1
10 TABLET ORAL DAILY
Qty: 90 TABLET | Refills: 2 | Status: SHIPPED | OUTPATIENT
Start: 2024-10-01

## 2024-10-01 NOTE — TELEPHONE ENCOUNTER
Refill Decision Note   Fauzia Sridevias  is requesting a refill authorization.  Brief Assessment and Rationale for Refill:  Approve     Medication Therapy Plan:         Comments:     Note composed:7:13 AM 10/01/2024

## 2024-10-02 ENCOUNTER — TELEPHONE (OUTPATIENT)
Dept: PHARMACY | Facility: CLINIC | Age: 69
End: 2024-10-02
Payer: COMMERCIAL

## 2024-10-03 NOTE — TELEPHONE ENCOUNTER
[facility]/Population Health Chart Review & Patient Outreach Details For Medication Adherence Project    Reason for Outreach Encounter: 3rd Party payor non-compliance report ([employer], [employer], C, etc)  2.  Patient Outreach Method: Reviewed patient chart   3.   Medication in question:    LAST FILLED: n/a  Hyperlipidemia Medications               atorvastatin (LIPITOR) 10 MG tablet Take 1 tablet (10 mg total) by mouth once daily.               4.  Reviewed and or Updates Made To: Patient Chart  5. Outreach Outcomes and/or actions taken: Medication discontinued    Additional Notes:     Side effects

## 2024-11-18 ENCOUNTER — TELEPHONE (OUTPATIENT)
Dept: CARDIOLOGY | Facility: CLINIC | Age: 69
End: 2024-11-18
Payer: COMMERCIAL

## 2024-11-20 ENCOUNTER — OFFICE VISIT (OUTPATIENT)
Dept: CARDIOLOGY | Facility: CLINIC | Age: 69
End: 2024-11-20
Payer: COMMERCIAL

## 2024-11-20 ENCOUNTER — HOSPITAL ENCOUNTER (OUTPATIENT)
Dept: CARDIOLOGY | Facility: CLINIC | Age: 69
Discharge: HOME | End: 2024-11-20
Payer: COMMERCIAL

## 2024-11-20 VITALS
SYSTOLIC BLOOD PRESSURE: 135 MMHG | BODY MASS INDEX: 31.63 KG/M2 | OXYGEN SATURATION: 99 % | DIASTOLIC BLOOD PRESSURE: 74 MMHG | HEIGHT: 66 IN | HEART RATE: 89 BPM

## 2024-11-20 DIAGNOSIS — Z01.810 PREOP CARDIOVASCULAR EXAM: ICD-10-CM

## 2024-11-20 DIAGNOSIS — E78.5 HYPERLIPIDEMIA, UNSPECIFIED HYPERLIPIDEMIA TYPE: ICD-10-CM

## 2024-11-20 DIAGNOSIS — R00.2 PALPITATIONS: ICD-10-CM

## 2024-11-20 DIAGNOSIS — I10 PRIMARY HYPERTENSION: Primary | ICD-10-CM

## 2024-11-20 DIAGNOSIS — I48.91 ATRIAL FIBRILLATION, UNSPECIFIED TYPE: ICD-10-CM

## 2024-11-20 LAB
OHS QRS DURATION: 64 MS
OHS QTC CALCULATION: 414 MS

## 2024-11-20 PROCEDURE — 99205 OFFICE O/P NEW HI 60 MIN: CPT | Mod: 25,S$GLB,,

## 2024-11-20 PROCEDURE — 3078F DIAST BP <80 MM HG: CPT | Mod: CPTII,S$GLB,,

## 2024-11-20 PROCEDURE — 3044F HG A1C LEVEL LT 7.0%: CPT | Mod: CPTII,S$GLB,,

## 2024-11-20 PROCEDURE — 1159F MED LIST DOCD IN RCRD: CPT | Mod: CPTII,S$GLB,,

## 2024-11-20 PROCEDURE — 93000 ELECTROCARDIOGRAM COMPLETE: CPT | Mod: S$GLB,,, | Performed by: INTERNAL MEDICINE

## 2024-11-20 PROCEDURE — 3008F BODY MASS INDEX DOCD: CPT | Mod: CPTII,S$GLB,,

## 2024-11-20 PROCEDURE — 99999 PR PBB SHADOW E&M-EST. PATIENT-LVL III: CPT | Mod: PBBFAC,,,

## 2024-11-20 PROCEDURE — 1126F AMNT PAIN NOTED NONE PRSNT: CPT | Mod: CPTII,S$GLB,,

## 2024-11-20 PROCEDURE — 3075F SYST BP GE 130 - 139MM HG: CPT | Mod: CPTII,S$GLB,,

## 2024-11-20 NOTE — ASSESSMENT & PLAN NOTE
Patient with well controlled BP at home with norvasc 10 mg and metoprolol 25 mg daily   Plan:   - Continue same medications   - Recommendations about DASH diet given and exercise  - Continue following with PCP

## 2024-11-20 NOTE — ASSESSMENT & PLAN NOTE
Revised Cardiac Risk Index   High risk surgery: No  History of ischemic heart disease: No  History of congestive heart failure: No  History of stroke: No  Insulin dependent diabetes: No  Cr > 2: No  0 points, class I risk, 3.9% risk of cardiac event 2014 ACC/AHA Perioperative Guidelines  Known or risk factors for CAD: Yes  High risk of MACE: No  Functional capacity < 4 METs: No, proceed without further testing     Pt has no active cardiac condition (ACS/USA, decompenstated CHF, significant arrhythmias or severe valvular disease) and can easily achieve 4 METS.  As such, pt does not require further cardiac evaluation prior to undergoing surgery.  Pt should remain on beta-blockers throughout the entire edvin-procedure time period.  Aspirin therapy can be held but should be restarted as soon as safely possible.  The remaining cardiac meds can be held as needed but should also be restarted after the procedure. These recommendations follow the most current Guideline on Perioperative Cardiovascular Evaluation and Management of Patients Undergoing Noncardiac Surgery released by the 2024 AHA/ACC/ACS Guideline for Perioperative Cardiovascular Management for Noncardiac Surgery (J Am Alejandro Cardiol. Published online September 24, 2024. https://[email or web address]/10.1016/j.jacc.2024.06.013.

## 2024-11-20 NOTE — ASSESSMENT & PLAN NOTE
Patient with high total cholesterol, low HDL and high LDL. She follows with her PCP and was started on Lipitor 10 mg on 2/2024. Will follow with her PCP for adjustments of therapy

## 2024-11-20 NOTE — ASSESSMENT & PLAN NOTE
PAF   CHADVASC2 - 4     Rate control with metoprolol 25 bid  AC with Xarelto  Stop Xarelto 2 days prior to colonoscopy and resume it as soon as possible after procedure

## 2024-11-20 NOTE — PROGRESS NOTES
Cardiology Clinic Note  Reason for Visit: Preop evaluation for colonoscopy     HPI:   Patient is a 69-year-old lady with relevant history of PAF on Xarelto, breast cancer, lung cancer (chemo/XRT then adjuvant therapy with Durvalumab. That was completed 2018), DM2, DVT in her neck, hypertension and GERD who comes to the office for preoperative evaluation for colonoscopy. She is doing well and she is undergoing colonoscopy. Patient denies chest pain with exertion or at rest, palpitations, shortness of breath, dyspnea on exertion, dizziness, syncope, edema, orthopnea, paroxysmal nocturnal dyspnea, or claudication.    She refers having multiple procedures under anesthesia and denies any complications. She says that she can walk easily as much as possible and she does not get dyspnea.     ROS:    Constitution: Negative for fever, chills, weight loss or gain.   HENT: Negative for sore throat, rhinorrhea, or headache.  Eyes: Negative for blurred or double vision.   Cardiovascular: See above  Pulmonary: Negative for SOB   Gastrointestinal: Negative for abdominal pain, nausea, vomiting, or diarrhea.   : Negative for dysuria.   Neurological: Negative for focal weakness or sensory changes.  PMH:     Past Medical History:   Diagnosis Date    Arthritis     Breast cancer     Hypertension     Primary cancer of right middle lobe of lung 2017    -diagnosed in 2016.  S/p chemo, radiation and immunotherapy  -mass reduced to airspace disease c/w radiation pneumonitis  -new 1 cm rll.  Repeat ct pending.      Regional lymph node metastasis present 2017     Past Surgical History:   Procedure Laterality Date    BREAST LUMPECTOMY       SECTION, CLASSIC      LIPOMA RESECTION      MASTECTOMY       Allergies:   Review of patient's allergies indicates:  No Known Allergies  Medications:     Current Outpatient Medications on File Prior to Visit   Medication Sig Dispense Refill    albuterol (VENTOLIN HFA) 90  "mcg/actuation inhaler Inhale 2 puffs into the lungs every 6 (six) hours as needed for Wheezing or Shortness of Breath. Rescue 8 g 3    amLODIPine (NORVASC) 10 MG tablet Take 1 tablet (10 mg total) by mouth once daily. 90 tablet 2    gabapentin (NEURONTIN) 100 MG capsule Take 100 mg by mouth 3 (three) times daily.      levothyroxine (SYNTHROID) 88 MCG tablet Take 1 tablet (88 mcg total) by mouth before breakfast. 90 tablet 3    metoprolol tartrate (LOPRESSOR) 25 MG tablet Take 1 tablet (25 mg total) by mouth 2 (two) times daily. 180 tablet 3    omeprazole (PRILOSEC) 20 MG capsule Take 1 capsule (20 mg total) by mouth once daily. 90 capsule 3    rivaroxaban (XARELTO) 20 mg Tab Take 1 tablet (20 mg total) by mouth daily with dinner or evening meal. Do not start until completed 21 days of 15 mg twice a day with meals 90 tablet 3    atorvastatin (LIPITOR) 10 MG tablet Take 1 tablet (10 mg total) by mouth once daily. 90 tablet 3    tiZANidine (ZANAFLEX) 4 MG tablet Take 1 tablet (4 mg total) by mouth every 6 (six) hours as needed (muscle spasm). (Patient not taking: Reported on 11/20/2024) 60 tablet 0     No current facility-administered medications on file prior to visit.     Social History:     Social History     Tobacco Use    Smoking status: Never    Smokeless tobacco: Never   Substance Use Topics    Alcohol use: No     Alcohol/week: 0.0 standard drinks of alcohol     Family History:     Family History   Problem Relation Name Age of Onset    Lymphoma Mother      Prostate cancer Father      Stomach cancer Sister      Breast cancer Sister      Diabetes Brother      Breast cancer Paternal Aunt      Breast cancer Paternal Grandmother       Physical Exam:   /74 (Patient Position: Sitting)   Pulse 89   Ht 5' 6" (1.676 m)   SpO2 99%   BMI 31.63 kg/m²    Wt Readings from Last 4 Encounters:   06/14/24 88.9 kg (195 lb 15.8 oz)   02/14/24 90.3 kg (199 lb 1.2 oz)   12/18/23 91.2 kg (201 lb 1 oz)   11/08/23 91.6 kg (201 " lb 15.1 oz)       Constitutional: No distress, obese, conversant  HEENT: Sclera anicteric, PERRLA  Neck: No JVD, no masses, good movement  CV: RRR, S1 and S2 normal, no additional heart sounds or murmurs. Pulses 2+ and equal bilaterally in radial arteries and femoral, DP and PT areas bilaterally  Pulm: Clear to auscultation bilaterally with symmetrical expansion.   Extremities: Both extremities intact and grossly normal, skin is warm, no edema noted  Skin: No ecchymosis, erythema, or ulcers  Psych: AOx3, appropriate affect  Neuro: CNII-XII intact, no focal deficits      Labs:     Lab Results   Component Value Date     02/14/2024    K 4.6 02/14/2024     02/14/2024    CO2 24 02/14/2024    BUN 16 02/14/2024    CREATININE 0.9 02/14/2024    ANIONGAP 9 02/14/2024     Lab Results   Component Value Date    HGBA1C 6.6 (H) 02/14/2024     Lab Results   Component Value Date     (H) 01/06/2022    Lab Results   Component Value Date    WBC 4.96 09/20/2023    HGB 11.1 (L) 09/20/2023    HCT 35.3 (L) 09/20/2023    HCT 34 (L) 01/07/2022     09/20/2023    GRAN 3.3 09/20/2023    GRAN 67.1 09/20/2023     Lab Results   Component Value Date    CHOL 207 (H) 02/14/2024    HDL 33 (L) 02/14/2024    LDLCALC 147.4 02/14/2024    TRIG 133 02/14/2024          Imaging:        TTE 1/9/2022:    The left ventricle is normal in size with normal systolic function. The estimated ejection fraction is 55%.  Normal right ventricular size with mildly reduced right ventricular systolic function.  Mild aortic regurgitation.  Mild mitral regurgitation.  Atrial fibrillation observed.  The estimated PA systolic pressure is 28 mmHg.  Intermediate central venous pressure (8 mmHg).       Assessment and Plan:       HTN (hypertension)  Patient with well controlled BP at home with norvasc 10 mg and metoprolol 25 mg daily   Plan:   - Continue same medications   - Recommendations about DASH diet given and exercise  - Continue following with PCP      Hyperlipidemia  Patient with high total cholesterol, low HDL and high LDL. She follows with her PCP and was started on Lipitor 10 mg on 2/2024. Will follow with her PCP for adjustments of therapy     Afib  PAF   CHADVASC2 - 4  Plan:  Rate control with metoprolol 25 bid  AC with Xarelto  Stop Xarelto 2 days prior to colonoscopy and resume it as soon as possible after procedure       Preop cardiovascular exam  Revised Cardiac Risk Index   High risk surgery: No  History of ischemic heart disease: No  History of congestive heart failure: No  History of stroke: No  Insulin dependent diabetes: No  Cr > 2: No  0 points, class I risk, 3.9% risk of cardiac event 2014 ACC/AHA Perioperative Guidelines  Known or risk factors for CAD: Yes  High risk of MACE: No  Functional capacity < 4 METs: No, proceed without further testing     Pt has no active cardiac condition (ACS/USA, decompenstated CHF, significant arrhythmias or severe valvular disease) and can easily achieve 4 METS.  As such, pt does not require further cardiac evaluation prior to undergoing surgery.  Pt should remain on beta-blockers throughout the entire edvin-procedure time period.  Xarelto should be held 2 days prior to procedure but should be restarted as soon as safely possible.  The remaining cardiac meds can be held as needed but should also be restarted after the procedure. These recommendations follow the most current Guideline on Perioperative Cardiovascular Evaluation and Management of Patients Undergoing Noncardiac Surgery released by the 2024 AHA/ACC/ACS Guideline for Perioperative Cardiovascular Management for Noncardiac Surgery (J Am Alejandro Cardiol. Published online September 24, 2024. [email or web address]/[email or web address].           Staff: Frank Flynn MD    Signed:  Be Barrios M.D.  Cardiology Fellow  [facility]  11/20/2024 8:13 AM

## 2024-12-02 ENCOUNTER — TELEPHONE (OUTPATIENT)
Dept: HEMATOLOGY/ONCOLOGY | Facility: CLINIC | Age: 69
End: 2024-12-02
Payer: COMMERCIAL

## 2024-12-02 NOTE — TELEPHONE ENCOUNTER
"----- Message from Chandu sent at 12/2/2024  9:33 AM CST -----  Reschedule Existing Appointment    Appt Date:  12/12 or 12/17    Type of appt: NFL; CT CHEST NON CON [[id number]]    or  1 year F/u    Physician: Sergei    Reason for rescheduling?  Transportation; Requesting to have all 3 appts on the same date    Caller: Self    Contact Preference: [phone] (home)       Additional Information:  "Thank you for all that you do for our patients"  "

## 2024-12-09 ENCOUNTER — CLINICAL SUPPORT (OUTPATIENT)
Dept: ENDOSCOPY | Facility: HOSPITAL | Age: 69
End: 2024-12-09
Attending: FAMILY MEDICINE
Payer: COMMERCIAL

## 2024-12-09 DIAGNOSIS — Z12.11 COLON CANCER SCREENING: ICD-10-CM

## 2024-12-13 ENCOUNTER — TELEPHONE (OUTPATIENT)
Dept: HEMATOLOGY/ONCOLOGY | Facility: CLINIC | Age: 69
End: 2024-12-13
Payer: COMMERCIAL

## 2024-12-13 DIAGNOSIS — C34.90 MALIGNANT NEOPLASM OF UNSPECIFIED PART OF UNSPECIFIED BRONCHUS OR LUNG: Primary | ICD-10-CM

## 2024-12-13 NOTE — TELEPHONE ENCOUNTER
Patient requested appointment change.  Patient provided with appointment options , provider appointment scheduled  New orders entered for lab and CT.  Spoke with patient again in regards to time of scheduling of the lab and CT , she was okay with early visits.   Patient provided with appointment details  Patient expressed gratitude   No further questions or concerns noted during call  Patient appointment reminders mailed.

## 2024-12-16 ENCOUNTER — LAB VISIT (OUTPATIENT)
Dept: LAB | Facility: HOSPITAL | Age: 69
End: 2024-12-16
Attending: FAMILY MEDICINE
Payer: COMMERCIAL

## 2024-12-16 ENCOUNTER — OFFICE VISIT (OUTPATIENT)
Dept: FAMILY MEDICINE | Facility: CLINIC | Age: 69
End: 2024-12-16
Payer: COMMERCIAL

## 2024-12-16 ENCOUNTER — PATIENT OUTREACH (OUTPATIENT)
Dept: ADMINISTRATIVE | Facility: HOSPITAL | Age: 69
End: 2024-12-16
Payer: COMMERCIAL

## 2024-12-16 VITALS
DIASTOLIC BLOOD PRESSURE: 74 MMHG | WEIGHT: 195.13 LBS | HEART RATE: 93 BPM | HEIGHT: 66 IN | TEMPERATURE: 98 F | SYSTOLIC BLOOD PRESSURE: 116 MMHG | BODY MASS INDEX: 31.36 KG/M2 | OXYGEN SATURATION: 98 %

## 2024-12-16 DIAGNOSIS — Z86.718 HISTORY OF DVT (DEEP VEIN THROMBOSIS): ICD-10-CM

## 2024-12-16 DIAGNOSIS — Z85.3 HISTORY OF LEFT BREAST CANCER: ICD-10-CM

## 2024-12-16 DIAGNOSIS — E78.5 HYPERLIPIDEMIA, UNSPECIFIED HYPERLIPIDEMIA TYPE: ICD-10-CM

## 2024-12-16 DIAGNOSIS — I82.90 ACUTE DEEP VEIN THROMBOSIS (DVT) OF NON-EXTREMITY VEIN: ICD-10-CM

## 2024-12-16 DIAGNOSIS — I70.0 ATHEROSCLEROSIS OF AORTA: Primary | Chronic | ICD-10-CM

## 2024-12-16 DIAGNOSIS — I10 PRIMARY HYPERTENSION: ICD-10-CM

## 2024-12-16 DIAGNOSIS — I48.91 ATRIAL FIBRILLATION, UNSPECIFIED TYPE: ICD-10-CM

## 2024-12-16 LAB
ALBUMIN SERPL BCP-MCNC: 3.6 G/DL (ref 3.5–5.2)
ALP SERPL-CCNC: 81 U/L (ref 40–150)
ALT SERPL W/O P-5'-P-CCNC: 13 U/L (ref 10–44)
ANION GAP SERPL CALC-SCNC: 10 MMOL/L (ref 8–16)
AST SERPL-CCNC: 17 U/L (ref 10–40)
BILIRUB SERPL-MCNC: 0.7 MG/DL (ref 0.1–1)
BUN SERPL-MCNC: 17 MG/DL (ref 8–23)
CALCIUM SERPL-MCNC: 9.7 MG/DL (ref 8.7–10.5)
CHLORIDE SERPL-SCNC: 107 MMOL/L (ref 95–110)
CHOLEST SERPL-MCNC: 175 MG/DL (ref 120–199)
CHOLEST/HDLC SERPL: 4.4 {RATIO} (ref 2–5)
CO2 SERPL-SCNC: 24 MMOL/L (ref 23–29)
CREAT SERPL-MCNC: 0.9 MG/DL (ref 0.5–1.4)
EST. GFR  (NO RACE VARIABLE): >60 ML/MIN/1.73 M^2
GLUCOSE SERPL-MCNC: 90 MG/DL (ref 70–110)
HDLC SERPL-MCNC: 40 MG/DL (ref 40–75)
HDLC SERPL: 22.9 % (ref 20–50)
LDLC SERPL CALC-MCNC: 115.2 MG/DL (ref 63–159)
NONHDLC SERPL-MCNC: 135 MG/DL
POTASSIUM SERPL-SCNC: 4.2 MMOL/L (ref 3.5–5.1)
PROT SERPL-MCNC: 8 G/DL (ref 6–8.4)
SODIUM SERPL-SCNC: 141 MMOL/L (ref 136–145)
T4 FREE SERPL-MCNC: 1.16 NG/DL (ref 0.71–1.51)
TRIGL SERPL-MCNC: 99 MG/DL (ref 30–150)
TSH SERPL DL<=0.005 MIU/L-ACNC: 0.2 UIU/ML (ref 0.4–4)

## 2024-12-16 PROCEDURE — G2211 COMPLEX E/M VISIT ADD ON: HCPCS | Mod: S$GLB,,, | Performed by: FAMILY MEDICINE

## 2024-12-16 PROCEDURE — 84439 ASSAY OF FREE THYROXINE: CPT | Performed by: FAMILY MEDICINE

## 2024-12-16 PROCEDURE — 1159F MED LIST DOCD IN RCRD: CPT | Mod: CPTII,S$GLB,, | Performed by: FAMILY MEDICINE

## 2024-12-16 PROCEDURE — 80061 LIPID PANEL: CPT | Performed by: FAMILY MEDICINE

## 2024-12-16 PROCEDURE — 3288F FALL RISK ASSESSMENT DOCD: CPT | Mod: CPTII,S$GLB,, | Performed by: FAMILY MEDICINE

## 2024-12-16 PROCEDURE — 3074F SYST BP LT 130 MM HG: CPT | Mod: CPTII,S$GLB,, | Performed by: FAMILY MEDICINE

## 2024-12-16 PROCEDURE — 99214 OFFICE O/P EST MOD 30 MIN: CPT | Mod: S$GLB,,, | Performed by: FAMILY MEDICINE

## 2024-12-16 PROCEDURE — 3008F BODY MASS INDEX DOCD: CPT | Mod: CPTII,S$GLB,, | Performed by: FAMILY MEDICINE

## 2024-12-16 PROCEDURE — 1160F RVW MEDS BY RX/DR IN RCRD: CPT | Mod: CPTII,S$GLB,, | Performed by: FAMILY MEDICINE

## 2024-12-16 PROCEDURE — 1101F PT FALLS ASSESS-DOCD LE1/YR: CPT | Mod: CPTII,S$GLB,, | Performed by: FAMILY MEDICINE

## 2024-12-16 PROCEDURE — 84443 ASSAY THYROID STIM HORMONE: CPT | Performed by: FAMILY MEDICINE

## 2024-12-16 PROCEDURE — 3044F HG A1C LEVEL LT 7.0%: CPT | Mod: CPTII,S$GLB,, | Performed by: FAMILY MEDICINE

## 2024-12-16 PROCEDURE — 99999 PR PBB SHADOW E&M-EST. PATIENT-LVL IV: CPT | Mod: PBBFAC,,, | Performed by: FAMILY MEDICINE

## 2024-12-16 PROCEDURE — 36415 COLL VENOUS BLD VENIPUNCTURE: CPT | Mod: PO | Performed by: FAMILY MEDICINE

## 2024-12-16 PROCEDURE — 3078F DIAST BP <80 MM HG: CPT | Mod: CPTII,S$GLB,, | Performed by: FAMILY MEDICINE

## 2024-12-16 PROCEDURE — 80053 COMPREHEN METABOLIC PANEL: CPT | Performed by: FAMILY MEDICINE

## 2024-12-16 RX ORDER — ATORVASTATIN CALCIUM 10 MG/1
10 TABLET, FILM COATED ORAL DAILY
Qty: 90 TABLET | Refills: 3 | Status: SHIPPED | OUTPATIENT
Start: 2024-12-16 | End: 2025-12-16

## 2024-12-16 NOTE — PROGRESS NOTES
"Routine Office Visit     Patient Name: Fauzia Kirk    : 1955  MRN: [id number]    Subjective     History of Present Illness    CHIEF COMPLAINT:  History of breast cancer. Lung cancer treatment completed. F/u Dr. Mai   Diabetes - diet controlled  Hx of DVT in her neck / Afib / Hypertension - cardiologist - Dr. Giraldo. on xarelto  Restless leg -  on gabapentin.  GERD - on omeprazole     BLOOD PRESSURE:  She reports low blood pressure today but questions the accuracy of the reading.    IMMUNIZATIONS:  She received COVID and RSV vaccines in 2023, with a total of six COVID vaccines to date.      ROS:  General: no fever, no chills, no fatigue, no weight gain, no weight loss  Eyes: no vision changes, no redness, no discharge  ENT: no ear pain, no nasal congestion, no sore throat  Cardiovascular: no chest pain, no palpitations, no lower extremity edema  Respiratory: no cough, no shortness of breath  Gastrointestinal: no abdominal pain, no nausea, no vomiting, no diarrhea, no constipation, no blood in stool  Genitourinary: no dysuria, no hematuria, no frequency  Musculoskeletal: no joint pain, no muscle pain  Skin: no rash, no lesion  Neurological: no headache, no dizziness, no numbness, no tingling  Psychiatric: no anxiety, no depression, no sleep difficulty           Objective     /74 (BP Location: Left arm, Patient Position: Sitting)   Pulse 93   Temp 98.2 °F (36.8 °C) (Oral)   Ht 5' 6" (1.676 m)   Wt 88.5 kg (195 lb 1.7 oz)   SpO2 98%   BMI 31.49 kg/m²   Physical Exam  Constitutional:       Appearance: She is well-developed.   HENT:      Head: Normocephalic and atraumatic.   Eyes:      Conjunctiva/sclera: Conjunctivae normal.      Pupils: Pupils are equal, round, and reactive to light.   Cardiovascular:      Rate and Rhythm: Normal rate and regular rhythm.      Heart sounds: Normal heart sounds. No murmur heard.     No friction rub. No gallop.   Pulmonary:      Effort: No respiratory " distress.      Breath sounds: Normal breath sounds.   Abdominal:      General: Bowel sounds are normal. There is no distension.      Palpations: Abdomen is soft.      Tenderness: There is no abdominal tenderness.   Musculoskeletal:         General: Normal range of motion.      Cervical back: Normal range of motion and neck supple.   Lymphadenopathy:      Cervical: No cervical adenopathy.   Skin:     General: Skin is warm.   Neurological:      Mental Status: She is alert and oriented to person, place, and time.           Assessment     Assessment & Plan     Evaluated blood pressure, noting it was in the low end of normal range   Reviewed COVID-19 vaccination status, confirming patient is up-to-date   Considered need for pneumonia and tetanus vaccines    GENERAL CARE:   Patient to continue maintaining current activity level.   Follow up in 6 months.         Problem List Items Addressed This Visit          Cardiac/Vascular    Atherosclerosis of aorta - Primary (Chronic)    Overview     CT 10/2021 - Aorta: 3 vessel left-sided aortic arch. There is atherosclerosis of the aorta. No aneurysm.    Patient with Atherosclerosis of the Aorta.  Stable/asymptomatic. Currently stable on lipid lowering treatment and b/p monitoring.           Atrial fibrillation    HTN (hypertension)  The current medical regimen is effective;  continue present plan and medications.       Hyperlipidemia    Relevant Medications    atorvastatin (LIPITOR) 10 MG tablet   Continued atorvastatin for cholesterol management.   CBC ordered today, including cholesterol check.   Will send message through [facility] with cholesterol level results.         Hematology    History of DVT (deep vein thrombosis)  On xarelto          Oncology    History of left breast cancer  F/u with hem/onc - Dr. Mai          This note was generated with the assistance of ambient listening technology. Verbal consent was obtained by the patient and accompanying visitor(s) for the  recording of patient appointment to facilitate this note. I attest to having reviewed and edited the generated note for accuracy, though some syntax or spelling errors may persist. Please contact the author of this note for any clarification.

## 2024-12-18 PROBLEM — Z01.810 PREOP CARDIOVASCULAR EXAM: Status: RESOLVED | Noted: 2024-11-20 | Resolved: 2024-12-18

## 2024-12-23 ENCOUNTER — TELEPHONE (OUTPATIENT)
Dept: FAMILY MEDICINE | Facility: CLINIC | Age: 69
End: 2024-12-23
Payer: COMMERCIAL

## 2024-12-23 NOTE — TELEPHONE ENCOUNTER
----- Message from Era Urena MD sent at 12/22/2024  9:54 PM CST -----  TSH was low, however active thyroid lab was normal.   No changes in medication regimen at this time.   Will recheck on next visit     Otherwise labs are within acceptable range   Normal liver and kidney function  Normal lipid panel

## 2025-02-04 ENCOUNTER — OFFICE VISIT (OUTPATIENT)
Dept: HEMATOLOGY/ONCOLOGY | Facility: CLINIC | Age: 70
End: 2025-02-04
Payer: COMMERCIAL

## 2025-02-04 ENCOUNTER — HOSPITAL ENCOUNTER (OUTPATIENT)
Dept: RADIOLOGY | Facility: HOSPITAL | Age: 70
Discharge: HOME | End: 2025-02-04
Attending: INTERNAL MEDICINE
Payer: COMMERCIAL

## 2025-02-04 VITALS
HEART RATE: 82 BPM | TEMPERATURE: 99 F | DIASTOLIC BLOOD PRESSURE: 68 MMHG | HEIGHT: 66 IN | OXYGEN SATURATION: 98 % | SYSTOLIC BLOOD PRESSURE: 143 MMHG | WEIGHT: 194.88 LBS | BODY MASS INDEX: 31.32 KG/M2

## 2025-02-04 DIAGNOSIS — C34.90 MALIGNANT NEOPLASM OF UNSPECIFIED PART OF UNSPECIFIED BRONCHUS OR LUNG: ICD-10-CM

## 2025-02-04 DIAGNOSIS — Z85.3 HISTORY OF LEFT BREAST CANCER: ICD-10-CM

## 2025-02-04 DIAGNOSIS — Z85.118 HISTORY OF LUNG CANCER: Primary | ICD-10-CM

## 2025-02-04 PROCEDURE — 1101F PT FALLS ASSESS-DOCD LE1/YR: CPT | Mod: CPTII,S$GLB,, | Performed by: INTERNAL MEDICINE

## 2025-02-04 PROCEDURE — 71250 CT THORAX DX C-: CPT | Mod: 26,,, | Performed by: STUDENT IN AN ORGANIZED HEALTH CARE EDUCATION/TRAINING PROGRAM

## 2025-02-04 PROCEDURE — 3077F SYST BP >= 140 MM HG: CPT | Mod: CPTII,S$GLB,, | Performed by: INTERNAL MEDICINE

## 2025-02-04 PROCEDURE — 3288F FALL RISK ASSESSMENT DOCD: CPT | Mod: CPTII,S$GLB,, | Performed by: INTERNAL MEDICINE

## 2025-02-04 PROCEDURE — 1125F AMNT PAIN NOTED PAIN PRSNT: CPT | Mod: CPTII,S$GLB,, | Performed by: INTERNAL MEDICINE

## 2025-02-04 PROCEDURE — 1159F MED LIST DOCD IN RCRD: CPT | Mod: CPTII,S$GLB,, | Performed by: INTERNAL MEDICINE

## 2025-02-04 PROCEDURE — 3008F BODY MASS INDEX DOCD: CPT | Mod: CPTII,S$GLB,, | Performed by: INTERNAL MEDICINE

## 2025-02-04 PROCEDURE — 99213 OFFICE O/P EST LOW 20 MIN: CPT | Mod: S$GLB,,, | Performed by: INTERNAL MEDICINE

## 2025-02-04 PROCEDURE — 71250 CT THORAX DX C-: CPT | Mod: TC

## 2025-02-04 PROCEDURE — 99999 PR PBB SHADOW E&M-EST. PATIENT-LVL III: CPT | Mod: PBBFAC,,, | Performed by: INTERNAL MEDICINE

## 2025-02-04 PROCEDURE — 3078F DIAST BP <80 MM HG: CPT | Mod: CPTII,S$GLB,, | Performed by: INTERNAL MEDICINE

## 2025-02-04 NOTE — PROGRESS NOTES
Subjective:       Patient ID: Fauzia Kirk is a 69 y.o. female.    Chief Complaint: No chief complaint on file.    HPI Ms. Kirk is a very pleasant 69-year-old  female who returned  for F/U of stage III right lung cancer.    She had chemo/XRT then adjuvant therapy with Durvalumab.    That was completed November 2018.    Has some chronic right leg pain which is unchanged.    She has no shortness of breath.  Appetite and bowel function has been good.           Lung Cancer  History:   In December 2016 she began to have some blood in her mucus after coughing.In  April 2017 she developed a persistent cough and saw her physician on April 18.  Chest x-ray at that time showed a rounded opacity in the right lung overlying the major fissure.   Chest CT in May showed a 4.8 x 4.0 cm, rounded, soft tissue mass in the middle lobe between the medial and lateral segments. There was pre-carinal adenopathy.    Subsequently she underwent bronchoscopy with EBUS on 6/9/17. Needle biopsy of the medistinal nodes was positive for poorly differentiated carcinoma with squamoid features. The cells were positive for CK7, CK5/6, and P63 and are negative for CK20, GCDFP, TTF1, ER, WV, Napsin and YEMI 3.This was felt to be most CW a new squamous lung cancer.    PET CT  demonstrated a hypermetabolic, large mass in the right middle lobe with an SUV max of 20.23. There was hypermetabolic activity extending from this mass tracking along the pleura. There was a hypermetabolic right hilar lymph node demonstrating an SUV max of 20.2. In addition there were 2 hypermetabolic subcarinal lymph nodes with the larger demonstrating an SUV max of 14.2. An additional right paratracheal lymph node was seen with an SUV max of 26.1.    She completed radiation together with weekly chemotherapy with carboplatin and Taxol  for stage IIIA disease.    She completed therapy on September 13, 2017 and received 7 chemotherapy treatments.    CT scan from September  29 showed that the right middle lobe mass had decreased to 4.2 x 2.3 cm from 4.8 x 4.3 cm.  Stable 4 mm pulmonary nodule in the left lower lobe.    She then began adjuvant immunotherapy starting in November 2017.  She completed 26 cycles of Durvalumab on 11/2/18.    CT - 12/15/22 - Middle lobe cicatricial atelectasis with adjacent pleural thickening; favor post radiation treatment fibrosis, similar to the prior.                           Interval decrease in size of anterior mediastinal lymph nodes, as above.                           Stable 0.5 cm left lower lobe nodule        Previous cancer history:She had a stage I, ER negative carcinoma of the    left breast in 1990, treated with lumpectomy and radiation therapy.  In      1993, she developed a stage I, ER positive carcinoma of the right breast     treated with lumpectomy and radiation.  In 1995, she developed left breast   cancer recurrence, treated with lumpectomy, brachytherapy and four cycles    of Adriamycin and Cytoxan.  In 1996, she developed a new right breast        cancer T2 N0 poorly differentiated, treated with four cycles of              preoperative Taxol followed by mastectomy.        On October 17, 2016 left mammogram showed increased calcifications in the left breast at 3:00.  On October 27 a biopsy showed DCIS with solid, cribriform, and micropapillary patterns.  Tumor was 95% ER positive at 95% NV positive    On November 7, 2016 she underwent left mastectomy which showed 8 mm of DCIS and negative margins.        Review of Systems   Constitutional:  Negative for activity change, appetite change, fatigue, fever and unexpected weight change.   HENT:  Negative for postnasal drip and trouble swallowing.    Respiratory:  Negative for cough.    Cardiovascular:  Negative for chest pain.   Gastrointestinal:  Negative for abdominal pain, constipation, nausea and vomiting.   Musculoskeletal:  Negative for back pain.        Chronic right leg pain due to  lumbar disc disease    Neurological:  Negative for headaches.   Psychiatric/Behavioral:  Negative for dysphoric mood. The patient is not nervous/anxious.        Objective:      Physical Exam  Constitutional:       Appearance: She is well-developed.   Cardiovascular:      Rate and Rhythm: Normal rate and regular rhythm.   Pulmonary:      Effort: Pulmonary effort is normal.      Breath sounds: Normal breath sounds. No wheezing or rales.   Abdominal:      Palpations: Abdomen is soft. There is no mass.      Tenderness: There is no abdominal tenderness.   Lymphadenopathy:      Cervical: No cervical adenopathy.      Upper Body:      Right upper body: No supraclavicular or axillary adenopathy.      Left upper body: No supraclavicular or axillary adenopathy.   Skin:     Findings: No erythema or rash.   Neurological:      Mental Status: She is alert and oriented to person, place, and time.   Psychiatric:         Mood and Affect: Mood normal.         Behavior: Behavior normal.         Thought Content: Thought content normal.         Judgment: Judgment normal.         Assessment:   Labs:CMP in December - normal, T4 normal    CT - stable, ALISIA    1. History of lung cancer    2. History of left breast cancer       Plan:       RTC 12 M with labs and CT chest          Route Chart for Scheduling  Med Onc Route Chart for Scheduling

## 2025-02-26 DIAGNOSIS — R73.03 PREDIABETES: ICD-10-CM

## 2025-06-20 ENCOUNTER — OFFICE VISIT (OUTPATIENT)
Dept: FAMILY MEDICINE | Facility: CLINIC | Age: 70
End: 2025-06-20
Payer: COMMERCIAL

## 2025-06-20 VITALS
WEIGHT: 198.63 LBS | DIASTOLIC BLOOD PRESSURE: 60 MMHG | OXYGEN SATURATION: 97 % | HEIGHT: 66 IN | HEART RATE: 97 BPM | SYSTOLIC BLOOD PRESSURE: 112 MMHG | BODY MASS INDEX: 31.92 KG/M2

## 2025-06-20 DIAGNOSIS — I48.91 ATRIAL FIBRILLATION, UNSPECIFIED TYPE: ICD-10-CM

## 2025-06-20 DIAGNOSIS — Z12.12 ENCOUNTER FOR COLORECTAL CANCER SCREENING: ICD-10-CM

## 2025-06-20 DIAGNOSIS — I70.0 ATHEROSCLEROSIS OF AORTA: Chronic | ICD-10-CM

## 2025-06-20 DIAGNOSIS — R73.03 PREDIABETES: ICD-10-CM

## 2025-06-20 DIAGNOSIS — I10 ESSENTIAL HYPERTENSION: ICD-10-CM

## 2025-06-20 DIAGNOSIS — I10 PRIMARY HYPERTENSION: ICD-10-CM

## 2025-06-20 DIAGNOSIS — Z86.718 HISTORY OF DVT (DEEP VEIN THROMBOSIS): ICD-10-CM

## 2025-06-20 DIAGNOSIS — Z12.11 ENCOUNTER FOR COLORECTAL CANCER SCREENING: ICD-10-CM

## 2025-06-20 DIAGNOSIS — E03.2 HYPOTHYROIDISM DUE TO MEDICATION: ICD-10-CM

## 2025-06-20 DIAGNOSIS — E78.5 HYPERLIPIDEMIA, UNSPECIFIED HYPERLIPIDEMIA TYPE: Primary | ICD-10-CM

## 2025-06-20 PROCEDURE — 99999 PR PBB SHADOW E&M-EST. PATIENT-LVL IV: CPT | Mod: PBBFAC,,, | Performed by: FAMILY MEDICINE

## 2025-06-20 RX ORDER — AMLODIPINE BESYLATE 10 MG/1
10 TABLET ORAL DAILY
Qty: 90 TABLET | Refills: 3 | Status: SHIPPED | OUTPATIENT
Start: 2025-06-20

## 2025-06-20 RX ORDER — METOPROLOL TARTRATE 25 MG/1
25 TABLET, FILM COATED ORAL 2 TIMES DAILY
Qty: 180 TABLET | Refills: 3 | Status: SHIPPED | OUTPATIENT
Start: 2025-06-20 | End: 2026-06-20

## 2025-06-20 RX ORDER — LEVOTHYROXINE SODIUM 88 UG/1
88 TABLET ORAL
Qty: 90 TABLET | Refills: 3 | Status: SHIPPED | OUTPATIENT
Start: 2025-06-20 | End: 2026-06-20

## 2025-06-20 RX ORDER — PRAVASTATIN SODIUM 10 MG/1
10 TABLET ORAL DAILY
Qty: 90 TABLET | Refills: 3 | Status: SHIPPED | OUTPATIENT
Start: 2025-06-20 | End: 2026-06-20

## 2025-06-20 NOTE — PROGRESS NOTES
Routine Office Visit     Patient Name: Fauzia Kirk    : 1955  MRN: [id number]    Subjective     History of Present Illness    CHIEF COMPLAINT:  History of breast cancer. Lung cancer treatment completed. F/u Dr. Mai   Diabetes - diet controlled  Hx of DVT in her neck / Afib / Hypertension - cardiologist - Dr. Giraldo. on xarelto  Restless leg -  on gabapentin.  GERD - on omeprazole   Hyperlipidemia     CARDIOVASCULAR:  She has a history of atrial fibrillation. Her last cardiology visit was in November for colonoscopy medical clearance, at which time she was cleared for the procedure. She denies current cardiac symptoms including chest pain, shortness of breath, and angina. She is not currently under active cardiology follow-up. She discontinued her cholesterol medication due to severe leg pain despite taking it with CoQ10, noting the side effects were intolerable even at the lowest available dose. She expresses interest in exploring alternative cholesterol management options.    FAMILY HISTORY:  She has a family history of heart disease.    CURRENT MEDICATIONS:  She continues Metoprolol, Amlodipine, Xarelto (1 refill remaining), and Levothyroxine (1 refill remaining).    EXERCISE:  She maintains an active lifestyle but acknowledges inconsistency in her exercise routine. She is aware of the importance of dedicated physical activity such as walking or bicycle riding but currently lacks a structured exercise plan.      ROS:  General: no fever, no chills, no fatigue, no weight gain, no weight loss  Eyes: no vision changes, no redness, no discharge  ENT: no ear pain, no nasal congestion, no sore throat  Cardiovascular: no chest pain, no palpitations, no lower extremity edema  Respiratory: no cough, no shortness of breath  Gastrointestinal: no abdominal pain, no nausea, no vomiting, no diarrhea, no constipation, no blood in stool  Genitourinary: no dysuria, no hematuria, no frequency  Musculoskeletal: no joint  "pain, no muscle pain, +limb pain  Skin: no rash, no lesion  Neurological: no headache, no dizziness, no numbness, no tingling  Psychiatric: no anxiety, no depression, no sleep difficulty           Objective     /60 (BP Location: Left arm, Patient Position: Sitting)   Pulse 97   Ht 5' 6" (1.676 m)   Wt 90.1 kg (198 lb 10.2 oz)   SpO2 97%   BMI 32.06 kg/m²   Physical Exam  Constitutional:       Appearance: She is well-developed.   HENT:      Head: Normocephalic and atraumatic.   Eyes:      Conjunctiva/sclera: Conjunctivae normal.      Pupils: Pupils are equal, round, and reactive to light.   Cardiovascular:      Rate and Rhythm: Normal rate and regular rhythm.      Heart sounds: Normal heart sounds. No murmur heard.     No friction rub. No gallop.   Pulmonary:      Effort: No respiratory distress.      Breath sounds: Normal breath sounds.   Abdominal:      General: Bowel sounds are normal. There is no distension.      Palpations: Abdomen is soft.      Tenderness: There is no abdominal tenderness.   Musculoskeletal:         General: Normal range of motion.      Cervical back: Normal range of motion and neck supple.   Lymphadenopathy:      Cervical: No cervical adenopathy.   Skin:     General: Skin is warm.   Neurological:      Mental Status: She is alert and oriented to person, place, and time.           Assessment     Assessment & Plan      GENERAL HEALTH RECOMMENDATIONS:   Patient to maintain active lifestyle with dedicated exercise time, such as walking or riding a bicycle.   Continue Amlodipine and Levothyroxine.   Referred for colonoscopy.         Problem List Items Addressed This Visit          Cardiac/Vascular    Atherosclerosis of aorta (Chronic)    Overview   CT 10/2021 - Aorta: 3 vessel left-sided aortic arch. There is atherosclerosis of the aorta. No aneurysm.    Patient with Atherosclerosis of the Aorta.  Stable/asymptomatic. Currently stable on lipid lowering treatment and b/p monitoring.        "    Atrial fibrillation   Continue Metoprolol and Xarelto for atrial fibrillation management.   Patient was last seen by a cardiologist in November and was cleared for a colonoscopy.   Prescribed refills of both medications, with Xarelto specifically for anticoagulation therapy.      HTN (hypertension)    Relevant Medications    metoprolol tartrate (LOPRESSOR) 25 MG tablet    amLODIPine (NORVASC) 10 MG tablet    Hyperlipidemia - Primary    Relevant Medications    pravastatin (PRAVACHOL) 10 MG tablet   Discontinued previous cholesterol medication due to significant leg pain side effects.   Initiated trial of different cholesterol medication at lowest dose to see if it causes fewer side effects.   Ordered lipid panel for December to assess cholesterol levels.   If new medication is ineffective or poorly tolerated, will reassess cholesterol levels in December without medication.   May need referral to cardiologist for injectable cholesterol medication    Explained that most cholesterol medications, including injectables, can cause similar side effects.   Scheduled follow up in 6 months to recheck cholesterol levels.   Patient instructed to contact office if new medication is ineffective or causes side effects.      Other Relevant Orders    CBC Auto Differential    Comprehensive Metabolic Panel    Lipid Panel    TSH       Hematology    History of DVT (deep vein thrombosis)       Endocrine    Hypothyroidism due to medication    Relevant Medications    levothyroxine (SYNTHROID) 88 MCG tablet  The current medical regimen is effective;  continue present plan and medications.       Prediabetes    Relevant Orders    Hemoglobin A1C     Other Visit Diagnoses         Essential hypertension        Relevant Medications    amLODIPine (NORVASC) 10 MG tablet  The current medical regimen is effective;  continue present plan and medications.       Other Relevant Orders    CBC Auto Differential    Comprehensive Metabolic Panel    Lipid  Panel    TSH      Encounter for colorectal cancer screening        Relevant Orders    Ambulatory referral/consult to Endo Procedure               This note was generated with the assistance of ambient listening technology. Verbal consent was obtained by the patient and accompanying visitor(s) for the recording of patient appointment to facilitate this note. I attest to having reviewed and edited the generated note for accuracy, though some syntax or spelling errors may persist. Please contact the author of this note for any clarification.

## 2025-06-23 ENCOUNTER — TELEPHONE (OUTPATIENT)
Dept: ENDOSCOPY | Facility: HOSPITAL | Age: 70
End: 2025-06-23
Payer: COMMERCIAL

## 2025-06-23 ENCOUNTER — CLINICAL SUPPORT (OUTPATIENT)
Dept: ENDOSCOPY | Facility: HOSPITAL | Age: 70
End: 2025-06-23
Attending: FAMILY MEDICINE
Payer: COMMERCIAL

## 2025-06-23 DIAGNOSIS — Z12.12 ENCOUNTER FOR COLORECTAL CANCER SCREENING: ICD-10-CM

## 2025-06-23 DIAGNOSIS — Z12.11 ENCOUNTER FOR COLORECTAL CANCER SCREENING: ICD-10-CM

## 2025-06-23 DIAGNOSIS — Z12.11 COLON CANCER SCREENING: Primary | ICD-10-CM

## 2025-06-26 ENCOUNTER — TELEPHONE (OUTPATIENT)
Dept: ENDOSCOPY | Facility: HOSPITAL | Age: 70
End: 2025-06-26
Payer: COMMERCIAL

## 2025-06-26 ENCOUNTER — PATIENT MESSAGE (OUTPATIENT)
Dept: ENDOSCOPY | Facility: HOSPITAL | Age: 70
End: 2025-06-26

## 2025-06-26 NOTE — TELEPHONE ENCOUNTER
Contacted the patient to schedule an endoscopy procedure(s) Colonoscopy . The patient did not answer the call and left a voice message requesting a call back.              Endoscopy Scheduling Department  [facility]  [address] [place] [address]  Take the [facility] Elevators to 4th Floor Endoscopy Lab      Date: 06/26/2025      Medical Record #       Dear  Fauzia Kirk    An order for the following procedure(s) Colonoscopy was placed for you by Era Urena MD.      Since multiple attempts have been made to get in touch with you, this is the last notification. Please call the scheduling nurse to schedule this procedure as soon as possible.    If you have already scheduled this appointment, please disregard this letter. If you would like to cancel this request, please call the number listed below.     Sincerely,        Endoscopy Scheduling Department [phone]        Comments: Office hours are Monday through Friday 8-430p.

## 2025-08-05 ENCOUNTER — NURSE TRIAGE (OUTPATIENT)
Dept: ADMINISTRATIVE | Facility: CLINIC | Age: 70
End: 2025-08-05
Payer: COMMERCIAL

## 2025-08-05 NOTE — TELEPHONE ENCOUNTER
Pt c/o having a bad cough and aches behind legs. Daughter is positive for f/u. Denies fever at this time. States that pain is behind knees with walking. States that cough is with yellow sputum. Pt states that she has a non stop cough. Advised to be seen within 24 hours per protocol. Appt scheduled per protocol. VU. Encounter routed to provider.   Reason for Disposition   [1] Continuous (nonstop) coughing interferes with work or school AND [2] no improvement using cough treatment per Care Advice    Additional Information   Negative: SEVERE difficulty breathing (e.g., struggling for each breath, speaks in single words)   Negative: Bluish (or gray) lips or face now   Negative: [1] Difficulty breathing AND [2] exposure to flames, smoke, or fumes   Negative: [1] Stridor AND [2] difficulty breathing   Negative: Sounds like a life-threatening emergency to the triager   Negative: [1] MODERATE difficulty breathing (e.g., speaks in phrases, SOB even at rest, pulse 100-120) AND [2] still present when not coughing   Negative: Chest pain  (Exception: MILD central chest pain, present only when coughing.)   Negative: Patient sounds very sick or weak to the triager   Negative: [1] MILD difficulty breathing (e.g., minimal/no SOB at rest, SOB with walking, pulse < 100) AND [2] still present when not coughing   Negative: [1] Coughed up blood AND [2] > 1 tablespoon (15 ml)  (Exception: Blood-tinged sputum.)   Negative: Fever > 103 F (39.4 C)   Negative: [1] Fever > 101 F (38.3 C) AND [2] age > 60 years   Negative: [1] Fever > 100 F (37.8 C) AND [2] bedridden (e.g., CVA, chronic illness, recovering from surgery)   Negative: [1] Fever > 100 F (37.8 C) AND [2] diabetes mellitus or weak immune system (e.g., HIV positive, cancer chemo, splenectomy, organ transplant, chronic steroids)   Negative: Wheezing is present   Negative: SEVERE coughing spells (e.g., whooping sound after coughing, vomiting after coughing)    Protocols used: Cough -  Acute [id number]

## 2025-08-06 ENCOUNTER — OFFICE VISIT (OUTPATIENT)
Dept: FAMILY MEDICINE | Facility: CLINIC | Age: 70
End: 2025-08-06
Payer: COMMERCIAL

## 2025-08-06 VITALS
OXYGEN SATURATION: 97 % | HEART RATE: 100 BPM | SYSTOLIC BLOOD PRESSURE: 122 MMHG | BODY MASS INDEX: 31.57 KG/M2 | TEMPERATURE: 98 F | WEIGHT: 196.44 LBS | DIASTOLIC BLOOD PRESSURE: 76 MMHG | HEIGHT: 66 IN

## 2025-08-06 DIAGNOSIS — I10 ESSENTIAL HYPERTENSION: ICD-10-CM

## 2025-08-06 DIAGNOSIS — I48.91 ATRIAL FIBRILLATION, UNSPECIFIED TYPE: ICD-10-CM

## 2025-08-06 DIAGNOSIS — R05.1 ACUTE COUGH: Primary | ICD-10-CM

## 2025-08-06 LAB
CTP QC/QA: YES
POC MOLECULAR INFLUENZA A AGN: NEGATIVE
POC MOLECULAR INFLUENZA B AGN: NEGATIVE
SARS-COV-2 RNA RESP QL NAA+PROBE: NEGATIVE

## 2025-08-06 PROCEDURE — 99999 PR PBB SHADOW E&M-EST. PATIENT-LVL III: CPT | Mod: PBBFAC,,,

## 2025-08-06 PROCEDURE — 87635 SARS-COV-2 COVID-19 AMP PRB: CPT

## 2025-08-06 RX ORDER — PROMETHAZINE HYDROCHLORIDE AND DEXTROMETHORPHAN HYDROBROMIDE 6.25; 15 MG/5ML; MG/5ML
5 SYRUP ORAL EVERY 6 HOURS PRN
Qty: 240 ML | Refills: 0 | Status: SHIPPED | OUTPATIENT
Start: 2025-08-06

## 2025-08-06 RX ORDER — BENZONATATE 200 MG/1
200 CAPSULE ORAL 3 TIMES DAILY PRN
Qty: 30 CAPSULE | Refills: 0 | Status: SHIPPED | OUTPATIENT
Start: 2025-08-06

## 2025-08-06 NOTE — PROGRESS NOTES
HPI     Chief Complaint:  Chief Complaint   Patient presents with    Cough       Fauzia Kirk is a 70 y.o. female with multiple medical diagnoses as listed in the medical history and problem list that presents for   Chief Complaint   Patient presents with    Cough    .     Patient is not known to me with her last appointment in this department on 6/20/2025.     Pt presents for cough.  Cough  This is a new problem. The current episode started in the past 7 days (started saturday). The problem has been gradually worsening. The cough is Productive of sputum. Associated symptoms include rhinorrhea and shortness of breath. Pertinent negatives include no nasal congestion or sore throat. The symptoms are aggravated by lying down. Treatments tried: mucinex,bryon seltzer, dayquil,nyquil, sinus pill. The treatment provided no relief. lung cancer 2017   Around daughter and grandson who were sick. Daughter with flu last week.  Assessment & Plan     Problem List Items Addressed This Visit       Atrial fibrillation  Stable. Managed with Metoprolol and Xarelto. Followed by cardiology.     Other Visit Diagnoses         Acute cough    -  Primary  Cough for the past few days. Recently exposed to daughter with the flu and other sick family members. Will swab for flu and covid. Will order tessalon perles and promethazine dm.    Relevant Medications    benzonatate (TESSALON) 200 MG capsule    promethazine-dextromethorphan (PROMETHAZINE-DM) 6.25-15 mg/5 mL Syrp    Other Relevant Orders    POCT Influenza A/B Molecular (Completed)    COVID-19 Routine Screening      Essential hypertension      BP today in clinic 122/76.  Managed with Amlodipine.                --------------------------------------------      Health Maintenance:  Health Maintenance         Date Due Completion Date    TETANUS VACCINE Never done ---    Pneumococcal Vaccines (Age 50+) (1 of 2 - PCV) Never done ---    High Dose Statin Never done ---    Shingles  "Vaccine (1 of 2) Never done ---    Colorectal Cancer Screening 09/16/2021 9/16/2011    Hemoglobin A1c (Prediabetes) 02/14/2025 2/14/2024    Influenza Vaccine (1) 09/01/2025 12/16/2024 (Declined)    Override on 12/16/2024: Declined    DEXA Scan 09/09/2029 9/9/2024    Lipid Panel 12/16/2029 12/16/2024            Health maintenance reviewed    Follow Up:  Follow up in about 4 months (around 12/6/2025), or if symptoms worsen or fail to improve.    Exam     Review of Systems:  (as noted above)  Review of Systems   HENT:  Positive for rhinorrhea. Negative for sore throat.    Respiratory:  Positive for cough and shortness of breath.        Physical Exam:   Physical Exam  Constitutional:       General: She is not in acute distress.     Appearance: Normal appearance. She is ill-appearing. She is not toxic-appearing or diaphoretic.   HENT:      Nose: Mucosal edema present. No congestion or rhinorrhea.      Mouth/Throat:      Pharynx: No pharyngeal swelling, oropharyngeal exudate or posterior oropharyngeal erythema.   Cardiovascular:      Rate and Rhythm: Normal rate. Rhythm irregular.   Pulmonary:      Effort: Pulmonary effort is normal. No respiratory distress.      Breath sounds: Normal breath sounds. No stridor. No wheezing, rhonchi or rales.   Chest:      Chest wall: No tenderness.   Neurological:      Mental Status: She is alert.       Vitals:    08/06/25 1042   BP: 122/76   BP Location: Right arm   Patient Position: Sitting   Pulse: 100   Temp: 98.4 °F (36.9 °C)   TempSrc: Oral   SpO2: 97%   Weight: 89.1 kg (196 lb 6.9 oz)   Height: 5' 6" (1.676 m)      Body mass index is 31.7 kg/m².        History     Past Medical History:  Past Medical History:   Diagnosis Date    Arthritis     Breast cancer     Hypertension     Primary cancer of right middle lobe of lung 07/13/2017    -diagnosed in 2016.  S/p chemo, radiation and immunotherapy  -mass reduced to airspace disease c/w radiation pneumonitis  -new 1 cm rll.  Repeat ct " pending.      Regional lymph node metastasis present 2017       Past Surgical History:  Past Surgical History:   Procedure Laterality Date    BREAST LUMPECTOMY       SECTION, CLASSIC      LIPOMA RESECTION      MASTECTOMY         Social History:  Social History[1]    Family History:  Family History   Problem Relation Name Age of Onset    Lymphoma Mother      Prostate cancer Father      Stomach cancer Sister      Breast cancer Sister      Diabetes Brother      Breast cancer Paternal Aunt      Breast cancer Paternal Grandmother         Allergies and Medications: (updated and reviewed)  Review of patient's allergies indicates:  No Known Allergies  Current Medications[2]    Patient Care Team:  Era Urena MD as PCP - General (Family Medicine)         - The patient is given an After Visit Summary that lists all medications with directions, allergies, education, orders placed during this encounter and follow-up instructions.      - I have reviewed the patient's medical information including past medical, family, and social history sections including the medications and allergies.      - We discussed the patient's current medications.     This note was created by combination of typed  and MModal dictation.  Transcription errors may be present.  If there are any questions, please contact me.       Maida Gaona NP                      [1]   Social History  Socioeconomic History    Marital status:    Tobacco Use    Smoking status: Never    Smokeless tobacco: Never   Substance and Sexual Activity    Alcohol use: No     Alcohol/week: 0.0 standard drinks of alcohol    Drug use: No    Sexual activity: Yes     Partners: Male   [2]   Current Outpatient Medications   Medication Sig Dispense Refill    albuterol (VENTOLIN HFA) 90 mcg/actuation inhaler Inhale 2 puffs into the lungs every 6 (six) hours as needed for Wheezing or Shortness of Breath. Rescue 8 g 3    amLODIPine (NORVASC) 10 MG tablet  Take 1 tablet (10 mg total) by mouth once daily. 90 tablet 3    gabapentin (NEURONTIN) 100 MG capsule Take 100 mg by mouth 3 (three) times daily.      levothyroxine (SYNTHROID) 88 MCG tablet Take 1 tablet (88 mcg total) by mouth before breakfast. 90 tablet 3    metoprolol tartrate (LOPRESSOR) 25 MG tablet Take 1 tablet (25 mg total) by mouth 2 (two) times daily. 180 tablet 3    omeprazole (PRILOSEC) 20 MG capsule Take 1 capsule (20 mg total) by mouth once daily. 90 capsule 3    pravastatin (PRAVACHOL) 10 MG tablet Take 1 tablet (10 mg total) by mouth once daily. 90 tablet 3    rivaroxaban (XARELTO) 20 mg Tab Take 1 tablet (20 mg total) by mouth daily with dinner or evening meal. Do not start until completed 21 days of 15 mg twice a day with meals 90 tablet 3    tiZANidine (ZANAFLEX) 4 MG tablet Take 1 tablet (4 mg total) by mouth every 6 (six) hours as needed (muscle spasm). 60 tablet 0    benzonatate (TESSALON) 200 MG capsule Take 1 capsule (200 mg total) by mouth 3 (three) times daily as needed for Cough. 30 capsule 0    promethazine-dextromethorphan (PROMETHAZINE-DM) 6.25-15 mg/5 mL Syrp Take 5 mLs by mouth every 6 (six) hours as needed (cough). 240 mL 0     No current facility-administered medications for this visit.

## 2025-08-07 ENCOUNTER — TELEPHONE (OUTPATIENT)
Dept: FAMILY MEDICINE | Facility: CLINIC | Age: 70
End: 2025-08-07
Payer: COMMERCIAL

## 2025-08-07 NOTE — TELEPHONE ENCOUNTER
----- Message from Maida Gaona NP sent at 8/7/2025 10:54 AM CDT -----  Please let patient know that her covid and flu results came back negative. Thanks  ----- Message -----  From: Sully Telles MA  Sent: 8/6/2025  11:25 AM CDT  To: Maida Gaona NP

## 2025-08-12 ENCOUNTER — TELEPHONE (OUTPATIENT)
Dept: FAMILY MEDICINE | Facility: CLINIC | Age: 70
End: 2025-08-12
Payer: COMMERCIAL

## 2025-08-12 DIAGNOSIS — J20.8 ACUTE BACTERIAL BRONCHITIS: Primary | ICD-10-CM

## 2025-08-12 DIAGNOSIS — B96.89 ACUTE BACTERIAL BRONCHITIS: Primary | ICD-10-CM

## 2025-08-13 RX ORDER — AZITHROMYCIN 250 MG/1
TABLET, FILM COATED ORAL
Qty: 6 TABLET | Refills: 0 | Status: SHIPPED | OUTPATIENT
Start: 2025-08-13 | End: 2025-08-18

## 2025-08-27 DIAGNOSIS — R06.2 WHEEZING: ICD-10-CM

## 2025-08-28 ENCOUNTER — TELEPHONE (OUTPATIENT)
Dept: FAMILY MEDICINE | Facility: CLINIC | Age: 70
End: 2025-08-28
Payer: COMMERCIAL

## 2025-08-28 RX ORDER — ALBUTEROL SULFATE 90 UG/1
2 INHALANT RESPIRATORY (INHALATION) EVERY 6 HOURS PRN
Qty: 8 G | Refills: 3 | Status: SHIPPED | OUTPATIENT
Start: 2025-08-28

## 2025-08-30 ENCOUNTER — OFFICE VISIT (OUTPATIENT)
Dept: URGENT CARE | Facility: CLINIC | Age: 70
End: 2025-08-30
Payer: COMMERCIAL

## 2025-09-04 DIAGNOSIS — E03.2 HYPOTHYROIDISM DUE TO MEDICATION: ICD-10-CM

## 2025-09-04 RX ORDER — LEVOTHYROXINE SODIUM 88 UG/1
88 TABLET ORAL
Qty: 90 TABLET | Refills: 1 | Status: SHIPPED | OUTPATIENT
Start: 2025-09-04

## 2025-09-05 DIAGNOSIS — K21.9 GASTROESOPHAGEAL REFLUX DISEASE, UNSPECIFIED WHETHER ESOPHAGITIS PRESENT: ICD-10-CM

## 2025-09-05 RX ORDER — OMEPRAZOLE 20 MG/1
20 CAPSULE, DELAYED RELEASE ORAL DAILY
Qty: 90 CAPSULE | Refills: 3 | Status: SHIPPED | OUTPATIENT
Start: 2025-09-05

## (undated) DEVICE — ELECTRODE REM PLYHSV RETURN 9

## (undated) DEVICE — SYR SLIP TIP 10ML SHIELD

## (undated) DEVICE — NDL ASPIRATING VIZISHOT 20-40M

## (undated) DEVICE — SPONGE DERMACEA 4X4IN 12PLY

## (undated) DEVICE — TRACKER PATIENT VPAD

## (undated) DEVICE — ADHESIVE MASTISOL VIAL 48/BX

## (undated) DEVICE — DRESSING TRANS 6X8 TEGADERM

## (undated) DEVICE — PACK SET UP CONVERTORS

## (undated) DEVICE — SOL NACL 0.9% INJ PF/50151

## (undated) DEVICE — DRAPE C ARM 42 X 120 10/BX

## (undated) DEVICE — SUT MONOCRYL 4-0 PS-2

## (undated) DEVICE — SEE MEDLINE ITEM 157131

## (undated) DEVICE — LUBRICANT SURGILUBE 2 OZ

## (undated) DEVICE — SEE MEDLINE ITEM 146313

## (undated) DEVICE — GAUZE SPONGE 4X4 12PLY

## (undated) DEVICE — ADAPTER SWIVEL

## (undated) DEVICE — DRESSING TRANS 4X4 TEGADERM

## (undated) DEVICE — SYR SLIP TIP 20CC

## (undated) DEVICE — DRAPE THYROID WITH ARMBOARD

## (undated) DEVICE — CONTAINER SPECIMEN STRL 4OZ

## (undated) DEVICE — CLOSURE SKIN STERI STRIP 1/2X4

## (undated) DEVICE — TRAY MINOR GEN SURG